# Patient Record
Sex: FEMALE | Race: WHITE | HISPANIC OR LATINO | Employment: OTHER | URBAN - METROPOLITAN AREA
[De-identification: names, ages, dates, MRNs, and addresses within clinical notes are randomized per-mention and may not be internally consistent; named-entity substitution may affect disease eponyms.]

---

## 2017-05-08 ENCOUNTER — OFFICE VISIT (OUTPATIENT)
Dept: INTERNAL MEDICINE | Facility: CLINIC | Age: 27
End: 2017-05-08

## 2017-05-08 ENCOUNTER — TELEPHONE (OUTPATIENT)
Dept: INTERNAL MEDICINE | Facility: CLINIC | Age: 27
End: 2017-05-08

## 2017-05-08 VITALS
BODY MASS INDEX: 37.45 KG/M2 | OXYGEN SATURATION: 97 % | HEART RATE: 103 BPM | TEMPERATURE: 97.7 F | DIASTOLIC BLOOD PRESSURE: 78 MMHG | HEIGHT: 65 IN | SYSTOLIC BLOOD PRESSURE: 128 MMHG | WEIGHT: 224.8 LBS

## 2017-05-08 DIAGNOSIS — R63.1 POLYDIPSIA: Primary | ICD-10-CM

## 2017-05-08 DIAGNOSIS — Z86.32 PERSONAL HISTORY OF GESTATIONAL DIABETES: ICD-10-CM

## 2017-05-08 DIAGNOSIS — R53.83 OTHER FATIGUE: ICD-10-CM

## 2017-05-08 DIAGNOSIS — E11.65 TYPE 2 DIABETES MELLITUS WITH HYPERGLYCEMIA, WITHOUT LONG-TERM CURRENT USE OF INSULIN (HCC): ICD-10-CM

## 2017-05-08 DIAGNOSIS — B35.6 TINEA CRURIS: ICD-10-CM

## 2017-05-08 DIAGNOSIS — R42 DIZZINESS: ICD-10-CM

## 2017-05-08 DIAGNOSIS — R74.01 ELEVATED TRANSAMINASE LEVEL: ICD-10-CM

## 2017-05-08 LAB
ALBUMIN SERPL-MCNC: 4.2 G/DL (ref 3.2–4.8)
ALBUMIN/GLOB SERPL: 1.4 G/DL (ref 1.5–2.5)
ALP SERPL-CCNC: 144 U/L (ref 25–100)
ALT SERPL W P-5'-P-CCNC: 78 U/L (ref 7–40)
ANION GAP SERPL CALCULATED.3IONS-SCNC: 8 MMOL/L (ref 3–11)
ARTICHOKE IGE QN: 155 MG/DL (ref 0–130)
AST SERPL-CCNC: 35 U/L (ref 0–33)
BASOPHILS # BLD AUTO: 0.03 10*3/MM3 (ref 0–0.2)
BASOPHILS NFR BLD AUTO: 0.5 % (ref 0–1)
BILIRUB BLD-MCNC: NEGATIVE MG/DL
BILIRUB SERPL-MCNC: 0.4 MG/DL (ref 0.3–1.2)
BUN BLD-MCNC: 10 MG/DL (ref 9–23)
BUN/CREAT SERPL: 12.5 (ref 7–25)
CALCIUM SPEC-SCNC: 9.8 MG/DL (ref 8.7–10.4)
CHLORIDE SERPL-SCNC: 105 MMOL/L (ref 99–109)
CHOLEST SERPL-MCNC: 201 MG/DL (ref 0–200)
CLARITY, POC: CLEAR
CO2 SERPL-SCNC: 28 MMOL/L (ref 20–31)
COLOR UR: YELLOW
CREAT BLD-MCNC: 0.8 MG/DL (ref 0.6–1.3)
DEPRECATED RDW RBC AUTO: 41.7 FL (ref 37–54)
EOSINOPHIL # BLD AUTO: 0.09 10*3/MM3 (ref 0.1–0.3)
EOSINOPHIL NFR BLD AUTO: 1.6 % (ref 0–3)
ERYTHROCYTE [DISTWIDTH] IN BLOOD BY AUTOMATED COUNT: 12.8 % (ref 11.3–14.5)
FOLATE SERPL-MCNC: 23.55 NG/ML (ref 3.2–20)
GFR SERPL CREATININE-BSD FRML MDRD: 85 ML/MIN/1.73
GLOBULIN UR ELPH-MCNC: 3.1 GM/DL
GLUCOSE BLD-MCNC: 411 MG/DL (ref 70–100)
GLUCOSE BLDC GLUCOMTR-MCNC: ABNORMAL MG/DL (ref 70–130)
GLUCOSE UR STRIP-MCNC: ABNORMAL MG/DL
HAV IGM SERPL QL IA: NORMAL
HBA1C MFR BLD: 10.8 %
HBV CORE IGM SERPL QL IA: NORMAL
HBV SURFACE AG SERPL QL IA: NORMAL
HCT VFR BLD AUTO: 43.1 % (ref 34.5–44)
HCV AB SER DONR QL: NORMAL
HDLC SERPL-MCNC: 48 MG/DL (ref 40–60)
HGB BLD-MCNC: 14.7 G/DL (ref 11.5–15.5)
IMM GRANULOCYTES # BLD: 0.02 10*3/MM3 (ref 0–0.03)
IMM GRANULOCYTES NFR BLD: 0.3 % (ref 0–0.6)
KETONES UR QL: NEGATIVE
LEUKOCYTE EST, POC: NEGATIVE
LYMPHOCYTES # BLD AUTO: 2.12 10*3/MM3 (ref 0.6–4.8)
LYMPHOCYTES NFR BLD AUTO: 36.9 % (ref 24–44)
MCH RBC QN AUTO: 30.4 PG (ref 27–31)
MCHC RBC AUTO-ENTMCNC: 34.1 G/DL (ref 32–36)
MCV RBC AUTO: 89 FL (ref 80–99)
MONOCYTES # BLD AUTO: 0.42 10*3/MM3 (ref 0–1)
MONOCYTES NFR BLD AUTO: 7.3 % (ref 0–12)
NEUTROPHILS # BLD AUTO: 3.06 10*3/MM3 (ref 1.5–8.3)
NEUTROPHILS NFR BLD AUTO: 53.4 % (ref 41–71)
NITRITE UR-MCNC: NEGATIVE MG/ML
PH UR: 6 [PH] (ref 5–8)
PLATELET # BLD AUTO: 161 10*3/MM3 (ref 150–450)
PMV BLD AUTO: 11.9 FL (ref 6–12)
POTASSIUM BLD-SCNC: 4.5 MMOL/L (ref 3.5–5.5)
PROT SERPL-MCNC: 7.3 G/DL (ref 5.7–8.2)
PROT UR STRIP-MCNC: NEGATIVE MG/DL
RBC # BLD AUTO: 4.84 10*6/MM3 (ref 3.89–5.14)
RBC # UR STRIP: NEGATIVE /UL
SODIUM BLD-SCNC: 141 MMOL/L (ref 132–146)
SP GR UR: 1.02 (ref 1–1.03)
T4 FREE SERPL-MCNC: 1.11 NG/DL (ref 0.89–1.76)
TRIGL SERPL-MCNC: 252 MG/DL (ref 0–150)
TSH SERPL DL<=0.05 MIU/L-ACNC: 1.3 MIU/ML (ref 0.35–5.35)
UROBILINOGEN UR QL: NORMAL
VIT B12 BLD-MCNC: 594 PG/ML (ref 211–911)
WBC NRBC COR # BLD: 5.74 10*3/MM3 (ref 3.5–10.8)

## 2017-05-08 PROCEDURE — 80074 ACUTE HEPATITIS PANEL: CPT | Performed by: FAMILY MEDICINE

## 2017-05-08 PROCEDURE — 84439 ASSAY OF FREE THYROXINE: CPT | Performed by: FAMILY MEDICINE

## 2017-05-08 PROCEDURE — 85025 COMPLETE CBC W/AUTO DIFF WBC: CPT | Performed by: FAMILY MEDICINE

## 2017-05-08 PROCEDURE — 81003 URINALYSIS AUTO W/O SCOPE: CPT | Performed by: FAMILY MEDICINE

## 2017-05-08 PROCEDURE — 82607 VITAMIN B-12: CPT | Performed by: FAMILY MEDICINE

## 2017-05-08 PROCEDURE — 84443 ASSAY THYROID STIM HORMONE: CPT | Performed by: FAMILY MEDICINE

## 2017-05-08 PROCEDURE — 82746 ASSAY OF FOLIC ACID SERUM: CPT | Performed by: FAMILY MEDICINE

## 2017-05-08 PROCEDURE — 83036 HEMOGLOBIN GLYCOSYLATED A1C: CPT | Performed by: FAMILY MEDICINE

## 2017-05-08 PROCEDURE — 99203 OFFICE O/P NEW LOW 30 MIN: CPT | Performed by: FAMILY MEDICINE

## 2017-05-08 PROCEDURE — 82962 GLUCOSE BLOOD TEST: CPT | Performed by: FAMILY MEDICINE

## 2017-05-08 PROCEDURE — 80061 LIPID PANEL: CPT | Performed by: FAMILY MEDICINE

## 2017-05-08 PROCEDURE — 80053 COMPREHEN METABOLIC PANEL: CPT | Performed by: FAMILY MEDICINE

## 2017-05-08 RX ORDER — BLOOD-GLUCOSE METER
KIT MISCELLANEOUS
Qty: 1 EACH | Refills: 0 | Status: SHIPPED | OUTPATIENT
Start: 2017-05-08 | End: 2018-06-08 | Stop reason: SDUPTHER

## 2017-05-10 ENCOUNTER — HOSPITAL ENCOUNTER (OUTPATIENT)
Dept: ULTRASOUND IMAGING | Facility: HOSPITAL | Age: 27
Discharge: HOME | End: 2017-05-10

## 2017-05-16 ENCOUNTER — HOSPITAL ENCOUNTER (OUTPATIENT)
Dept: ULTRASOUND IMAGING | Facility: HOSPITAL | Age: 27
Discharge: HOME | End: 2017-05-16
Admitting: FAMILY MEDICINE

## 2017-05-16 DIAGNOSIS — R74.01 ELEVATED TRANSAMINASE LEVEL: ICD-10-CM

## 2017-05-16 PROCEDURE — 76705 ECHO EXAM OF ABDOMEN: CPT

## 2017-05-17 ENCOUNTER — OFFICE VISIT (OUTPATIENT)
Dept: INTERNAL MEDICINE | Facility: CLINIC | Age: 27
End: 2017-05-17

## 2017-05-17 VITALS
WEIGHT: 222.2 LBS | OXYGEN SATURATION: 98 % | HEIGHT: 65 IN | DIASTOLIC BLOOD PRESSURE: 68 MMHG | SYSTOLIC BLOOD PRESSURE: 122 MMHG | BODY MASS INDEX: 37.02 KG/M2 | TEMPERATURE: 97.5 F | HEART RATE: 92 BPM

## 2017-05-17 DIAGNOSIS — IMO0001 UNCONTROLLED TYPE 2 DIABETES MELLITUS WITHOUT COMPLICATION, WITHOUT LONG-TERM CURRENT USE OF INSULIN: Primary | ICD-10-CM

## 2017-05-17 DIAGNOSIS — R19.7 DIARRHEA, UNSPECIFIED TYPE: ICD-10-CM

## 2017-05-17 PROCEDURE — 99213 OFFICE O/P EST LOW 20 MIN: CPT | Performed by: FAMILY MEDICINE

## 2017-05-17 RX ORDER — INSULIN GLARGINE 100 [IU]/ML
10 INJECTION, SOLUTION SUBCUTANEOUS NIGHTLY
Qty: 5 PEN | Refills: 1 | Status: SHIPPED | OUTPATIENT
Start: 2017-05-17 | End: 2017-10-09 | Stop reason: SDUPTHER

## 2017-05-17 RX ORDER — LANCETS 28 GAUGE
EACH MISCELLANEOUS
Qty: 100 EACH | Refills: 12 | Status: SHIPPED | OUTPATIENT
Start: 2017-05-17 | End: 2018-08-16 | Stop reason: SDUPTHER

## 2017-05-17 RX ORDER — PEN NEEDLE, DIABETIC 30 GX3/16"
NEEDLE, DISPOSABLE MISCELLANEOUS
Qty: 100 EACH | Refills: 3 | Status: SHIPPED | OUTPATIENT
Start: 2017-05-17 | End: 2017-07-17 | Stop reason: SDUPTHER

## 2017-05-24 ENCOUNTER — TELEPHONE (OUTPATIENT)
Dept: INTERNAL MEDICINE | Facility: CLINIC | Age: 27
End: 2017-05-24

## 2017-05-24 DIAGNOSIS — E11.65 TYPE 2 DIABETES MELLITUS WITH HYPERGLYCEMIA, WITHOUT LONG-TERM CURRENT USE OF INSULIN (HCC): ICD-10-CM

## 2017-06-07 ENCOUNTER — HOSPITAL ENCOUNTER (OUTPATIENT)
Dept: DIABETES SERVICES | Facility: HOSPITAL | Age: 27
Setting detail: RECURRING SERIES
Discharge: HOME | End: 2017-06-07

## 2017-06-08 ENCOUNTER — HOSPITAL ENCOUNTER (OUTPATIENT)
Dept: DIABETES SERVICES | Facility: HOSPITAL | Age: 27
Setting detail: RECURRING SERIES
Discharge: HOME | End: 2017-06-08

## 2017-06-08 PROCEDURE — G0109 DIAB MANAGE TRN IND/GROUP: HCPCS | Performed by: DIETITIAN, REGISTERED

## 2017-06-27 ENCOUNTER — OFFICE VISIT (OUTPATIENT)
Dept: INTERNAL MEDICINE | Facility: CLINIC | Age: 27
End: 2017-06-27

## 2017-06-27 VITALS
WEIGHT: 222.8 LBS | SYSTOLIC BLOOD PRESSURE: 120 MMHG | HEIGHT: 65 IN | TEMPERATURE: 98.3 F | OXYGEN SATURATION: 98 % | BODY MASS INDEX: 37.12 KG/M2 | HEART RATE: 99 BPM | DIASTOLIC BLOOD PRESSURE: 72 MMHG

## 2017-06-27 DIAGNOSIS — B35.2 TINEA MANUS: Primary | ICD-10-CM

## 2017-06-27 DIAGNOSIS — E11.65 TYPE 2 DIABETES MELLITUS WITH HYPERGLYCEMIA, WITH LONG-TERM CURRENT USE OF INSULIN (HCC): ICD-10-CM

## 2017-06-27 DIAGNOSIS — Z79.4 TYPE 2 DIABETES MELLITUS WITH HYPERGLYCEMIA, WITH LONG-TERM CURRENT USE OF INSULIN (HCC): ICD-10-CM

## 2017-06-27 PROBLEM — R42 DIZZINESS: Status: RESOLVED | Noted: 2017-05-08 | Resolved: 2017-06-27

## 2017-06-27 PROCEDURE — 99214 OFFICE O/P EST MOD 30 MIN: CPT | Performed by: FAMILY MEDICINE

## 2017-06-27 RX ORDER — LORATADINE 10 MG/1
10 TABLET ORAL DAILY
Qty: 30 TABLET | Refills: 3 | Status: SHIPPED | OUTPATIENT
Start: 2017-06-27 | End: 2018-04-11 | Stop reason: SDUPTHER

## 2017-06-27 RX ORDER — KETOCONAZOLE 20 MG/G
CREAM TOPICAL EVERY 12 HOURS SCHEDULED
Qty: 30 G | Refills: 0 | Status: SHIPPED | OUTPATIENT
Start: 2017-06-27 | End: 2017-07-17 | Stop reason: SDUPTHER

## 2017-06-27 NOTE — PROGRESS NOTES
"Subjective   Shelleybruce Graff is a 27 y.o. female.     Chief Complaint   Patient presents with   • Diabetes     1 month follow up.  Went to diabetes class   • Dizziness     dizziness is better       Visit Vitals   • /72   • Pulse 99   • Temp 98.3 °F (36.8 °C)   • Ht 64.8\" (164.6 cm)   • Wt 222 lb 12.8 oz (101 kg)   • LMP  (LMP Unknown)  Comment: Mirena   • SpO2 98%   • BMI 37.31 kg/m2       Diabetes   She presents for her follow-up diabetic visit. She has type 2 diabetes mellitus. Her disease course has been improving. There are no hypoglycemic associated symptoms. Pertinent negatives for hypoglycemia include no dizziness, headaches or nervousness/anxiousness. Pertinent negatives for diabetes include no blurred vision, no chest pain, no fatigue, no foot paresthesias, no foot ulcerations, no polydipsia, no polyphagia, no polyuria, no visual change, no weakness and no weight loss. There are no hypoglycemic complications. Symptoms are improving. Pertinent negatives for diabetic complications include no CVA, heart disease, nephropathy, peripheral neuropathy, PVD or retinopathy. Risk factors for coronary artery disease include diabetes mellitus. Current diabetic treatment includes diet, oral agent (monotherapy) and insulin injections. Her weight is stable. She is following a diabetic diet. Meal planning includes avoidance of concentrated sweets and carbohydrate counting. Her home blood glucose trend is decreasing rapidly. An ACE inhibitor/angiotensin II receptor blocker is not being taken. She does not see a podiatrist.Eye exam is not current.   Dizziness   The problem has been resolved. Pertinent negatives include no abdominal pain, anorexia, arthralgias, change in bowel habit, chest pain, chills, congestion, coughing, diaphoresis, fatigue, fever, headaches, joint swelling, myalgias, nausea, neck pain, numbness, rash, sore throat, swollen glands, urinary symptoms, vertigo, visual change, vomiting or " weakness. Nothing aggravates the symptoms. Treatments tried: control of diabetes. The treatment provided significant relief.        Pt ate watermelon last night and BS was 178.  Blood sugar in am 150 up to 180.  Pt has gone to 2 diabetic classes.   Pt's boss is giving her grief about going to classes.  Pt stopped breast feeding 2 weeks ago.     Pt has scaly rash on right hand    The following portions of the patient's history were reviewed and updated as appropriate: allergies, current medications, past family history, past medical history, past social history, past surgical history and problem list.    Past Medical History:   Diagnosis Date   • Gestational diabetes    • Pap smear for cervical cancer screening 2017       Past Surgical History:   Procedure Laterality Date   • CERVICAL CONE BIOPSY      age 14-benign   •  SECTION  , 08, 11/6/15    x3 5/15/13       No Known Allergies    Family History   Problem Relation Age of Onset   • Stroke Mother    • Heart disease Mother    • Diabetes Mother    • Obesity Mother    • Hypertension Mother        Social History     Social History   • Marital status:      Spouse name: N/A   • Number of children: N/A   • Years of education: N/A     Occupational History   • Not on file.     Social History Main Topics   • Smoking status: Former Smoker     Packs/day: 2.00     Years: 2.00     Quit date: 2012   • Smokeless tobacco: Never Used   • Alcohol use No   • Drug use: No   • Sexual activity: Yes     Partners: Male     Birth control/ protection: IUD      Comment:      Other Topics Concern   • Not on file     Social History Narrative       Review of Systems   Constitutional: Negative.  Negative for chills, diaphoresis, fatigue, fever and weight loss.   HENT: Positive for postnasal drip, rhinorrhea and sinus pressure. Negative for congestion, ear pain, nosebleeds, sneezing and sore throat.    Eyes: Negative.  Negative for blurred vision, redness and  itching.   Respiratory: Negative.  Negative for cough, shortness of breath and wheezing.    Cardiovascular: Negative.  Negative for chest pain and palpitations.   Gastrointestinal: Negative.  Negative for abdominal pain, anorexia, change in bowel habit, constipation, diarrhea, nausea and vomiting.   Endocrine: Negative.  Negative for cold intolerance, heat intolerance, polydipsia, polyphagia and polyuria.   Genitourinary: Negative.  Negative for dysuria, frequency, hematuria and urgency.   Musculoskeletal: Negative.  Negative for arthralgias, back pain, joint swelling, myalgias and neck pain.   Skin: Negative.  Negative for color change and rash.   Allergic/Immunologic: Positive for environmental allergies.   Neurological: Negative for dizziness, vertigo, syncope, weakness, light-headedness, numbness and headaches.   Hematological: Negative.  Negative for adenopathy. Does not bruise/bleed easily.   Psychiatric/Behavioral: Negative.  Negative for dysphoric mood. The patient is not nervous/anxious.        Objective   Physical Exam   Constitutional: She is oriented to person, place, and time. She appears well-developed.   HENT:   Head: Normocephalic.   Right Ear: External ear normal.   Left Ear: External ear normal.   Nose: Nose normal.   Eyes: Conjunctivae and EOM are normal. Pupils are equal, round, and reactive to light.   Neck: Normal range of motion. Neck supple.   Cardiovascular: Normal rate and regular rhythm.    No murmur heard.  Pulmonary/Chest: Effort normal and breath sounds normal.   Musculoskeletal: Normal range of motion.    Shelley had a diabetic foot exam performed (normal) today.   During the foot exam she had a monofilament test performed (normal).    Vascular Status -  Her exam exhibits right foot vasculature normal. Her exam exhibits no right foot edema. Her exam exhibits left foot vasculature normal. Her exam exhibits no left foot edema.   Skin Integrity  -  Her right foot skin is intact.     Shelley  's left foot skin is intact. .  Neurological: She is alert and oriented to person, place, and time.   Skin: Skin is warm and dry. Rash noted.        Psychiatric: She has a normal mood and affect. Her behavior is normal.   Nursing note and vitals reviewed.      Assessment/Plan   Shelley was seen today for diabetes and dizziness.    Diagnoses and all orders for this visit:    Tinea manus    Type 2 diabetes mellitus with hyperglycemia, with long-term current use of insulin  -     metFORMIN (GLUCOPHAGE) 1000 MG tablet; Take 1 tablet by mouth 2 (Two) Times a Day With Meals.  -     SITagliptin (JANUVIA) 50 MG tablet; Take 1 tablet by mouth Daily.    Other orders  -     loratadine (CLARITIN) 10 MG tablet; Take 1 tablet by mouth Daily.  -     ketoconazole (NIZORAL) 2 % cream; Apply  topically Every 12 (Twelve) Hours.        Cut back on insulin by 2 units if sugar is under 100 every 3 days.            Current Outpatient Prescriptions:   •  glucose blood test strip, Used to check blood sugar three times daily for diabetes E11.9., Disp: 100 each, Rfl: 12  •  glucose monitoring kit (FREESTYLE) monitoring kit, Use daily and prn, Disp: 1 each, Rfl: 0  •  Insulin Glargine (BASAGLAR KWIKPEN) 100 UNIT/ML injection pen, Inject 10 Units under the skin Every Night., Disp: 5 pen, Rfl: 1  •  Insulin Pen Needle (PEN NEEDLES) 31G X 8 MM misc, Use daily with insulin E11.65, Disp: 100 each, Rfl: 3  •  Lancets (FREESTYLE) lancets, Use to check blood sugar for Diabetes E11.9., Disp: 100 each, Rfl: 12  •  levonorgestrel (MIRENA) 20 MCG/24HR IUD, 1 each by Intrauterine route 1 (One) Time., Disp: , Rfl:   •  metFORMIN (GLUCOPHAGE) 1000 MG tablet, Take 1 tablet by mouth 2 (Two) Times a Day With Meals., Disp: 60 tablet, Rfl: 1  •  ketoconazole (NIZORAL) 2 % cream, Apply  topically Every 12 (Twelve) Hours., Disp: 30 g, Rfl: 0  •  loratadine (CLARITIN) 10 MG tablet, Take 1 tablet by mouth Daily., Disp: 30 tablet, Rfl: 3  •  SITagliptin (JANUVIA) 50 MG  tablet, Take 1 tablet by mouth Daily., Disp: 30 tablet, Rfl: 1    Return in about 4 weeks (around 7/25/2017), or if symptoms worsen or fail to improve, for Recheck.

## 2017-07-17 ENCOUNTER — TELEPHONE (OUTPATIENT)
Dept: INTERNAL MEDICINE | Facility: CLINIC | Age: 27
End: 2017-07-17

## 2017-07-17 DIAGNOSIS — Z79.4 TYPE 2 DIABETES MELLITUS WITH HYPERGLYCEMIA, WITH LONG-TERM CURRENT USE OF INSULIN (HCC): ICD-10-CM

## 2017-07-17 DIAGNOSIS — E11.65 TYPE 2 DIABETES MELLITUS WITH HYPERGLYCEMIA, WITH LONG-TERM CURRENT USE OF INSULIN (HCC): ICD-10-CM

## 2017-07-17 RX ORDER — KETOCONAZOLE 20 MG/G
CREAM TOPICAL EVERY 12 HOURS SCHEDULED
Qty: 30 G | Refills: 0 | Status: SHIPPED | OUTPATIENT
Start: 2017-07-17 | End: 2017-11-08

## 2017-07-17 RX ORDER — PEN NEEDLE, DIABETIC 30 GX3/16"
NEEDLE, DISPOSABLE MISCELLANEOUS
Qty: 100 EACH | Refills: 3 | Status: SHIPPED | OUTPATIENT
Start: 2017-07-17 | End: 2017-11-08

## 2017-07-17 NOTE — TELEPHONE ENCOUNTER
PT NEEDS DR LEVY TO CALL IN SCRIPT TP JANUVIA 50MG CLARITIN, NIZORAL AND ANTIBIOTIC THAT GOES WITH CREAM AND SHE NEEDS PEN NEEDS SENT SID MORGAN

## 2017-07-28 ENCOUNTER — OFFICE VISIT (OUTPATIENT)
Dept: INTERNAL MEDICINE | Facility: CLINIC | Age: 27
End: 2017-07-28

## 2017-07-28 VITALS
SYSTOLIC BLOOD PRESSURE: 120 MMHG | DIASTOLIC BLOOD PRESSURE: 70 MMHG | HEART RATE: 105 BPM | TEMPERATURE: 97.2 F | WEIGHT: 221.8 LBS | HEIGHT: 65 IN | BODY MASS INDEX: 36.96 KG/M2 | OXYGEN SATURATION: 98 %

## 2017-07-28 DIAGNOSIS — Z79.4 TYPE 2 DIABETES MELLITUS WITH HYPERGLYCEMIA, WITH LONG-TERM CURRENT USE OF INSULIN (HCC): ICD-10-CM

## 2017-07-28 DIAGNOSIS — Z79.899 ENCOUNTER FOR LONG-TERM CURRENT USE OF MEDICATION: ICD-10-CM

## 2017-07-28 DIAGNOSIS — E11.65 TYPE 2 DIABETES MELLITUS WITH HYPERGLYCEMIA, WITH LONG-TERM CURRENT USE OF INSULIN (HCC): ICD-10-CM

## 2017-07-28 LAB
AMPHET+METHAMPHET UR QL: NEGATIVE
AMPHETAMINES UR QL: NEGATIVE
BARBITURATES UR QL SCN: NEGATIVE
BENZODIAZ UR QL SCN: NEGATIVE
BUPRENORPHINE SERPL-MCNC: NEGATIVE NG/ML
CANNABINOIDS SERPL QL: NEGATIVE
COCAINE UR QL: NEGATIVE
METHADONE UR QL SCN: NEGATIVE
OPIATES UR QL: NEGATIVE
OXYCODONE UR QL SCN: NEGATIVE
PCP UR QL SCN: NEGATIVE
PROPOXYPH UR QL: NEGATIVE
TRICYCLICS UR QL SCN: NEGATIVE

## 2017-07-28 PROCEDURE — 80306 DRUG TEST PRSMV INSTRMNT: CPT | Performed by: FAMILY MEDICINE

## 2017-07-28 PROCEDURE — 99214 OFFICE O/P EST MOD 30 MIN: CPT | Performed by: FAMILY MEDICINE

## 2017-07-28 PROCEDURE — 82570 ASSAY OF URINE CREATININE: CPT | Performed by: FAMILY MEDICINE

## 2017-07-28 PROCEDURE — 82043 UR ALBUMIN QUANTITATIVE: CPT | Performed by: FAMILY MEDICINE

## 2017-07-28 RX ORDER — PHENTERMINE HYDROCHLORIDE 37.5 MG/1
37.5 TABLET ORAL
Qty: 30 TABLET | Refills: 0 | Status: SHIPPED | OUTPATIENT
Start: 2017-07-28 | End: 2017-08-30 | Stop reason: SDUPTHER

## 2017-07-28 RX ORDER — PHENTERMINE HYDROCHLORIDE 37.5 MG/1
37.5 TABLET ORAL
Qty: 30 TABLET | Refills: 0 | Status: SHIPPED | OUTPATIENT
Start: 2017-07-28 | End: 2017-07-28 | Stop reason: SDUPTHER

## 2017-07-28 NOTE — PROGRESS NOTES
"Subjective   Shelley Graff is a 27 y.o. female.     Chief Complaint   Patient presents with   • Diabetes     1 month follow up   • Dizziness       Visit Vitals   • /70   • Pulse 105   • Temp 97.2 °F (36.2 °C)   • Ht 64.8\" (164.6 cm)   • Wt 221 lb 12.8 oz (101 kg)   • LMP  (LMP Unknown)  Comment: Mirena   • SpO2 98%   • BMI 37.14 kg/m2       Diabetes   She presents for her follow-up diabetic visit. She has type 2 diabetes mellitus. Hypoglycemia symptoms include dizziness. Pertinent negatives for hypoglycemia include no headaches or nervousness/anxiousness. Pertinent negatives for diabetes include no blurred vision, no chest pain, no fatigue, no foot paresthesias, no foot ulcerations, no polydipsia, no polyphagia, no polyuria, no visual change, no weakness and no weight loss. There are no hypoglycemic complications. Pertinent negatives for diabetic complications include no autonomic neuropathy, CVA, heart disease, nephropathy, peripheral neuropathy, PVD or retinopathy. Risk factors for coronary artery disease include diabetes mellitus. Current diabetic treatment includes insulin injections and oral agent (dual therapy). She is compliant with treatment all of the time. Her weight is stable. She is following a generally healthy diet. Meal planning includes avoidance of concentrated sweets. She participates in exercise daily. Her home blood glucose trend is decreasing steadily. Her highest blood glucose is 140-180 mg/dl. An ACE inhibitor/angiotensin II receptor blocker is not being taken. Eye exam is not current.   Dizziness   This is a chronic problem. The current episode started more than 1 month ago. The problem occurs rarely. The problem has been resolved. Pertinent negatives include no abdominal pain, anorexia, arthralgias, change in bowel habit, chest pain, chills, congestion, coughing, diaphoresis, fatigue, fever, headaches, joint swelling, myalgias, nausea, neck pain, numbness, rash, sore throat, " swollen glands, urinary symptoms, vertigo, visual change, vomiting or weakness. Nothing aggravates the symptoms. She has tried nothing for the symptoms. The treatment provided significant relief.      Maximum sugar is 155, even postprandial.  Pt wants to try phentermine for weight loss.   Pt has friend's who are on this.  Pt has cut back on her carbs.    Pt stopped breast feeding 1 month ago    The following portions of the patient's history were reviewed and updated as appropriate: allergies, current medications, past family history, past medical history, past social history, past surgical history and problem list.      Past Medical History:   Diagnosis Date   • Gestational diabetes    • Pap smear for cervical cancer screening 2017       Past Surgical History:   Procedure Laterality Date   • CERVICAL CONE BIOPSY      age 14-benign   •  SECTION  , 08, 11/6/15    x3 5/15/13       No Known Allergies    Family History   Problem Relation Age of Onset   • Stroke Mother    • Heart disease Mother    • Diabetes Mother    • Obesity Mother    • Hypertension Mother        Social History     Social History   • Marital status:      Spouse name: N/A   • Number of children: N/A   • Years of education: N/A     Occupational History   • Not on file.     Social History Main Topics   • Smoking status: Former Smoker     Packs/day: 2.00     Years: 2.00     Quit date: 2012   • Smokeless tobacco: Never Used   • Alcohol use No   • Drug use: No   • Sexual activity: Yes     Partners: Male     Birth control/ protection: IUD      Comment:      Other Topics Concern   • Not on file     Social History Narrative       Review of Systems   Constitutional: Negative.  Negative for chills, diaphoresis, fatigue, fever and weight loss.   HENT: Negative.  Negative for congestion, ear pain, nosebleeds, postnasal drip, rhinorrhea, sinus pressure, sneezing and sore throat.    Eyes: Negative.  Negative for blurred vision,  redness and itching.   Respiratory: Negative.  Negative for cough, shortness of breath and wheezing.    Cardiovascular: Negative.  Negative for chest pain and palpitations.   Gastrointestinal: Negative.  Negative for abdominal pain, anorexia, change in bowel habit, constipation, diarrhea, nausea and vomiting.   Endocrine: Negative.  Negative for cold intolerance, heat intolerance, polydipsia, polyphagia and polyuria.   Genitourinary: Negative.  Negative for dysuria, frequency, hematuria and urgency.   Musculoskeletal: Negative.  Negative for arthralgias, back pain, joint swelling, myalgias and neck pain.   Skin: Negative.  Negative for color change and rash.        Rash resolved   Allergic/Immunologic: Negative.  Negative for environmental allergies.   Neurological: Positive for dizziness. Negative for vertigo, syncope, weakness, light-headedness, numbness and headaches.        Dizziness resolved   Hematological: Negative.  Negative for adenopathy. Does not bruise/bleed easily.   Psychiatric/Behavioral: Negative.  Negative for dysphoric mood. The patient is not nervous/anxious.        Objective   Physical Exam   Constitutional: She is oriented to person, place, and time. She appears well-developed.   BMI 37.1   Weight 221#   HENT:   Head: Normocephalic.   Right Ear: External ear normal.   Left Ear: External ear normal.   Nose: Nose normal.   Eyes: Conjunctivae and EOM are normal. Pupils are equal, round, and reactive to light.   Neck: Normal range of motion. Neck supple.   Cardiovascular: Normal rate and regular rhythm.    No murmur heard.  Pulmonary/Chest: Effort normal and breath sounds normal.   Abdominal: Soft. Bowel sounds are normal.   Musculoskeletal: Normal range of motion.   Neurological: She is alert and oriented to person, place, and time.   Skin: Skin is warm and dry.   Psychiatric: She has a normal mood and affect. Her behavior is normal.   Nursing note and vitals reviewed.      Assessment/Plan   Shelley  was seen today for diabetes and dizziness.    Diagnoses and all orders for this visit:    BMI 37.0-37.9, adult  -     Discontinue: phentermine (ADIPEX-P) 37.5 MG tablet; Take 1 tablet by mouth Every Morning Before Breakfast.  -     Discontinue: phentermine (ADIPEX-P) 37.5 MG tablet; Take 1 tablet by mouth Every Morning Before Breakfast.  -     phentermine (ADIPEX-P) 37.5 MG tablet; Take 1 tablet by mouth Every Morning Before Breakfast.    Type 2 diabetes mellitus with hyperglycemia, with long-term current use of insulin  -     metFORMIN (GLUCOPHAGE) 1000 MG tablet; Take 1 tablet by mouth 2 (Two) Times a Day With Meals.  -     Microalbumin / Creatinine Urine Ratio    Encounter for long-term current use of medication  -     Urine Drug Screen                   Current Outpatient Prescriptions:   •  glucose blood test strip, Used to check blood sugar three times daily for diabetes E11.9., Disp: 100 each, Rfl: 12  •  glucose monitoring kit (FREESTYLE) monitoring kit, Use daily and prn, Disp: 1 each, Rfl: 0  •  Insulin Glargine (BASAGLAR KWIKPEN) 100 UNIT/ML injection pen, Inject 10 Units under the skin Every Night., Disp: 5 pen, Rfl: 1  •  Insulin Pen Needle (PEN NEEDLES) 31G X 8 MM misc, Use daily with insulin E11.65, Disp: 100 each, Rfl: 3  •  ketoconazole (NIZORAL) 2 % cream, Apply  topically Every 12 (Twelve) Hours., Disp: 30 g, Rfl: 0  •  Lancets (FREESTYLE) lancets, Use to check blood sugar for Diabetes E11.9., Disp: 100 each, Rfl: 12  •  levonorgestrel (MIRENA) 20 MCG/24HR IUD, 1 each by Intrauterine route 1 (One) Time., Disp: , Rfl:   •  metFORMIN (GLUCOPHAGE) 1000 MG tablet, Take 1 tablet by mouth 2 (Two) Times a Day With Meals., Disp: 60 tablet, Rfl: 3  •  SITagliptin (JANUVIA) 50 MG tablet, Take 1 tablet by mouth Daily., Disp: 30 tablet, Rfl: 1  •  loratadine (CLARITIN) 10 MG tablet, Take 1 tablet by mouth Daily., Disp: 30 tablet, Rfl: 3  •  phentermine (ADIPEX-P) 37.5 MG tablet, Take 1 tablet by mouth Every  Morning Before Breakfast., Disp: 30 tablet, Rfl: 0    Return in about 4 weeks (around 8/25/2017), or if symptoms worsen or fail to improve, for Recheck.    Velasquez report reviewed and is consistent #[id number]    The patient has read and signed the [facility] Controlled Substance Contract.  I will continue to see patient for regular follow up appointments.  They are well controlled on their medication.  VELASQUEZ is updated every 3 months. The patient is aware of the potential for addiction and dependence.

## 2017-07-30 LAB
CREAT 24H UR-MCNC: 182.6 MG/DL
MICROALB/CRT. RATIO UR: 4.1 MG/G CREAT (ref 0–30)
MICROALBUMIN UR-MCNC: 7.4 UG/ML

## 2017-08-02 ENCOUNTER — TELEPHONE (OUTPATIENT)
Dept: INTERNAL MEDICINE | Facility: CLINIC | Age: 27
End: 2017-08-02

## 2017-08-02 NOTE — TELEPHONE ENCOUNTER
Patient called stating that you had left her a message on Monday but I don't see any encounters in here? Please advise. Thanks

## 2017-08-30 ENCOUNTER — OFFICE VISIT (OUTPATIENT)
Dept: INTERNAL MEDICINE | Facility: CLINIC | Age: 27
End: 2017-08-30

## 2017-08-30 VITALS
TEMPERATURE: 96.9 F | HEART RATE: 89 BPM | BODY MASS INDEX: 35.96 KG/M2 | HEIGHT: 65 IN | OXYGEN SATURATION: 98 % | DIASTOLIC BLOOD PRESSURE: 74 MMHG | WEIGHT: 215.8 LBS | SYSTOLIC BLOOD PRESSURE: 106 MMHG

## 2017-08-30 DIAGNOSIS — R74.01 ELEVATED TRANSAMINASE LEVEL: ICD-10-CM

## 2017-08-30 DIAGNOSIS — E78.2 MIXED HYPERLIPIDEMIA: ICD-10-CM

## 2017-08-30 DIAGNOSIS — B35.2 TINEA MANUS: ICD-10-CM

## 2017-08-30 DIAGNOSIS — IMO0001 UNCONTROLLED TYPE 2 DIABETES MELLITUS WITHOUT COMPLICATION, WITH LONG-TERM CURRENT USE OF INSULIN: ICD-10-CM

## 2017-08-30 LAB
ALBUMIN SERPL-MCNC: 4.5 G/DL (ref 3.2–4.8)
ALBUMIN/GLOB SERPL: 1.5 G/DL (ref 1.5–2.5)
ALP SERPL-CCNC: 98 U/L (ref 25–100)
ALT SERPL W P-5'-P-CCNC: 79 U/L (ref 7–40)
ANION GAP SERPL CALCULATED.3IONS-SCNC: 6 MMOL/L (ref 3–11)
ARTICHOKE IGE QN: 111 MG/DL (ref 0–130)
AST SERPL-CCNC: 29 U/L (ref 0–33)
BILIRUB SERPL-MCNC: 0.5 MG/DL (ref 0.3–1.2)
BUN BLD-MCNC: 14 MG/DL (ref 9–23)
BUN/CREAT SERPL: 23.3 (ref 7–25)
CALCIUM SPEC-SCNC: 9.7 MG/DL (ref 8.7–10.4)
CHLORIDE SERPL-SCNC: 105 MMOL/L (ref 99–109)
CHOLEST SERPL-MCNC: 168 MG/DL (ref 0–200)
CO2 SERPL-SCNC: 28 MMOL/L (ref 20–31)
CREAT BLD-MCNC: 0.6 MG/DL (ref 0.6–1.3)
GFR SERPL CREATININE-BSD FRML MDRD: 119 ML/MIN/1.73
GFR SERPL CREATININE-BSD FRML MDRD: 144 ML/MIN/1.73
GLOBULIN UR ELPH-MCNC: 3 GM/DL
GLUCOSE BLD-MCNC: 93 MG/DL (ref 70–100)
HBA1C MFR BLD: 6.3 % (ref 4.8–5.6)
HDLC SERPL-MCNC: 50 MG/DL (ref 40–60)
POTASSIUM BLD-SCNC: 4.1 MMOL/L (ref 3.5–5.5)
PROT SERPL-MCNC: 7.5 G/DL (ref 5.7–8.2)
SODIUM BLD-SCNC: 139 MMOL/L (ref 132–146)
TRIGL SERPL-MCNC: 141 MG/DL (ref 0–150)

## 2017-08-30 PROCEDURE — 80053 COMPREHEN METABOLIC PANEL: CPT | Performed by: FAMILY MEDICINE

## 2017-08-30 PROCEDURE — 99214 OFFICE O/P EST MOD 30 MIN: CPT | Performed by: FAMILY MEDICINE

## 2017-08-30 PROCEDURE — 83036 HEMOGLOBIN GLYCOSYLATED A1C: CPT | Performed by: FAMILY MEDICINE

## 2017-08-30 PROCEDURE — 80061 LIPID PANEL: CPT | Performed by: FAMILY MEDICINE

## 2017-08-30 RX ORDER — CLOTRIMAZOLE 1 %
CREAM (GRAM) TOPICAL 2 TIMES DAILY
Qty: 28 G | Refills: 1 | Status: SHIPPED | OUTPATIENT
Start: 2017-08-30 | End: 2017-11-08

## 2017-08-30 RX ORDER — PHENTERMINE HYDROCHLORIDE 37.5 MG/1
37.5 TABLET ORAL
Qty: 30 TABLET | Refills: 0 | Status: SHIPPED | OUTPATIENT
Start: 2017-08-30 | End: 2017-08-30 | Stop reason: SDUPTHER

## 2017-08-30 RX ORDER — PHENTERMINE HYDROCHLORIDE 37.5 MG/1
37.5 TABLET ORAL
Qty: 30 TABLET | Refills: 0 | Status: SHIPPED | OUTPATIENT
Start: 2017-08-30 | End: 2017-10-09 | Stop reason: SDUPTHER

## 2017-08-30 NOTE — PROGRESS NOTES
"Subjective   Shelley Graff is a 27 y.o. female.     Chief Complaint   Patient presents with   • Diabetes     checking at home and doing well.        Visit Vitals   • /74   • Pulse 89   • Temp 96.9 °F (36.1 °C)   • Ht 64.8\" (164.6 cm)   • Wt 215 lb 12.8 oz (97.9 kg)   • LMP 08/30/2017   • SpO2 98%   • BMI 36.13 kg/m2       Diabetes   She presents for her follow-up diabetic visit. She has type 2 diabetes mellitus. Her disease course has been improving. There are no hypoglycemic associated symptoms. Pertinent negatives for hypoglycemia include no dizziness, headaches or nervousness/anxiousness. Associated symptoms include weight loss. Pertinent negatives for diabetes include no blurred vision, no chest pain, no fatigue, no foot paresthesias, no foot ulcerations, no polydipsia, no polyphagia, no polyuria, no visual change and no weakness. There are no hypoglycemic complications. Symptoms are improving. There are no diabetic complications. Pertinent negatives for diabetic complications include no CVA, heart disease, nephropathy, peripheral neuropathy, PVD or retinopathy. Risk factors for coronary artery disease include diabetes mellitus and obesity. Current diabetic treatment includes insulin injections and oral agent (dual therapy). Her weight is decreasing steadily. She is following a generally healthy diet. Meal planning includes avoidance of concentrated sweets. She participates in exercise daily. Her home blood glucose trend is decreasing steadily. An ACE inhibitor/angiotensin II receptor blocker is not being taken. She does not see a podiatrist.Eye exam is not current.   Obesity   This is a chronic problem. The current episode started more than 1 year ago. The problem occurs constantly. The problem has been gradually improving. Associated symptoms include a rash. Pertinent negatives include no abdominal pain, anorexia, arthralgias, change in bowel habit, chest pain, chills, congestion, coughing, " diaphoresis, fatigue, fever, headaches, joint swelling, myalgias, nausea, neck pain, numbness, sore throat, swollen glands, urinary symptoms, visual change, vomiting or weakness. The symptoms are aggravated by eating. Treatments tried: diet change, phentermine. The treatment provided moderate relief.      Pt has lost 6# and is doing well and feeling since exercising.  Sugar yesterday 132.   Pt has cut her insulin to 13 units.   Pt has changed her diet.     The following portions of the patient's history were reviewed and updated as appropriate: allergies, current medications, past family history, past medical history, past social history, past surgical history and problem list.     Past Medical History:   Diagnosis Date   • Gestational diabetes    • Pap smear for cervical cancer screening 2017       Past Surgical History:   Procedure Laterality Date   • CERVICAL CONE BIOPSY      age 14-benign   •  SECTION  , 08, 11/6/15    x3 5/15/13       No Known Allergies    Family History   Problem Relation Age of Onset   • Stroke Mother    • Heart disease Mother    • Diabetes Mother    • Obesity Mother    • Hypertension Mother        Social History     Social History   • Marital status:      Spouse name: N/A   • Number of children: N/A   • Years of education: N/A     Occupational History   • Not on file.     Social History Main Topics   • Smoking status: Former Smoker     Packs/day: 2.00     Years: 2.00     Quit date: 2012   • Smokeless tobacco: Never Used   • Alcohol use No   • Drug use: No   • Sexual activity: Yes     Partners: Male     Birth control/ protection: IUD      Comment:      Other Topics Concern   • Not on file     Social History Narrative       Review of Systems   Constitutional: Positive for weight loss. Negative for chills, diaphoresis, fatigue and fever.   HENT: Negative.  Negative for congestion, ear pain, nosebleeds, postnasal drip, rhinorrhea, sinus pressure, sneezing and  sore throat.    Eyes: Negative.  Negative for blurred vision, redness and itching.   Respiratory: Negative.  Negative for cough, shortness of breath and wheezing.    Cardiovascular: Negative.  Negative for chest pain and palpitations.   Gastrointestinal: Positive for constipation. Negative for abdominal pain, anorexia, change in bowel habit, diarrhea, nausea and vomiting.   Endocrine: Negative.  Negative for cold intolerance, heat intolerance, polydipsia, polyphagia and polyuria.   Genitourinary: Negative.  Negative for dysuria, frequency, hematuria and urgency.   Musculoskeletal: Negative.  Negative for arthralgias, back pain, joint swelling, myalgias and neck pain.   Skin: Positive for rash. Negative for color change.   Allergic/Immunologic: Negative.  Negative for environmental allergies.   Neurological: Negative.  Negative for dizziness, syncope, weakness, light-headedness, numbness and headaches.   Hematological: Negative.  Negative for adenopathy. Does not bruise/bleed easily.   Psychiatric/Behavioral: Negative.  Negative for dysphoric mood. The patient is not nervous/anxious.        Objective   Physical Exam   Constitutional: She is oriented to person, place, and time. She appears well-developed.   HENT:   Head: Normocephalic.   Right Ear: External ear normal.   Left Ear: External ear normal.   Nose: Nose normal.   Mouth/Throat: Oropharynx is clear and moist.   Eyes: Conjunctivae and EOM are normal. Pupils are equal, round, and reactive to light.   Neck: Normal range of motion. Neck supple.   Cardiovascular: Normal rate and regular rhythm.    No murmur heard.  Pulmonary/Chest: Effort normal and breath sounds normal. No respiratory distress. She has no decreased breath sounds. She has no wheezes. She has no rhonchi. She has no rales. She exhibits no tenderness.   Abdominal: Soft. Bowel sounds are normal. There is no tenderness.   Musculoskeletal: Normal range of motion.   Neurological: She is alert and  oriented to person, place, and time.   Skin: Skin is warm and dry. Rash noted.        Psychiatric: She has a normal mood and affect. Her behavior is normal.   Nursing note and vitals reviewed.      Assessment/Plan   Shelley was seen today for diabetes.    Diagnoses and all orders for this visit:    BMI 36.0-36.9,adult  -     Discontinue: phentermine (ADIPEX-P) 37.5 MG tablet; Take 1 tablet by mouth Every Morning Before Breakfast.  -     phentermine (ADIPEX-P) 37.5 MG tablet; Take 1 tablet by mouth Every Morning Before Breakfast.    Tinea manus  -     clotrimazole (LOTRIMIN) 1 % cream; Apply  topically 2 (Two) Times a Day.    Uncontrolled type 2 diabetes mellitus without complication, with long-term current use of insulin  -     Hemoglobin A1c    Elevated transaminase level  -     Comprehensive Metabolic Panel    Mixed hyperlipidemia  -     Lipid Panel        Continue to decrease insulin as the blood sugar decreases           Current Outpatient Prescriptions:   •  glucose blood test strip, Used to check blood sugar three times daily for diabetes E11.9., Disp: 100 each, Rfl: 12  •  glucose monitoring kit (FREESTYLE) monitoring kit, Use daily and prn, Disp: 1 each, Rfl: 0  •  Insulin Pen Needle (PEN NEEDLES) 31G X 8 MM misc, Use daily with insulin E11.65, Disp: 100 each, Rfl: 3  •  Lancets (FREESTYLE) lancets, Use to check blood sugar for Diabetes E11.9., Disp: 100 each, Rfl: 12  •  levonorgestrel (MIRENA) 20 MCG/24HR IUD, 1 each by Intrauterine route 1 (One) Time., Disp: , Rfl:   •  loratadine (CLARITIN) 10 MG tablet, Take 1 tablet by mouth Daily., Disp: 30 tablet, Rfl: 3  •  metFORMIN (GLUCOPHAGE) 1000 MG tablet, Take 1 tablet by mouth 2 (Two) Times a Day With Meals., Disp: 60 tablet, Rfl: 3  •  phentermine (ADIPEX-P) 37.5 MG tablet, Take 1 tablet by mouth Every Morning Before Breakfast., Disp: 30 tablet, Rfl: 0  •  SITagliptin (JANUVIA) 50 MG tablet, Take 1 tablet by mouth Daily., Disp: 30 tablet, Rfl: 1  •   clotrimazole (LOTRIMIN) 1 % cream, Apply  topically 2 (Two) Times a Day., Disp: 28 g, Rfl: 1  •  Insulin Glargine (BASAGLAR KWIKPEN) 100 UNIT/ML injection pen, Inject 10 Units under the skin Every Night. (Patient taking differently: Inject 13 Units under the skin Every Night.), Disp: 5 pen, Rfl: 1  •  ketoconazole (NIZORAL) 2 % cream, Apply  topically Every 12 (Twelve) Hours., Disp: 30 g, Rfl: 0    Return in about 4 weeks (around 9/27/2017), or if symptoms worsen or fail to improve, for Recheck.

## 2017-08-30 NOTE — PATIENT INSTRUCTIONS
Recuento de calorías para bajar de peso  (Calorie Counting for Weight Loss)  Las calorías son energía que se obtiene de lo que se come y se sonja. El organismo usa esta energía para mantenerlo activo barbie el día. La cantidad de calorías que come tiene incidencia en el peso. Cuando come más calorías de las que el cuerpo necesita, edwina acumula las calorías extra faheem grasa. Cuando come menos calorías de las que el cuerpo necesita, edwina quema grasa para obtener la energía que requiere.  El recuento de calorías es el registro de la cantidad de calorías que come y sonja cada día. Si se asegura de comer menos calorías de las que el cuerpo necesita, debe bajar de peso. Para que el recuento de calorías funcione, tendrá que comer la cantidad de calorías adecuadas para usted en un día, para bajar david cantidad de peso saludable por semana. David cantidad de peso saludable para bajar por semana suele ser entre 1 y 2 libras (0,5 a 0,9 kg). Un nutricionista puede determinar la cantidad de calorías que necesita por día y sugerirle cómo alcanzar kohler objetivo calórico.   ¿CUÁL ES MI PLAN?  Mi objetivo es comer __________ calorías por día.   Si faheem esta cantidad de calorías por día, matias bajar unas __________ libras por semana.  ¿QUÉ MATIAS SABER ACERCA DEL RECUENTO DE CALORÍAS?  A fin de alcanzar kohler objetivo diario de calorías, tendrá que:  · Averiguar cuántas calorías hay en cada alimento que le gustaría comer. Intente hacerlo antes de comer.  · Decida la cantidad que puede comer del alimento.  · Anote lo que comió y cuántas calorías tenía. Esta tarea se conoce faheem llevar un registro de comidas.  ¿DÓNDE ENCUENTRO INFORMACIÓN SOBRE LAS CALORÍAS?  Es posible encontrar la cantidad de calorías que contiene un alimento en la etiqueta de información nutricional. Tenga en cuenta que toda la información que se incluye en david etiqueta se basa en david porción específica del alimento. Si un alimento no tiene david etiqueta de información  nutricional, intente buscar las calorías en Internet o pida ayuda al nutricionista.  ¿CÓMO DECIDO CUÁNTO COMER?  Para decidir qué cantidad del alimento puede comer, tendrá que tener en cuenta el número de calorías en david porción y el tamaño de david porción. Es posible encontrar estos datos en la etiqueta de información nutricional. Si un alimento no tiene david etiqueta de información nutricional, busque los datos en Internet o pida ayuda al nutricionista.  Recuerde que las calorías se calculan por porción. Si opta por comer más de david porción de un alimento, tendrá que multiplicar las calorías por porción por la cantidad de porciones que planea comer. Por ejemplo, la etiqueta de un envase de pan puede decir que el tamaño de david porción es 1 rodaja, y que david porción tiene 90 calorías. Si come 1 rodaja, habrá comido 90 calorías. Si come 2 rodajas, habrá comido 180 calorías.  ¿CÓMO LLEVO UN REGISTRO DE COMIDAS?  Después de cada comida, registre la siguiente información en el registro de comidas:  · Lo que comió.  · La cantidad que comió.  · La cantidad de calorías que tenía.  · Luego, sume las calorías.  Tenga a mano el registro de comidas, por ejemplo, en un anotador de bolsillo. Otra alternativa es usar david aplicación en el teléfono móvil o un sitio web. Algunos programas calcularán las calorías y le mostrarán la cantidad de calorías que le quedan, cada vez que agregue un alimento al registro.  ¿CUÁLES SON ALGUNOS CONSEJOS PARA EL RECUENTO DE CALORÍAS?  · Use las calorías de los alimentos y las bebidas que lo sacien y no lo dejen con apetito, por ejemplo, sonia secos y mantequillas de sonia secos, verduras, proteínas magras y alimentos con alto contenido de fibra (más de 5 g de fibra por porción).  · Coma alimentos nutritivos y evite las calorías vacías. Las calorías vacías son aquellas que se obtienen de los alimentos o las bebidas que no contienen muchos nutrientes, faheem los dulces y los refrescos. Es mejor comer  david comida nutritiva altamente calórica (faheem un aguacate) que david con pocos nutrientes (faheem david bolsa de patatas fritas).  · Sepa cuántas calorías tienen los alimentos que come con más frecuencia. De edwina modo, no tiene que buscar edwina dato cada vez que los come.  · Esté atento a los alimentos que pueden parecer hipocalóricos, beatriz que en realidad contienen muchas calorías, faheem los productos de panadería, los refrescos y los dulces sin grasa.  · Preste atención a las calorías en las bebidas, faheem los refrescos, las bebidas a base de café, las bebidas con alcohol y los jugos, que contienen muchas calorías, beatriz no le dan saciedad. Opte por las bebidas bajas en calorías, faheem el agua y las bebidas dietéticas.  · Concéntrese en tratar de contar las calorías de los alimentos que tienen la mayor cantidad de calorías. Registrar las calorías de david ensalada que solo contiene hortalizas es menos importante que calcular las de un batido de leche.  · Encuentre un método para controlar las calorías que funcione para usted. Sea creativo. La mayoría de las personas que alcanzan el éxito encuentran métodos para llevar un registro de cuánto comen en un día, incluso si no cuentan cada caloría.  ¿CUÁLES SON ALGUNOS CONSEJOS PARA CONTROLAR LAS PORCIONES?  · Sepa cuántas calorías hay en david porción. [place] lo ayudará a saber cuántas porciones de un alimento determinado puede comer.  · Use david taza medidora para medir los tamaños de las porciones, lo que es muy útil al principio. Con el tiempo, podrá hacer un cálculo estimativo de los tamaños de las porciones de algunos alimentos.  · Dedique tiempo a poner porciones de diferentes alimentos en alfonzo platos, tazones y tazas predilectos, a fin de saber cómo se ve david porción.  · Intente no comer directamente de david bolsa o david caja, ya que esto puede llevarlo a comer en exceso. Ponga la cantidad que le gustaría comer en david taza o un plato, a fin de asegurarse de que está comiendo la  "porción correcta.  · Use platos, vasos y tazones más pequeños para no comer en exceso. Esta es david forma rápida y sencilla de poner en práctica el control de las porciones. Si el plato es más pequeño, le caben menos alimentos.  · Intente no hacer muchas tareas mientras come, dexter alcides televisión o usar la computadora. Si es la hora de comer, siéntese a la serrano y disfrute de la comida. [place] lo ayudará a que empiece a reconocer cuándo está satisfecho. También le permitirá estar más consciente de lo que come y cuánto está comiendo.  ¿CÓMO PUEDO HACER EL RECUENTO DE CALORÍAS CUANDO DEXTER AFUERA?  · Pida porciones más pequeñas o porciones para niños.  · Considere la posibilidad de compartir un plato principal y las guarniciones, en lugar de pedir kohler propio plato principal.  · Si pide kohler propio plato principal, coma solo la mitad. Pida david caja al comienzo de la comida y ponga allí el taryn del plato principal, para no sentir la tentación de comerlo.  · Busque las calorías en el menú. Si se detallan las calorías, elija las opciones que contengan la garcia cantidad.  · Elija platos que incluyan verduras, frutas, cereales integrales, productos lácteos con bajo contenido de grasa y proteínas magras. Centrarse en elegir con inteligencia alimentos de cada dominic de los 5 grupos que puede ayudarlo a seguir por el buen andie en los restaurantes.  · Opte por los alimentos hervidos, asados, cocidos a la mer o al vapor.  · Elija el agua, la leche, el té helado sin azúcar u otras bebidas que no contengan azúcares agregados. Si desea david bebida alcohólica, escoja david opción con menos calorías. Por ejemplo, un tyler normal puede tener hasta 700 calorías, y un vaso de vino tiene unas 150.  · No coma alimentos que contengan mantequilla, estén empanados, fritos o que se sirvan con salsa a base de crema. Generalmente, los alimentos que se etiquetan dexter \"crujientes\" están fritos, a menos que se indique lo contrario.  · Ordene los " aderezos, las salsas y los jarabes aparte, ya que suelen tener muchas calorías; por lo tanto, no los consuma en grandes cantidades.  · Tenga cuidado con las ensaladas. Muchas personas piensan que las ensaladas son david opción saludable, beatriz en muchas cosas, esto no es así. Hay muchas ensaladas que contienen tocino, tamela frito, grandes cantidades de queso, patatas fritas y aderezo. Todos estos productos son altamente calóricos. Si desea david ensalada, elija david de hortalizas y pida lucy a la mer o un filete. Ordene el aderezo aparte o pida aceite de andrews y vinagre o cheryl para aderezar.  · Manuel un cálculo estimativo de la cantidad de porciones que le sirven. Por ejemplo, david porción de arroz cocido equivale a media taza o tiene el tamaño aproximado de un molde de janel, o de media pelota de tenis. Conocer el tamaño de las porciones lo ayudará a estar atento a la cantidad de comida que come en los restaurantes. La lista que sigue le muestra el tamaño de algunas porciones comunes a partir de objetos cotidianos.    1 onza (28 g) = 4 dados apilados.    3 onzas (85 g) = 1 nadine de cartas.    1 cucharadita = 1 dado.    1 cucharada = media pelota de tenis de serrano.    2 cucharadas = 1 pelota de tenis de serrano.    Media taza = 1 pelota de tenis o 1 molde de janel.    1 taza = 1 pelota de béisbol.     Esta información no tiene faheem fin reemplazar el consejo del médico. Asegúrese de hacerle al médico cualquier pregunta que tenga.     Document Released: 04/05/2010 Document Revised: 01/08/2016  [employer] Interactive Patient Education ©2017 [employer]

## 2017-10-09 ENCOUNTER — OFFICE VISIT (OUTPATIENT)
Dept: INTERNAL MEDICINE | Facility: CLINIC | Age: 27
End: 2017-10-09

## 2017-10-09 VITALS
WEIGHT: 210 LBS | OXYGEN SATURATION: 98 % | TEMPERATURE: 97.3 F | HEART RATE: 100 BPM | BODY MASS INDEX: 34.99 KG/M2 | HEIGHT: 65 IN | SYSTOLIC BLOOD PRESSURE: 132 MMHG | DIASTOLIC BLOOD PRESSURE: 74 MMHG

## 2017-10-09 DIAGNOSIS — Z23 NEED FOR INFLUENZA VACCINATION: ICD-10-CM

## 2017-10-09 DIAGNOSIS — Z79.4 TYPE 2 DIABETES MELLITUS WITHOUT COMPLICATION, WITH LONG-TERM CURRENT USE OF INSULIN (HCC): ICD-10-CM

## 2017-10-09 DIAGNOSIS — B35.2 TINEA MANUS: Primary | ICD-10-CM

## 2017-10-09 DIAGNOSIS — E11.9 TYPE 2 DIABETES MELLITUS WITHOUT COMPLICATION, WITH LONG-TERM CURRENT USE OF INSULIN (HCC): ICD-10-CM

## 2017-10-09 PROCEDURE — 90471 IMMUNIZATION ADMIN: CPT | Performed by: FAMILY MEDICINE

## 2017-10-09 PROCEDURE — 90686 IIV4 VACC NO PRSV 0.5 ML IM: CPT | Performed by: FAMILY MEDICINE

## 2017-10-09 PROCEDURE — 99214 OFFICE O/P EST MOD 30 MIN: CPT | Performed by: FAMILY MEDICINE

## 2017-10-09 RX ORDER — PHENTERMINE HYDROCHLORIDE 37.5 MG/1
37.5 TABLET ORAL
Qty: 30 TABLET | Refills: 0 | Status: SHIPPED | OUTPATIENT
Start: 2017-10-09 | End: 2017-11-08

## 2017-10-09 RX ORDER — INSULIN GLARGINE 100 [IU]/ML
8 INJECTION, SOLUTION SUBCUTANEOUS NIGHTLY
Qty: 2 PEN | Refills: 1 | Status: SHIPPED | OUTPATIENT
Start: 2017-10-09 | End: 2017-11-08

## 2017-10-09 NOTE — PROGRESS NOTES
"Subjective   Shelleybruce Graff is a 27 y.o. female.     Chief Complaint   Patient presents with   • Diabetes     3 month follow up   • Weight Loss   • dry skin on hand     dry, peeling area not helped with lotrimin cream       Visit Vitals   • /74   • Pulse 100   • Temp 97.3 °F (36.3 °C)   • Ht 64.8\" (164.6 cm)   • Wt 210 lb (95.3 kg)   • SpO2 98%   • Breastfeeding No   • BMI 35.16 kg/m2       Diabetes   She presents for her follow-up diabetic visit. She has type 2 diabetes mellitus. Her disease course has been improving. Hypoglycemia symptoms include sweats and tremors. Pertinent negatives for hypoglycemia include no dizziness, headaches or nervousness/anxiousness. Associated symptoms include weight loss. Pertinent negatives for diabetes include no blurred vision, no chest pain, no fatigue, no foot paresthesias, no foot ulcerations, no polydipsia, no polyphagia, no polyuria, no visual change and no weakness. There are no hypoglycemic complications. Symptoms are improving. There are no diabetic complications. Pertinent negatives for diabetic complications include no autonomic neuropathy, CVA, heart disease, nephropathy, peripheral neuropathy, PVD or retinopathy. Risk factors for coronary artery disease include dyslipidemia, obesity and diabetes mellitus. Current diabetic treatment includes insulin injections and oral agent (dual therapy). She is compliant with treatment all of the time. Her weight is decreasing steadily. She is following a generally healthy diet. She has not had a previous visit with a dietitian. She participates in exercise daily. Her home blood glucose trend is decreasing steadily. Her breakfast blood glucose range is generally 70-90 mg/dl. Her highest blood glucose is  mg/dl. An ACE inhibitor/angiotensin II receptor blocker is not being taken. She does not see a podiatrist.Eye exam is not current.   Weight Loss   This is a new (working on weight loss) problem. The current " episode started more than 1 month ago. The problem occurs constantly. The problem has been gradually improving. Pertinent negatives include no abdominal pain, anorexia, arthralgias, change in bowel habit, chest pain, chills, congestion, coughing, diaphoresis, fatigue, fever, headaches, joint swelling, myalgias, nausea, neck pain, numbness, rash, sore throat, swollen glands, urinary symptoms, vertigo, visual change, vomiting or weakness. Nothing aggravates the symptoms. Treatments tried: phentermine, diet and exercise. The treatment provided no relief.   Rash   This is a chronic problem. The current episode started more than 1 month ago. The problem is unchanged. The affected locations include the right hand. The rash is characterized by dryness, itchiness and peeling. She was exposed to nothing. Pertinent negatives include no anorexia, congestion, cough, diarrhea, eye pain, facial edema, fatigue, fever, joint pain, nail changes, rhinorrhea, shortness of breath, sore throat or vomiting. Past treatments include anti-itch cream (antifungal). The treatment provided no relief. There is no history of allergies, asthma, eczema or varicella.      Pt has been exercising and changing eating and sugar is rarely over 100.   Pt has cut her basaglar down to 11 and still has some low sugars.   Pt has lost 14#since May.   The following portions of the patient's history were reviewed and updated as appropriate: allergies, current medications, past family history, past medical history, past social history, past surgical history and problem list.    Past Medical History:   Diagnosis Date   • Gestational diabetes    • Pap smear for cervical cancer screening 2017       Past Surgical History:   Procedure Laterality Date   • CERVICAL CONE BIOPSY      age 14-benign   •  SECTION  , 08, 11/6/15    x3 5/15/13       No Known Allergies    Family History   Problem Relation Age of Onset   • Stroke Mother    • Heart disease Mother     • Diabetes Mother    • Obesity Mother    • Hypertension Mother        Social History     Social History   • Marital status:      Spouse name: N/A   • Number of children: N/A   • Years of education: N/A     Occupational History   • Not on file.     Social History Main Topics   • Smoking status: Former Smoker     Packs/day: 2.00     Years: 2.00     Quit date: 07/2012   • Smokeless tobacco: Never Used   • Alcohol use No   • Drug use: No   • Sexual activity: Yes     Partners: Male     Birth control/ protection: IUD      Comment:      Other Topics Concern   • Not on file     Social History Narrative       Review of Systems   Constitutional: Positive for weight loss. Negative for chills, diaphoresis, fatigue and fever.   HENT: Negative.  Negative for congestion, ear pain, nosebleeds, postnasal drip, rhinorrhea, sinus pressure, sneezing and sore throat.    Eyes: Negative.  Negative for blurred vision, pain, redness and itching.   Respiratory: Negative.  Negative for cough, shortness of breath and wheezing.    Cardiovascular: Negative.  Negative for chest pain and palpitations.   Gastrointestinal: Negative.  Negative for abdominal pain, anorexia, change in bowel habit, constipation, diarrhea, nausea and vomiting.   Endocrine: Negative.  Negative for cold intolerance, heat intolerance, polydipsia, polyphagia and polyuria.   Genitourinary: Negative.  Negative for dysuria, frequency, hematuria and urgency.   Musculoskeletal: Negative.  Negative for arthralgias, back pain, joint pain, joint swelling, myalgias and neck pain.   Skin: Negative.  Negative for color change, nail changes and rash.   Allergic/Immunologic: Negative.  Negative for environmental allergies.   Neurological: Positive for tremors. Negative for dizziness, vertigo, syncope, weakness, light-headedness, numbness and headaches.        Tremors when hypoglycemic   Hematological: Negative.  Negative for adenopathy. Does not bruise/bleed easily.    Psychiatric/Behavioral: Negative.  Negative for dysphoric mood. The patient is not nervous/anxious.        Objective   Physical Exam   Constitutional: She is oriented to person, place, and time. She appears well-developed.   HENT:   Head: Normocephalic.   Right Ear: External ear normal.   Left Ear: External ear normal.   Nose: Nose normal.   Mouth/Throat: Oropharynx is clear and moist.   Eyes: Conjunctivae and EOM are normal. Pupils are equal, round, and reactive to light.   Neck: Normal range of motion. Neck supple.   Cardiovascular: Normal rate and regular rhythm.    No murmur heard.  Pulmonary/Chest: Effort normal and breath sounds normal. No respiratory distress. She has no wheezes. She has no rales. She exhibits no tenderness.   Abdominal: Soft. Bowel sounds are normal. There is no tenderness.   Musculoskeletal: Normal range of motion.   Neurological: She is alert and oriented to person, place, and time.   Skin: Skin is warm and dry. Rash noted.        Psychiatric: She has a normal mood and affect. Her behavior is normal.   Nursing note and vitals reviewed.      Assessment/Plan   Shelley was seen today for diabetes, weight loss and dry skin on hand.    Diagnoses and all orders for this visit:    Tinea manus    BMI 35.0-35.9,adult  -     phentermine (ADIPEX-P) 37.5 MG tablet; Take 1 tablet by mouth Every Morning Before Breakfast.    Type 2 diabetes mellitus without complication, with long-term current use of insulin  -     Insulin Glargine (BASAGLAR KWIKPEN) 100 UNIT/ML injection pen; Inject 8 Units under the skin Every Night.    Need for influenza vaccination  -     Flu Vaccine Quad PF 3YR+ (FLUARIX/FLUZONE [id number])    Other orders  -     Crisaborole (EUCRISA) 2 % ointment; Apply 1 dose topically 2 (Two) Times a Day.      Continue to wean insulin           Current Outpatient Prescriptions:   •  glucose blood test strip, Used to check blood sugar three times daily for diabetes E11.9., Disp: 100 each, Rfl:  12  •  glucose monitoring kit (FREESTYLE) monitoring kit, Use daily and prn, Disp: 1 each, Rfl: 0  •  Insulin Glargine (BASAGLAR KWIKPEN) 100 UNIT/ML injection pen, Inject 8 Units under the skin Every Night., Disp: 2 pen, Rfl: 1  •  Insulin Pen Needle (PEN NEEDLES) 31G X 8 MM misc, Use daily with insulin E11.65, Disp: 100 each, Rfl: 3  •  ketoconazole (NIZORAL) 2 % cream, Apply  topically Every 12 (Twelve) Hours., Disp: 30 g, Rfl: 0  •  Lancets (FREESTYLE) lancets, Use to check blood sugar for Diabetes E11.9., Disp: 100 each, Rfl: 12  •  levonorgestrel (MIRENA) 20 MCG/24HR IUD, 1 each by Intrauterine route 1 (One) Time., Disp: , Rfl:   •  loratadine (CLARITIN) 10 MG tablet, Take 1 tablet by mouth Daily., Disp: 30 tablet, Rfl: 3  •  metFORMIN (GLUCOPHAGE) 1000 MG tablet, Take 1 tablet by mouth 2 (Two) Times a Day With Meals., Disp: 60 tablet, Rfl: 3  •  phentermine (ADIPEX-P) 37.5 MG tablet, Take 1 tablet by mouth Every Morning Before Breakfast., Disp: 30 tablet, Rfl: 0  •  SITagliptin (JANUVIA) 50 MG tablet, Take 1 tablet by mouth Daily., Disp: 30 tablet, Rfl: 1  •  clotrimazole (LOTRIMIN) 1 % cream, Apply  topically 2 (Two) Times a Day., Disp: 28 g, Rfl: 1  •  Crisaborole (EUCRISA) 2 % ointment, Apply 1 dose topically 2 (Two) Times a Day., Disp: 10 g, Rfl: 0    Return in about 4 weeks (around 11/6/2017), or if symptoms worsen or fail to improve, for Recheck.

## 2017-11-08 ENCOUNTER — OFFICE VISIT (OUTPATIENT)
Dept: INTERNAL MEDICINE | Facility: CLINIC | Age: 27
End: 2017-11-08

## 2017-11-08 VITALS
OXYGEN SATURATION: 98 % | SYSTOLIC BLOOD PRESSURE: 128 MMHG | TEMPERATURE: 96.9 F | BODY MASS INDEX: 34.99 KG/M2 | WEIGHT: 209 LBS | HEART RATE: 90 BPM | DIASTOLIC BLOOD PRESSURE: 84 MMHG

## 2017-11-08 DIAGNOSIS — L30.9 ECZEMA OF RIGHT HAND: ICD-10-CM

## 2017-11-08 DIAGNOSIS — E11.9 TYPE 2 DIABETES MELLITUS WITHOUT COMPLICATION, WITHOUT LONG-TERM CURRENT USE OF INSULIN (HCC): Primary | ICD-10-CM

## 2017-11-08 PROCEDURE — 99214 OFFICE O/P EST MOD 30 MIN: CPT | Performed by: FAMILY MEDICINE

## 2017-11-08 NOTE — PROGRESS NOTES
Subjective   Shelley Graff is a 27 y.o. female.     Chief Complaint   Patient presents with   • Diabetes     1 month follow up   • tinea manus       Visit Vitals   • /84   • Pulse 90   • Temp 96.9 °F (36.1 °C)   • Wt 209 lb (94.8 kg)   • SpO2 98%   • BMI 34.99 kg/m2       Diabetes   She presents for her follow-up diabetic visit. No MedicAlert identification noted. Her disease course has been improving. There are no hypoglycemic associated symptoms. Pertinent negatives for hypoglycemia include no dizziness, headaches or nervousness/anxiousness. Associated symptoms include weight loss. Pertinent negatives for diabetes include no blurred vision, no chest pain, no fatigue, no foot paresthesias, no foot ulcerations, no polydipsia, no polyphagia, no polyuria, no visual change and no weakness. There are no hypoglycemic complications. Symptoms are improving. There are no diabetic complications. Pertinent negatives for diabetic complications include no autonomic neuropathy, CVA, heart disease, impotence, nephropathy, peripheral neuropathy, PVD or retinopathy. Risk factors for coronary artery disease include diabetes mellitus, family history and obesity. Current diabetic treatment includes oral agent (dual therapy). She is compliant with treatment most of the time. Her weight is stable. She is following a diabetic diet. Meal planning includes avoidance of concentrated sweets. She has not had a previous visit with a dietitian. Her breakfast blood glucose range is generally  mg/dl. Her highest blood glucose is  mg/dl. An ACE inhibitor/angiotensin II receptor blocker is not being taken. She does not see a podiatrist.Eye exam is not current.   Rash   This is a chronic problem. The current episode started more than 1 year ago. The problem is unchanged. The affected locations include the right fingers. The rash is characterized by blistering, redness, itchiness and burning. It is unknown if there was an  exposure to a precipitant. Associated symptoms include rhinorrhea. Pertinent negatives include no anorexia, cough, diarrhea, eye pain, facial edema, fatigue, fever, joint pain, nail changes, shortness of breath, sore throat or vomiting. Past treatments include topical steroids (antifungal). The treatment provided no relief. There is no history of allergies, asthma, eczema or varicella.      Pt has stopped her insulin and sugars are stable.   The following portions of the patient's history were reviewed and updated as appropriate: allergies, current medications, past family history, past medical history, past social history, past surgical history and problem list.     Past Medical History:   Diagnosis Date   • Gestational diabetes    • Pap smear for cervical cancer screening 2017       Past Surgical History:   Procedure Laterality Date   • CERVICAL CONE BIOPSY      age 14-benign   •  SECTION  , 08, 11/6/15    x3 5/15/13       No Known Allergies    Family History   Problem Relation Age of Onset   • Stroke Mother    • Heart disease Mother    • Diabetes Mother    • Obesity Mother    • Hypertension Mother        Social History     Social History   • Marital status:      Spouse name: N/A   • Number of children: N/A   • Years of education: N/A     Occupational History   • Not on file.     Social History Main Topics   • Smoking status: Former Smoker     Packs/day: 2.00     Years: 2.00     Quit date: 2012   • Smokeless tobacco: Never Used   • Alcohol use No   • Drug use: No   • Sexual activity: Yes     Partners: Male     Birth control/ protection: IUD      Comment:      Other Topics Concern   • Not on file     Social History Narrative         Review of Systems   Constitutional: Positive for weight loss. Negative for chills, diaphoresis, fatigue and fever.   HENT: Positive for rhinorrhea. Negative for ear pain, nosebleeds, postnasal drip, sinus pressure, sneezing and sore throat.    Eyes:  Negative.  Negative for blurred vision, pain, redness and itching.   Respiratory: Negative.  Negative for cough, shortness of breath and wheezing.    Cardiovascular: Negative.  Negative for chest pain and palpitations.   Gastrointestinal: Negative.  Negative for abdominal pain, anorexia, constipation, diarrhea, nausea and vomiting.   Endocrine: Negative.  Negative for cold intolerance, heat intolerance, polydipsia, polyphagia and polyuria.   Genitourinary: Negative.  Negative for dysuria, frequency, hematuria, impotence and urgency.   Musculoskeletal: Negative.  Negative for arthralgias, back pain, joint pain and neck pain.   Skin: Positive for rash. Negative for color change and nail changes.   Allergic/Immunologic: Positive for environmental allergies.   Neurological: Negative.  Negative for dizziness, syncope, weakness, light-headedness and headaches.   Hematological: Negative.  Negative for adenopathy. Does not bruise/bleed easily.   Psychiatric/Behavioral: Negative.  Negative for dysphoric mood. The patient is not nervous/anxious.        Objective   Physical Exam   Constitutional: She is oriented to person, place, and time. She appears well-developed.   HENT:   Head: Normocephalic.   Right Ear: External ear normal.   Left Ear: External ear normal.   Nose: Nose normal.   Eyes: Conjunctivae and EOM are normal. Pupils are equal, round, and reactive to light.   Neck: Normal range of motion. Neck supple.   Cardiovascular: Normal rate and regular rhythm.    No murmur heard.  Pulmonary/Chest: Effort normal and breath sounds normal. No respiratory distress. She has no decreased breath sounds. She has no wheezes. She has no rhonchi. She has no rales. She exhibits no tenderness.   Abdominal: Soft. Bowel sounds are normal. There is no tenderness.   Musculoskeletal: Normal range of motion.   Neurological: She is alert and oriented to person, place, and time.   Skin: Skin is warm and dry. Rash noted.   Psychiatric: She has  a normal mood and affect. Her behavior is normal.   Nursing note and vitals reviewed.      Assessment/Plan   Shelley was seen today for diabetes and tinea manus.    Diagnoses and all orders for this visit:    Type 2 diabetes mellitus without complication, without long-term current use of insulin  -     metFORMIN (GLUCOPHAGE) 1000 MG tablet; Take 1 tablet by mouth 2 (Two) Times a Day With Meals.  -     SITagliptin (JANUVIA) 50 MG tablet; Take 1 tablet by mouth Daily.    Eczema of right hand  -     Ambulatory Referral to Dermatology    Other orders  -     mupirocin (BACTROBAN) 2 % ointment; Apply  topically 2 (Two) Times a Day.      D/c insulin  D/c phentermine  Continue oral meds  Tighten diet through the holidays             Current Outpatient Prescriptions:   •  glucose blood test strip, Used to check blood sugar three times daily for diabetes E11.9., Disp: 100 each, Rfl: 12  •  glucose monitoring kit (FREESTYLE) monitoring kit, Use daily and prn, Disp: 1 each, Rfl: 0  •  Lancets (FREESTYLE) lancets, Use to check blood sugar for Diabetes E11.9., Disp: 100 each, Rfl: 12  •  levonorgestrel (MIRENA) 20 MCG/24HR IUD, 1 each by Intrauterine route 1 (One) Time., Disp: , Rfl:   •  loratadine (CLARITIN) 10 MG tablet, Take 1 tablet by mouth Daily., Disp: 30 tablet, Rfl: 3  •  metFORMIN (GLUCOPHAGE) 1000 MG tablet, Take 1 tablet by mouth 2 (Two) Times a Day With Meals., Disp: 60 tablet, Rfl: 3  •  SITagliptin (JANUVIA) 50 MG tablet, Take 1 tablet by mouth Daily., Disp: 30 tablet, Rfl: 3  •  mupirocin (BACTROBAN) 2 % ointment, Apply  topically 2 (Two) Times a Day., Disp: 30 g, Rfl: 0    Return in about 4 weeks (around 12/6/2017), or if symptoms worsen or fail to improve, for Recheck.

## 2017-12-11 ENCOUNTER — TELEPHONE (OUTPATIENT)
Dept: INTERNAL MEDICINE | Facility: CLINIC | Age: 27
End: 2017-12-11

## 2017-12-11 NOTE — TELEPHONE ENCOUNTER
MsChester Wally Graff called needing a call back regarding her Dermatology referral that was entered on Nov. 8,2017, she has not been scheduled. She says her condition is getting worse.

## 2018-01-29 ENCOUNTER — OFFICE VISIT (OUTPATIENT)
Dept: INTERNAL MEDICINE | Facility: CLINIC | Age: 28
End: 2018-01-29

## 2018-01-29 VITALS
HEIGHT: 65 IN | DIASTOLIC BLOOD PRESSURE: 72 MMHG | BODY MASS INDEX: 36.59 KG/M2 | WEIGHT: 219.6 LBS | SYSTOLIC BLOOD PRESSURE: 118 MMHG | TEMPERATURE: 97.8 F

## 2018-01-29 DIAGNOSIS — R35.0 FREQUENCY OF URINATION: ICD-10-CM

## 2018-01-29 DIAGNOSIS — E11.9 TYPE 2 DIABETES MELLITUS WITHOUT COMPLICATION, UNSPECIFIED LONG TERM INSULIN USE STATUS: Primary | ICD-10-CM

## 2018-01-29 DIAGNOSIS — E11.9 TYPE 2 DIABETES MELLITUS WITHOUT COMPLICATION, WITHOUT LONG-TERM CURRENT USE OF INSULIN (HCC): ICD-10-CM

## 2018-01-29 LAB
ALBUMIN SERPL-MCNC: 4.3 G/DL (ref 3.2–4.8)
ALBUMIN/GLOB SERPL: 1.5 G/DL (ref 1.5–2.5)
ALP SERPL-CCNC: 98 U/L (ref 25–100)
ALT SERPL W P-5'-P-CCNC: 86 U/L (ref 7–40)
ANION GAP SERPL CALCULATED.3IONS-SCNC: 6 MMOL/L (ref 3–11)
ARTICHOKE IGE QN: 152 MG/DL (ref 0–130)
AST SERPL-CCNC: 30 U/L (ref 0–33)
BILIRUB BLD-MCNC: NEGATIVE MG/DL
BILIRUB SERPL-MCNC: 0.4 MG/DL (ref 0.3–1.2)
BUN BLD-MCNC: 8 MG/DL (ref 9–23)
BUN/CREAT SERPL: 13.3 (ref 7–25)
CALCIUM SPEC-SCNC: 9.2 MG/DL (ref 8.7–10.4)
CHLORIDE SERPL-SCNC: 103 MMOL/L (ref 99–109)
CHOLEST SERPL-MCNC: 204 MG/DL (ref 0–200)
CLARITY, POC: CLEAR
CO2 SERPL-SCNC: 27 MMOL/L (ref 20–31)
COLOR UR: ABNORMAL
CREAT BLD-MCNC: 0.6 MG/DL (ref 0.6–1.3)
GFR SERPL CREATININE-BSD FRML MDRD: 118 ML/MIN/1.73
GFR SERPL CREATININE-BSD FRML MDRD: 143 ML/MIN/1.73
GLOBULIN UR ELPH-MCNC: 2.9 GM/DL
GLUCOSE BLD-MCNC: 240 MG/DL (ref 70–100)
GLUCOSE UR STRIP-MCNC: ABNORMAL MG/DL
HBA1C MFR BLD: 7.9 %
HDLC SERPL-MCNC: 49 MG/DL (ref 40–60)
KETONES UR QL: NEGATIVE
LEUKOCYTE EST, POC: NEGATIVE
NITRITE UR-MCNC: NEGATIVE MG/ML
PH UR: 6 [PH] (ref 5–8)
POTASSIUM BLD-SCNC: 4.6 MMOL/L (ref 3.5–5.5)
PROT SERPL-MCNC: 7.2 G/DL (ref 5.7–8.2)
PROT UR STRIP-MCNC: NEGATIVE MG/DL
RBC # UR STRIP: NEGATIVE /UL
SODIUM BLD-SCNC: 136 MMOL/L (ref 132–146)
SP GR UR: 1.01 (ref 1–1.03)
TRIGL SERPL-MCNC: 251 MG/DL (ref 0–150)
UROBILINOGEN UR QL: NORMAL

## 2018-01-29 PROCEDURE — 83036 HEMOGLOBIN GLYCOSYLATED A1C: CPT | Performed by: FAMILY MEDICINE

## 2018-01-29 PROCEDURE — 99213 OFFICE O/P EST LOW 20 MIN: CPT | Performed by: FAMILY MEDICINE

## 2018-01-29 PROCEDURE — 80061 LIPID PANEL: CPT | Performed by: FAMILY MEDICINE

## 2018-01-29 PROCEDURE — 80053 COMPREHEN METABOLIC PANEL: CPT | Performed by: FAMILY MEDICINE

## 2018-01-29 PROCEDURE — 87086 URINE CULTURE/COLONY COUNT: CPT | Performed by: FAMILY MEDICINE

## 2018-01-29 NOTE — PROGRESS NOTES
"Jolene Graff is a 28 y.o. female.     Chief Complaint   Patient presents with   • Diabetes     3 month follow up       Visit Vitals   • /72 (BP Location: Right arm)   • Temp 97.8 °F (36.6 °C) (Temporal Artery )   • Ht 164.6 cm (64.8\")   • Wt 99.6 kg (219 lb 9.6 oz)   • BMI 36.77 kg/m2       Diabetes   Her disease course has been worsening. There are no hypoglycemic associated symptoms. Pertinent negatives for hypoglycemia include no dizziness, headaches or nervousness/anxiousness. Associated symptoms include polydipsia. Pertinent negatives for diabetes include no blurred vision, no chest pain, no fatigue, no foot paresthesias, no foot ulcerations, no polyphagia, no polyuria, no visual change, no weakness and no weight loss. There are no hypoglycemic complications. Symptoms are worsening. There are no diabetic complications. Risk factors for coronary artery disease include diabetes mellitus. Current diabetic treatment includes oral agent (dual therapy). She is compliant with treatment some of the time. She is following a generally unhealthy diet. When asked about meal planning, she reported none. She participates in exercise intermittently. Her home blood glucose trend is increasing steadily. Her breakfast blood glucose range is generally 180-200 mg/dl. Her highest blood glucose is >200 mg/dl. An ACE inhibitor/angiotensin II receptor blocker is not being taken. She does not see a podiatrist.Eye exam is not current.      Pt was laid off at work. Pt is eating more.  Pt had sugars up to 300 including this am.     The following portions of the patient's history were reviewed and updated as appropriate: allergies, current medications, past family history, past medical history, past social history, past surgical history and problem list.    Past Medical History:   Diagnosis Date   • Gestational diabetes    • Pap smear for cervical cancer screening 01/2017       Past Surgical History: "   Procedure Laterality Date   • CERVICAL CONE BIOPSY      age 14-benign   •  SECTION  , 08, 11/6/15    x3 5/15/13     No Known Allergies    Family History   Problem Relation Age of Onset   • Stroke Mother    • Heart disease Mother    • Diabetes Mother    • Obesity Mother    • Hypertension Mother      Social History     Social History   • Marital status:      Spouse name: N/A   • Number of children: N/A   • Years of education: N/A     Occupational History   • Not on file.     Social History Main Topics   • Smoking status: Former Smoker     Packs/day: 2.00     Years: 2.00     Quit date: 2012   • Smokeless tobacco: Never Used   • Alcohol use No   • Drug use: No   • Sexual activity: Yes     Partners: Male     Birth control/ protection: IUD      Comment:      Other Topics Concern   • Not on file     Social History Narrative       Review of Systems   Constitutional: Negative.  Negative for chills, diaphoresis, fatigue, fever and weight loss.   HENT: Negative.  Negative for ear pain, nosebleeds, postnasal drip, rhinorrhea, sinus pressure, sneezing and sore throat.    Eyes: Negative.  Negative for blurred vision, redness and itching.   Respiratory: Negative.  Negative for cough, shortness of breath and wheezing.    Cardiovascular: Negative.  Negative for chest pain and palpitations.   Gastrointestinal: Negative.  Negative for abdominal pain, constipation, diarrhea, nausea and vomiting.   Endocrine: Positive for polydipsia. Negative for cold intolerance, heat intolerance, polyphagia and polyuria.   Genitourinary: Positive for vaginal discharge. Negative for dysuria, frequency, hematuria and urgency.   Musculoskeletal: Negative.  Negative for arthralgias, back pain and neck pain.   Skin: Negative.  Negative for color change and rash.   Allergic/Immunologic: Negative.  Negative for environmental allergies.   Neurological: Negative.  Negative for dizziness, syncope, weakness, light-headedness and  headaches.   Hematological: Negative.  Negative for adenopathy. Does not bruise/bleed easily.   Psychiatric/Behavioral: Negative.  Negative for dysphoric mood. The patient is not nervous/anxious.        Objective   Physical Exam   Constitutional: She is oriented to person, place, and time. She appears well-developed.   HENT:   Head: Normocephalic.   Right Ear: External ear normal.   Left Ear: External ear normal.   Nose: Nose normal.   Eyes: Conjunctivae and EOM are normal. Pupils are equal, round, and reactive to light.   Neck: Normal range of motion. Neck supple.   Cardiovascular: Normal rate and regular rhythm.    No murmur heard.  Pulmonary/Chest: Effort normal and breath sounds normal. No respiratory distress. She has no decreased breath sounds. She has no wheezes. She has no rhonchi. She has no rales. She exhibits no tenderness.   Abdominal: Soft. Bowel sounds are normal. There is no tenderness.   Musculoskeletal: Normal range of motion.   Neurological: She is alert and oriented to person, place, and time.   Skin: Skin is warm and dry.   Psychiatric: She has a normal mood and affect. Her behavior is normal.   Nursing note and vitals reviewed.      Assessment/Plan   Shelley was seen today for diabetes.    Diagnoses and all orders for this visit:    Type 2 diabetes mellitus without complication, unspecified long term insulin use status  -     POC Glycosylated Hemoglobin (Hb A1C)  -     Comprehensive Metabolic Panel  -     Lipid Panel    Frequency of urination  -     Urine Culture - Urine, Urine, Clean Catch  -     POC Urinalysis Dipstick, Automated    Type 2 diabetes mellitus without complication, without long-term current use of insulin  -     metFORMIN (GLUCOPHAGE) 1000 MG tablet; Take 1 tablet by mouth 2 (Two) Times a Day With Meals.  -     SITagliptin (JANUVIA) 100 MG tablet; Take 1 tablet by mouth Daily.          Increase Januvia, increase walking         Current Outpatient Prescriptions:   •  glucose blood  test strip, Used to check blood sugar three times daily for diabetes E11.9., Disp: 100 each, Rfl: 12  •  glucose monitoring kit (FREESTYLE) monitoring kit, Use daily and prn, Disp: 1 each, Rfl: 0  •  Lancets (FREESTYLE) lancets, Use to check blood sugar for Diabetes E11.9., Disp: 100 each, Rfl: 12  •  levonorgestrel (MIRENA) 20 MCG/24HR IUD, 1 each by Intrauterine route 1 (One) Time., Disp: , Rfl:   •  loratadine (CLARITIN) 10 MG tablet, Take 1 tablet by mouth Daily., Disp: 30 tablet, Rfl: 3  •  metFORMIN (GLUCOPHAGE) 1000 MG tablet, Take 1 tablet by mouth 2 (Two) Times a Day With Meals., Disp: 60 tablet, Rfl: 3  •  mupirocin (BACTROBAN) 2 % ointment, Apply  topically 2 (Two) Times a Day., Disp: 30 g, Rfl: 0  •  SITagliptin (JANUVIA) 100 MG tablet, Take 1 tablet by mouth Daily., Disp: 30 tablet, Rfl: 3    Return in about 2 weeks (around 2/12/2018), or if symptoms worsen or fail to improve, for Recheck.    Recent Results (from the past 168 hour(s))   POC Glycosylated Hemoglobin (Hb A1C)    Collection Time: 01/29/18  1:57 PM   Result Value Ref Range    Hemoglobin A1C 7.9 %   Comprehensive Metabolic Panel    Collection Time: 01/29/18  3:06 PM   Result Value Ref Range    Glucose 240 (H) 70 - 100 mg/dL    BUN 8 (L) 9 - 23 mg/dL    Creatinine 0.60 0.60 - 1.30 mg/dL    Sodium 136 132 - 146 mmol/L    Potassium 4.6 3.5 - 5.5 mmol/L    Chloride 103 99 - 109 mmol/L    CO2 27.0 20.0 - 31.0 mmol/L    Calcium 9.2 8.7 - 10.4 mg/dL    Total Protein 7.2 5.7 - 8.2 g/dL    Albumin 4.30 3.20 - 4.80 g/dL    ALT (SGPT) 86 (H) 7 - 40 U/L    AST (SGOT) 30 0 - 33 U/L    Alkaline Phosphatase 98 25 - 100 U/L    Total Bilirubin 0.4 0.3 - 1.2 mg/dL    eGFR Non African Amer 118 >60 mL/min/1.73    eGFR  African Amer 143 >60 mL/min/1.73    Globulin 2.9 gm/dL    A/G Ratio 1.5 1.5 - 2.5 g/dL    BUN/Creatinine Ratio 13.3 7.0 - 25.0    Anion Gap 6.0 3.0 - 11.0 mmol/L   Lipid Panel    Collection Time: 01/29/18  3:06 PM   Result Value Ref Range    Total  Cholesterol 204 (H) 0 - 200 mg/dL    Triglycerides 251 (H) 0 - 150 mg/dL    HDL Cholesterol 49 40 - 60 mg/dL    LDL Cholesterol  152 (H) 0 - 130 mg/dL   POC Urinalysis Dipstick, Automated    Collection Time: 01/29/18  3:15 PM   Result Value Ref Range    Color Straw Yellow, Straw, Dark Yellow, Keila    Clarity, UA Clear Clear    Glucose, UA 3+ (A) Negative, 1000 mg/dL (3+) mg/dL    Bilirubin Negative Negative    Ketones, UA Negative Negative    Specific Gravity  1.015 1.005 - 1.030    Blood, UA Negative Negative    pH, Urine 6.0 5.0 - 8.0    Protein, POC Negative Negative mg/dL    Urobilinogen, UA Normal Normal    Leukocytes Negative Negative    Nitrite, UA Negative Negative

## 2018-01-31 LAB
BACTERIA SPEC AEROBE CULT: NORMAL
BACTERIA SPEC AEROBE CULT: NORMAL

## 2018-02-12 ENCOUNTER — OFFICE VISIT (OUTPATIENT)
Dept: INTERNAL MEDICINE | Facility: CLINIC | Age: 28
End: 2018-02-12

## 2018-02-12 VITALS
SYSTOLIC BLOOD PRESSURE: 118 MMHG | TEMPERATURE: 97.3 F | DIASTOLIC BLOOD PRESSURE: 62 MMHG | HEART RATE: 94 BPM | OXYGEN SATURATION: 99 %

## 2018-02-12 DIAGNOSIS — E11.9 TYPE 2 DIABETES MELLITUS WITHOUT COMPLICATION, WITHOUT LONG-TERM CURRENT USE OF INSULIN (HCC): Primary | ICD-10-CM

## 2018-02-12 PROCEDURE — 99212 OFFICE O/P EST SF 10 MIN: CPT | Performed by: FAMILY MEDICINE

## 2018-02-12 NOTE — PROGRESS NOTES
Subjective   Shelley Graff is a 28 y.o. female.     Chief Complaint   Patient presents with   • Med Management       Visit Vitals   • /62   • Pulse 94   • Temp 97.3 °F (36.3 °C)   • SpO2 99%       Diabetes   She presents for her follow-up diabetic visit. She has type 2 diabetes mellitus. Her disease course has been improving. There are no hypoglycemic associated symptoms. Pertinent negatives for hypoglycemia include no dizziness, headaches or nervousness/anxiousness. Pertinent negatives for diabetes include no blurred vision, no chest pain, no fatigue, no foot paresthesias, no foot ulcerations, no polydipsia, no polyphagia, no polyuria, no visual change, no weakness and no weight loss. There are no hypoglycemic complications. Pertinent negatives for diabetic complications include no autonomic neuropathy, CVA, heart disease, impotence, nephropathy, peripheral neuropathy, PVD or retinopathy. Risk factors for coronary artery disease include dyslipidemia and family history. Current diabetic treatment includes oral agent (dual therapy). She is compliant with treatment most of the time. She is following a generally healthy diet. Meal planning includes avoidance of concentrated sweets. She participates in exercise daily. Her breakfast blood glucose range is generally 110-130 mg/dl. Her dinner blood glucose range is generally 140-180 mg/dl. Her highest blood glucose is 180-200 mg/dl.        The following portions of the patient's history were reviewed and updated as appropriate: allergies, current medications, past family history, past medical history, past social history, past surgical history and problem list.     Past Medical History:   Diagnosis Date   • Gestational diabetes    • Pap smear for cervical cancer screening 2017     Past Surgical History:   Procedure Laterality Date   • CERVICAL CONE BIOPSY      age 14-benign   •  SECTION  , 08, 11/6/15    x3 5/15/13     No Known  Allergies    Family History   Problem Relation Age of Onset   • Stroke Mother    • Heart disease Mother    • Diabetes Mother    • Obesity Mother    • Hypertension Mother      Social History     Social History   • Marital status:      Spouse name: N/A   • Number of children: N/A   • Years of education: N/A     Occupational History   • Not on file.     Social History Main Topics   • Smoking status: Former Smoker     Packs/day: 2.00     Years: 2.00     Quit date: 07/2012   • Smokeless tobacco: Never Used   • Alcohol use No   • Drug use: No   • Sexual activity: Yes     Partners: Male     Birth control/ protection: IUD      Comment:      Other Topics Concern   • Not on file     Social History Narrative         Review of Systems   Constitutional: Negative.  Negative for chills, diaphoresis, fatigue, fever and weight loss.   HENT: Negative.  Negative for ear pain, nosebleeds, postnasal drip, rhinorrhea, sinus pressure, sneezing and sore throat.    Eyes: Negative.  Negative for blurred vision, redness and itching.   Respiratory: Negative.  Negative for cough, shortness of breath and wheezing.    Cardiovascular: Negative.  Negative for chest pain and palpitations.   Gastrointestinal: Negative.  Negative for abdominal pain, constipation, diarrhea, nausea and vomiting.   Endocrine: Negative.  Negative for cold intolerance, heat intolerance, polydipsia, polyphagia and polyuria.   Genitourinary: Negative.  Negative for dysuria, frequency, hematuria, impotence and urgency.   Musculoskeletal: Negative.  Negative for arthralgias, back pain and neck pain.   Skin: Negative.  Negative for color change and rash.   Allergic/Immunologic: Negative.  Negative for environmental allergies.   Neurological: Negative.  Negative for dizziness, syncope, weakness, light-headedness and headaches.   Hematological: Negative.  Negative for adenopathy. Does not bruise/bleed easily.   Psychiatric/Behavioral: Negative.  Negative for  dysphoric mood. The patient is not nervous/anxious.        Objective   Physical Exam   Constitutional: She is oriented to person, place, and time. She appears well-developed.   HENT:   Head: Normocephalic.   Right Ear: External ear normal.   Left Ear: External ear normal.   Nose: Nose normal.   Eyes: Conjunctivae and EOM are normal. Pupils are equal, round, and reactive to light.   Neck: Normal range of motion. Neck supple.   Cardiovascular: Normal rate and regular rhythm.    No murmur heard.  Pulmonary/Chest: Effort normal and breath sounds normal. No respiratory distress. She has no decreased breath sounds. She has no wheezes. She has no rhonchi. She has no rales. She exhibits no tenderness.   Abdominal: Soft. Bowel sounds are normal. There is no tenderness.   Musculoskeletal: Normal range of motion.   Neurological: She is alert and oriented to person, place, and time.   Skin: Skin is warm and dry.   Psychiatric: She has a normal mood and affect. Her behavior is normal.   Nursing note and vitals reviewed.      Assessment/Plan   Shelley was seen today for med management.    Diagnoses and all orders for this visit:    Type 2 diabetes mellitus without complication, without long-term current use of insulin      Improved control of DM with diet and exercise             Current Outpatient Prescriptions:   •  glucose blood test strip, Used to check blood sugar three times daily for diabetes E11.9., Disp: 100 each, Rfl: 12  •  glucose monitoring kit (FREESTYLE) monitoring kit, Use daily and prn, Disp: 1 each, Rfl: 0  •  Lancets (FREESTYLE) lancets, Use to check blood sugar for Diabetes E11.9., Disp: 100 each, Rfl: 12  •  levonorgestrel (MIRENA) 20 MCG/24HR IUD, 1 each by Intrauterine route 1 (One) Time., Disp: , Rfl:   •  loratadine (CLARITIN) 10 MG tablet, Take 1 tablet by mouth Daily., Disp: 30 tablet, Rfl: 3  •  metFORMIN (GLUCOPHAGE) 1000 MG tablet, Take 1 tablet by mouth 2 (Two) Times a Day With Meals., Disp: 60  tablet, Rfl: 3  •  mupirocin (BACTROBAN) 2 % ointment, Apply  topically 2 (Two) Times a Day., Disp: 30 g, Rfl: 0  •  SITagliptin (JANUVIA) 100 MG tablet, Take 1 tablet by mouth Daily., Disp: 30 tablet, Rfl: 3    Return in about 1 month (around 3/12/2018), or if symptoms worsen or fail to improve, for Recheck.    Discussed increasing time frame and pt wanted to keep it at a month

## 2018-04-11 ENCOUNTER — OFFICE VISIT (OUTPATIENT)
Dept: INTERNAL MEDICINE | Facility: CLINIC | Age: 28
End: 2018-04-11

## 2018-04-11 VITALS
OXYGEN SATURATION: 98 % | WEIGHT: 225.2 LBS | SYSTOLIC BLOOD PRESSURE: 118 MMHG | TEMPERATURE: 98 F | DIASTOLIC BLOOD PRESSURE: 70 MMHG | BODY MASS INDEX: 37.52 KG/M2 | HEART RATE: 109 BPM | HEIGHT: 65 IN

## 2018-04-11 DIAGNOSIS — E11.9 TYPE 2 DIABETES MELLITUS WITHOUT COMPLICATION, WITHOUT LONG-TERM CURRENT USE OF INSULIN (HCC): ICD-10-CM

## 2018-04-11 PROCEDURE — 99213 OFFICE O/P EST LOW 20 MIN: CPT | Performed by: FAMILY MEDICINE

## 2018-04-11 RX ORDER — LORATADINE 10 MG/1
10 TABLET ORAL DAILY
Qty: 30 TABLET | Refills: 3 | Status: SHIPPED | OUTPATIENT
Start: 2018-04-11 | End: 2018-10-15 | Stop reason: SDUPTHER

## 2018-04-11 NOTE — PROGRESS NOTES
"Jolene Graff is a 28 y.o. female.     Chief Complaint   Patient presents with   • Diabetes     follow up       Visit Vitals  /70   Pulse 109   Temp 98 °F (36.7 °C)   Ht 164.6 cm (64.8\")   Wt 102 kg (225 lb 3.2 oz)   SpO2 98%   BMI 37.70 kg/m²       Diabetes   She presents for her follow-up diabetic visit. She has type 2 diabetes mellitus. Her disease course has been worsening. There are no hypoglycemic associated symptoms. Pertinent negatives for hypoglycemia include no dizziness, headaches or nervousness/anxiousness. Pertinent negatives for diabetes include no blurred vision, no chest pain, no fatigue, no foot paresthesias, no foot ulcerations, no polydipsia, no polyphagia, no polyuria, no visual change, no weakness and no weight loss. There are no hypoglycemic complications. Symptoms are worsening. There are no diabetic complications. Risk factors for coronary artery disease include diabetes mellitus and family history. Current diabetic treatment includes oral agent (dual therapy). She is compliant with treatment most of the time. Her weight is stable. She is following a low fat/cholesterol and generally healthy diet. Meal planning includes avoidance of concentrated sweets. Her breakfast blood glucose range is generally 180-200 mg/dl. Her highest blood glucose is >200 mg/dl.      Fasting sugar 180-190.     The following portions of the patient's history were reviewed and updated as appropriate: allergies, current medications, past family history, past medical history, past social history, past surgical history and problem list.    Past Medical History:   Diagnosis Date   • Gestational diabetes    • Pap smear for cervical cancer screening 2017     Past Surgical History:   Procedure Laterality Date   • CERVICAL CONE BIOPSY      age 14-benign   •  SECTION  , 08, 11/6/15    x3 5/15/13     No Known Allergies  Family History   Problem Relation Age of Onset   • Stroke Mother  "   • Heart disease Mother    • Diabetes Mother    • Obesity Mother    • Hypertension Mother        Social History     Social History   • Marital status:      Spouse name: N/A   • Number of children: N/A   • Years of education: N/A     Occupational History   • Not on file.     Social History Main Topics   • Smoking status: Former Smoker     Packs/day: 2.00     Years: 2.00     Quit date: 07/2012   • Smokeless tobacco: Never Used   • Alcohol use No   • Drug use: No   • Sexual activity: Yes     Partners: Male     Birth control/ protection: IUD      Comment:      Other Topics Concern   • Not on file     Social History Narrative   • No narrative on file       Review of Systems   Constitutional: Negative.  Negative for chills, diaphoresis, fatigue, fever and weight loss.   HENT: Negative.  Negative for ear pain, nosebleeds, postnasal drip, rhinorrhea, sinus pressure, sneezing and sore throat.    Eyes: Negative.  Negative for blurred vision, redness and itching.   Respiratory: Negative.  Negative for cough, shortness of breath and wheezing.    Cardiovascular: Negative.  Negative for chest pain and palpitations.   Gastrointestinal: Negative.  Negative for abdominal pain, constipation, diarrhea, nausea and vomiting.   Endocrine: Negative.  Negative for cold intolerance, heat intolerance, polydipsia, polyphagia and polyuria.   Genitourinary: Negative.  Negative for dysuria, frequency, hematuria and urgency.   Musculoskeletal: Negative.  Negative for arthralgias, back pain and neck pain.   Skin: Negative.  Negative for color change and rash.   Allergic/Immunologic: Negative.  Negative for environmental allergies.   Neurological: Negative.  Negative for dizziness, syncope, weakness, light-headedness and headaches.   Hematological: Negative.  Negative for adenopathy. Does not bruise/bleed easily.   Psychiatric/Behavioral: Negative.  Negative for dysphoric mood. The patient is not nervous/anxious.        Objective    Physical Exam   Constitutional: She is oriented to person, place, and time. She appears well-developed.   HENT:   Head: Normocephalic.   Right Ear: External ear normal.   Left Ear: External ear normal.   Nose: Nose normal.   Eyes: Conjunctivae and EOM are normal. Pupils are equal, round, and reactive to light.   Neck: Trachea normal and normal range of motion. Neck supple. Carotid bruit is not present. No thyroid mass and no thyromegaly present.   Cardiovascular: Normal rate and regular rhythm.    No murmur heard.  Pulmonary/Chest: Effort normal and breath sounds normal. No respiratory distress. She has no decreased breath sounds. She has no wheezes. She has no rhonchi. She has no rales. She exhibits no tenderness.   Abdominal: Soft. Bowel sounds are normal. There is no tenderness.   Musculoskeletal: Normal range of motion.   Neurological: She is alert and oriented to person, place, and time.   Skin: Skin is warm and dry.   Psychiatric: She has a normal mood and affect. Her behavior is normal.   Nursing note and vitals reviewed.      Assessment/Plan   Shelley was seen today for diabetes.    Diagnoses and all orders for this visit:    Type 2 diabetes mellitus without complication, without long-term current use of insulin  -     metFORMIN (GLUCOPHAGE) 1000 MG tablet; Take 1 tablet by mouth 2 (Two) Times a Day With Meals.  -     SITagliptin (JANUVIA) 100 MG tablet; Take 1 tablet by mouth Daily.  -     dapagliflozin (FARXIGA) 5 MG tablet tablet; Take 1 tablet by mouth Daily.    Other orders  -     loratadine (CLARITIN) 10 MG tablet; Take 1 tablet by mouth Daily.      Continue metformin and januvia.  Add farxiga             Current Outpatient Prescriptions:   •  glucose blood test strip, Used to check blood sugar three times daily for diabetes E11.9., Disp: 100 each, Rfl: 12  •  glucose monitoring kit (FREESTYLE) monitoring kit, Use daily and prn, Disp: 1 each, Rfl: 0  •  Lancets (FREESTYLE) lancets, Use to check blood  sugar for Diabetes E11.9., Disp: 100 each, Rfl: 12  •  levonorgestrel (MIRENA) 20 MCG/24HR IUD, 1 each by Intrauterine route 1 (One) Time., Disp: , Rfl:   •  loratadine (CLARITIN) 10 MG tablet, Take 1 tablet by mouth Daily., Disp: 30 tablet, Rfl: 3  •  metFORMIN (GLUCOPHAGE) 1000 MG tablet, Take 1 tablet by mouth 2 (Two) Times a Day With Meals., Disp: 60 tablet, Rfl: 3  •  SITagliptin (JANUVIA) 100 MG tablet, Take 1 tablet by mouth Daily., Disp: 30 tablet, Rfl: 3  •  dapagliflozin (FARXIGA) 5 MG tablet tablet, Take 1 tablet by mouth Daily., Disp: 30 tablet, Rfl: 2    Return in about 4 weeks (around 5/9/2018), or if symptoms worsen or fail to improve, for Recheck.

## 2018-05-07 ENCOUNTER — TELEPHONE (OUTPATIENT)
Dept: INTERNAL MEDICINE | Facility: CLINIC | Age: 28
End: 2018-05-07

## 2018-05-08 ENCOUNTER — OFFICE VISIT (OUTPATIENT)
Dept: INTERNAL MEDICINE | Facility: CLINIC | Age: 28
End: 2018-05-08

## 2018-05-08 VITALS
WEIGHT: 224.6 LBS | SYSTOLIC BLOOD PRESSURE: 118 MMHG | OXYGEN SATURATION: 98 % | TEMPERATURE: 97.5 F | HEIGHT: 65 IN | HEART RATE: 100 BPM | DIASTOLIC BLOOD PRESSURE: 78 MMHG | BODY MASS INDEX: 37.42 KG/M2

## 2018-05-08 DIAGNOSIS — Z79.899 LONG-TERM USE OF HIGH-RISK MEDICATION: ICD-10-CM

## 2018-05-08 DIAGNOSIS — E11.9 TYPE 2 DIABETES MELLITUS WITHOUT COMPLICATION, WITHOUT LONG-TERM CURRENT USE OF INSULIN (HCC): Primary | ICD-10-CM

## 2018-05-08 DIAGNOSIS — E11.65 TYPE 2 DIABETES MELLITUS WITH HYPERGLYCEMIA, WITHOUT LONG-TERM CURRENT USE OF INSULIN (HCC): ICD-10-CM

## 2018-05-08 LAB
AMPHET+METHAMPHET UR QL: NEGATIVE
AMPHETAMINES UR QL: NEGATIVE
BARBITURATES UR QL SCN: NEGATIVE
BENZODIAZ UR QL SCN: NEGATIVE
BUPRENORPHINE SERPL-MCNC: NEGATIVE NG/ML
CANNABINOIDS SERPL QL: NEGATIVE
COCAINE UR QL: NEGATIVE
HBA1C MFR BLD: 8 %
METHADONE UR QL SCN: NEGATIVE
OPIATES UR QL: NEGATIVE
OXYCODONE UR QL SCN: NEGATIVE
PCP UR QL SCN: NEGATIVE
PROPOXYPH UR QL: NEGATIVE
TRICYCLICS UR QL SCN: NEGATIVE

## 2018-05-08 PROCEDURE — 83036 HEMOGLOBIN GLYCOSYLATED A1C: CPT | Performed by: FAMILY MEDICINE

## 2018-05-08 PROCEDURE — 80306 DRUG TEST PRSMV INSTRMNT: CPT | Performed by: FAMILY MEDICINE

## 2018-05-08 PROCEDURE — 99214 OFFICE O/P EST MOD 30 MIN: CPT | Performed by: FAMILY MEDICINE

## 2018-05-08 RX ORDER — PHENTERMINE HYDROCHLORIDE 37.5 MG/1
37.5 TABLET ORAL
Qty: 30 TABLET | Refills: 0 | Status: SHIPPED | OUTPATIENT
Start: 2018-05-08 | End: 2018-06-07 | Stop reason: SDUPTHER

## 2018-05-08 NOTE — PROGRESS NOTES
"Subjective   Shelley Graff is a 28 y.o. female.     Chief Complaint   Patient presents with   • Diabetes     4 week follow up       Visit Vitals  /78   Pulse 100   Temp 97.5 °F (36.4 °C)   Ht 164.6 cm (64.8\")   Wt 102 kg (224 lb 9.6 oz)   SpO2 98%   BMI 37.61 kg/m²         Diabetes   She presents for her follow-up diabetic visit. Her disease course has been stable. There are no hypoglycemic associated symptoms. Pertinent negatives for hypoglycemia include no dizziness, headaches or nervousness/anxiousness. Associated symptoms include polyuria. Pertinent negatives for diabetes include no blurred vision, no chest pain, no fatigue, no foot paresthesias, no foot ulcerations, no polydipsia, no polyphagia, no visual change, no weakness and no weight loss. There are no hypoglycemic complications. Pertinent negatives for diabetic complications include no CVA, heart disease, nephropathy, peripheral neuropathy, PVD or retinopathy. Risk factors for coronary artery disease include diabetes mellitus, family history and obesity. Current diabetic treatment includes oral agent (triple therapy). She is compliant with treatment most of the time. Her weight is stable. She is following a generally healthy diet. Meal planning includes avoidance of concentrated sweets. She participates in exercise daily. There is no change in her home blood glucose trend. An ACE inhibitor/angiotensin II receptor blocker is not being taken. She does not see a podiatrist.Eye exam is current.   Obesity   This is a chronic problem. The current episode started more than 1 year ago. The problem occurs constantly. The problem has been unchanged. Pertinent negatives include no abdominal pain, anorexia, arthralgias, change in bowel habit, chest pain, chills, congestion, coughing, diaphoresis, fatigue, fever, headaches, joint swelling, myalgias, nausea, neck pain, numbness, rash, sore throat, swollen glands, urinary symptoms, vertigo, visual " change, vomiting or weakness. The symptoms are aggravated by eating. Treatments tried: phentermine in past. The treatment provided moderate relief.      Pt would like to try appetite suppression tab.  Pt states that her sugar was staying below 200 with Farxiga, but her insurance does not cover it.   Pt has stopped her soda pops.      The following portions of the patient's history were reviewed and updated as appropriate: allergies, current medications, past family history, past medical history, past social history, past surgical history and problem list.    Past Medical History:   Diagnosis Date   • Gestational diabetes    • Pap smear for cervical cancer screening 2017      Past Surgical History:   Procedure Laterality Date   • CERVICAL CONE BIOPSY      age 14-benign   •  SECTION  , 08, 11/6/15    x3 5/15/13      Family History   Problem Relation Age of Onset   • Stroke Mother    • Heart disease Mother    • Diabetes Mother    • Obesity Mother    • Hypertension Mother       Social History     Social History   • Marital status:      Spouse name: N/A   • Number of children: N/A   • Years of education: N/A     Occupational History   • Not on file.     Social History Main Topics   • Smoking status: Former Smoker     Packs/day: 2.00     Years: 2.00     Quit date: 2012   • Smokeless tobacco: Never Used   • Alcohol use No   • Drug use: No   • Sexual activity: Yes     Partners: Male     Birth control/ protection: IUD      Comment:      Other Topics Concern   • Not on file     Social History Narrative   • No narrative on file        Review of Systems   Constitutional: Negative.  Negative for appetite change, chills, diaphoresis, fatigue, fever and weight loss.   HENT: Negative.  Negative for congestion, ear pain, nosebleeds, postnasal drip, rhinorrhea, sinus pressure, sneezing and sore throat.    Eyes: Negative.  Negative for blurred vision, redness and itching.   Respiratory: Negative.   Negative for cough, shortness of breath and wheezing.    Cardiovascular: Negative.  Negative for chest pain and palpitations.   Gastrointestinal: Negative.  Negative for abdominal pain, anorexia, change in bowel habit, constipation, diarrhea, nausea and vomiting.   Endocrine: Positive for polyuria. Negative for cold intolerance, heat intolerance, polydipsia and polyphagia.   Genitourinary: Positive for frequency. Negative for dysuria, hematuria and urgency.   Musculoskeletal: Negative.  Negative for arthralgias, back pain, joint swelling, myalgias and neck pain.   Skin: Negative.  Negative for color change and rash.   Allergic/Immunologic: Positive for environmental allergies.   Neurological: Negative.  Negative for dizziness, vertigo, syncope, weakness, light-headedness, numbness and headaches.   Hematological: Negative.  Negative for adenopathy. Does not bruise/bleed easily.   Psychiatric/Behavioral: Negative.  Negative for dysphoric mood. The patient is not nervous/anxious.        Objective   Physical Exam   Constitutional: She is oriented to person, place, and time. She appears well-developed.   HENT:   Head: Normocephalic.   Right Ear: External ear normal.   Left Ear: External ear normal.   Nose: Nose normal.   Eyes: Conjunctivae, EOM and lids are normal. Pupils are equal, round, and reactive to light.   Neck: Trachea normal and normal range of motion. Neck supple. Carotid bruit is not present. No thyroid mass and no thyromegaly present.   Cardiovascular: Normal rate and regular rhythm.    No murmur heard.  Pulmonary/Chest: Effort normal and breath sounds normal. No respiratory distress. She has no decreased breath sounds. She has no wheezes. She has no rhonchi. She has no rales. She exhibits no tenderness.   Abdominal: Soft. Bowel sounds are normal. There is no tenderness.   Musculoskeletal: Normal range of motion.   Neurological: She is alert and oriented to person, place, and time.   Skin: Skin is warm and  dry.   Psychiatric: She has a normal mood and affect. Her behavior is normal.   Nursing note and vitals reviewed.      Assessment/Plan   Shelley was seen today for diabetes.    Diagnoses and all orders for this visit:    Type 2 diabetes mellitus without complication, without long-term current use of insulin  -     POC Glycosylated Hemoglobin (Hb A1C)    BMI 37.0-37.9, adult    Long-term use of high-risk medication  -     Urine Drug Screen - Urine, Clean Catch    Other orders  -     phentermine (ADIPEX-P) 37.5 MG tablet; Take 1 tablet by mouth Every Morning Before Breakfast.        [facility]  New Jardiance order sent in earlier today.   Restart phentermine         Current Outpatient Prescriptions:   •  Empagliflozin 10 MG tablet, Take 1 tablet by mouth Daily., Disp: 30 tablet, Rfl: 1  •  glucose monitoring kit (FREESTYLE) monitoring kit, Use daily and prn, Disp: 1 each, Rfl: 0  •  Lancets (FREESTYLE) lancets, Use to check blood sugar for Diabetes E11.9., Disp: 100 each, Rfl: 12  •  levonorgestrel (MIRENA) 20 MCG/24HR IUD, 1 each by Intrauterine route 1 (One) Time., Disp: , Rfl:   •  loratadine (CLARITIN) 10 MG tablet, Take 1 tablet by mouth Daily., Disp: 30 tablet, Rfl: 3  •  metFORMIN (GLUCOPHAGE) 1000 MG tablet, Take 1 tablet by mouth 2 (Two) Times a Day With Meals., Disp: 60 tablet, Rfl: 3  •  SITagliptin (JANUVIA) 100 MG tablet, Take 1 tablet by mouth Daily., Disp: 30 tablet, Rfl: 3  •  glucose blood test strip, Used to check blood sugar 3-4 daily for diabetes E11.9., Disp: 125 each, Rfl: 12  •  phentermine (ADIPEX-P) 37.5 MG tablet, Take 1 tablet by mouth Every Morning Before Breakfast., Disp: 30 tablet, Rfl: 0    Return in about 4 weeks (around 6/5/2018), or if symptoms worsen or fail to improve, for Annual physical, Recheck.    Recent Results (from the past 168 hour(s))   Urine Drug Screen - Urine, Clean Catch    Collection Time: 05/08/18  8:42 AM   Result Value  Ref Range    THC, Screen, Urine Negative Negative    Phencyclidine (PCP), Urine Negative Negative    Cocaine Screen, Urine Negative Negative    Methamphetamine, Urine Negative Negative    Opiate Screen Negative Negative    Amphetamine Screen, Urine Negative Negative    Benzodiazepine Screen, Urine Negative Negative    Tricyclic Antidepressants Screen Negative Negative    Methadone Screen, Urine Negative Negative    Barbiturates Screen, Urine Negative Negative    Oxycodone Screen, Urine Negative Negative    Propoxyphene Screen Negative Negative    Buprenorphine, Screen, Urine Negative Negative   POC Glycosylated Hemoglobin (Hb A1C)    Collection Time: 05/08/18  9:02 AM   Result Value Ref Range    Hemoglobin A1C 8.0 %

## 2018-06-07 ENCOUNTER — OFFICE VISIT (OUTPATIENT)
Dept: INTERNAL MEDICINE | Facility: CLINIC | Age: 28
End: 2018-06-07

## 2018-06-07 VITALS
WEIGHT: 213 LBS | DIASTOLIC BLOOD PRESSURE: 82 MMHG | OXYGEN SATURATION: 98 % | TEMPERATURE: 97.6 F | BODY MASS INDEX: 35.49 KG/M2 | HEART RATE: 102 BPM | HEIGHT: 65 IN | SYSTOLIC BLOOD PRESSURE: 108 MMHG

## 2018-06-07 DIAGNOSIS — R74.01 ELEVATED TRANSAMINASE LEVEL: ICD-10-CM

## 2018-06-07 DIAGNOSIS — Z00.00 ANNUAL PHYSICAL EXAM: Primary | ICD-10-CM

## 2018-06-07 DIAGNOSIS — E11.9 TYPE 2 DIABETES MELLITUS WITHOUT COMPLICATION, WITHOUT LONG-TERM CURRENT USE OF INSULIN (HCC): ICD-10-CM

## 2018-06-07 LAB
ALBUMIN SERPL-MCNC: 4.83 G/DL (ref 3.2–4.8)
ALBUMIN/GLOB SERPL: 1.5 G/DL (ref 1.5–2.5)
ALP SERPL-CCNC: 86 U/L (ref 25–100)
ALT SERPL W P-5'-P-CCNC: 269 U/L (ref 7–40)
ANION GAP SERPL CALCULATED.3IONS-SCNC: 11 MMOL/L (ref 3–11)
ARTICHOKE IGE QN: 141 MG/DL (ref 0–130)
AST SERPL-CCNC: 74 U/L (ref 0–33)
BASOPHILS # BLD AUTO: 0.03 10*3/MM3 (ref 0–0.2)
BASOPHILS NFR BLD AUTO: 0.3 % (ref 0–1)
BILIRUB BLD-MCNC: NEGATIVE MG/DL
BILIRUB SERPL-MCNC: 0.7 MG/DL (ref 0.3–1.2)
BUN BLD-MCNC: 11 MG/DL (ref 9–23)
BUN/CREAT SERPL: 14.1 (ref 7–25)
CALCIUM SPEC-SCNC: 9.4 MG/DL (ref 8.7–10.4)
CHLORIDE SERPL-SCNC: 104 MMOL/L (ref 99–109)
CHOLEST SERPL-MCNC: 189 MG/DL (ref 0–200)
CLARITY, POC: ABNORMAL
CO2 SERPL-SCNC: 24 MMOL/L (ref 20–31)
COLOR UR: ABNORMAL
CREAT BLD-MCNC: 0.78 MG/DL (ref 0.6–1.3)
DEPRECATED RDW RBC AUTO: 45.4 FL (ref 37–54)
EOSINOPHIL # BLD AUTO: 0.14 10*3/MM3 (ref 0–0.3)
EOSINOPHIL NFR BLD AUTO: 1.2 % (ref 0–3)
ERYTHROCYTE [DISTWIDTH] IN BLOOD BY AUTOMATED COUNT: 13.7 % (ref 11.3–14.5)
GFR SERPL CREATININE-BSD FRML MDRD: 106 ML/MIN/1.73
GFR SERPL CREATININE-BSD FRML MDRD: 87 ML/MIN/1.73
GLOBULIN UR ELPH-MCNC: 3.2 GM/DL
GLUCOSE BLD-MCNC: 102 MG/DL (ref 70–100)
GLUCOSE UR STRIP-MCNC: ABNORMAL MG/DL
HCT VFR BLD AUTO: 48.7 % (ref 34.5–44)
HDLC SERPL-MCNC: 58 MG/DL (ref 40–60)
HGB BLD-MCNC: 16.6 G/DL (ref 11.5–15.5)
IMM GRANULOCYTES # BLD: 0.02 10*3/MM3 (ref 0–0.03)
IMM GRANULOCYTES NFR BLD: 0.2 % (ref 0–0.6)
KETONES UR QL: NEGATIVE
LEUKOCYTE EST, POC: NEGATIVE
LYMPHOCYTES # BLD AUTO: 3.71 10*3/MM3 (ref 0.6–4.8)
LYMPHOCYTES NFR BLD AUTO: 31.3 % (ref 24–44)
MCH RBC QN AUTO: 31.1 PG (ref 27–31)
MCHC RBC AUTO-ENTMCNC: 34.1 G/DL (ref 32–36)
MCV RBC AUTO: 91.2 FL (ref 80–99)
MONOCYTES # BLD AUTO: 0.64 10*3/MM3 (ref 0–1)
MONOCYTES NFR BLD AUTO: 5.4 % (ref 0–12)
NEUTROPHILS # BLD AUTO: 7.33 10*3/MM3 (ref 1.5–8.3)
NEUTROPHILS NFR BLD AUTO: 61.6 % (ref 41–71)
NITRITE UR-MCNC: NEGATIVE MG/ML
PH UR: 5.5 [PH] (ref 5–8)
PLATELET # BLD AUTO: 228 10*3/MM3 (ref 150–450)
PMV BLD AUTO: 11.5 FL (ref 6–12)
POTASSIUM BLD-SCNC: 4.1 MMOL/L (ref 3.5–5.5)
PROT SERPL-MCNC: 8 G/DL (ref 5.7–8.2)
PROT UR STRIP-MCNC: NEGATIVE MG/DL
RBC # BLD AUTO: 5.34 10*6/MM3 (ref 3.89–5.14)
RBC # UR STRIP: NEGATIVE /UL
SODIUM BLD-SCNC: 139 MMOL/L (ref 132–146)
SP GR UR: 1.03 (ref 1–1.03)
TRIGL SERPL-MCNC: 127 MG/DL (ref 0–150)
TSH SERPL DL<=0.05 MIU/L-ACNC: 1.18 MIU/ML (ref 0.35–5.35)
UROBILINOGEN UR QL: NORMAL
WBC NRBC COR # BLD: 11.87 10*3/MM3 (ref 3.5–10.8)

## 2018-06-07 PROCEDURE — 80053 COMPREHEN METABOLIC PANEL: CPT | Performed by: FAMILY MEDICINE

## 2018-06-07 PROCEDURE — 99395 PREV VISIT EST AGE 18-39: CPT | Performed by: FAMILY MEDICINE

## 2018-06-07 PROCEDURE — 84443 ASSAY THYROID STIM HORMONE: CPT | Performed by: FAMILY MEDICINE

## 2018-06-07 PROCEDURE — 85025 COMPLETE CBC W/AUTO DIFF WBC: CPT | Performed by: FAMILY MEDICINE

## 2018-06-07 PROCEDURE — 80074 ACUTE HEPATITIS PANEL: CPT | Performed by: FAMILY MEDICINE

## 2018-06-07 PROCEDURE — 80061 LIPID PANEL: CPT | Performed by: FAMILY MEDICINE

## 2018-06-07 PROCEDURE — 81003 URINALYSIS AUTO W/O SCOPE: CPT | Performed by: FAMILY MEDICINE

## 2018-06-07 PROCEDURE — 82570 ASSAY OF URINE CREATININE: CPT | Performed by: FAMILY MEDICINE

## 2018-06-07 PROCEDURE — 82043 UR ALBUMIN QUANTITATIVE: CPT | Performed by: FAMILY MEDICINE

## 2018-06-07 RX ORDER — PHENTERMINE HYDROCHLORIDE 37.5 MG/1
37.5 TABLET ORAL
Qty: 30 TABLET | Refills: 0 | Status: SHIPPED | OUTPATIENT
Start: 2018-06-07 | End: 2018-07-06 | Stop reason: SDUPTHER

## 2018-06-07 NOTE — PROGRESS NOTES
"Subjective   Shelley Graff is a 28 y.o. female.     Chief Complaint   Patient presents with   • Annual Exam     pap scheduled for this year       Visit Vitals  /82   Pulse 102   Temp 97.6 °F (36.4 °C) (Temporal Artery )   Ht 164.6 cm (64.8\")   Wt 96.6 kg (213 lb)   SpO2 98%   BMI 35.66 kg/m²         Diabetes   She presents for her follow-up diabetic visit. She has type 2 diabetes mellitus. Her disease course has been improving. There are no hypoglycemic associated symptoms. Pertinent negatives for hypoglycemia include no confusion, dizziness, headaches, nervousness/anxiousness, pallor, seizures, speech difficulty or tremors. Associated symptoms include weight loss. Pertinent negatives for diabetes include no blurred vision, no chest pain, no fatigue, no foot paresthesias, no foot ulcerations, no polydipsia, no polyphagia, no polyuria, no visual change and no weakness. There are no hypoglycemic complications. Symptoms are improving. Pertinent negatives for diabetic complications include no CVA, heart disease, nephropathy, peripheral neuropathy, PVD or retinopathy. Risk factors for coronary artery disease include family history and obesity. Current diabetic treatment includes oral agent (triple therapy). She is compliant with treatment most of the time. She is following a generally healthy diet. Meal planning includes avoidance of concentrated sweets. She participates in exercise daily. Her breakfast blood glucose range is generally  mg/dl. An ACE inhibitor/angiotensin II receptor blocker is not being taken. She does not see a podiatrist.Eye exam is current.      Pt has pap scheduled with GYN.   Pt has lost 13 # and glucose is under better control.     The following portions of the patient's history were reviewed and updated as appropriate: allergies, current medications, past family history, past medical history, past social history, past surgical history and problem list.    Past Medical " History:   Diagnosis Date   • Gestational diabetes    • Pap smear for cervical cancer screening 2017      Past Surgical History:   Procedure Laterality Date   • CERVICAL CONE BIOPSY      age 14-benign   •  SECTION  , 08, 11/6/15    x3 5/15/13      Family History   Problem Relation Age of Onset   • Stroke Mother    • Heart disease Mother    • Diabetes Mother    • Obesity Mother    • Hypertension Mother       Social History     Social History   • Marital status:      Spouse name: N/A   • Number of children: N/A   • Years of education: N/A     Occupational History   • Not on file.     Social History Main Topics   • Smoking status: Former Smoker     Packs/day: 2.00     Years: 2.00     Quit date: 2012   • Smokeless tobacco: Never Used   • Alcohol use No   • Drug use: No   • Sexual activity: Yes     Partners: Male     Birth control/ protection: IUD      Comment:      Other Topics Concern   • Not on file     Social History Narrative   • No narrative on file        Review of Systems   Constitutional: Positive for weight loss. Negative for activity change, appetite change, chills, diaphoresis, fatigue, fever and unexpected weight change.   HENT: Negative.  Negative for congestion, dental problem, drooling, ear discharge, ear pain, facial swelling, hearing loss, mouth sores, nosebleeds, postnasal drip, rhinorrhea, sinus pain, sinus pressure, sneezing, sore throat, tinnitus, trouble swallowing and voice change.    Eyes: Negative.  Negative for blurred vision, photophobia, pain, discharge, redness, itching and visual disturbance.   Respiratory: Negative.  Negative for apnea, cough, choking, chest tightness, shortness of breath, wheezing and stridor.    Cardiovascular: Negative.  Negative for chest pain, palpitations and leg swelling.   Gastrointestinal: Negative.  Negative for abdominal distention, abdominal pain, anal bleeding, blood in stool, constipation, diarrhea, nausea, rectal pain and  vomiting.   Endocrine: Negative.  Negative for cold intolerance, heat intolerance, polydipsia, polyphagia and polyuria.   Genitourinary: Negative.  Negative for decreased urine volume, difficulty urinating, dyspareunia, dysuria, enuresis, flank pain, frequency, genital sores, hematuria, menstrual problem, pelvic pain, urgency, vaginal bleeding, vaginal discharge and vaginal pain.   Musculoskeletal: Negative.  Negative for arthralgias, back pain, gait problem, joint swelling, myalgias, neck pain and neck stiffness.   Skin: Negative.  Negative for color change, pallor, rash and wound.   Allergic/Immunologic: Negative.  Negative for environmental allergies, food allergies and immunocompromised state.   Neurological: Negative.  Negative for dizziness, tremors, seizures, syncope, facial asymmetry, speech difficulty, weakness, light-headedness, numbness and headaches.   Hematological: Negative.  Negative for adenopathy. Does not bruise/bleed easily.   Psychiatric/Behavioral: Negative.  Negative for agitation, behavioral problems, confusion, decreased concentration, dysphoric mood, hallucinations, self-injury, sleep disturbance and suicidal ideas. The patient is not nervous/anxious and is not hyperactive.        Objective   Physical Exam   Constitutional: She is oriented to person, place, and time. She appears well-developed and well-nourished. No distress.   HENT:   Head: Normocephalic and atraumatic.   Right Ear: Tympanic membrane, external ear and ear canal normal.   Left Ear: Tympanic membrane, external ear and ear canal normal.   Nose: Nose normal.   Mouth/Throat: Uvula is midline, oropharynx is clear and moist and mucous membranes are normal. Mucous membranes are not pale, not dry and not cyanotic. No oropharyngeal exudate, posterior oropharyngeal edema or posterior oropharyngeal erythema.   Eyes: Conjunctivae, EOM and lids are normal. Pupils are equal, round, and reactive to light. Right eye exhibits no chemosis, no  discharge, no exudate and no hordeolum. No foreign body present in the right eye. Left eye exhibits no chemosis, no discharge, no exudate and no hordeolum. No foreign body present in the left eye. Right conjunctiva is not injected. Right conjunctiva has no hemorrhage. Left conjunctiva is not injected. Left conjunctiva has no hemorrhage. No scleral icterus. Right eye exhibits normal extraocular motion and no nystagmus. Left eye exhibits normal extraocular motion and no nystagmus.   Fundoscopic exam:       The right eye shows red reflex.        The left eye shows red reflex.   Neck: Trachea normal and normal range of motion. Neck supple. No tracheal deviation present. No thyroid mass and no thyromegaly present.   Cardiovascular: Normal rate, regular rhythm, normal heart sounds and intact distal pulses.  Exam reveals no gallop and no friction rub.    No murmur heard.  Pulses:       Dorsalis pedis pulses are 2+ on the right side, and 2+ on the left side.        Posterior tibial pulses are 2+ on the right side, and 2+ on the left side.   Pulmonary/Chest: Effort normal and breath sounds normal. No respiratory distress. She has no decreased breath sounds. She has no wheezes. She has no rhonchi. She has no rales. Chest wall is not dull to percussion. She exhibits no tenderness.   Abdominal: Soft. Bowel sounds are normal. She exhibits no distension and no mass. There is no hepatosplenomegaly. There is no tenderness. There is no rebound and no guarding.   Genitourinary:   Genitourinary Comments: Breast and GYN will be done by GYN   Musculoskeletal: Normal range of motion. She exhibits no edema, tenderness or deformity.   Lymphadenopathy:        Head (right side): No submandibular adenopathy present.        Head (left side): No submandibular adenopathy present.     She has no cervical adenopathy.        Right cervical: No superficial cervical, no deep cervical and no posterior cervical adenopathy present.       Left cervical:  No superficial cervical, no deep cervical and no posterior cervical adenopathy present.     She has no axillary adenopathy.        Right axillary: No pectoral and no lateral adenopathy present.        Left axillary: No pectoral and no lateral adenopathy present.       Right: No inguinal and no supraclavicular adenopathy present.        Left: No inguinal and no supraclavicular adenopathy present.   Neurological: She is alert and oriented to person, place, and time. She has normal strength and normal reflexes. No cranial nerve deficit or sensory deficit. Coordination normal.   Reflex Scores:       Bicep reflexes are 2+ on the right side and 2+ on the left side.       Brachioradialis reflexes are 2+ on the right side and 2+ on the left side.       Patellar reflexes are 2+ on the right side and 2+ on the left side.  Skin: Skin is warm and dry. No rash noted. She is not diaphoretic. No cyanosis. Nails show no clubbing.   Psychiatric: She has a normal mood and affect. Her speech is normal and behavior is normal. Judgment and thought content normal. Cognition and memory are normal.   Nursing note and vitals reviewed.      Assessment/Plan   Shelley was seen today for annual exam.    Diagnoses and all orders for this visit:    Annual physical exam  -     POCT urinalysis dipstick, automated  -     Comprehensive Metabolic Panel  -     TSH  -     Lipid Panel  -     CBC & Differential  -     CBC Auto Differential    Type 2 diabetes mellitus without complication, without long-term current use of insulin  -     metFORMIN (GLUCOPHAGE) 1000 MG tablet; Take 1 tablet by mouth 2 (Two) Times a Day With Meals.  -     SITagliptin (JANUVIA) 100 MG tablet; Take 1 tablet by mouth Daily.  -     Comprehensive Metabolic Panel  -     TSH  -     Lipid Panel  -     Microalbumin / Creatinine Urine Ratio - Urine, Clean Catch    Elevated transaminase level  -     Hepatitis Panel, Acute    Other orders  -     Empagliflozin 10 MG tablet; Take 1 tablet  by mouth Daily.  -     phentermine (ADIPEX-P) 37.5 MG tablet; Take 1 tablet by mouth Every Morning Before Breakfast.        ALT and AST have increased, will add acute hepatitis panel.  Please follow a low animal fat diet that is also low in sugar, low in junk food, low in sweet drinks and low in alcohol.  Please increase the amount of fiber in your diet as well as increasing your daily exercise, such as walking.           Current Outpatient Prescriptions:   •  Empagliflozin 10 MG tablet, Take 1 tablet by mouth Daily., Disp: 30 tablet, Rfl: 1  •  glucose blood test strip, Used to check blood sugar 3-4 daily for diabetes E11.9., Disp: 125 each, Rfl: 12  •  glucose monitoring kit (FREESTYLE) monitoring kit, Use daily and prn, Disp: 1 each, Rfl: 0  •  Lancets (FREESTYLE) lancets, Use to check blood sugar for Diabetes E11.9., Disp: 100 each, Rfl: 12  •  levonorgestrel (MIRENA) 20 MCG/24HR IUD, 1 each by Intrauterine route 1 (One) Time., Disp: , Rfl:   •  loratadine (CLARITIN) 10 MG tablet, Take 1 tablet by mouth Daily., Disp: 30 tablet, Rfl: 3  •  metFORMIN (GLUCOPHAGE) 1000 MG tablet, Take 1 tablet by mouth 2 (Two) Times a Day With Meals., Disp: 60 tablet, Rfl: 3  •  phentermine (ADIPEX-P) 37.5 MG tablet, Take 1 tablet by mouth Every Morning Before Breakfast., Disp: 30 tablet, Rfl: 0  •  SITagliptin (JANUVIA) 100 MG tablet, Take 1 tablet by mouth Daily., Disp: 30 tablet, Rfl: 3    Return in about 4 weeks (around 7/5/2018), or if symptoms worsen or fail to improve, for Recheck.    Recent Results (from the past 168 hour(s))   POCT urinalysis dipstick, automated    Collection Time: 06/07/18  1:05 PM   Result Value Ref Range    Color Dark Yellow Yellow, Straw, Dark Yellow, Keila    Clarity, UA Slightly Cloudy (A) Clear    Specific Gravity  1.030 1.005 - 1.030    pH, Urine 5.5 5.0 - 8.0    Leukocytes Negative Negative    Nitrite, UA Negative Negative    Protein, POC Negative Negative mg/dL    Glucose, UA 3+ (A) Negative,  1000 mg/dL (3+) mg/dL    Ketones, UA Negative Negative    Urobilinogen, UA Normal Normal    Bilirubin Negative Negative    Blood, UA Negative Negative   Comprehensive Metabolic Panel    Collection Time: 06/07/18  1:35 PM   Result Value Ref Range    Glucose 102 (H) 70 - 100 mg/dL    BUN 11 9 - 23 mg/dL    Creatinine 0.78 0.60 - 1.30 mg/dL    Sodium 139 132 - 146 mmol/L    Potassium 4.1 3.5 - 5.5 mmol/L    Chloride 104 99 - 109 mmol/L    CO2 24.0 20.0 - 31.0 mmol/L    Calcium 9.4 8.7 - 10.4 mg/dL    Total Protein 8.0 5.7 - 8.2 g/dL    Albumin 4.83 (H) 3.20 - 4.80 g/dL    ALT (SGPT) 269 (H) 7 - 40 U/L    AST (SGOT) 74 (H) 0 - 33 U/L    Alkaline Phosphatase 86 25 - 100 U/L    Total Bilirubin 0.7 0.3 - 1.2 mg/dL    eGFR Non African Amer 87 >60 mL/min/1.73    eGFR  African Amer 106 >60 mL/min/1.73    Globulin 3.2 gm/dL    A/G Ratio 1.5 1.5 - 2.5 g/dL    BUN/Creatinine Ratio 14.1 7.0 - 25.0    Anion Gap 11.0 3.0 - 11.0 mmol/L   TSH    Collection Time: 06/07/18  1:35 PM   Result Value Ref Range    TSH 1.182 0.350 - 5.350 mIU/mL   Lipid Panel    Collection Time: 06/07/18  1:35 PM   Result Value Ref Range    Total Cholesterol 189 0 - 200 mg/dL    Triglycerides 127 0 - 150 mg/dL    HDL Cholesterol 58 40 - 60 mg/dL    LDL Cholesterol  141 (H) 0 - 130 mg/dL   CBC Auto Differential    Collection Time: 06/07/18  1:35 PM   Result Value Ref Range    WBC 11.87 (H) 3.50 - 10.80 10*3/mm3    RBC 5.34 (H) 3.89 - 5.14 10*6/mm3    Hemoglobin 16.6 (H) 11.5 - 15.5 g/dL    Hematocrit 48.7 (H) 34.5 - 44.0 %    MCV 91.2 80.0 - 99.0 fL    MCH 31.1 (H) 27.0 - 31.0 pg    MCHC 34.1 32.0 - 36.0 g/dL    RDW 13.7 11.3 - 14.5 %    RDW-SD 45.4 37.0 - 54.0 fl    MPV 11.5 6.0 - 12.0 fL    Platelets 228 150 - 450 10*3/mm3    Neutrophil % 61.6 41.0 - 71.0 %    Lymphocyte % 31.3 24.0 - 44.0 %    Monocyte % 5.4 0.0 - 12.0 %    Eosinophil % 1.2 0.0 - 3.0 %    Basophil % 0.3 0.0 - 1.0 %    Immature Grans % 0.2 0.0 - 0.6 %    Neutrophils, Absolute 7.33 1.50 -  8.30 10*3/mm3    Lymphocytes, Absolute 3.71 0.60 - 4.80 10*3/mm3    Monocytes, Absolute 0.64 0.00 - 1.00 10*3/mm3    Eosinophils, Absolute 0.14 0.00 - 0.30 10*3/mm3    Basophils, Absolute 0.03 0.00 - 0.20 10*3/mm3    Immature Grans, Absolute 0.02 0.00 - 0.03 10*3/mm3   Hepatitis Panel, Acute    Collection Time: 06/07/18  1:35 PM   Result Value Ref Range    Hepatitis B Surface Ag Non-Reactive Non-Reactive    Hep A IgM Non-Reactive Non-Reactive    Hep B C IgM Non-Reactive Non-Reactive    Hepatitis C Ab Non-Reactive Non-Reactive

## 2018-06-08 ENCOUNTER — TELEPHONE (OUTPATIENT)
Dept: INTERNAL MEDICINE | Facility: CLINIC | Age: 28
End: 2018-06-08

## 2018-06-08 DIAGNOSIS — E11.65 TYPE 2 DIABETES MELLITUS WITH HYPERGLYCEMIA, WITHOUT LONG-TERM CURRENT USE OF INSULIN (HCC): ICD-10-CM

## 2018-06-08 DIAGNOSIS — R74.01 ELEVATED TRANSAMINASE LEVEL: Primary | ICD-10-CM

## 2018-06-08 LAB
HAV IGM SERPL QL IA: NORMAL
HBV CORE IGM SERPL QL IA: NORMAL
HBV SURFACE AG SERPL QL IA: NORMAL
HCV AB SER DONR QL: NORMAL

## 2018-06-08 RX ORDER — BLOOD-GLUCOSE METER
KIT MISCELLANEOUS
Qty: 1 EACH | Refills: 0 | Status: SHIPPED | OUTPATIENT
Start: 2018-06-08 | End: 2019-11-24

## 2018-06-08 NOTE — TELEPHONE ENCOUNTER
PT NEEDS TEST STRIP MACHINE SHE LOST WHOLE BAG WILL INSURANCE PAY IS SO WILL DR LYNNE SEND IN A RX

## 2018-06-09 LAB
CREAT 24H UR-MCNC: 176.9 MG/DL
MICROALBUMIN UR-MCNC: 27.3 UG/ML
MICROALBUMIN/CREAT UR: 15.4 MG/G CREAT (ref 0–30)

## 2018-06-11 ENCOUNTER — TELEPHONE (OUTPATIENT)
Dept: INTERNAL MEDICINE | Facility: CLINIC | Age: 28
End: 2018-06-11

## 2018-06-11 NOTE — TELEPHONE ENCOUNTER
----- Message from Amee DUMONT MD sent at 6/8/2018  8:38 PM EDT -----  Please call the patient regarding her abnormal result.  Glucose is better but liver enzymes have increased.  Hemoglobin has increased.  This patient getting enough fluids?  Is she getting dehydrated?  Hepatitis screen for hepatitis A, B, and C was negative.   Need to recheck liver enzymes about 2 weeks.  Orders will be in the computer.  Please hold phentermine until liver enzymes are stable.

## 2018-06-11 NOTE — TELEPHONE ENCOUNTER
PN of results.  She stated understanding and will have lab drawn in 2 weeks.  Will hold phentermine

## 2018-07-02 ENCOUNTER — LAB (OUTPATIENT)
Dept: INTERNAL MEDICINE | Facility: CLINIC | Age: 28
End: 2018-07-02

## 2018-07-02 DIAGNOSIS — R74.01 ELEVATED TRANSAMINASE LEVEL: ICD-10-CM

## 2018-07-02 LAB
ALBUMIN SERPL-MCNC: 4.2 G/DL (ref 3.2–4.8)
ALP SERPL-CCNC: 79 U/L (ref 25–100)
ALT SERPL W P-5'-P-CCNC: 189 U/L (ref 7–40)
AST SERPL-CCNC: 53 U/L (ref 0–33)
BILIRUB CONJ SERPL-MCNC: 0.1 MG/DL (ref 0–0.2)
BILIRUB INDIRECT SERPL-MCNC: 0.3 MG/DL (ref 0.1–1.1)
BILIRUB SERPL-MCNC: 0.4 MG/DL (ref 0.3–1.2)
PROT SERPL-MCNC: 7.1 G/DL (ref 5.7–8.2)

## 2018-07-02 PROCEDURE — 80076 HEPATIC FUNCTION PANEL: CPT | Performed by: FAMILY MEDICINE

## 2018-07-06 ENCOUNTER — OFFICE VISIT (OUTPATIENT)
Dept: INTERNAL MEDICINE | Facility: CLINIC | Age: 28
End: 2018-07-06

## 2018-07-06 VITALS
HEIGHT: 65 IN | HEART RATE: 115 BPM | DIASTOLIC BLOOD PRESSURE: 68 MMHG | BODY MASS INDEX: 35.96 KG/M2 | WEIGHT: 215.8 LBS | SYSTOLIC BLOOD PRESSURE: 112 MMHG | OXYGEN SATURATION: 98 % | TEMPERATURE: 97.5 F

## 2018-07-06 DIAGNOSIS — E11.9 TYPE 2 DIABETES MELLITUS WITHOUT COMPLICATION, WITHOUT LONG-TERM CURRENT USE OF INSULIN (HCC): Primary | ICD-10-CM

## 2018-07-06 DIAGNOSIS — IMO0002 TRANSAMINASE OR LDH ELEVATION: ICD-10-CM

## 2018-07-06 LAB
ALBUMIN SERPL-MCNC: 4.36 G/DL (ref 3.2–4.8)
ALP SERPL-CCNC: 74 U/L (ref 25–100)
ALT SERPL W P-5'-P-CCNC: 227 U/L (ref 7–40)
AST SERPL-CCNC: 70 U/L (ref 0–33)
BILIRUB CONJ SERPL-MCNC: 0.1 MG/DL (ref 0–0.2)
BILIRUB INDIRECT SERPL-MCNC: 0.4 MG/DL (ref 0.1–1.1)
BILIRUB SERPL-MCNC: 0.5 MG/DL (ref 0.3–1.2)
PROT SERPL-MCNC: 7.2 G/DL (ref 5.7–8.2)

## 2018-07-06 PROCEDURE — 80076 HEPATIC FUNCTION PANEL: CPT | Performed by: FAMILY MEDICINE

## 2018-07-06 PROCEDURE — 36415 COLL VENOUS BLD VENIPUNCTURE: CPT | Performed by: FAMILY MEDICINE

## 2018-07-06 RX ORDER — PHENTERMINE HYDROCHLORIDE 37.5 MG/1
37.5 TABLET ORAL
Qty: 30 TABLET | Refills: 0 | Status: SHIPPED | OUTPATIENT
Start: 2018-07-06 | End: 2018-08-16 | Stop reason: SDUPTHER

## 2018-07-06 NOTE — PROGRESS NOTES
"Jolene Graff is a 28 y.o. female.     Chief Complaint   Patient presents with   • Follow-up     Lab results from liver enzymes   • Med Refill       Visit Vitals  /68   Pulse 115   Temp 97.5 °F (36.4 °C) (Temporal Artery )   Ht 164.6 cm (64.8\")   Wt 97.9 kg (215 lb 12.8 oz)   SpO2 98%   BMI 36.13 kg/m²         History of Present Illness   Pt here for f/u of elevated liver enzymes. Sugar has been normal.     The following portions of the patient's history were reviewed and updated as appropriate: allergies, current medications, past family history, past medical history, past social history, past surgical history and problem list.    Past Medical History:   Diagnosis Date   • Gestational diabetes    • Pap smear for cervical cancer screening 2017      Past Surgical History:   Procedure Laterality Date   • CERVICAL CONE BIOPSY      age 14-benign   •  SECTION  , 08, 11/6/15    x3 5/15/13      Family History   Problem Relation Age of Onset   • Stroke Mother    • Heart disease Mother    • Diabetes Mother    • Obesity Mother    • Hypertension Mother       Social History     Social History   • Marital status:      Spouse name: N/A   • Number of children: N/A   • Years of education: N/A     Occupational History   • Not on file.     Social History Main Topics   • Smoking status: Former Smoker     Packs/day: 2.00     Years: 2.00     Quit date: 2012   • Smokeless tobacco: Never Used   • Alcohol use No   • Drug use: No   • Sexual activity: Yes     Partners: Male     Birth control/ protection: IUD      Comment:      Other Topics Concern   • Not on file     Social History Narrative   • No narrative on file        Review of Systems   Constitutional: Negative.  Negative for chills, diaphoresis, fatigue and fever.   HENT: Negative.  Negative for ear pain, nosebleeds, postnasal drip, rhinorrhea, sinus pressure, sneezing and sore throat.    Eyes: Positive for itching (right, " now better). Negative for redness.   Respiratory: Negative.  Negative for cough, shortness of breath and wheezing.    Cardiovascular: Negative.  Negative for chest pain and palpitations.   Gastrointestinal: Negative.  Negative for abdominal pain, constipation, diarrhea, nausea and vomiting.   Endocrine: Negative.  Negative for cold intolerance and heat intolerance.   Genitourinary: Negative.  Negative for dysuria, frequency, hematuria and urgency.   Musculoskeletal: Negative.  Negative for arthralgias, back pain and neck pain.   Skin: Negative.  Negative for color change and rash.   Allergic/Immunologic: Negative.  Negative for environmental allergies.   Neurological: Negative.  Negative for dizziness, syncope, light-headedness and headaches.   Hematological: Negative.  Negative for adenopathy. Does not bruise/bleed easily.   Psychiatric/Behavioral: Negative.  Negative for dysphoric mood. The patient is not nervous/anxious.        Objective   Physical Exam   Constitutional: She is oriented to person, place, and time. She appears well-developed.   HENT:   Head: Normocephalic.   Right Ear: External ear normal.   Left Ear: External ear normal.   Nose: Nose normal.   Eyes: Conjunctivae, EOM and lids are normal. Pupils are equal, round, and reactive to light.   Neck: Trachea normal and normal range of motion. Neck supple. Carotid bruit is not present. No thyroid mass and no thyromegaly present.   Cardiovascular: Normal rate and regular rhythm.    No murmur heard.  Pulmonary/Chest: Effort normal and breath sounds normal. No respiratory distress. She has no decreased breath sounds. She has no wheezes. She has no rhonchi. She has no rales. She exhibits no tenderness.   Abdominal: Soft. Bowel sounds are normal. There is no tenderness.   Musculoskeletal: Normal range of motion.   Neurological: She is alert and oriented to person, place, and time.   Skin: Skin is warm and dry.   Psychiatric: She has a normal mood and affect.  Her behavior is normal.   Nursing note and vitals reviewed.      Assessment/Plan   Shelley was seen today for follow-up and med refill.    Diagnoses and all orders for this visit:    Type 2 diabetes mellitus without complication, without long-term current use of insulin (CMS/HCC)    Transaminase or LDH elevation  -     Hepatic Function Panel; Future    Other orders  -     phentermine (ADIPEX-P) 37.5 MG tablet; Take 1 tablet by mouth Every Morning Before Breakfast.  -     Empagliflozin 10 MG tablet; Take 1 tablet by mouth Daily.                   Current Outpatient Prescriptions:   •  Empagliflozin 10 MG tablet, Take 1 tablet by mouth Daily., Disp: 30 tablet, Rfl: 1  •  glucose blood test strip, Used to check blood sugar 3-4 daily for diabetes E11.9., Disp: 125 each, Rfl: 12  •  glucose monitoring kit (FREESTYLE) monitoring kit, Use daily to test blood sugar daily for Diabetes E11.9, Disp: 1 each, Rfl: 0  •  Lancets (FREESTYLE) lancets, Use to check blood sugar for Diabetes E11.9., Disp: 100 each, Rfl: 12  •  levonorgestrel (MIRENA) 20 MCG/24HR IUD, 1 each by Intrauterine route 1 (One) Time., Disp: , Rfl:   •  loratadine (CLARITIN) 10 MG tablet, Take 1 tablet by mouth Daily., Disp: 30 tablet, Rfl: 3  •  metFORMIN (GLUCOPHAGE) 1000 MG tablet, Take 1 tablet by mouth 2 (Two) Times a Day With Meals., Disp: 60 tablet, Rfl: 3  •  SITagliptin (JANUVIA) 100 MG tablet, Take 1 tablet by mouth Daily., Disp: 30 tablet, Rfl: 3  •  phentermine (ADIPEX-P) 37.5 MG tablet, Take 1 tablet by mouth Every Morning Before Breakfast., Disp: 30 tablet, Rfl: 0    Return in about 4 weeks (around 8/3/2018), or if symptoms worsen or fail to improve, for Recheck.    Recent Results (from the past 336 hour(s))   Hepatic Function Panel    Collection Time: 07/02/18  8:03 AM   Result Value Ref Range    Total Protein 7.1 5.7 - 8.2 g/dL    Albumin 4.20 3.20 - 4.80 g/dL    ALT (SGPT) 189 (H) 7 - 40 U/L    AST (SGOT) 53 (H) 0 - 33 U/L    Alkaline  Phosphatase 79 25 - 100 U/L    Total Bilirubin 0.4 0.3 - 1.2 mg/dL    Bilirubin, Direct 0.1 0.0 - 0.2 mg/dL    Bilirubin, Indirect 0.3 0.1 - 1.1 mg/dL

## 2018-07-08 DIAGNOSIS — R74.01 ELEVATED TRANSAMINASE LEVEL: Primary | ICD-10-CM

## 2018-07-09 ENCOUNTER — TELEPHONE (OUTPATIENT)
Dept: INTERNAL MEDICINE | Facility: CLINIC | Age: 28
End: 2018-07-09

## 2018-07-09 NOTE — TELEPHONE ENCOUNTER
----- Message from Amee DUMONT MD sent at 7/8/2018  3:04 PM EDT -----  Please call the patient regarding her abnormal result. Liver enzymes increasing, hold phentermine.   A referral to GI will be made.

## 2018-07-11 ENCOUNTER — OFFICE VISIT (OUTPATIENT)
Dept: GASTROENTEROLOGY | Facility: CLINIC | Age: 28
End: 2018-07-11

## 2018-07-11 ENCOUNTER — LAB (OUTPATIENT)
Dept: LAB | Facility: HOSPITAL | Age: 28
End: 2018-07-11

## 2018-07-11 VITALS
WEIGHT: 215.2 LBS | OXYGEN SATURATION: 98 % | SYSTOLIC BLOOD PRESSURE: 122 MMHG | HEIGHT: 65 IN | DIASTOLIC BLOOD PRESSURE: 72 MMHG | TEMPERATURE: 97.5 F | BODY MASS INDEX: 35.85 KG/M2 | HEART RATE: 122 BPM

## 2018-07-11 DIAGNOSIS — R74.8 LIVER ENZYME ELEVATION: Primary | ICD-10-CM

## 2018-07-11 DIAGNOSIS — IMO0001 OBESITY, CLASS II, BMI 35-39.9, WITH COMORBIDITY: ICD-10-CM

## 2018-07-11 DIAGNOSIS — Z83.2 FAMILY HISTORY OF AUTOIMMUNE DISORDER: ICD-10-CM

## 2018-07-11 DIAGNOSIS — R74.8 LIVER ENZYME ELEVATION: ICD-10-CM

## 2018-07-11 LAB
FERRITIN SERPL-MCNC: 263 NG/ML (ref 10–291)
GGT SERPL-CCNC: 55 U/L (ref 0–37)
HBV SURFACE AB SER RIA-ACNC: NORMAL
IRON 24H UR-MRATE: 87 MCG/DL (ref 50–175)
IRON SATN MFR SERPL: 21 % (ref 15–50)
TIBC SERPL-MCNC: 405 MCG/DL (ref 250–450)

## 2018-07-11 PROCEDURE — 86708 HEPATITIS A ANTIBODY: CPT

## 2018-07-11 PROCEDURE — 83516 IMMUNOASSAY NONANTIBODY: CPT

## 2018-07-11 PROCEDURE — 86038 ANTINUCLEAR ANTIBODIES: CPT

## 2018-07-11 PROCEDURE — 82390 ASSAY OF CERULOPLASMIN: CPT

## 2018-07-11 PROCEDURE — 82977 ASSAY OF GGT: CPT

## 2018-07-11 PROCEDURE — 83550 IRON BINDING TEST: CPT

## 2018-07-11 PROCEDURE — 82728 ASSAY OF FERRITIN: CPT

## 2018-07-11 PROCEDURE — 82104 ALPHA-1-ANTITRYPSIN PHENO: CPT

## 2018-07-11 PROCEDURE — 86376 MICROSOMAL ANTIBODY EACH: CPT

## 2018-07-11 PROCEDURE — 99244 OFF/OP CNSLTJ NEW/EST MOD 40: CPT | Performed by: NURSE PRACTITIONER

## 2018-07-11 PROCEDURE — 82103 ALPHA-1-ANTITRYPSIN TOTAL: CPT

## 2018-07-11 PROCEDURE — 36415 COLL VENOUS BLD VENIPUNCTURE: CPT

## 2018-07-11 PROCEDURE — 83540 ASSAY OF IRON: CPT

## 2018-07-11 PROCEDURE — 86706 HEP B SURFACE ANTIBODY: CPT

## 2018-07-11 NOTE — PATIENT INSTRUCTIONS
There are different reasons that can cause LFT elevation, which include but not limited to:   · Viral hepatitis infection  · Other infection in the body  · Autoimmune conditions (hepatitis or primary biliary cholangitis)   · Alcohol  · Medications (OTC, prescribed, and recreational)  · Gallstones  · Fatty liver (usually involves weight gain, high blood pressure, high cholesterol/triglicerdies, and/or diabetes)    Additional work up is needed at this time. I also recommend checking for immunity against hepatitis A and hepatitis B.  I will recommend vaccination if needed.     If blood work results are inconclusive, we will consider doing a liver biopsy for more definitive answer. Any abnormal liver condition that is not treated or controlled can later lead to liver cirrhosis and sometimes liver cancer.       Liver Function Tests  Liver function tests are blood tests to see how well your liver is working. The proteins and enzymes measured in the test can alert your health care provider to inflammation, damage, or disease in your liver. It is common to have liver function tests:  · During annual physical exams.  · When you are taking certain medicines.  · If you have liver disease.  · If you drink a lot of alcohol.  · When you are not feeling well.  · When you have other conditions that may affect the liver.  Substances measured may include:   · Alanine transaminase (ALT). This is an enzyme in the liver.  · Aspartate transaminase (AST). This is an enzyme in the liver, heart, and muscles.  · Alkaline phosphatase (ALP). This is a protein in the liver, bile ducts, and bone. It is also in other body tissues.  · Total bilirubin. This is a yellow pigment in bile.  · Albumin. This is a protein in the liver.  · Prothrombin time and international normalized ratio (PT and INR). PT measures the time that it takes for your blood to clot. INR is a calculation of blood clotting time based upon your PT result. It is also calculated  based on normal ranges defined by the laboratory that processed your lab test.  · Total protein. This measures two proteins, albumin and globulin, found in the blood.  PREPARATION FOR TEST  How you prepare will depend on which tests are being done and the reason why these tests are being done. You may need to:  · Avoid eating for 4-6 hours before the test or as directed by your health care provider.  · Stop taking certain medicines prior to your blood test as directed by your health care provider.  RESULTS   It is your responsibility to obtain your test results. Ask the lab or department performing the test when and how you will get your results. Contact your health care provider to discuss any questions you have about your results.  RANGE OF NORMAL VALUES  Ranges for normal values may vary among different labs and hospitals. You should always check with your health care provider after having lab work or other tests done to discuss the meaning of your test results and whether your values are considered within normal limits.  The following are normal ranges for substances measured in liver function tests:  ALT  · Infant: may be twice as high as adult values.  · Child or adult: 4-36 international units/L at 37°C or 4-36 units/L (SI units).  · Elderly: may be slightly higher than adult values.  AST  · [place] 0-5 days old:  units/L.  · Child under 3 years old: 15-60 units/L.  · 3-6 years old: 15-50 units/L.  · 6-12 years old: 10-50 units/L.  · 12-18 years old: 10-40 units/L.  · Adult: 0-35 units/L or 0-0.58 microkatal/L (SI units).  · Elderly: slightly higher than adults.  ALP  · Child under 2 years old:  units/L.  · 2-8 years old:  units/L.  · 9-15 years old:  units/L.  · 16-21 years old:  units/L.  · Adult:  units/L or 0.5-2.0 microkatal/L (SI units).  · Elderly: slightly higher than adult.  Total bilirubin  · [place]: 1.0-12.0 mg/dL or 17.1-205 micromoles/L (SI units).  · Adult,  elderly, or child: 0.3-1.0 mg/dL or 5.1-17 micromoles/L.  Albumin  · Premature infant: 3.0-4.2 g/dL.  · [place]: 3.5-5.4 g/dL.  · Infant: 4.4-5.4 g/dL.  · Child: 4.0-5.9 g/dL.  · Adult or elderly: 3.5-5.0 g/dL or 35-50 g/L (SI units).  PT  · 11.0-12.5 seconds; 85%-100%.  INR  · 0.8-1.1.  Total protein  · Premature infant: 4.2-7.6 g/dL.  · [place]: 4.6-7.4 g/dL.  · Infant: 6.0-6.7 g/dL.  · Child: 6.2-8.0 g/dL.  · Adult or elderly: 6.4-8.3 g/dL or 64-83 g/L (SI units).  MEANING OF RESULTS OUTSIDE NORMAL VALUE RANGES   Sometimes test results can be abnormal due to other factors, such as medicines, exercise, or pregnancy. Follow up with your health care provider if you have any questions about test results outside the normal value ranges.  ALT  · Levels above the normal range, along with other test results, may indicate liver disease.  AST  · Levels above the normal range, along with other test results, may indicate liver disease. Sometimes levels also increase after burns, surgery, heart attack, muscle damage, or seizure.  ALP  · Levels above the normal range, along with other test results, may indicate biliary obstruction, diseases of the liver, bone disease, thyroid disease, tumors, fractures, leukemia or lymphoma, or several other conditions. People with blood type O or B may show higher levels after a fatty meal.  · Levels below the normal range, along with other test results, may indicate bone and teeth conditions, malnutrition, protein deficiency, or [place] disease.  Total bilirubin  · Levels above the normal range, along with other test results, may indicate problems with the liver, gallbladder, or bile ducts.  Albumin  · Levels above the normal range, along with other test results, may indicate dehydration. They may also be caused by a diet that is high in protein. Sometimes, the band placed around the upper arm during the process of drawing blood can cause the level of this protein in your blood to rise and  give you a result above the normal range.  · Levels below the normal range, along with other tests results, may indicate kidney disease, liver disease, or malabsorption of nutrients.  PT and INR  · Levels above the normal range mean your blood is clotting slower than normal. This may be due to blood disorders, liver disorders, or low levels of vitamin K.  Total protein  · Levels above the normal range, along with other test results, may be due to infection or other diseases.  · Levels below the normal range, along with other test results, may be due to an immune system disorder, bleeding, burns, kidney disorder, liver disease, trouble absorbing or getting enough nutrients, or other conditions that affect the intestines.

## 2018-07-11 NOTE — PROGRESS NOTES
GASTROENTEROLOGY OUTPATIENT NEW PATIENT NOTE  Patient: YUSUF RAMOS : 1990  Date of Service: 2018  Dear Dr.Wanda DARYL Larson MD   Thank you for your referral of this patient for evaluation of transinits  CC: Establish Care (Elevated  transaminse level)  Assessment/Plan                                             ASSESSMENT & PLANS     Liver enzyme elevation r/t fatty liver versus autoimmune condition.  However, there is an inverse correlation of liver enzymes and hemoglobin A1c.  Perhaps patient has more than one condition, besides diabetes, that is contributing to her liver enzymes elevation.     Family history of autoimmune disorder  -     Hepatitis A Antibody, Total; Future  -     Hepatitis B Surface Antibody; Future  -     Gamma GT; Future  -     Ferritin; Future  -     Nuclear Antigen Antibody, IFA; Future  -     Alpha - 1 - Antitrypsin Phenotype; Future  -     Mitochondrial Antibodies, M2; Future  -     Anti-Smooth Muscle Antibody Titer; Future  -     Soluble Liver Ag (IgG Ab); Future  -     US Liver; Future  -     Iron Profile; Future  -     Anti-microsomal Antibody; Future  -     Ceruloplasmin; Future    Obesity, Class II, BMI 35-39.9, with comorbidity    Follow Up: RTC 1 month      DISCUSSION: I explained to patient that there are different reasons that can cause LFT elevation, which include but not limited to: Viral hepatitis infection, autoimmune hepatitis, alcohol, medications (OTC, prescribed, & recreational),  primary biliary cholangitis, gallstones,  &/or fatty liver.  Additional work up is needed at this time. I also recommend checking for immunity against hepatitis A & hepatitis B. If blood work results are inconclusive, we will consider doing a liver biopsy for more definitive answer. I also explained that any underlying cause of LFT elevation that is not treated or controlled can later lead to liver cirrhosis and sometimes liver cancer.   The above plan was delineated  "in details with patient and all questions and concerns were answered.  Patient is also given contact information.  Patient is to return as scheduled or sooner if new problems arise  Subjective                                                     SUBJECTIVE   History of Present Illness  Ms. Shelley Graff is a 28 y.o. female presents to the clinic today for an evaluation of Establish Care (Elevated  transaminse level)  Liver enzymes elevation started in May 2017.    DM2 since 2017. Hemoglobin A1c at 10.8.  Hemoglobin A1c has been downward trending to 8.0 in May 2018  · fasiga samples only (May or 2018 for x 1 wk, but insurance no approved  · jardiance then Januvia    Recent new meds:  Pt was on phentermine (Aug 2017 to ) for wt loss.  Patient has intentional weight loss (documented) of 10 pounds within the last 3 months    Maternal first cousin w/ Crohn  No known HLP. Seldom NSAIDS. No recreational drug of any type. Denies ETOH. \"Absolutely not alcohol\"  No excessive consumption of acetaminophen.  Pt denies taking other OTC products or herbal products other than what is listed.    ROS:Review of Systems   Gastrointestinal: Positive for abdominal pain.     Subjective   PAST MED HX: Pt  has a past medical history of Gestational diabetes and Pap smear for cervical cancer screening (2017).  PAST SURG HX: Pt  has a past surgical history that includes  section (, 08, 11/6/15) and Cervical cone biopsy.  FAM HX: family history includes Diabetes in her mother; Heart disease in her mother; Hypertension in her mother; Obesity in her mother; Stroke in her mother.  SOC HX: Pt  reports that she quit smoking about 6 years ago. She has a 4.00 pack-year smoking history. She has never used smokeless tobacco. She reports that she does not drink alcohol or use drugs. patient has 2 small kids were here with her today.  Patient works at [employer]) [email or web address]   Objective                                  "                          OBJECTIVE   Allergy: Pt has No Known Allergies.  MEDS: •  Empagliflozin 10 MG tablet, Take 1 tablet by mouth Daily., Disp: 30 tablet, Rfl: 1  •  glucose blood test strip, Used to check blood sugar 3-4 daily for diabetes E11.9., Disp: 125 each, Rfl: 12  •  glucose monitoring kit (FREESTYLE) monitoring kit, Use daily to test blood sugar daily for Diabetes E11.9, Disp: 1 each, Rfl: 0  •  Lancets (FREESTYLE) lancets, Use to check blood sugar for Diabetes E11.9., Disp: 100 each, Rfl: 12  •  levonorgestrel (MIRENA) 20 MCG/24HR IUD, 1 each by Intrauterine route 1 (One) Time., Disp: , Rfl:   •  loratadine (CLARITIN) 10 MG tablet, Take 1 tablet by mouth Daily., Disp: 30 tablet, Rfl: 3  •  metFORMIN (GLUCOPHAGE) 1000 MG tablet, Take 1 tablet by mouth 2 (Two) Times a Day With Meals., Disp: 60 tablet, Rfl: 3  •  phentermine (ADIPEX-P) 37.5 MG tablet, Take 1 tablet by mouth Every Morning Before Breakfast., Disp: 30 tablet, Rfl: 0  •  SITagliptin (JANUVIA) 100 MG tablet, Take 1 tablet by mouth Daily., Disp: 30 tablet, Rfl: 3  Lab Results   Component Value Date    WBC 11.87 (H) 06/07/2018    HGB 16.6 (H) 06/07/2018    HGB 14.7 05/08/2017    HGB 14.4 11/25/2014    HCT 48.7 (H) 06/07/2018    HCT 43.1 05/08/2017    HCT 41.8 11/25/2014    MCV 91.2 06/07/2018    MCHC 34.1 06/07/2018     06/07/2018     05/08/2017     11/25/2014     Lab Results   Component Value Date     06/07/2018    K 4.1 06/07/2018     06/07/2018    CO2 24.0 06/07/2018    BUN 11 06/07/2018    CREATININE 0.78 06/07/2018    EGFRIFNONA 87 06/07/2018    GLUCOSE 102 (H) 06/07/2018    CALCIUM 9.4 06/07/2018    ANIONGAP 11.0 06/07/2018       Liver Profile     Lab Results   Component Value Date    AST 70 (H) 07/06/2018    AST 53 (H) 07/02/2018    AST 74 (H) 06/07/2018    AST 30 01/29/2018    AST 29 08/30/2017    AST 35 (H) 05/08/2017    AST 16 11/25/2014     Lab Results   Component Value Date     (H) 07/06/2018      (H) 07/02/2018     (H) 06/07/2018    ALT 86 (H) 01/29/2018    ALT 79 (H) 08/30/2017    ALT 78 (H) 05/08/2017    ALT 38 11/25/2014     Lab Results   Component Value Date    ALKPHOS 74 07/06/2018    ALKPHOS 79 07/02/2018    ALKPHOS 86 06/07/2018    ALKPHOS 98 01/29/2018    ALKPHOS 98 08/30/2017     Lab Results   Component Value Date    BILITOT 0.5 07/06/2018    BILITOT 0.4 07/02/2018    BILITOT 0.7 06/07/2018    BILITOT 0.4 01/29/2018    BILITOT 0.5 08/30/2017    BILIDIR 0.1 07/06/2018    BILIDIR 0.1 07/02/2018    INDBILI 0.4 07/06/2018    INDBILI 0.3 07/02/2018    ALBUMIN 4.36 07/06/2018    ALBUMIN 4.20 07/02/2018    ALBUMIN 4.83 (H) 06/07/2018    ALBUMIN 4.30 01/29/2018    ALBUMIN 4.50 08/30/2017    PROTEINTOT 7.2 07/06/2018     Albumin Globulin Ratio (Normal 1.5 - 2.5 g/dL)  Lab Results   Component Value Date    LABIL2 1.5 06/07/2018    LABIL2 1.5 01/29/2018    LABIL2 1.5 08/30/2017      Drug Screen  Lab Results   Component Value Date    THCURSCR Negative 05/08/2018    THCURSCR Negative 07/28/2017    PCPUR Negative 05/08/2018    PCPUR Negative 07/28/2017    COCAINEUR Negative 05/08/2018    COCAINEUR Negative 07/28/2017    METAMPSCNUR Negative 05/08/2018    METAMPSCNUR Negative 07/28/2017    LABOPIASCN Negative 05/08/2018    LABOPIASCN Negative 07/28/2017    AMPHETSCREEN Negative 05/08/2018    AMPHETSCREEN Negative 07/28/2017    LABBENZSCN Negative 05/08/2018    LABBENZSCN Negative 07/28/2017    TRICYCLICSCN Negative 05/08/2018    TRICYCLICSCN Negative 07/28/2017    LABMETHSCN Negative 05/08/2018    LABMETHSCN Negative 07/28/2017    BARBITSCNUR Negative 05/08/2018    BARBITSCNUR Negative 07/28/2017    OXYCODONESCN Negative 05/08/2018    OXYCODONESCN Negative 07/28/2017    PROPOXSCN Negative 05/08/2018    PROPOXSCN Negative 07/28/2017    BUPRENORSCNU Negative 05/08/2018    BUPRENORSCNU Negative 07/28/2017     Co-morbidities   Lab Results   Component Value Date    HGBA1C 8.0 05/08/2018    HGBA1C 7.9  01/29/2018    HGBA1C 6.30 08/30/2017    HGBA1C 10.8 05/08/2017     HLP  Lab Results   Component Value Date    TRIG 127 06/07/2018    TRIG 251 (H) 01/29/2018    TRIG 141 08/30/2017    TRIG 252 (H) 05/08/2017    HDL 58 06/07/2018    HDL 49 01/29/2018    HDL 50 08/30/2017    HDL 48 05/08/2017     (H) 06/07/2018     (H) 01/29/2018     08/30/2017     (H) 05/08/2017     Infections  Viral Hepatitis  Lab Results   Component Value Date    HEPAIGM Non-Reactive 06/07/2018    HEPAIGM Non-Reactive 05/08/2017    HEPBSAG Non-Reactive 06/07/2018    HEPBSAG Non-Reactive 05/08/2017    HEPBIGMCORE Non-Reactive 06/07/2018    HEPBIGMCORE Non-Reactive 05/08/2017    HEPCVIRUSABY Non-Reactive 06/07/2018    HEPCVIRUSABY Non-Reactive 05/08/2017     Wt Readings from Last 5 Encounters:   07/11/18 97.6 kg (215 lb 3.2 oz)   07/06/18 97.9 kg (215 lb 12.8 oz)   06/07/18 96.6 kg (213 lb)   05/08/18 102 kg (224 lb 9.6 oz)   04/11/18 102 kg (225 lb 3.2 oz)   body mass index is 36.07 kg/m².,Temp: 97.5 °F (36.4 °C),BP: 122/72,Heart Rate: (!) 122   Physical Exam  General Well developed; well nourished; no acute distress.   ENT Oral mucosa pink and moist without thrush or lesions.    Neck Neck supple; trachea midline. No thyromegaly   Resp CTA; no rhonchi, rales, or wheezes.  Respiration effort normal  CV RRR; normal S1, S2; no M/R/G. No lower extremity edema  GI Abd soft, NT, ND, normal active bowel sounds.  Obese. No abd hernia  Skin No rash; no lesions; no bruises.  Skin turgor normal  Musc No clubbing; no cyanosis.  Gait steady  Psych Oriented to time, place, and person.  Appropriate affect  Thank you kindly for allowing me to be part of this patient’s care.    Pt care team: Rea BIRMINGHAM & Talia Campbell MA  07/11/189:09 AM  [facility]--Gastroenterology  [phone]    CC: Dr.Wanda DARYL Larson MD  [address] [address] / [facility] [id number]  FAX:[phone]

## 2018-07-12 LAB
ACTIN IGG SERPL-ACNC: 6 UNITS (ref 0–19)
ALP LIVER CFR SERPL: <1 UNITS (ref 0–20)
ANA SER QL IA: NEGATIVE
CERULOPLASMIN SERPL-MCNC: 30.8 MG/DL (ref 19–39)
DEPRECATED MITOCHONDRIA M2 IGG SER-ACNC: <20 UNITS (ref 0–20)
HAV AB SER QL IA: NEGATIVE

## 2018-07-18 LAB
A1AT SERPL-MCNC: 139 MG/DL (ref 90–200)
PHENOTYPE: NORMAL
SOLUBLE LIVER IGG SER IA-ACNC: 1.6 UNITS (ref 0–20)

## 2018-07-23 ENCOUNTER — HOSPITAL ENCOUNTER (OUTPATIENT)
Dept: ULTRASOUND IMAGING | Facility: HOSPITAL | Age: 28
Discharge: HOME | End: 2018-07-23
Admitting: NURSE PRACTITIONER

## 2018-07-23 DIAGNOSIS — R74.8 LIVER ENZYME ELEVATION: ICD-10-CM

## 2018-07-23 PROCEDURE — 76705 ECHO EXAM OF ABDOMEN: CPT

## 2018-08-14 ENCOUNTER — OFFICE VISIT (OUTPATIENT)
Dept: GASTROENTEROLOGY | Facility: CLINIC | Age: 28
End: 2018-08-14

## 2018-08-14 VITALS
BODY MASS INDEX: 35.81 KG/M2 | OXYGEN SATURATION: 98 % | TEMPERATURE: 97.4 F | HEART RATE: 112 BPM | WEIGHT: 214.9 LBS | SYSTOLIC BLOOD PRESSURE: 116 MMHG | HEIGHT: 65 IN | DIASTOLIC BLOOD PRESSURE: 80 MMHG

## 2018-08-14 DIAGNOSIS — E66.9 DIABETES MELLITUS TYPE 2 IN OBESE (HCC): ICD-10-CM

## 2018-08-14 DIAGNOSIS — K75.81 NASH (NONALCOHOLIC STEATOHEPATITIS): Primary | ICD-10-CM

## 2018-08-14 DIAGNOSIS — R74.8 LIVER ENZYME ELEVATION: ICD-10-CM

## 2018-08-14 DIAGNOSIS — E11.69 DIABETES MELLITUS TYPE 2 IN OBESE (HCC): ICD-10-CM

## 2018-08-14 DIAGNOSIS — E66.9 OBESITY, CLASS II, BMI 35-39.9: ICD-10-CM

## 2018-08-14 DIAGNOSIS — Z23 NEED FOR PROPHYLACTIC VACCINATION AGAINST HEPATITIS A AND HEPATITIS B: ICD-10-CM

## 2018-08-14 PROCEDURE — 90746 HEPB VACCINE 3 DOSE ADULT IM: CPT | Performed by: NURSE PRACTITIONER

## 2018-08-14 PROCEDURE — 90632 HEPA VACCINE ADULT IM: CPT | Performed by: NURSE PRACTITIONER

## 2018-08-14 PROCEDURE — 99213 OFFICE O/P EST LOW 20 MIN: CPT | Performed by: NURSE PRACTITIONER

## 2018-08-14 PROCEDURE — 90472 IMMUNIZATION ADMIN EACH ADD: CPT | Performed by: NURSE PRACTITIONER

## 2018-08-14 PROCEDURE — 90471 IMMUNIZATION ADMIN: CPT | Performed by: NURSE PRACTITIONER

## 2018-08-14 NOTE — PATIENT INSTRUCTIONS
Fatty Liver  Fatty liver, also called hepatic steatosis or steatohepatitis, is a condition in which too much fat has built up in your liver cells. The liver removes harmful substances from your bloodstream. It produces fluids your body needs. It also helps your body use and store energy from the food you eat.  In many cases, fatty liver does not cause symptoms or problems. It is often diagnosed when tests are being done for other reasons. However, over time, fatty liver can cause inflammation that may lead to more serious liver problems, such as scarring of the liver (cirrhosis).  What are the causes?  Causes of fatty liver may include:  · Drinking too much alcohol.  · Poor nutrition.  · Obesity.  · Cushing syndrome.  · Diabetes.  · Hyperlipidemia.  · Pregnancy.  · Certain drugs.  · Poisons.  · Some viral infections.    What increases the risk?  You may be more likely to develop fatty liver if you:  · Abuse alcohol.  · Are pregnant.  · Are overweight.  · Have diabetes.  · Have hepatitis.  · Have a high triglyceride level.    What are the signs or symptoms?  Fatty liver often does not cause any symptoms. In cases where symptoms develop, they can include:  · Fatigue.  · Weakness.  · Weight loss.  · Confusion.  · Abdominal pain.  · Yellowing of your skin and the white parts of your eyes (jaundice).  · Nausea and vomiting.    How is this diagnosed?  Fatty liver may be diagnosed by:  · Physical exam and medical history.  · Blood tests.  · Imaging tests, such as an ultrasound, CT scan, or MRI.  · Liver biopsy. A small sample of liver tissue is removed using a needle. The sample is then looked at under a microscope.    How is this treated?  Fatty liver is often caused by other health conditions. Treatment for fatty liver may involve medicines and lifestyle changes to manage conditions such as:  · Alcoholism.  · High cholesterol.  · Diabetes.  · Being overweight or obese.    Follow these instructions at home:  · Eat a  healthy diet as directed by your health care provider.  · Exercise regularly. This can help you lose weight and control your cholesterol and diabetes. Talk to your health care provider about an exercise plan and which activities are best for you.  · Do not drink alcohol.  · Take medicines only as directed by your health care provider.  Contact a health care provider if:  You have difficulty controlling your:  · Blood sugar.  · Cholesterol.  · Alcohol consumption.    Get help right away if:  · You have abdominal pain.  · You have jaundice.  · You have nausea and vomiting.  This information is not intended to replace advice given to you by your health care provider. Make sure you discuss any questions you have with your health care provider.  Document Released: 02/02/2007 Document Revised: 05/25/2017 Document Reviewed: 04/29/2015  [employer] Interactive Patient Education © 2018 [employer]  Nonalcoholic Fatty Liver Disease Diet  Nonalcoholic fatty liver disease is a condition that causes fat to accumulate in and around the liver. The disease makes it harder for the liver to work the way that it should. Following a healthy diet can help to keep nonalcoholic fatty liver disease under control. It can also help to prevent or improve conditions that are associated with the disease, such as heart disease, diabetes, high blood pressure, and abnormal cholesterol levels. Along with regular exercise, this diet:  · Promotes weight loss.  · Helps to control blood sugar levels.  · Helps to improve the way that the body uses insulin.    What do I need to know about this diet?  · Use the glycemic index (GI) to plan your meals. The index tells you how quickly a food will raise your blood sugar. Choose low-GI foods. These foods take a longer time to raise blood sugar.  · Keep track of how many calories you take in. Eating the right amount of calories will help you to achieve a healthy weight.  · You may want to follow a Mediterranean  diet. This diet includes a lot of vegetables, lean meats or fish, whole grains, fruits, and healthy oils and fats.  What foods can I eat?  Grains  Whole grains, such as whole-wheat or whole-grain breads, crackers, tortillas, cereals, and pasta. Stone-ground whole wheat. Pumpernickel bread. Unsweetened oatmeal. Bulgur. Barley. Quinoa. Brown or wild rice. Corn or whole-wheat flour tortillas.  Vegetables  Lettuce. Spinach. Peas. Beets. Cauliflower. Cabbage. Broccoli. Carrots. Tomatoes. Squash. Eggplant. Herbs. Peppers. Onions. Cucumbers. [place] sprouts. Yams and sweet potatoes. Beans. Lentils.  Fruits  Bananas. Apples. Oranges. Grapes. Papaya. Patricio. Pomegranate. Kiwi. Grapefruit. Cherries.  Meats and Other Protein Sources  Seafood and shellfish. Lean meats. Poultry. Tofu.  Dairy  Low-fat or fat-free dairy products, such as yogurt, cottage cheese, and cheese.  Beverages  Water. Sugar-free drinks. Tea. Coffee. Low-fat or skim milk. Milk alternatives, such as soy or almond milk. Real fruit juice.  Condiments  Mustard. Relish. Low-fat, low-sugar ketchup and barbecue sauce. Low-fat or fat-free mayonnaise.  Sweets and Desserts  Sugar-free sweets.  Fats and Oils  Avocado. Canola or olive oil. Nuts and nut butters. Seeds.  The items listed above may not be a complete list of recommended foods or beverages. Contact your dietitian for more options.  What foods are not recommended?  Palm oil and coconut oil. Processed foods. Fried foods. Sweetened drinks, such as sweet tea, milkshakes, snow cones, iced sweet drinks, and sodas. Alcohol. Sweets. Foods that contain a lot of salt or sodium.  The items listed above may not be a complete list of foods and beverages to avoid. Contact your dietitian for more information.  This information is not intended to replace advice given to you by your health care provider. Make sure you discuss any questions you have with your health care provider.  Document Released: 05/03/2016 Document  Revised: 05/25/2017 Document Reviewed: 01/12/2016  [employer] Interactive Patient Education © 2018 [employer]

## 2018-08-14 NOTE — PROGRESS NOTES
GASTROENTEROLOGY OUTPATIENT ESTABLISHED PATIENT NOTE  Patient: YUSUF RAMOS : 1990  Date of Service: 2018  CC: Follow-up    Assessment/Plan                                             ASSESSMENT & PLANS     JOHNSON (nonalcoholic steatohepatitis)  Liver enzyme elevation r/t to JOHNSON  Diabetes mellitus type 2 in obese (CMS/HCC)  Obesity, Class II, BMI 35-39.9  • Weight loss is only safe way to mgt fatty liver disease.   • Ambulatory Referral to Nutrition Services  • I recommend moderate, regular exercise as tolerated.  • Follow up with PCP for mgt of DM. I encouraged pt to stay healthy in general.  Avoid or modest alcohol consumption.  Avoidsmoking, recreation drugs, or excessive acetaminophen.  I explained to pt that fatty liver, if not controlled, can develop into liver cirrhosis  • OK to continue phentermine since her ALT remains elevated whether or not pt is on phentermine.      Need for prophylactic vaccination against hepatitis A and hepatitis B  -     Hepatitis A Vaccine Adult IM.  First dose today.    -     Hepatitis B Vaccine Adult IM  First dose today    Follow Up:  · RTC in 1 month for 2nd Hep B vaccine.    · RTC in 3 months for repeat CMP. If at that time, pt's weight is reduced, and DM2 is improved, but there is no improvement in LFT abnormality, then a liver biopsy is to recommended to further evaluate other causes of LFT elevation.      DISCUSSION: The above plan was delineated in details with patient and all questions and concerns were answered.  Patient is also given contact information.  Patient is to return as scheduled or sooner if new problems arise.   Subjective                                                     SUBJECTIVE   History of Present Illness  Ms. Yusuf Ramos is a 28 y.o. female is here for Follow-up on recent evaluation for LFT elevation.  Work up showed fatty liver.  Autoimmune lab work is WNL.   Need Hep A and B vaccine.  Pt has been trying to  lose wt.  Has resumed taking phentermine in May 2018.  Previously, pt was on phentermine fr Aug 2017 to Nov 17 and had an intentional weight loss (documented) of 10 pounds within the 3 months  Pt denies any abdominal pain, including RUQ abdominal pain.  Pt denies jaundice, pruritus, stool or urine color changes, palmar erythema, or any skin changes. No stigmata of liver disease.  No change in bowel habits. Pt denies dark black stools or bright red blood in the stools, in the toilet bowl, or on the toilet tissue.  No diarrhea or constipation.  Stool character and consistency have been normal.    ROS:Review of Systems   Constitutional: Negative.         Pt currently or recently takes NSAIDS ( (i.e Ibuprofen, Aleve, Advil, Exedrin, BC Powder, diclofenac, meloxicam, & Naproxen, etc)?  No    Pt currently or recently takes abx?  No   HENT: Negative.    Eyes: Negative.    Respiratory: Negative.    Cardiovascular: Negative.    Gastrointestinal: Negative.    Endocrine: Negative.    Genitourinary: Negative.    Musculoskeletal: Negative.    Skin: Negative.    Allergic/Immunologic: Negative.    Neurological: Negative.    Hematological: Negative.    Psychiatric/Behavioral: Negative.      Objective                                                           OBJECTIVE   Allergy: Pt has No Known Allergies.  MEDS: •  Empagliflozin 10 MG tablet, Take 1 tablet by mouth Daily., Disp: 30 tablet, Rfl: 1  •  glucose blood test strip, Used to check blood sugar 3-4 daily for diabetes E11.9., Disp: 125 each, Rfl: 12  •  glucose monitoring kit (FREESTYLE) monitoring kit, Use daily to test blood sugar daily for Diabetes E11.9, Disp: 1 each, Rfl: 0  •  Lancets (FREESTYLE) lancets, Use to check blood sugar for Diabetes E11.9., Disp: 100 each, Rfl: 12  •  levonorgestrel (MIRENA) 20 MCG/24HR IUD, 1 each by Intrauterine route 1 (One) Time., Disp: , Rfl:   •  loratadine (CLARITIN) 10 MG tablet, Take 1 tablet by mouth Daily., Disp: 30 tablet, Rfl: 3  •   metFORMIN (GLUCOPHAGE) 1000 MG tablet, Take 1 tablet by mouth 2 (Two) Times a Day With Meals., Disp: 60 tablet, Rfl: 3  •  phentermine (ADIPEX-P) 37.5 MG tablet, Take 1 tablet by mouth Every Morning Before Breakfast., Disp: 30 tablet, Rfl: 0  •  SITagliptin (JANUVIA) 100 MG tablet, Take 1 tablet by mouth Daily., Disp: 30 tablet, Rfl: 3  Lab Results   Component Value Date    WBC 11.87 (H) 06/07/2018    WBC 5.74 05/08/2017    WBC 7.96 11/25/2014    EOSABS 0.14 06/07/2018    EOSABS 0.09 (L) 05/08/2017    EOSABS 0.10 11/25/2014    HGB 16.6 (H) 06/07/2018    HGB 14.7 05/08/2017    HGB 14.4 11/25/2014    HCT 48.7 (H) 06/07/2018    HCT 43.1 05/08/2017    HCT 41.8 11/25/2014    MCV 91.2 06/07/2018    MCV 89.0 05/08/2017    MCV 85.5 11/25/2014    MCHC 34.1 06/07/2018    MCHC 34.1 05/08/2017    MCHC 34.4 11/25/2014     06/07/2018     05/08/2017     11/25/2014     Lab Results   Component Value Date     06/07/2018    K 4.1 06/07/2018     06/07/2018    CO2 24.0 06/07/2018    BUN 11 06/07/2018    CREATININE 0.78 06/07/2018    GLUCOSE 102 (H) 06/07/2018    CALCIUM 9.4 06/07/2018    ANIONGAP 11.0 06/07/2018        Liver Profile     Lab Results   Component Value Date    AST 70 (H) 07/06/2018    AST 53 (H) 07/02/2018    AST 74 (H) 06/07/2018    AST 30 01/29/2018    AST 29 08/30/2017    AST 35 (H) 05/08/2017    AST 16 11/25/2014     Lab Results   Component Value Date     (H) 07/06/2018     (H) 07/02/2018     (H) 06/07/2018    ALT 86 (H) 01/29/2018    ALT 79 (H) 08/30/2017    ALT 78 (H) 05/08/2017    ALT 38 11/25/2014     Lab Results   Component Value Date    ALKPHOS 74 07/06/2018    ALKPHOS 79 07/02/2018    ALKPHOS 86 06/07/2018    ALKPHOS 98 01/29/2018    ALKPHOS 98 08/30/2017    GGT 55 (H) 07/11/2018     Lab Results   Component Value Date    BILITOT 0.5 07/06/2018    BILITOT 0.4 07/02/2018    BILITOT 0.7 06/07/2018    BILITOT 0.4 01/29/2018    BILITOT 0.5 08/30/2017    BILIDIR  0.1 07/06/2018    BILIDIR 0.1 07/02/2018    INDBILI 0.4 07/06/2018    INDBILI 0.3 07/02/2018    ALBUMIN 4.36 07/06/2018    ALBUMIN 4.20 07/02/2018    ALBUMIN 4.83 (H) 06/07/2018    ALBUMIN 4.30 01/29/2018    ALBUMIN 4.50 08/30/2017    PROTEINTOT 7.2 07/06/2018     06/07/2018     05/08/2017     11/25/2014     Immunity against Hep A and B  Lab Results   Component Value Date    HAV Negative 07/11/2018    HEPBSAB Non-Reactive 07/11/2018     Drug Screen  Lab Results   Component Value Date    THCURSCR Negative 05/08/2018    THCURSCR Negative 07/28/2017    PCPUR Negative 05/08/2018    PCPUR Negative 07/28/2017    COCAINEUR Negative 05/08/2018    COCAINEUR Negative 07/28/2017    METAMPSCNUR Negative 05/08/2018    METAMPSCNUR Negative 07/28/2017    LABOPIASCN Negative 05/08/2018    LABOPIASCN Negative 07/28/2017    AMPHETSCREEN Negative 05/08/2018    AMPHETSCREEN Negative 07/28/2017    LABBENZSCN Negative 05/08/2018    LABBENZSCN Negative 07/28/2017    TRICYCLICSCN Negative 05/08/2018    TRICYCLICSCN Negative 07/28/2017    LABMETHSCN Negative 05/08/2018    LABMETHSCN Negative 07/28/2017    BARBITSCNUR Negative 05/08/2018    BARBITSCNUR Negative 07/28/2017    OXYCODONESCN Negative 05/08/2018    OXYCODONESCN Negative 07/28/2017    PROPOXSCN Negative 05/08/2018    PROPOXSCN Negative 07/28/2017    BUPRENORSCNU Negative 05/08/2018    BUPRENORSCNU Negative 07/28/2017     TIBC (Normal 250 - 450 ug/dL)  Lab Results   Component Value Date    TIBC 405 07/11/2018     Serum Iron (Normal 38 - 169 ug/dL)  Lab Results   Component Value Date    IRON 87 07/11/2018     Transferrin or Iron Saturation % (Normal 20 - 50 %)  Lab Results   Component Value Date    LABIRON 21 07/11/2018     Ferritin (Normal 20.00 - 291.00 ng/mL)  Lab Results   Component Value Date    FERRITIN 263.00 07/11/2018     Co-morbidities   Lab Results   Component Value Date    HGBA1C 8.0 05/08/2018    HGBA1C 7.9 01/29/2018    HGBA1C 6.30 (H) 08/30/2017     HGBA1C 10.8 05/08/2017     HLP  Lab Results   Component Value Date    TRIG 127 06/07/2018    TRIG 251 (H) 01/29/2018    TRIG 141 08/30/2017    TRIG 252 (H) 05/08/2017    HDL 58 06/07/2018    HDL 49 01/29/2018    HDL 50 08/30/2017    HDL 48 05/08/2017     (H) 06/07/2018     (H) 01/29/2018     08/30/2017     (H) 05/08/2017     Thyroid/Calcium Panel (Normal TSH: 0.350 - 5.350 mIU/mL)  Lab Results   Component Value Date    TSH 1.182 06/07/2018    TSH 1.299 05/08/2017    TSH 1.029 11/25/2014    FREET4 1.11 05/08/2017    CALCIUM 9.4 06/07/2018    CALCIUM 9.2 01/29/2018    CALCIUM 9.7 08/30/2017     Autoimmune Markers  Lab Results   Component Value Date    ETHEL Negative 07/11/2018     Anti-Mitochondrial Antibody (AMA)   Lab Results   Component Value Date    MITOAB <20.0 07/11/2018     Anti-Smooth Muscle Antibody   Lab Results   Component Value Date    SMOOTHMUSCAB 6 07/11/2018     Liver-Kidney Antibody (Anti-microsomal Antibody)  Lab Results   Component Value Date    LABLIVE <1.0 07/11/2018     Alpha-1 Antitrypsin Phenotype and Alpha Tumor Marker  Lab Results   Component Value Date    AFPTM 139 07/11/2018    PHENOTYPE MM 07/11/2018     Ceruloplasmin (Normal 16.0 - 31.0 mg/dL)  Lab Results   Component Value Date    CERULOPLSM 30.8 07/11/2018     Soluble Liver Ag (IgG Ab)   Lab Results   Component Value Date    LABANTI 1.6 07/11/2018     Infections  Viral Hepatitis  Lab Results   Component Value Date    HEPAIGM Non-Reactive 06/07/2018    HEPAIGM Non-Reactive 05/08/2017    HEPBSAG Non-Reactive 06/07/2018    HEPBSAG Non-Reactive 05/08/2017    HEPBIGMCORE Non-Reactive 06/07/2018    HEPBIGMCORE Non-Reactive 05/08/2017    HEPCVIRUSABY Non-Reactive 06/07/2018    HEPCVIRUSABY Non-Reactive 05/08/2017     EXAMINATION: US LIVER- 07/23/2018     FINDINGS: Examination is suboptimal due to breathing motion artifact.  The pancreas and its visualized portions are homogeneous and  unremarkable. There is  increased echogenicity identified within the  liver suggesting fatty infiltration. No focal mass or intrahepatic  biliary ductal dilatation. No stones seen in the gallbladder. No wall  thickening or pericholecystic fluid. The common bile duct measures 3 mm.  The right kidney normal in size, configuration, and texture measuring in  length from pole to pole 11.5 cm. No solid cortical mass or renal  cortical cysts seen within the right kidney. No hydronephrosis or  nephrolithiasis.     IMPRESSION:  Fatty infiltration identified of the liver otherwise  unremarkable right upper quadrant ultrasound.    Wt Readings from Last 5 Encounters:   08/14/18 97.5 kg (214 lb 14.4 oz)   07/11/18 97.6 kg (215 lb 3.2 oz)   07/06/18 97.9 kg (215 lb 12.8 oz)   06/07/18 96.6 kg (213 lb)   05/08/18 102 kg (224 lb 9.6 oz)   body mass index is 36.03 kg/m².,Temp: 97.4 °F (36.3 °C),BP: 116/80,Heart Rate: 112   Physical Exam  General Well developed; well nourished; no acute distress.   ENT Oral mucosa pink and moist without thrush or lesions.    GI Abd soft, NT, ND, normal active bowel sounds.  No HSM.  No abd hernia    Pt care team: Rea BIRMINGH[facility] & Yao Blank, [facility]--Gastroenterology  [phone]    CC: Amee Plunkett MD   [address] [place] [address] / [place] [address] FAX:[phone]

## 2018-08-16 ENCOUNTER — OFFICE VISIT (OUTPATIENT)
Dept: INTERNAL MEDICINE | Facility: CLINIC | Age: 28
End: 2018-08-16

## 2018-08-16 VITALS
HEIGHT: 65 IN | WEIGHT: 214.4 LBS | DIASTOLIC BLOOD PRESSURE: 72 MMHG | OXYGEN SATURATION: 98 % | HEART RATE: 96 BPM | TEMPERATURE: 97.8 F | BODY MASS INDEX: 35.72 KG/M2 | SYSTOLIC BLOOD PRESSURE: 110 MMHG

## 2018-08-16 DIAGNOSIS — E11.65 TYPE 2 DIABETES MELLITUS WITH HYPERGLYCEMIA, WITHOUT LONG-TERM CURRENT USE OF INSULIN (HCC): ICD-10-CM

## 2018-08-16 DIAGNOSIS — E11.9 TYPE 2 DIABETES MELLITUS WITHOUT COMPLICATION, WITHOUT LONG-TERM CURRENT USE OF INSULIN (HCC): ICD-10-CM

## 2018-08-16 LAB — HBA1C MFR BLD: 6.5 %

## 2018-08-16 PROCEDURE — 99213 OFFICE O/P EST LOW 20 MIN: CPT | Performed by: FAMILY MEDICINE

## 2018-08-16 PROCEDURE — 83036 HEMOGLOBIN GLYCOSYLATED A1C: CPT | Performed by: FAMILY MEDICINE

## 2018-08-16 RX ORDER — PHENTERMINE HYDROCHLORIDE 37.5 MG/1
37.5 TABLET ORAL
Qty: 30 TABLET | Refills: 0 | Status: SHIPPED | OUTPATIENT
Start: 2018-08-16 | End: 2018-10-15

## 2018-08-16 RX ORDER — LANCETS 28 GAUGE
EACH MISCELLANEOUS
Qty: 100 EACH | Refills: 12 | Status: SHIPPED | OUTPATIENT
Start: 2018-08-16 | End: 2019-04-15 | Stop reason: SDUPTHER

## 2018-08-16 NOTE — PROGRESS NOTES
"Subjective   Shelley Graff is a 28 y.o. female.     Chief Complaint   Patient presents with   • Follow-up   • Diabetes       Visit Vitals  /72   Pulse 96   Temp 97.8 °F (36.6 °C) (Temporal Artery )   Ht 164.5 cm (64.76\")   Wt 97.3 kg (214 lb 6.4 oz)   SpO2 98%   BMI 35.94 kg/m²         Diabetes   She presents for her follow-up diabetic visit. She has type 2 diabetes mellitus. Her disease course has been improving. There are no hypoglycemic associated symptoms. Pertinent negatives for hypoglycemia include no dizziness, headaches or nervousness/anxiousness. Pertinent negatives for diabetes include no blurred vision, no chest pain, no fatigue, no foot paresthesias, no foot ulcerations, no polydipsia, no polyphagia, no polyuria, no visual change, no weakness and no weight loss. There are no hypoglycemic complications. Symptoms are stable. Pertinent negatives for diabetic complications include no CVA, heart disease, nephropathy, peripheral neuropathy, PVD or retinopathy. Risk factors for coronary artery disease include diabetes mellitus, obesity and family history. Current diabetic treatment includes oral agent (triple therapy). She is compliant with treatment all of the time. Her weight is stable. She is following a generally healthy diet. Meal planning includes avoidance of concentrated sweets. She has not had a previous visit with a dietitian (has appointment Monday). Her breakfast blood glucose range is generally 110-130 mg/dl. An ACE inhibitor/angiotensin II receptor blocker is not being taken. She does not see a podiatrist.Eye exam is current.      Pt did see GI and dx'd with fatty liver.  They did make a Nutrition appt and ok'ed restart phentermine.     The following portions of the patient's history were reviewed and updated as appropriate: allergies, current medications, past family history, past medical history, past social history, past surgical history and problem list.    Past Medical " History:   Diagnosis Date   • Gestational diabetes    • Pap smear for cervical cancer screening 2017      Past Surgical History:   Procedure Laterality Date   • CERVICAL CONE BIOPSY      age 14-benign   •  SECTION  , 08, 11/6/15    x3 5/15/13      Family History   Problem Relation Age of Onset   • Stroke Mother    • Heart disease Mother    • Diabetes Mother    • Obesity Mother    • Hypertension Mother       Social History     Social History   • Marital status:      Spouse name: N/A   • Number of children: N/A   • Years of education: N/A     Occupational History   • Not on file.     Social History Main Topics   • Smoking status: Former Smoker     Packs/day: 2.00     Years: 2.00     Quit date: 2012   • Smokeless tobacco: Never Used   • Alcohol use No   • Drug use: No   • Sexual activity: Yes     Partners: Male     Birth control/ protection: IUD      Comment:      Other Topics Concern   • Not on file     Social History Narrative   • No narrative on file        Review of Systems   Constitutional: Negative.  Negative for chills, diaphoresis, fatigue, fever and weight loss.   HENT: Negative.  Negative for ear pain, nosebleeds, postnasal drip, rhinorrhea, sinus pressure, sneezing and sore throat.    Eyes: Negative.  Negative for blurred vision, redness and itching.   Respiratory: Negative.  Negative for cough, shortness of breath and wheezing.    Cardiovascular: Negative.  Negative for chest pain and palpitations.   Gastrointestinal: Negative.  Negative for abdominal pain, constipation, diarrhea, nausea and vomiting.   Endocrine: Negative.  Negative for cold intolerance, heat intolerance, polydipsia, polyphagia and polyuria.   Genitourinary: Negative.  Negative for dysuria, frequency, hematuria and urgency.   Musculoskeletal: Negative.  Negative for arthralgias, back pain and neck pain.   Skin: Negative.  Negative for color change and rash.   Allergic/Immunologic: Negative.  Negative for  environmental allergies.   Neurological: Negative.  Negative for dizziness, syncope, weakness, light-headedness and headaches.   Hematological: Negative.  Negative for adenopathy. Does not bruise/bleed easily.   Psychiatric/Behavioral: Negative.  Negative for dysphoric mood. The patient is not nervous/anxious.        Objective   Physical Exam   Constitutional: She is oriented to person, place, and time. She appears well-developed.   HENT:   Head: Normocephalic.   Right Ear: External ear normal.   Left Ear: External ear normal.   Nose: Nose normal.   Eyes: Pupils are equal, round, and reactive to light. Conjunctivae, EOM and lids are normal.   Neck: Trachea normal and normal range of motion. Neck supple. Carotid bruit is not present. No thyroid mass and no thyromegaly present.   Cardiovascular: Normal rate and regular rhythm.    No murmur heard.  Pulmonary/Chest: Effort normal and breath sounds normal. No respiratory distress. She has no decreased breath sounds. She has no wheezes. She has no rhonchi. She has no rales. She exhibits no tenderness.   Abdominal: Soft. Bowel sounds are normal. There is no tenderness.   Musculoskeletal: Normal range of motion.   Neurological: She is alert and oriented to person, place, and time.   Skin: Skin is warm and dry.   Psychiatric: She has a normal mood and affect. Her behavior is normal.   Nursing note and vitals reviewed.      Assessment/Plan   Shelley was seen today for follow-up and diabetes.    Diagnoses and all orders for this visit:    Type 2 diabetes mellitus with hyperglycemia, without long-term current use of insulin (CMS/[facility])  -     POC Glycosylated Hemoglobin (Hb A1C)  -     glucose blood test strip; Used to check blood sugar 3-4 daily for diabetes E11.9.    Type 2 diabetes mellitus without complication, without long-term current use of insulin (CMS/[facility])  -     SITagliptin (JANUVIA) 100 MG tablet; Take 1 tablet by mouth Daily.  -     metFORMIN (GLUCOPHAGE) 1000 MG  tablet; Take 1 tablet by mouth 2 (Two) Times a Day With Meals.    Other orders  -     Lancets (FREESTYLE) lancets; Use to check blood sugar for Diabetes E11.9.  -     phentermine (ADIPEX-P) 37.5 MG tablet; Take 1 tablet by mouth Every Morning Before Breakfast.  -     Empagliflozin 10 MG tablet; Take 1 tablet by mouth Daily.                   Current Outpatient Prescriptions:   •  Empagliflozin 10 MG tablet, Take 1 tablet by mouth Daily., Disp: 30 tablet, Rfl: 3  •  glucose blood test strip, Used to check blood sugar 3-4 daily for diabetes E11.9., Disp: 125 each, Rfl: 12  •  glucose monitoring kit (FREESTYLE) monitoring kit, Use daily to test blood sugar daily for Diabetes E11.9, Disp: 1 each, Rfl: 0  •  Lancets (FREESTYLE) lancets, Use to check blood sugar for Diabetes E11.9., Disp: 100 each, Rfl: 12  •  levonorgestrel (MIRENA) 20 MCG/24HR IUD, 1 each by Intrauterine route 1 (One) Time., Disp: , Rfl:   •  loratadine (CLARITIN) 10 MG tablet, Take 1 tablet by mouth Daily., Disp: 30 tablet, Rfl: 3  •  metFORMIN (GLUCOPHAGE) 1000 MG tablet, Take 1 tablet by mouth 2 (Two) Times a Day With Meals., Disp: 60 tablet, Rfl: 3  •  phentermine (ADIPEX-P) 37.5 MG tablet, Take 1 tablet by mouth Every Morning Before Breakfast., Disp: 30 tablet, Rfl: 0  •  SITagliptin (JANUVIA) 100 MG tablet, Take 1 tablet by mouth Daily., Disp: 30 tablet, Rfl: 3    Return in about 4 weeks (around 9/13/2018), or if symptoms worsen or fail to improve, for Recheck.    Recent Results (from the past 168 hour(s))   POC Glycosylated Hemoglobin (Hb A1C)    Collection Time: 08/16/18  2:45 PM   Result Value Ref Range    Hemoglobin A1C 6.5 %

## 2018-09-13 ENCOUNTER — TELEPHONE (OUTPATIENT)
Dept: GASTROENTEROLOGY | Facility: CLINIC | Age: 28
End: 2018-09-13

## 2018-09-17 ENCOUNTER — OFFICE VISIT (OUTPATIENT)
Dept: INTERNAL MEDICINE | Facility: CLINIC | Age: 28
End: 2018-09-17

## 2018-09-17 VITALS
HEIGHT: 65 IN | DIASTOLIC BLOOD PRESSURE: 70 MMHG | TEMPERATURE: 98.1 F | SYSTOLIC BLOOD PRESSURE: 110 MMHG | HEART RATE: 97 BPM | OXYGEN SATURATION: 98 % | WEIGHT: 208.6 LBS | BODY MASS INDEX: 34.75 KG/M2

## 2018-09-17 DIAGNOSIS — E11.9 TYPE 2 DIABETES MELLITUS WITHOUT COMPLICATION, WITHOUT LONG-TERM CURRENT USE OF INSULIN (HCC): Primary | ICD-10-CM

## 2018-09-17 DIAGNOSIS — Z23 NEED FOR HEPATITIS B VACCINATION: ICD-10-CM

## 2018-09-17 DIAGNOSIS — E66.09 CLASS 2 OBESITY DUE TO EXCESS CALORIES WITH BODY MASS INDEX (BMI) OF 35.0 TO 35.9 IN ADULT, UNSPECIFIED WHETHER SERIOUS COMORBIDITY PRESENT: ICD-10-CM

## 2018-09-17 DIAGNOSIS — B35.6 TINEA CRURIS: ICD-10-CM

## 2018-09-17 PROCEDURE — 90746 HEPB VACCINE 3 DOSE ADULT IM: CPT | Performed by: FAMILY MEDICINE

## 2018-09-17 PROCEDURE — 90471 IMMUNIZATION ADMIN: CPT | Performed by: FAMILY MEDICINE

## 2018-09-17 PROCEDURE — 99214 OFFICE O/P EST MOD 30 MIN: CPT | Performed by: FAMILY MEDICINE

## 2018-09-17 RX ORDER — KETOCONAZOLE 20 MG/G
CREAM TOPICAL EVERY 12 HOURS SCHEDULED
Qty: 30 G | Refills: 0 | Status: SHIPPED | OUTPATIENT
Start: 2018-09-17 | End: 2020-01-30

## 2018-09-17 NOTE — PROGRESS NOTES
"Subjective   Shelley Graff is a 28 y.o. female.     Chief Complaint   Patient presents with   • Diabetes     follow up       Visit Vitals  /70   Pulse 97   Temp 98.1 °F (36.7 °C)   Ht 164.5 cm (64.75\")   Wt 94.6 kg (208 lb 9.6 oz)   LMP  (Approximate)   SpO2 98%   BMI 34.98 kg/m²         Diabetes   She presents for her follow-up diabetic visit. She has type 2 diabetes mellitus. Her disease course has been improving. Hypoglycemia symptoms include hunger and sweats. Pertinent negatives for hypoglycemia include no dizziness, headaches or nervousness/anxiousness. Pertinent negatives for diabetes include no blurred vision, no chest pain, no fatigue, no foot paresthesias, no foot ulcerations, no polydipsia, no polyphagia, no polyuria, no visual change, no weakness and no weight loss. Hypoglycemia complications include required assistance (). Symptoms are improving. There are no diabetic complications. Risk factors for coronary artery disease include diabetes mellitus. Current diabetic treatment includes oral agent (triple therapy). She is compliant with treatment all of the time. Her weight is decreasing steadily. She is following a generally healthy diet. Meal planning includes avoidance of concentrated sweets. She participates in exercise intermittently. Her breakfast blood glucose range is generally  mg/dl. An ACE inhibitor/angiotensin II receptor blocker is being taken. She does not see a podiatrist.Eye exam is current.   Obesity   This is a chronic problem. The current episode started more than 1 year ago. The problem occurs constantly. The problem has been gradually improving. Associated symptoms include arthralgias (knees). Pertinent negatives include no abdominal pain, anorexia, change in bowel habit, chest pain, chills, congestion, coughing, diaphoresis, fatigue, fever, headaches, joint swelling, myalgias, nausea, neck pain, numbness, rash, sore throat, swollen glands, urinary " symptoms, vertigo, visual change, vomiting or weakness. The symptoms are aggravated by eating. Treatments tried: phentermine. The treatment provided significant relief.      Pt skipped a meal and her blood sugar dropped to 70. Pt had to have her  drive her home from [place].   Sugar  depending on the diet.    Pt has lost 6# since last visit. Pt is avoiding carbs.     The following portions of the patient's history were reviewed and updated as appropriate: allergies, current medications, past family history, past medical history, past social history, past surgical history and problem list.    Past Medical History:   Diagnosis Date   • Gestational diabetes    • Pap smear for cervical cancer screening 2017      Past Surgical History:   Procedure Laterality Date   • CERVICAL CONE BIOPSY      age 14-benign   •  SECTION  , 08, 11/6/15    x3 5/15/13      Family History   Problem Relation Age of Onset   • Stroke Mother    • Heart disease Mother    • Diabetes Mother    • Obesity Mother    • Hypertension Mother       Social History     Social History   • Marital status:      Spouse name: N/A   • Number of children: N/A   • Years of education: N/A     Occupational History   • Not on file.     Social History Main Topics   • Smoking status: Former Smoker     Packs/day: 2.00     Years: 2.00     Quit date: 2012   • Smokeless tobacco: Never Used   • Alcohol use No   • Drug use: No   • Sexual activity: Yes     Partners: Male     Birth control/ protection: IUD      Comment:      Other Topics Concern   • Not on file     Social History Narrative   • No narrative on file        Review of Systems   Constitutional: Negative.  Negative for chills, diaphoresis, fatigue, fever and weight loss.   HENT: Negative.  Negative for congestion, ear pain, nosebleeds, postnasal drip, rhinorrhea, sinus pressure, sneezing and sore throat.    Eyes: Negative.  Negative for blurred vision, redness and itching.    Respiratory: Negative.  Negative for cough, shortness of breath and wheezing.    Cardiovascular: Negative.  Negative for chest pain and palpitations.   Gastrointestinal: Negative.  Negative for abdominal pain, anorexia, change in bowel habit, constipation, diarrhea, nausea and vomiting.   Endocrine: Negative.  Negative for cold intolerance, heat intolerance, polydipsia, polyphagia and polyuria.   Genitourinary: Negative.  Negative for dysuria, frequency, hematuria and urgency.   Musculoskeletal: Positive for arthralgias (knees). Negative for back pain, joint swelling, myalgias and neck pain.   Skin: Negative.  Negative for color change and rash.   Allergic/Immunologic: Negative.  Negative for environmental allergies.   Neurological: Negative.  Negative for dizziness, vertigo, syncope, weakness, light-headedness, numbness and headaches.   Hematological: Negative.  Negative for adenopathy. Does not bruise/bleed easily.   Psychiatric/Behavioral: Negative.  Negative for dysphoric mood. The patient is not nervous/anxious.        Objective   Physical Exam   Constitutional: She is oriented to person, place, and time. She appears well-developed.   HENT:   Head: Normocephalic.   Right Ear: External ear normal.   Left Ear: External ear normal.   Nose: Nose normal.   Eyes: Pupils are equal, round, and reactive to light. Conjunctivae, EOM and lids are normal.   Neck: Trachea normal and normal range of motion. Neck supple. Carotid bruit is not present. No thyroid mass and no thyromegaly present.   Cardiovascular: Normal rate and regular rhythm.    No murmur heard.  Pulmonary/Chest: Effort normal and breath sounds normal. No respiratory distress. She has no decreased breath sounds. She has no wheezes. She has no rhonchi. She has no rales. She exhibits no tenderness.   Abdominal: Soft. Bowel sounds are normal. There is no tenderness.   Musculoskeletal: Normal range of motion.    Shelley had a diabetic foot exam performed (nl)  today.   During the foot exam she had a monofilament test performed (nl).  Vascular Status -  Her right foot exhibits normal foot vasculature  and no edema. Her left foot exhibits normal foot vasculature  and no edema.  Skin Integrity  -  Her right foot skin is intact.Her left foot skin is intact..  Neurological: She is alert and oriented to person, place, and time.   Skin: Skin is warm and dry.   Psychiatric: She has a normal mood and affect. Her behavior is normal.   Nursing note and vitals reviewed.      Assessment/Plan   Shelley was seen today for diabetes.    Diagnoses and all orders for this visit:    Type 2 diabetes mellitus without complication, without long-term current use of insulin (CMS/HCC)    Need for hepatitis B vaccination  -     Hepatitis B Vaccine Adult IM    Class 2 obesity due to excess calories with body mass index (BMI) of 35.0 to 35.9 in adult, unspecified whether serious comorbidity present    Tinea cruris  -     ketoconazole (NIZORAL) 2 % cream; Apply  topically to the appropriate area as directed Every 12 (Twelve) Hours.      Pap with GYN  Flu shot when available  Continue the adipex and DM meds         Current Outpatient Prescriptions:   •  Empagliflozin 10 MG tablet, Take 1 tablet by mouth Daily., Disp: 30 tablet, Rfl: 3  •  glucose blood test strip, Used to check blood sugar 3-4 daily for diabetes E11.9., Disp: 125 each, Rfl: 12  •  glucose monitoring kit (FREESTYLE) monitoring kit, Use daily to test blood sugar daily for Diabetes E11.9, Disp: 1 each, Rfl: 0  •  levonorgestrel (MIRENA) 20 MCG/24HR IUD, 1 each by Intrauterine route 1 (One) Time., Disp: , Rfl:   •  loratadine (CLARITIN) 10 MG tablet, Take 1 tablet by mouth Daily., Disp: 30 tablet, Rfl: 3  •  metFORMIN (GLUCOPHAGE) 1000 MG tablet, Take 1 tablet by mouth 2 (Two) Times a Day With Meals., Disp: 60 tablet, Rfl: 3  •  phentermine (ADIPEX-P) 37.5 MG tablet, Take 1 tablet by mouth Every Morning Before Breakfast., Disp: 30 tablet,  Rfl: 0  •  SITagliptin (JANUVIA) 100 MG tablet, Take 1 tablet by mouth Daily., Disp: 30 tablet, Rfl: 3  •  ketoconazole (NIZORAL) 2 % cream, Apply  topically to the appropriate area as directed Every 12 (Twelve) Hours., Disp: 30 g, Rfl: 0  •  Lancets (FREESTYLE) lancets, Use to check blood sugar for Diabetes E11.9., Disp: 100 each, Rfl: 12    Return in about 4 weeks (around 10/15/2018), or if symptoms worsen or fail to improve, for Recheck.    Anil report reviewed and is consistent #[id number]

## 2018-10-15 ENCOUNTER — OFFICE VISIT (OUTPATIENT)
Dept: INTERNAL MEDICINE | Facility: CLINIC | Age: 28
End: 2018-10-15

## 2018-10-15 VITALS
HEIGHT: 65 IN | WEIGHT: 210.2 LBS | TEMPERATURE: 97.4 F | OXYGEN SATURATION: 98 % | DIASTOLIC BLOOD PRESSURE: 74 MMHG | BODY MASS INDEX: 35.02 KG/M2 | HEART RATE: 94 BPM | SYSTOLIC BLOOD PRESSURE: 108 MMHG

## 2018-10-15 DIAGNOSIS — E11.9 TYPE 2 DIABETES MELLITUS WITHOUT COMPLICATION, WITHOUT LONG-TERM CURRENT USE OF INSULIN (HCC): ICD-10-CM

## 2018-10-15 PROCEDURE — 99213 OFFICE O/P EST LOW 20 MIN: CPT | Performed by: FAMILY MEDICINE

## 2018-10-15 RX ORDER — LORATADINE 10 MG/1
10 TABLET ORAL DAILY
Qty: 30 TABLET | Refills: 3 | Status: SHIPPED | OUTPATIENT
Start: 2018-10-15 | End: 2019-02-11 | Stop reason: SDUPTHER

## 2018-10-15 RX ORDER — PHENTERMINE HYDROCHLORIDE 37.5 MG/1
37.5 TABLET ORAL
Qty: 30 TABLET | Refills: 0 | Status: CANCELLED | OUTPATIENT
Start: 2018-10-15

## 2018-10-15 NOTE — PROGRESS NOTES
"Subjective   Shelley Graff is a 28 y.o. female.     Chief Complaint   Patient presents with   • weight management   • Med Refill       Visit Vitals  /74   Pulse 94   Temp 97.4 °F (36.3 °C)   Ht 164.5 cm (64.75\")   Wt 95.3 kg (210 lb 3.2 oz)   SpO2 98%   BMI 35.25 kg/m²         Diabetes   She presents for her follow-up diabetic visit. She has type 2 diabetes mellitus. Her disease course has been improving. There are no hypoglycemic associated symptoms. Pertinent negatives for hypoglycemia include no dizziness, headaches or nervousness/anxiousness. Associated symptoms include weight loss. Pertinent negatives for diabetes include no blurred vision, no chest pain, no fatigue, no foot paresthesias, no foot ulcerations, no polydipsia, no polyphagia, no polyuria, no visual change and no weakness. There are no hypoglycemic complications. Symptoms are improving. Pertinent negatives for diabetic complications include no CVA, heart disease, nephropathy, peripheral neuropathy, PVD or retinopathy. Risk factors for coronary artery disease include obesity and diabetes mellitus. Current diabetic treatment includes oral agent (dual therapy). She is compliant with treatment most of the time. Her weight is decreasing steadily. She is following a generally healthy diet. Meal planning includes avoidance of concentrated sweets. She participates in exercise daily. Her home blood glucose trend is decreasing steadily. Her breakfast blood glucose range is generally 70-90 mg/dl. An ACE inhibitor/angiotensin II receptor blocker is not being taken. She does not see a podiatrist.Eye exam is current.   Obesity   This is a chronic problem. The current episode started more than 1 year ago. The problem occurs constantly. The problem has been gradually improving. Associated symptoms include arthralgias (knees). Pertinent negatives include no abdominal pain, anorexia, change in bowel habit, chest pain, chills, congestion, coughing, " diaphoresis, fatigue, fever, headaches, joint swelling, myalgias, nausea, neck pain, numbness, rash, sore throat, swollen glands, urinary symptoms, vertigo, visual change, vomiting or weakness. The symptoms are aggravated by eating. She has tried walking (adipex) for the symptoms. The treatment provided moderate relief.      Pt has decreased her metformin to daily instead of bid because of decrease in blood sugar.     The following portions of the patient's history were reviewed and updated as appropriate: allergies, current medications, past family history, past medical history, past social history, past surgical history and problem list.    Past Medical History:   Diagnosis Date   • Gestational diabetes    • Pap smear for cervical cancer screening 2017      Past Surgical History:   Procedure Laterality Date   • CERVICAL CONE BIOPSY      age 14-benign   •  SECTION  , 08, 11/6/15    x3 5/15/13      Family History   Problem Relation Age of Onset   • Stroke Mother    • Heart disease Mother    • Diabetes Mother    • Obesity Mother    • Hypertension Mother       Social History     Social History   • Marital status:      Spouse name: N/A   • Number of children: N/A   • Years of education: N/A     Occupational History   • Not on file.     Social History Main Topics   • Smoking status: Former Smoker     Packs/day: 2.00     Years: 2.00     Quit date: 2012   • Smokeless tobacco: Never Used   • Alcohol use No   • Drug use: No   • Sexual activity: Yes     Partners: Male     Birth control/ protection: IUD      Comment:      Other Topics Concern   • Not on file     Social History Narrative   • No narrative on file        Review of Systems   Constitutional: Positive for weight loss. Negative for chills, diaphoresis, fatigue and fever.   HENT: Negative.  Negative for congestion, ear pain, nosebleeds, postnasal drip, rhinorrhea, sinus pressure, sneezing and sore throat.    Eyes: Negative.  Negative  for blurred vision, redness and itching.   Respiratory: Negative.  Negative for cough, shortness of breath and wheezing.    Cardiovascular: Negative.  Negative for chest pain and palpitations.   Gastrointestinal: Negative.  Negative for abdominal pain, anorexia, change in bowel habit, constipation, diarrhea, nausea and vomiting.   Endocrine: Negative.  Negative for cold intolerance, heat intolerance, polydipsia, polyphagia and polyuria.   Genitourinary: Negative.  Negative for dysuria, frequency, hematuria and urgency.   Musculoskeletal: Positive for arthralgias (knees). Negative for back pain, joint swelling, myalgias and neck pain.   Skin: Negative.  Negative for color change and rash.   Allergic/Immunologic: Negative.  Negative for environmental allergies.   Neurological: Negative.  Negative for dizziness, vertigo, syncope, weakness, light-headedness, numbness and headaches.   Hematological: Negative.  Negative for adenopathy. Does not bruise/bleed easily.   Psychiatric/Behavioral: Negative.  Negative for dysphoric mood. The patient is not nervous/anxious.        Objective   Physical Exam   Constitutional: She is oriented to person, place, and time. She appears well-developed.   HENT:   Head: Normocephalic.   Right Ear: External ear normal.   Left Ear: External ear normal.   Nose: Nose normal.   Eyes: Pupils are equal, round, and reactive to light. Conjunctivae, EOM and lids are normal.   Neck: Trachea normal and normal range of motion. Neck supple. Carotid bruit is not present. No thyroid mass and no thyromegaly present.   Cardiovascular: Normal rate and regular rhythm.    No murmur heard.  Pulmonary/Chest: Effort normal and breath sounds normal. No respiratory distress. She has no decreased breath sounds. She has no wheezes. She has no rhonchi. She has no rales. She exhibits no tenderness.   Abdominal: Soft. Bowel sounds are normal. There is no tenderness.   Musculoskeletal: Normal range of motion.    Neurological: She is alert and oriented to person, place, and time.   Skin: Skin is warm and dry.   Psychiatric: She has a normal mood and affect. Her behavior is normal.   Nursing note and vitals reviewed.      Assessment/Plan   Shelley was seen today for weight management and med refill.    Diagnoses and all orders for this visit:    BMI 35.0-35.9,adult  -     naltrexone-bupropion ER (CONTRAVE) 8-90 MG tablet; Wk 1: 1 tab daily, Wk 2: 1 tab twice a day, Wk 3: 2 tabs in AM, 1 tab in PM, Wk 4: 2 tabs twice a day, Maintenance dose: 2 tabs twice daily.    Type 2 diabetes mellitus without complication, without long-term current use of insulin (CMS/[facility])  -     SITagliptin (JANUVIA) 100 MG tablet; Take 1 tablet by mouth Daily.    Other orders  -     loratadine (CLARITIN) 10 MG tablet; Take 1 tablet by mouth Daily.  -     Cancel: phentermine (ADIPEX-P) 37.5 MG tablet; Take 1 tablet by mouth Every Morning Before Breakfast.        Pt advised that we will not write for controlled substances in the future. Will change weight loss med to contrave.              Current Outpatient Prescriptions:   •  Empagliflozin 10 MG tablet, Take 1 tablet by mouth Daily., Disp: 30 tablet, Rfl: 3  •  glucose blood test strip, Used to check blood sugar 3-4 daily for diabetes E11.9., Disp: 125 each, Rfl: 12  •  glucose monitoring kit (FREESTYLE) monitoring kit, Use daily to test blood sugar daily for Diabetes E11.9, Disp: 1 each, Rfl: 0  •  ketoconazole (NIZORAL) 2 % cream, Apply  topically to the appropriate area as directed Every 12 (Twelve) Hours., Disp: 30 g, Rfl: 0  •  Lancets (FREESTYLE) lancets, Use to check blood sugar for Diabetes E11.9., Disp: 100 each, Rfl: 12  •  levonorgestrel (MIRENA) 20 MCG/24HR IUD, 1 each by Intrauterine route 1 (One) Time., Disp: , Rfl:   •  loratadine (CLARITIN) 10 MG tablet, Take 1 tablet by mouth Daily., Disp: 30 tablet, Rfl: 3  •  metFORMIN (GLUCOPHAGE) 1000 MG tablet, Take 1 tablet by mouth 2 (Two) Times  a Day With Meals., Disp: 60 tablet, Rfl: 3  •  SITagliptin (JANUVIA) 100 MG tablet, Take 1 tablet by mouth Daily., Disp: 30 tablet, Rfl: 3  •  naltrexone-bupropion ER (CONTRAVE) 8-90 MG tablet, Wk 1: 1 tab daily, Wk 2: 1 tab twice a day, Wk 3: 2 tabs in AM, 1 tab in PM, Wk 4: 2 tabs twice a day, Maintenance dose: 2 tabs twice daily., Disp: 60 tablet, Rfl: 0    Return in about 4 weeks (around 11/12/2018), or if symptoms worsen or fail to improve, for Recheck.    Hemoglobin A1C   Date Value Ref Range Status   08/16/2018 6.5 % Final   05/08/2018 8.0 % Final   01/29/2018 7.9 % Final   08/30/2017 6.30 (H) 4.80 - 5.60 % Final

## 2018-10-16 ENCOUNTER — TELEPHONE (OUTPATIENT)
Dept: INTERNAL MEDICINE | Facility: CLINIC | Age: 28
End: 2018-10-16

## 2018-10-16 NOTE — TELEPHONE ENCOUNTER
Patient needs to talk to you about some medication that we called in yesterday that is not covered. No idea what the name of it is.

## 2018-11-15 ENCOUNTER — OFFICE VISIT (OUTPATIENT)
Dept: INTERNAL MEDICINE | Facility: CLINIC | Age: 28
End: 2018-11-15

## 2018-11-15 VITALS
DIASTOLIC BLOOD PRESSURE: 70 MMHG | BODY MASS INDEX: 35.89 KG/M2 | HEART RATE: 96 BPM | TEMPERATURE: 99.5 F | WEIGHT: 215.4 LBS | HEIGHT: 65 IN | OXYGEN SATURATION: 99 % | SYSTOLIC BLOOD PRESSURE: 110 MMHG

## 2018-11-15 DIAGNOSIS — E11.9 TYPE 2 DIABETES MELLITUS WITHOUT COMPLICATION, WITHOUT LONG-TERM CURRENT USE OF INSULIN (HCC): Primary | ICD-10-CM

## 2018-11-15 DIAGNOSIS — Z23 NEED FOR PROPHYLACTIC VACCINATION AGAINST STREPTOCOCCUS PNEUMONIAE (PNEUMOCOCCUS): ICD-10-CM

## 2018-11-15 DIAGNOSIS — R53.83 OTHER FATIGUE: ICD-10-CM

## 2018-11-15 DIAGNOSIS — R63.5 WEIGHT GAIN: ICD-10-CM

## 2018-11-15 DIAGNOSIS — Z23 NEED FOR IMMUNIZATION AGAINST INFLUENZA: ICD-10-CM

## 2018-11-15 DIAGNOSIS — E11.65 TYPE 2 DIABETES MELLITUS WITH HYPERGLYCEMIA, WITHOUT LONG-TERM CURRENT USE OF INSULIN (HCC): ICD-10-CM

## 2018-11-15 LAB
ALBUMIN SERPL-MCNC: 4.36 G/DL (ref 3.2–4.8)
ALBUMIN/GLOB SERPL: 1.7 G/DL (ref 1.5–2.5)
ALP SERPL-CCNC: 86 U/L (ref 25–100)
ALT SERPL W P-5'-P-CCNC: 93 U/L (ref 7–40)
ANION GAP SERPL CALCULATED.3IONS-SCNC: 8 MMOL/L (ref 3–11)
ARTICHOKE IGE QN: 152 MG/DL (ref 0–130)
AST SERPL-CCNC: 31 U/L (ref 0–33)
BASOPHILS # BLD AUTO: 0.04 10*3/MM3 (ref 0–0.2)
BASOPHILS NFR BLD AUTO: 0.4 % (ref 0–1)
BILIRUB SERPL-MCNC: 0.4 MG/DL (ref 0.3–1.2)
BUN BLD-MCNC: 11 MG/DL (ref 9–23)
BUN/CREAT SERPL: 15.7 (ref 7–25)
CALCIUM SPEC-SCNC: 9.4 MG/DL (ref 8.7–10.4)
CHLORIDE SERPL-SCNC: 103 MMOL/L (ref 99–109)
CHOLEST SERPL-MCNC: 197 MG/DL (ref 0–200)
CO2 SERPL-SCNC: 23 MMOL/L (ref 20–31)
CREAT BLD-MCNC: 0.7 MG/DL (ref 0.6–1.3)
DEPRECATED RDW RBC AUTO: 39.6 FL (ref 37–54)
EOSINOPHIL # BLD AUTO: 0.19 10*3/MM3 (ref 0–0.3)
EOSINOPHIL NFR BLD AUTO: 2 % (ref 0–3)
ERYTHROCYTE [DISTWIDTH] IN BLOOD BY AUTOMATED COUNT: 12.4 % (ref 11.3–14.5)
FOLATE SERPL-MCNC: 8.04 NG/ML (ref 3.2–20)
GFR SERPL CREATININE-BSD FRML MDRD: 120 ML/MIN/1.73
GFR SERPL CREATININE-BSD FRML MDRD: 99 ML/MIN/1.73
GLOBULIN UR ELPH-MCNC: 2.6 GM/DL
GLUCOSE BLD-MCNC: 235 MG/DL (ref 70–100)
HBA1C MFR BLD: 6.7 % (ref 4.8–5.6)
HCT VFR BLD AUTO: 45.6 % (ref 34.5–44)
HDLC SERPL-MCNC: 48 MG/DL (ref 40–60)
HGB BLD-MCNC: 16 G/DL (ref 11.5–15.5)
IMM GRANULOCYTES # BLD: 0.02 10*3/MM3 (ref 0–0.03)
IMM GRANULOCYTES NFR BLD: 0.2 % (ref 0–0.6)
LYMPHOCYTES # BLD AUTO: 3.4 10*3/MM3 (ref 0.6–4.8)
LYMPHOCYTES NFR BLD AUTO: 35.1 % (ref 24–44)
MCH RBC QN AUTO: 30.7 PG (ref 27–31)
MCHC RBC AUTO-ENTMCNC: 35.1 G/DL (ref 32–36)
MCV RBC AUTO: 87.5 FL (ref 80–99)
MONOCYTES # BLD AUTO: 0.45 10*3/MM3 (ref 0–1)
MONOCYTES NFR BLD AUTO: 4.6 % (ref 0–12)
NEUTROPHILS # BLD AUTO: 5.59 10*3/MM3 (ref 1.5–8.3)
NEUTROPHILS NFR BLD AUTO: 57.7 % (ref 41–71)
PLATELET # BLD AUTO: 182 10*3/MM3 (ref 150–450)
PMV BLD AUTO: 11.2 FL (ref 6–12)
POTASSIUM BLD-SCNC: 4.2 MMOL/L (ref 3.5–5.5)
PROT SERPL-MCNC: 7 G/DL (ref 5.7–8.2)
RBC # BLD AUTO: 5.21 10*6/MM3 (ref 3.89–5.14)
SODIUM BLD-SCNC: 134 MMOL/L (ref 132–146)
T4 FREE SERPL-MCNC: 1.06 NG/DL (ref 0.89–1.76)
TRIGL SERPL-MCNC: 235 MG/DL (ref 0–150)
TSH SERPL DL<=0.05 MIU/L-ACNC: 1.16 MIU/ML (ref 0.35–5.35)
WBC NRBC COR # BLD: 9.69 10*3/MM3 (ref 3.5–10.8)

## 2018-11-15 PROCEDURE — 99213 OFFICE O/P EST LOW 20 MIN: CPT | Performed by: FAMILY MEDICINE

## 2018-11-15 PROCEDURE — 90472 IMMUNIZATION ADMIN EACH ADD: CPT | Performed by: FAMILY MEDICINE

## 2018-11-15 PROCEDURE — 80061 LIPID PANEL: CPT | Performed by: FAMILY MEDICINE

## 2018-11-15 PROCEDURE — 80053 COMPREHEN METABOLIC PANEL: CPT | Performed by: FAMILY MEDICINE

## 2018-11-15 PROCEDURE — 84439 ASSAY OF FREE THYROXINE: CPT | Performed by: FAMILY MEDICINE

## 2018-11-15 PROCEDURE — 82746 ASSAY OF FOLIC ACID SERUM: CPT | Performed by: FAMILY MEDICINE

## 2018-11-15 PROCEDURE — 90732 PPSV23 VACC 2 YRS+ SUBQ/IM: CPT | Performed by: FAMILY MEDICINE

## 2018-11-15 PROCEDURE — 85025 COMPLETE CBC W/AUTO DIFF WBC: CPT | Performed by: FAMILY MEDICINE

## 2018-11-15 PROCEDURE — 90471 IMMUNIZATION ADMIN: CPT | Performed by: FAMILY MEDICINE

## 2018-11-15 PROCEDURE — 82607 VITAMIN B-12: CPT | Performed by: FAMILY MEDICINE

## 2018-11-15 PROCEDURE — 90674 CCIIV4 VAC NO PRSV 0.5 ML IM: CPT | Performed by: FAMILY MEDICINE

## 2018-11-15 PROCEDURE — 84443 ASSAY THYROID STIM HORMONE: CPT | Performed by: FAMILY MEDICINE

## 2018-11-15 PROCEDURE — 83036 HEMOGLOBIN GLYCOSYLATED A1C: CPT | Performed by: FAMILY MEDICINE

## 2018-11-15 NOTE — PROGRESS NOTES
"Subjective   Shelley KATE Graff is a 28 y.o. female.     Chief Complaint   Patient presents with   • Diabetes     4 week f/u. Could not afford Concave. Wants to know if she could get naltrexone and the bupropion.       Visit Vitals  /70 (BP Location: Left arm, Patient Position: Sitting)   Pulse 96   Temp 99.5 °F (37.5 °C)   Ht 164.5 cm (64.76\")   Wt 97.7 kg (215 lb 6.4 oz)   SpO2 99%   BMI 36.11 kg/m²         Diabetes   She presents for her follow-up diabetic visit. She has type 2 diabetes mellitus. Her disease course has been stable. There are no hypoglycemic associated symptoms. Pertinent negatives for hypoglycemia include no dizziness, headaches or nervousness/anxiousness. Associated symptoms include fatigue (staying tired). Pertinent negatives for diabetes include no blurred vision, no chest pain, no foot paresthesias, no foot ulcerations, no polydipsia, no polyphagia, no polyuria, no visual change, no weakness and no weight loss. There are no hypoglycemic complications. Symptoms are stable. Pertinent negatives for diabetic complications include no CVA, heart disease, nephropathy, peripheral neuropathy, PVD or retinopathy. Risk factors for coronary artery disease include diabetes mellitus and obesity. Current diabetic treatment includes oral agent (triple therapy). She is compliant with treatment most of the time. Her weight is increasing steadily. She is following a generally healthy diet. Meal planning includes avoidance of concentrated sweets. She participates in exercise intermittently. Her breakfast blood glucose range is generally 110-130 mg/dl. She does not see a podiatrist.Eye exam is current (February).   Obesity   This is a chronic problem. The current episode started more than 1 year ago. The problem occurs constantly. The problem has been waxing and waning. Associated symptoms include fatigue (staying tired). Pertinent negatives include no abdominal pain, anorexia, arthralgias, change in " bowel habit, chest pain, chills, congestion, coughing, diaphoresis, fever, headaches, joint swelling, myalgias, nausea, neck pain, numbness, rash, sore throat, swollen glands, urinary symptoms, vertigo, visual change, vomiting or weakness. Nothing aggravates the symptoms. Treatments tried: phentermine and diet. The treatment provided mild relief.        The following portions of the patient's history were reviewed and updated as appropriate: allergies, current medications, past family history, past medical history, past social history, past surgical history and problem list.    Past Medical History:   Diagnosis Date   • Gestational diabetes    • Pap smear for cervical cancer screening 2017      Past Surgical History:   Procedure Laterality Date   • CERVICAL CONE BIOPSY      age 14-benign   •  SECTION  , 08, 11/6/15    x3 5/15/13      Family History   Problem Relation Age of Onset   • Stroke Mother    • Heart disease Mother    • Diabetes Mother    • Obesity Mother    • Hypertension Mother       Social History     Socioeconomic History   • Marital status:      Spouse name: Not on file   • Number of children: Not on file   • Years of education: Not on file   • Highest education level: Not on file   Social Needs   • Financial resource strain: Not on file   • Food insecurity - worry: Not on file   • Food insecurity - inability: Not on file   • Transportation needs - medical: Not on file   • Transportation needs - non-medical: Not on file   Occupational History   • Not on file   Tobacco Use   • Smoking status: Former Smoker     Packs/day: 2.00     Years: 2.00     Pack years: 4.00     Last attempt to quit: 2012     Years since quittin.3   • Smokeless tobacco: Never Used   Substance and Sexual Activity   • Alcohol use: No   • Drug use: No   • Sexual activity: Yes     Partners: Male     Birth control/protection: IUD     Comment:    Other Topics Concern   • Not on file   Social History  Narrative   • Not on file        Review of Systems   Constitutional: Positive for fatigue (staying tired). Negative for chills, diaphoresis, fever and weight loss.   HENT: Negative.  Negative for congestion, ear pain, nosebleeds, postnasal drip, rhinorrhea, sinus pressure, sneezing and sore throat.    Eyes: Negative.  Negative for blurred vision, redness and itching.   Respiratory: Negative.  Negative for cough, shortness of breath and wheezing.    Cardiovascular: Negative.  Negative for chest pain and palpitations.   Gastrointestinal: Negative.  Negative for abdominal pain, anorexia, change in bowel habit, constipation, diarrhea, nausea and vomiting.   Endocrine: Negative.  Negative for cold intolerance, heat intolerance, polydipsia, polyphagia and polyuria.   Genitourinary: Negative.  Negative for dysuria, frequency, hematuria and urgency.   Musculoskeletal: Negative.  Negative for arthralgias, back pain, joint swelling, myalgias and neck pain.   Skin: Negative.  Negative for color change and rash.   Allergic/Immunologic: Negative.  Negative for environmental allergies.   Neurological: Negative.  Negative for dizziness, vertigo, syncope, weakness, light-headedness, numbness and headaches.   Hematological: Negative.  Negative for adenopathy. Does not bruise/bleed easily.   Psychiatric/Behavioral: Negative.  Negative for dysphoric mood. The patient is not nervous/anxious.        Objective   Physical Exam   Constitutional: She is oriented to person, place, and time. She appears well-developed.   HENT:   Head: Normocephalic.   Right Ear: External ear normal.   Left Ear: External ear normal.   Nose: Nose normal.   Eyes: Conjunctivae, EOM and lids are normal. Pupils are equal, round, and reactive to light.   Neck: Trachea normal and normal range of motion. Neck supple. Carotid bruit is not present. No thyroid mass and no thyromegaly present.   Cardiovascular: Normal rate and regular rhythm.   No murmur  heard.  Pulmonary/Chest: Effort normal and breath sounds normal. No respiratory distress. She has no decreased breath sounds. She has no wheezes. She has no rhonchi. She has no rales. She exhibits no tenderness.   Abdominal: Soft. Bowel sounds are normal. There is no tenderness.   Musculoskeletal: Normal range of motion.   Neurological: She is alert and oriented to person, place, and time.   Skin: Skin is warm and dry.   Psychiatric: She has a normal mood and affect. Her behavior is normal.   Nursing note and vitals reviewed.      Assessment/Plan   Shelley was seen today for diabetes.    Diagnoses and all orders for this visit:    Type 2 diabetes mellitus without complication, without long-term current use of insulin (CMS/[facility])  -     Lipid Panel  -     TSH  -     Hemoglobin A1c  -     Empagliflozin 10 MG tablet; Take 1 tablet by mouth Daily.  -     metFORMIN (GLUCOPHAGE) 1000 MG tablet; Take 1 tablet by mouth 2 (Two) Times a Day With Meals.  -     SITagliptin (JANUVIA) 100 MG tablet; Take 1 tablet by mouth Daily.  -     Ambulatory Referral to Bariatric Surgery  -     Pneumococcal Polysaccharide Vaccine 23-Valent (PPSV23) Greater Than or Equal To 3yo Subcutaneous / IM    Weight gain  -     Ambulatory Referral to Bariatric Surgery    Need for immunization against influenza  -     Flucelvax Quad=>4Years ([id number])    Need for prophylactic vaccination against Streptococcus pneumoniae (pneumococcus)  -     Pneumococcal Polysaccharide Vaccine 23-Valent (PPSV23) Greater Than or Equal To 3yo Subcutaneous / IM    Other fatigue  -     Comprehensive Metabolic Panel  -     TSH  -     T4, Free  -     CBC & Differential  -     Vitamin B12  -     Folate  -     CBC Auto Differential    Type 2 diabetes mellitus with hyperglycemia, without long-term current use of insulin (CMS/[facility])  -     glucose blood test strip; Used to check blood sugar 3-4 daily for diabetes E11.9.    BMI 36.0-36.9,adult  -     Ambulatory Referral to Bariatric  Surgery                   Current Outpatient Medications:   •  Empagliflozin 10 MG tablet, Take 1 tablet by mouth Daily., Disp: 30 tablet, Rfl: 3  •  glucose blood test strip, Used to check blood sugar 3-4 daily for diabetes E11.9., Disp: 125 each, Rfl: 12  •  glucose monitoring kit (FREESTYLE) monitoring kit, Use daily to test blood sugar daily for Diabetes E11.9, Disp: 1 each, Rfl: 0  •  ketoconazole (NIZORAL) 2 % cream, Apply  topically to the appropriate area as directed Every 12 (Twelve) Hours., Disp: 30 g, Rfl: 0  •  Lancets (FREESTYLE) lancets, Use to check blood sugar for Diabetes E11.9., Disp: 100 each, Rfl: 12  •  levonorgestrel (MIRENA) 20 MCG/24HR IUD, 1 each by Intrauterine route 1 (One) Time., Disp: , Rfl:   •  loratadine (CLARITIN) 10 MG tablet, Take 1 tablet by mouth Daily., Disp: 30 tablet, Rfl: 3  •  metFORMIN (GLUCOPHAGE) 1000 MG tablet, Take 1 tablet by mouth 2 (Two) Times a Day With Meals., Disp: 60 tablet, Rfl: 3  •  SITagliptin (JANUVIA) 100 MG tablet, Take 1 tablet by mouth Daily., Disp: 30 tablet, Rfl: 3    Return in about 4 weeks (around 12/13/2018), or if symptoms worsen or fail to improve, for Recheck.

## 2018-11-16 ENCOUNTER — TELEPHONE (OUTPATIENT)
Dept: INTERNAL MEDICINE | Facility: CLINIC | Age: 28
End: 2018-11-16

## 2018-11-16 LAB — VIT B12 BLD-MCNC: 519 PG/ML (ref 211–911)

## 2018-11-16 NOTE — TELEPHONE ENCOUNTER
PT CALLED ARM IS HURTING SO BAD FROM SHOTS SHE CAN NOT LIFT HER ARM WAS TOLD TO ICE AND MOVE ARM AROUND PLEASE GIVE HER A CALL

## 2018-11-16 NOTE — TELEPHONE ENCOUNTER
Spoke with pt, it was her left deltoid that was hurting which was where the pneumonia shot was given. She said it's slightly bruised and slightly sore and red just around the injection site. She has been using ice and has been taking ibuprofen. Advised to keep moving and lightly massage but if gets worse to go to an [facility]. Pt verbalized understanding.

## 2018-12-14 ENCOUNTER — OFFICE VISIT (OUTPATIENT)
Dept: INTERNAL MEDICINE | Facility: CLINIC | Age: 28
End: 2018-12-14

## 2018-12-14 VITALS
HEIGHT: 65 IN | SYSTOLIC BLOOD PRESSURE: 110 MMHG | WEIGHT: 229 LBS | BODY MASS INDEX: 38.15 KG/M2 | OXYGEN SATURATION: 99 % | HEART RATE: 95 BPM | DIASTOLIC BLOOD PRESSURE: 68 MMHG | TEMPERATURE: 97.1 F

## 2018-12-14 DIAGNOSIS — F41.9 ANXIETY: ICD-10-CM

## 2018-12-14 DIAGNOSIS — G47.9 SLEEP DISTURBANCE: ICD-10-CM

## 2018-12-14 DIAGNOSIS — E11.65 TYPE 2 DIABETES MELLITUS WITH HYPERGLYCEMIA, WITHOUT LONG-TERM CURRENT USE OF INSULIN (HCC): Primary | ICD-10-CM

## 2018-12-14 LAB — GLUCOSE BLDC GLUCOMTR-MCNC: 185 MG/DL (ref 70–130)

## 2018-12-14 PROCEDURE — 99214 OFFICE O/P EST MOD 30 MIN: CPT | Performed by: FAMILY MEDICINE

## 2018-12-14 PROCEDURE — 82962 GLUCOSE BLOOD TEST: CPT | Performed by: FAMILY MEDICINE

## 2018-12-14 RX ORDER — BUSPIRONE HYDROCHLORIDE 5 MG/1
5 TABLET ORAL 3 TIMES DAILY
Qty: 90 TABLET | Refills: 1 | Status: SHIPPED | OUTPATIENT
Start: 2018-12-14 | End: 2019-01-02 | Stop reason: DRUGHIGH

## 2018-12-14 RX ORDER — BUSPIRONE HYDROCHLORIDE 5 MG/1
5 TABLET ORAL 3 TIMES DAILY
Qty: 60 TABLET | Refills: 1 | Status: SHIPPED | OUTPATIENT
Start: 2018-12-14 | End: 2018-12-14 | Stop reason: SDUPTHER

## 2018-12-14 NOTE — PROGRESS NOTES
"Subjective   Shelleybruce Graff is a 28 y.o. female.     Chief Complaint   Patient presents with   • Diabetes     f/u. She states that her sugars have been running up into the 500s       Visit Vitals  /68 (BP Location: Left arm, Patient Position: Sitting)   Pulse 95   Temp 97.1 °F (36.2 °C)   Ht 164.5 cm (64.76\")   Wt 104 kg (229 lb)   SpO2 99%   BMI 38.39 kg/m²         Diabetes   She presents for her follow-up diabetic visit. She has type 2 diabetes mellitus. Her disease course has been worsening. Hypoglycemia symptoms include dizziness and nervousness/anxiousness. Pertinent negatives for hypoglycemia include no confusion or headaches. Associated symptoms include blurred vision, fatigue, polydipsia and polyphagia. Pertinent negatives for diabetes include no chest pain, no foot paresthesias, no foot ulcerations, no polyuria, no visual change, no weakness and no weight loss. There are no hypoglycemic complications. Symptoms are worsening. Pertinent negatives for diabetic complications include no CVA, heart disease, peripheral neuropathy, PVD or retinopathy. Risk factors for coronary artery disease include diabetes mellitus and obesity. Current diabetic treatment includes oral agent (triple therapy). She is compliant with treatment most of the time. Her weight is increasing steadily. She is following a generally unhealthy diet. When asked about meal planning, she reported none. She participates in exercise daily. Her home blood glucose trend is increasing rapidly. Her breakfast blood glucose range is generally 180-200 mg/dl. She does not see a podiatrist.Eye exam is not current.   Anxiety   Presents for initial visit. Onset was 1 to 4 weeks ago. Symptoms include dizziness, excessive worry, irritability, nervous/anxious behavior and shortness of breath. Patient reports no chest pain, compulsions, confusion, decreased concentration, depressed mood, dry mouth, feeling of choking, hyperventilation, insomnia, " malaise, muscle tension, nausea, obsessions, palpitations, panic, restlessness or suicidal ideas. Symptoms occur most days. The severity of symptoms is moderate. The symptoms are aggravated by family issues. The quality of sleep is good. Nighttime awakenings: none.     There are no known risk factors. There is no history of anemia, anxiety/panic attacks, arrhythmia, asthma, bipolar disorder, CAD, CHF, chronic lung disease, depression, fibromyalgia, hyperthyroidism or suicide attempts. Past treatments include nothing.      Pt forgot her med one day and her sugar was 433. Pt states that she took her meds the next day and sugar was 385.  Pt did not check her sugar this morning.     Pt is stress eating.   Pt has gained 14#.        told pt that she snores and has interrupted breathing.    Pt is stressing over getting 's visa.   The following portions of the patient's history were reviewed and updated as appropriate: allergies, current medications, past family history, past medical history, past social history, past surgical history and problem list.    Past Medical History:   Diagnosis Date   • Gestational diabetes    • Pap smear for cervical cancer screening 2017      Past Surgical History:   Procedure Laterality Date   • CERVICAL CONE BIOPSY      age 14-benign   •  SECTION  , 08, 11/6/15    x3 5/15/13      Family History   Problem Relation Age of Onset   • Stroke Mother    • Heart disease Mother    • Diabetes Mother    • Obesity Mother    • Hypertension Mother       Social History     Socioeconomic History   • Marital status:      Spouse name: Not on file   • Number of children: Not on file   • Years of education: Not on file   • Highest education level: Not on file   Social Needs   • Financial resource strain: Not on file   • Food insecurity - worry: Not on file   • Food insecurity - inability: Not on file   • Transportation needs - medical: Not on file   • Transportation needs -  non-medical: Not on file   Occupational History   • Not on file   Tobacco Use   • Smoking status: Former Smoker     Packs/day: 2.00     Years: 2.00     Pack years: 4.00     Last attempt to quit: 2012     Years since quittin.4   • Smokeless tobacco: Never Used   Substance and Sexual Activity   • Alcohol use: No   • Drug use: No   • Sexual activity: Yes     Partners: Male     Birth control/protection: IUD     Comment:    Other Topics Concern   • Not on file   Social History Narrative   • Not on file        Review of Systems   Constitutional: Positive for diaphoresis, fatigue and irritability. Negative for chills, fever and weight loss.   HENT: Negative.  Negative for ear pain, nosebleeds, postnasal drip, rhinorrhea, sinus pressure, sneezing and sore throat.    Eyes: Positive for blurred vision and visual disturbance. Negative for redness and itching.   Respiratory: Positive for shortness of breath. Negative for cough and wheezing.    Cardiovascular: Negative.  Negative for chest pain and palpitations.   Gastrointestinal: Negative.  Negative for abdominal pain, constipation, diarrhea, nausea and vomiting.   Endocrine: Positive for polydipsia and polyphagia. Negative for cold intolerance, heat intolerance and polyuria.   Genitourinary: Negative.  Negative for dysuria, frequency, hematuria and urgency.   Musculoskeletal: Positive for arthralgias. Negative for back pain and neck pain.   Skin: Negative.  Negative for color change and rash.   Allergic/Immunologic: Negative.  Negative for environmental allergies.   Neurological: Positive for dizziness. Negative for syncope, weakness, light-headedness and headaches.   Hematological: Negative.  Negative for adenopathy. Does not bruise/bleed easily.   Psychiatric/Behavioral: Positive for sleep disturbance. Negative for confusion, decreased concentration, dysphoric mood and suicidal ideas. The patient is nervous/anxious. The patient does not have insomnia.         Objective   Physical Exam   Constitutional: She is oriented to person, place, and time. She appears well-developed.   HENT:   Head: Normocephalic.   Right Ear: External ear normal.   Left Ear: External ear normal.   Nose: Nose normal.   Eyes: Conjunctivae, EOM and lids are normal. Pupils are equal, round, and reactive to light.   Neck: Trachea normal and normal range of motion. Neck supple. Carotid bruit is not present. No thyroid mass and no thyromegaly present.   Cardiovascular: Normal rate and regular rhythm.   No murmur heard.  Pulmonary/Chest: Effort normal and breath sounds normal. No respiratory distress. She has no decreased breath sounds. She has no wheezes. She has no rhonchi. She has no rales. She exhibits no tenderness.   Abdominal: Soft. Bowel sounds are normal. There is no tenderness.   Musculoskeletal: Normal range of motion.   Neurological: She is alert and oriented to person, place, and time.   Skin: Skin is warm and dry.   Psychiatric: She has a normal mood and affect. Her behavior is normal.   Nursing note and vitals reviewed.      Assessment/Plan   Shelley was seen today for diabetes.    Diagnoses and all orders for this visit:    Type 2 diabetes mellitus with hyperglycemia, without long-term current use of insulin (CMS/formerly [facility])  -     POCT Glucose    Sleep disturbance  -     Ambulatory Referral to Sleep Medicine    Anxiety  -     Discontinue: busPIRone (BUSPAR) 5 MG tablet; Take 1 tablet by mouth 3 (Three) Times a Day.  -     busPIRone (BUSPAR) 5 MG tablet; Take 1 tablet by mouth 3 (Three) Times a Day.        Tighten diet, daily exercise.   Pt is buying low carb food.   Start buspar, check for sleep apnea.         Current Outpatient Medications:   •  Empagliflozin 10 MG tablet, Take 1 tablet by mouth Daily., Disp: 30 tablet, Rfl: 3  •  glucose blood test strip, Used to check blood sugar 3-4 daily for diabetes E11.9., Disp: 125 each, Rfl: 12  •  glucose monitoring kit (FREESTYLE) monitoring kit,  Use daily to test blood sugar daily for Diabetes E11.9, Disp: 1 each, Rfl: 0  •  ketoconazole (NIZORAL) 2 % cream, Apply  topically to the appropriate area as directed Every 12 (Twelve) Hours., Disp: 30 g, Rfl: 0  •  Lancets (FREESTYLE) lancets, Use to check blood sugar for Diabetes E11.9., Disp: 100 each, Rfl: 12  •  levonorgestrel (MIRENA) 20 MCG/24HR IUD, 1 each by Intrauterine route 1 (One) Time., Disp: , Rfl:   •  loratadine (CLARITIN) 10 MG tablet, Take 1 tablet by mouth Daily., Disp: 30 tablet, Rfl: 3  •  metFORMIN (GLUCOPHAGE) 1000 MG tablet, Take 1 tablet by mouth 2 (Two) Times a Day With Meals., Disp: 60 tablet, Rfl: 3  •  SITagliptin (JANUVIA) 100 MG tablet, Take 1 tablet by mouth Daily., Disp: 30 tablet, Rfl: 3  •  busPIRone (BUSPAR) 5 MG tablet, Take 1 tablet by mouth 3 (Three) Times a Day., Disp: 90 tablet, Rfl: 1    Return in about 2 weeks (around 12/28/2018), or if symptoms worsen or fail to improve, for Recheck.    Hemoglobin A1C   Date Value Ref Range Status   11/15/2018 6.70 (H) 4.80 - 5.60 % Final   08/16/2018 6.5 % Final   05/08/2018 8.0 % Final   01/29/2018 7.9 % Final   08/30/2017 6.30 (H) 4.80 - 5.60 % Final     Recent Results (from the past 168 hour(s))   POCT Glucose    Collection Time: 12/14/18  4:25 PM   Result Value Ref Range    Glucose 185 (A) 70 - 130 mg/dL

## 2018-12-14 NOTE — PATIENT INSTRUCTIONS
Living With Anxiety  After being diagnosed with an anxiety disorder, you may be relieved to know why you have felt or behaved a certain way. It is natural to also feel overwhelmed about the treatment ahead and what it will mean for your life. With care and support, you can manage this condition and recover from it.  How to cope with anxiety  Dealing with stress  Stress is your body’s reaction to life changes and events, both good and bad. Stress can last just a few hours or it can be ongoing. Stress can play a major role in anxiety, so it is important to learn both how to cope with stress and how to think about it differently.  Talk with your health care provider or a counselor to learn more about stress reduction. He or she may suggest some stress reduction techniques, such as:  · Music therapy. This can include creating or listening to music that you enjoy and that inspires you.  · Mindfulness-based meditation. This involves being aware of your normal breaths, rather than trying to control your breathing. It can be done while sitting or walking.  · Centering prayer. This is a kind of meditation that involves focusing on a word, phrase, or sacred image that is meaningful to you and that brings you peace.  · Deep breathing. To do this, expand your stomach and inhale slowly through your nose. Hold your breath for 3-5 seconds. Then exhale slowly, allowing your stomach muscles to relax.  · Self-talk. This is a skill where you identify thought patterns that lead to anxiety reactions and correct those thoughts.  · Muscle relaxation. This involves tensing muscles then relaxing them.    Choose a stress reduction technique that fits your lifestyle and personality. Stress reduction techniques take time and practice. Set aside 5-15 minutes a day to do them. Therapists can offer training in these techniques. The training may be covered by some insurance plans. Other things you can do to manage stress include:  · Keeping a  stress diary. This can help you learn what triggers your stress and ways to control your response.  · Thinking about how you respond to certain situations. You may not be able to control everything, but you can control your reaction.  · Making time for activities that help you relax, and not feeling guilty about spending your time in this way.    Therapy combined with coping and stress-reduction skills provides the best chance for successful treatment.  Medicines  Medicines can help ease symptoms. Medicines for anxiety include:  · Anti-anxiety drugs.  · Antidepressants.  · Beta-blockers.    Medicines may be used as the main treatment for anxiety disorder, along with therapy, or if other treatments are not working. Medicines should be prescribed by a health care provider.  Relationships  Relationships can play a big part in helping you recover. Try to spend more time connecting with trusted friends and family members. Consider going to couples counseling, taking family education classes, or going to family therapy. Therapy can help you and others better understand the condition.  How to recognize changes in your condition  Everyone has a different response to treatment for anxiety. Recovery from anxiety happens when symptoms decrease and stop interfering with your daily activities at home or work. This may mean that you will start to:  · Have better concentration and focus.  · Sleep better.  · Be less irritable.  · Have more energy.  · Have improved memory.    It is important to recognize when your condition is getting worse. Contact your health care provider if your symptoms interfere with home or work and you do not feel like your condition is improving.  Where to find help and support:  You can get help and support from these sources:  · Self-help groups.  · Online and community organizations.  · A trusted spiritual leader.  · Couples counseling.  · Family education classes.  · Family therapy.    Follow these  instructions at home:  · Eat a healthy diet that includes plenty of vegetables, fruits, whole grains, low-fat dairy products, and lean protein. Do not eat a lot of foods that are high in solid fats, added sugars, or salt.  · Exercise. Most adults should do the following:  ? Exercise for at least 150 minutes each week. The exercise should increase your heart rate and make you sweat (moderate-intensity exercise).  ? Strengthening exercises at least twice a week.  · Cut down on caffeine, tobacco, alcohol, and other potentially harmful substances.  · Get the right amount and quality of sleep. Most adults need 7-9 hours of sleep each night.  · Make choices that simplify your life.  · Take over-the-counter and prescription medicines only as told by your health care provider.  · Avoid caffeine, alcohol, and certain over-the-counter cold medicines. These may make you feel worse. Ask your pharmacist which medicines to avoid.  · Keep all follow-up visits as told by your health care provider. This is important.  Questions to ask your health care provider  · Would I benefit from therapy?  · How often should I follow up with a health care provider?  · How long do I need to take medicine?  · Are there any long-term side effects of my medicine?  · Are there any alternatives to taking medicine?  Contact a health care provider if:  · You have a hard time staying focused or finishing daily tasks.  · You spend many hours a day feeling worried about everyday life.  · You become exhausted by worry.  · You start to have headaches, feel tense, or have nausea.  · You urinate more than normal.  · You have diarrhea.  Get help right away if:  · You have a racing heart and shortness of breath.  · You have thoughts of hurting yourself or others.  If you ever feel like you may hurt yourself or others, or have thoughts about taking your own life, get help right away. You can go to your nearest emergency department or call:  · Your local emergency  services (911 in the [place]  · A suicide crisis helpline, such as the National Suicide Prevention Lifeline at 1-662.684.6079. This is open 24-hours a day.    Summary  · Taking steps to deal with stress can help calm you.  · Medicines cannot cure anxiety disorders, but they can help ease symptoms.  · Family, friends, and partners can play a big part in helping you recover from an anxiety disorder.  This information is not intended to replace advice given to you by your health care provider. Make sure you discuss any questions you have with your health care provider.  Document Released: 12/12/2017 Document Revised: 12/12/2017 Document Reviewed: 12/12/2017  [employer] Interactive Patient Education © 2018 [employer]  Sleep Apnea  Sleep apnea is a condition in which breathing pauses or becomes shallow during sleep. Episodes of sleep apnea usually last 10 seconds or longer, and they may occur as many as 20 times an hour. Sleep apnea disrupts your sleep and keeps your body from getting the rest that it needs. This condition can increase your risk of certain health problems, including:  · Heart attack.  · Stroke.  · Obesity.  · Diabetes.  · Heart failure.  · Irregular heartbeat.    There are three kinds of sleep apnea:  · Obstructive sleep apnea. This kind is caused by a blocked or collapsed airway.  · Central sleep apnea. This kind happens when the part of the brain that controls breathing does not send the correct signals to the muscles that control breathing.  · Mixed sleep apnea. This is a combination of obstructive and central sleep apnea.    What are the causes?  The most common cause of this condition is a collapsed or blocked airway. An airway can collapse or become blocked if:  · Your throat muscles are abnormally relaxed.  · Your tongue and tonsils are larger than normal.  · You are overweight.  · Your airway is smaller than normal.    What increases the risk?  This condition is more likely to develop in people  who:  · Are overweight.  · Smoke.  · Have a smaller than normal airway.  · Are elderly.  · Are male.  · Drink alcohol.  · Take sedatives or tranquilizers.  · Have a family history of sleep apnea.    What are the signs or symptoms?  Symptoms of this condition include:  · Trouble staying asleep.  · Daytime sleepiness and tiredness.  · Irritability.  · Loud snoring.  · Morning headaches.  · Trouble concentrating.  · Forgetfulness.  · Decreased interest in sex.  · Unexplained sleepiness.  · Mood swings.  · Personality changes.  · Feelings of depression.  · Waking up often during the night to urinate.  · Dry mouth.  · Sore throat.    How is this diagnosed?  This condition may be diagnosed with:  · A medical history.  · A physical exam.  · A series of tests that are done while you are sleeping (sleep study). These tests are usually done in a sleep lab, but they may also be done at home.    How is this treated?  Treatment for this condition aims to restore normal breathing and to ease symptoms during sleep. It may involve managing health issues that can affect breathing, such as high blood pressure or obesity. Treatment may include:  · Sleeping on your side.  · Using a decongestant if you have nasal congestion.  · Avoiding the use of depressants, including alcohol, sedatives, and narcotics.  · Losing weight if you are overweight.  · Making changes to your diet.  · Quitting smoking.  · Using a device to open your airway while you sleep, such as:  ? An oral appliance. This is a custom-made mouthpiece that shifts your lower jaw forward.  ? A continuous positive airway pressure (CPAP) device. This device delivers oxygen to your airway through a mask.  ? A nasal expiratory positive airway pressure (EPAP) device. This device has valves that you put into each nostril.  ? A bi-level positive airway pressure (BPAP) device. This device delivers oxygen to your airway through a mask.  · Surgery if other treatments do not work. During  surgery, excess tissue is removed to create a wider airway.    It is important to get treatment for sleep apnea. Without treatment, this condition can lead to:  · High blood pressure.  · Coronary artery disease.  · (Men) An inability to achieve or maintain an erection (impotence).  · Reduced thinking abilities.    Follow these instructions at home:  · Make any lifestyle changes that your health care provider recommends.  · Eat a healthy, well-balanced diet.  · Take over-the-counter and prescription medicines only as told by your health care provider.  · Avoid using depressants, including alcohol, sedatives, and narcotics.  · Take steps to lose weight if you are overweight.  · If you were given a device to open your airway while you sleep, use it only as told by your health care provider.  · Do not use any tobacco products, such as cigarettes, chewing tobacco, and e-cigarettes. If you need help quitting, ask your health care provider.  · Keep all follow-up visits as told by your health care provider. This is important.  Contact a health care provider if:  · The device that you received to open your airway during sleep is uncomfortable or does not seem to be working.  · Your symptoms do not improve.  · Your symptoms get worse.  Get help right away if:  · You develop chest pain.  · You develop shortness of breath.  · You develop discomfort in your back, arms, or stomach.  · You have trouble speaking.  · You have weakness on one side of your body.  · You have drooping in your face.  These symptoms may represent a serious problem that is an emergency. Do not wait to see if the symptoms will go away. Get medical help right away. Call your local emergency services (911 in the [place] Do not drive yourself to the hospital.  This information is not intended to replace advice given to you by your health care provider. Make sure you discuss any questions you have with your health care provider.  Document Released: 12/08/2003  Document Revised: 08/13/2017 Document Reviewed: 09/26/2016  [employer] Interactive Patient Education © 2018 [employer]

## 2019-01-02 ENCOUNTER — OFFICE VISIT (OUTPATIENT)
Dept: INTERNAL MEDICINE | Facility: CLINIC | Age: 29
End: 2019-01-02

## 2019-01-02 VITALS
DIASTOLIC BLOOD PRESSURE: 62 MMHG | BODY MASS INDEX: 35.35 KG/M2 | OXYGEN SATURATION: 99 % | HEIGHT: 65 IN | WEIGHT: 212.2 LBS | TEMPERATURE: 97.7 F | RESPIRATION RATE: 16 BRPM | SYSTOLIC BLOOD PRESSURE: 104 MMHG | HEART RATE: 102 BPM

## 2019-01-02 DIAGNOSIS — E11.65 TYPE 2 DIABETES MELLITUS WITH HYPERGLYCEMIA, WITHOUT LONG-TERM CURRENT USE OF INSULIN (HCC): ICD-10-CM

## 2019-01-02 DIAGNOSIS — B37.31 YEAST VAGINITIS: ICD-10-CM

## 2019-01-02 DIAGNOSIS — F41.9 ANXIETY: Primary | ICD-10-CM

## 2019-01-02 PROCEDURE — 99214 OFFICE O/P EST MOD 30 MIN: CPT | Performed by: FAMILY MEDICINE

## 2019-01-02 RX ORDER — BUSPIRONE HYDROCHLORIDE 15 MG/1
15 TABLET ORAL 2 TIMES DAILY
Qty: 60 TABLET | Refills: 1 | Status: SHIPPED | OUTPATIENT
Start: 2019-01-02 | End: 2019-01-22 | Stop reason: SINTOL

## 2019-01-02 RX ORDER — FLUCONAZOLE 150 MG/1
150 TABLET ORAL ONCE
Qty: 1 TABLET | Refills: 1 | Status: SHIPPED | OUTPATIENT
Start: 2019-01-02 | End: 2019-01-02

## 2019-01-02 NOTE — PROGRESS NOTES
"Subjective   Shelley Graff is a 28 y.o. female.     Chief Complaint   Patient presents with   • 2 week follow up Anxiety and Sleep Apnea     Started on Buspar 2 weeks ago. Does not feel any difference since starting this.        Visit Vitals  /62   Pulse 102   Temp 97.7 °F (36.5 °C) (Temporal)   Resp 16   Ht 164.5 cm (64.76\")   Wt 96.3 kg (212 lb 3.2 oz)   SpO2 99%   BMI 35.57 kg/m²         Anxiety   Presents for follow-up visit. Symptoms include decreased concentration, excessive worry, nervous/anxious behavior and restlessness. Patient reports no chest pain, compulsions, confusion, depressed mood, dizziness, dry mouth, feeling of choking, hyperventilation, insomnia, irritability, malaise, muscle tension, nausea, obsessions, palpitations, panic, shortness of breath or suicidal ideas. Symptoms occur most days. The severity of symptoms is mild. The quality of sleep is poor. Nighttime awakenings: several.     Compliance with medications is %.      Pt had sugars of over 400 for 4 days the week before [place].   Pt states that diet was not different.  Then sugar dropped.  Pt has stopped carbs the past 1.5 weeks.   Pt restarted her bid metformin.   Bedtime sugar is about 130. AM sugar 160-180.     Pt is losing weight.     Pt states that her  has told her that she has interrupted snoring.   Pt has been called by Sleep clinic    The following portions of the patient's history were reviewed and updated as appropriate: allergies, current medications, past family history, past medical history, past social history, past surgical history and problem list.    Past Medical History:   Diagnosis Date   • Gestational diabetes    • Pap smear for cervical cancer screening 2017      Past Surgical History:   Procedure Laterality Date   • CERVICAL CONE BIOPSY      age 14-benign   •  SECTION  , 08, 11/6/15    x3 5/15/13      Family History   Problem Relation Age of Onset   • Stroke Mother  "   • Heart disease Mother    • Diabetes Mother    • Obesity Mother    • Hypertension Mother       Social History     Socioeconomic History   • Marital status:      Spouse name: Not on file   • Number of children: Not on file   • Years of education: Not on file   • Highest education level: Not on file   Social Needs   • Financial resource strain: Not on file   • Food insecurity - worry: Not on file   • Food insecurity - inability: Not on file   • Transportation needs - medical: Not on file   • Transportation needs - non-medical: Not on file   Occupational History   • Not on file   Tobacco Use   • Smoking status: Former Smoker     Packs/day: 2.00     Years: 2.00     Pack years: 4.00     Last attempt to quit: 2012     Years since quittin.5   • Smokeless tobacco: Never Used   Substance and Sexual Activity   • Alcohol use: No   • Drug use: No   • Sexual activity: Yes     Partners: Male     Birth control/protection: IUD     Comment:    Other Topics Concern   • Not on file   Social History Narrative   • Not on file        Review of Systems   Constitutional: Positive for diaphoresis and fatigue. Negative for chills, fever and irritability.   HENT: Negative.  Negative for ear pain, nosebleeds, postnasal drip, rhinorrhea, sinus pressure, sneezing and sore throat.    Eyes: Negative.  Negative for redness and itching.   Respiratory: Positive for apnea (possibly). Negative for cough, shortness of breath and wheezing.    Cardiovascular: Negative.  Negative for chest pain and palpitations.   Gastrointestinal: Negative.  Negative for abdominal pain, constipation, diarrhea, nausea and vomiting.   Endocrine: Negative.  Negative for cold intolerance and heat intolerance.   Genitourinary: Positive for vaginal discharge (yeast infection). Negative for frequency, hematuria and urgency. Dysuria: from yeast per pt.   Musculoskeletal: Negative.  Negative for arthralgias, back pain and neck pain.   Skin: Negative.   Negative for color change and rash.   Allergic/Immunologic: Negative.  Negative for environmental allergies.   Neurological: Negative.  Negative for dizziness, syncope, light-headedness and headaches.   Hematological: Negative.  Negative for adenopathy. Does not bruise/bleed easily.   Psychiatric/Behavioral: Positive for agitation and decreased concentration. Negative for confusion, dysphoric mood and suicidal ideas. The patient is nervous/anxious. The patient does not have insomnia.        Objective   Physical Exam   Constitutional: She is oriented to person, place, and time. She appears well-developed.   HENT:   Head: Normocephalic.   Right Ear: External ear normal.   Left Ear: External ear normal.   Nose: Nose normal.   Eyes: Conjunctivae, EOM and lids are normal. Pupils are equal, round, and reactive to light.   Neck: Trachea normal and normal range of motion. Neck supple. Carotid bruit is not present. No thyroid mass and no thyromegaly present.   Cardiovascular: Normal rate and regular rhythm.   No murmur heard.  Pulmonary/Chest: Effort normal and breath sounds normal. No respiratory distress. She has no decreased breath sounds. She has no wheezes. She has no rhonchi. She has no rales. She exhibits no tenderness.   Abdominal: Soft. Bowel sounds are normal. There is no tenderness.   Musculoskeletal: Normal range of motion.   Neurological: She is alert and oriented to person, place, and time.   Skin: Skin is warm and dry.   Psychiatric: She has a normal mood and affect. Her behavior is normal.   Nursing note and vitals reviewed.      Assessment/Plan   Shelley was seen today for 2 week follow up anxiety and sleep apnea.    Diagnoses and all orders for this visit:    Anxiety  -     busPIRone (BUSPAR) 15 MG tablet; Take 1 tablet by mouth 2 (Two) Times a Day.    Type 2 diabetes mellitus with hyperglycemia, without long-term current use of insulin (CMS/[facility])    Yeast vaginitis  -     fluconazole (DIFLUCAN) 150 MG  tablet; Take 1 tablet by mouth 1 (One) Time for 1 dose.      Add bedtime snack.   Continue bid metformin.  Increase buspar to 15 mg bid           Current Outpatient Medications:   •  busPIRone (BUSPAR) 15 MG tablet, Take 1 tablet by mouth 2 (Two) Times a Day., Disp: 60 tablet, Rfl: 1  •  Empagliflozin 10 MG tablet, Take 1 tablet by mouth Daily., Disp: 30 tablet, Rfl: 3  •  fluconazole (DIFLUCAN) 150 MG tablet, Take 1 tablet by mouth 1 (One) Time for 1 dose., Disp: 1 tablet, Rfl: 1  •  glucose blood test strip, Used to check blood sugar 3-4 daily for diabetes E11.9., Disp: 125 each, Rfl: 12  •  glucose monitoring kit (FREESTYLE) monitoring kit, Use daily to test blood sugar daily for Diabetes E11.9, Disp: 1 each, Rfl: 0  •  ketoconazole (NIZORAL) 2 % cream, Apply  topically to the appropriate area as directed Every 12 (Twelve) Hours., Disp: 30 g, Rfl: 0  •  Lancets (FREESTYLE) lancets, Use to check blood sugar for Diabetes E11.9., Disp: 100 each, Rfl: 12  •  levonorgestrel (MIRENA) 20 MCG/24HR IUD, 1 each by Intrauterine route 1 (One) Time., Disp: , Rfl:   •  loratadine (CLARITIN) 10 MG tablet, Take 1 tablet by mouth Daily., Disp: 30 tablet, Rfl: 3  •  metFORMIN (GLUCOPHAGE) 1000 MG tablet, Take 1 tablet by mouth 2 (Two) Times a Day With Meals., Disp: 60 tablet, Rfl: 3  •  ondansetron ODT (ZOFRAN-ODT) 8 MG disintegrating tablet, Take 1 tablet by mouth Every 8 (Eight) Hours As Needed for Nausea., Disp: 15 tablet, Rfl: 0  •  SITagliptin (JANUVIA) 100 MG tablet, Take 1 tablet by mouth Daily., Disp: 30 tablet, Rfl: 3    Return in about 4 weeks (around 1/30/2019), or if symptoms worsen or fail to improve, for Recheck.

## 2019-01-16 ENCOUNTER — TELEPHONE (OUTPATIENT)
Dept: INTERNAL MEDICINE | Facility: CLINIC | Age: 29
End: 2019-01-16

## 2019-01-22 ENCOUNTER — OFFICE VISIT (OUTPATIENT)
Dept: INTERNAL MEDICINE | Facility: CLINIC | Age: 29
End: 2019-01-22

## 2019-01-22 VITALS
HEIGHT: 65 IN | TEMPERATURE: 97.8 F | SYSTOLIC BLOOD PRESSURE: 102 MMHG | HEART RATE: 110 BPM | OXYGEN SATURATION: 99 % | WEIGHT: 216.2 LBS | DIASTOLIC BLOOD PRESSURE: 60 MMHG | BODY MASS INDEX: 36.02 KG/M2

## 2019-01-22 DIAGNOSIS — F41.9 ANXIETY: Primary | ICD-10-CM

## 2019-01-22 PROCEDURE — 99213 OFFICE O/P EST LOW 20 MIN: CPT | Performed by: FAMILY MEDICINE

## 2019-01-22 RX ORDER — SERTRALINE HYDROCHLORIDE 25 MG/1
25 TABLET, FILM COATED ORAL DAILY
Qty: 30 TABLET | Refills: 1 | Status: SHIPPED | OUTPATIENT
Start: 2019-01-22 | End: 2019-02-11 | Stop reason: SDUPTHER

## 2019-01-22 NOTE — PROGRESS NOTES
"Subjective   Shelley Graff is a 28 y.o. female.     Chief Complaint   Patient presents with   • Anxiety     f/u. Pt states she is too drowsy and only has taken for 3 days. Stopped taking medication, she also had vomiting while on the meds.       Visit Vitals  /60   Pulse 110   Temp 97.8 °F (36.6 °C)   Ht 164.5 cm (64.76\")   Wt 98.1 kg (216 lb 3.2 oz)   SpO2 99%   BMI 36.24 kg/m²         Anxiety   Presents for follow-up visit. Symptoms include insomnia, nervous/anxious behavior and restlessness. Patient reports no chest pain, compulsions, confusion, decreased concentration, depressed mood, dizziness, excessive worry, feeling of choking, hyperventilation, irritability, malaise, muscle tension, nausea, obsessions, palpitations, panic, shortness of breath or suicidal ideas. Symptoms occur most days. The severity of symptoms is mild. The quality of sleep is fair. Nighttime awakenings: several.     Compliance with medications is %.      Pt states that she has pizza for 2 days and otherwise the sugar has been good.   Pt stopped the buspirone because of fatigue.    The following portions of the patient's history were reviewed and updated as appropriate: allergies, current medications, past family history, past medical history, past social history, past surgical history and problem list.    Past Medical History:   Diagnosis Date   • Gestational diabetes    • Pap smear for cervical cancer screening 2017      Past Surgical History:   Procedure Laterality Date   • CERVICAL CONE BIOPSY      age 14-benign   •  SECTION  , 08, 11/6/15    x3 5/15/13      Family History   Problem Relation Age of Onset   • Stroke Mother    • Heart disease Mother    • Diabetes Mother    • Obesity Mother    • Hypertension Mother       Social History     Socioeconomic History   • Marital status:      Spouse name: Not on file   • Number of children: Not on file   • Years of education: Not on file   • Highest " education level: Not on file   Social Needs   • Financial resource strain: Not on file   • Food insecurity - worry: Not on file   • Food insecurity - inability: Not on file   • Transportation needs - medical: Not on file   • Transportation needs - non-medical: Not on file   Occupational History   • Not on file   Tobacco Use   • Smoking status: Former Smoker     Packs/day: 2.00     Years: 2.00     Pack years: 4.00     Last attempt to quit: 2012     Years since quittin.5   • Smokeless tobacco: Never Used   Substance and Sexual Activity   • Alcohol use: No   • Drug use: No   • Sexual activity: Yes     Partners: Male     Birth control/protection: IUD     Comment:    Other Topics Concern   • Not on file   Social History Narrative   • Not on file      No Known Allergies    Review of Systems   Constitutional: Negative.  Negative for chills, diaphoresis, fatigue, fever and irritability.   HENT: Negative.  Negative for ear pain, nosebleeds, postnasal drip, rhinorrhea, sinus pressure, sneezing and sore throat.    Eyes: Negative.  Negative for redness and itching.   Respiratory: Negative.  Negative for cough, shortness of breath and wheezing.    Cardiovascular: Negative.  Negative for chest pain and palpitations.   Gastrointestinal: Negative.  Negative for abdominal pain, constipation, diarrhea, nausea and vomiting.   Endocrine: Negative.  Negative for cold intolerance and heat intolerance.   Genitourinary: Negative.  Negative for dysuria, frequency, hematuria and urgency.   Musculoskeletal: Negative.  Negative for arthralgias, back pain and neck pain.   Skin: Negative.  Negative for color change and rash.   Allergic/Immunologic: Positive for environmental allergies.   Neurological: Negative.  Negative for dizziness, syncope, weakness, light-headedness and headaches.   Hematological: Negative.  Negative for adenopathy. Does not bruise/bleed easily.   Psychiatric/Behavioral: Negative for confusion, decreased  concentration, dysphoric mood and suicidal ideas. The patient is nervous/anxious and has insomnia.        Objective   Physical Exam   Constitutional: She is oriented to person, place, and time. She appears well-developed.   HENT:   Head: Normocephalic.   Right Ear: External ear normal.   Left Ear: External ear normal.   Nose: Nose normal.   Eyes: Conjunctivae, EOM and lids are normal. Pupils are equal, round, and reactive to light.   Neck: Trachea normal and normal range of motion. Neck supple. Carotid bruit is not present. No thyroid mass and no thyromegaly present.   Cardiovascular: Normal rate and regular rhythm.   No murmur heard.  Pulmonary/Chest: Effort normal and breath sounds normal. No respiratory distress. She has no decreased breath sounds. She has no wheezes. She has no rhonchi. She has no rales. She exhibits no tenderness.   Abdominal: Soft. Bowel sounds are normal. There is no tenderness.   Musculoskeletal: Normal range of motion.   Neurological: She is alert and oriented to person, place, and time.   Skin: Skin is warm and dry.   Psychiatric: She has a normal mood and affect. Her behavior is normal.   Nursing note and vitals reviewed.      Assessment/Plan   Shelley was seen today for anxiety.    Diagnoses and all orders for this visit:    Anxiety  -     sertraline (ZOLOFT) 25 MG tablet; Take 1 tablet by mouth Daily.                   Current Outpatient Medications:   •  Empagliflozin 10 MG tablet, Take 1 tablet by mouth Daily., Disp: 30 tablet, Rfl: 3  •  glucose blood test strip, Used to check blood sugar 3-4 daily for diabetes E11.9., Disp: 125 each, Rfl: 12  •  glucose monitoring kit (FREESTYLE) monitoring kit, Use daily to test blood sugar daily for Diabetes E11.9, Disp: 1 each, Rfl: 0  •  ketoconazole (NIZORAL) 2 % cream, Apply  topically to the appropriate area as directed Every 12 (Twelve) Hours., Disp: 30 g, Rfl: 0  •  Lancets (FREESTYLE) lancets, Use to check blood sugar for Diabetes E11.9.,  Disp: 100 each, Rfl: 12  •  levonorgestrel (MIRENA) 20 MCG/24HR IUD, 1 each by Intrauterine route 1 (One) Time., Disp: , Rfl:   •  loratadine (CLARITIN) 10 MG tablet, Take 1 tablet by mouth Daily., Disp: 30 tablet, Rfl: 3  •  metFORMIN (GLUCOPHAGE) 1000 MG tablet, Take 1 tablet by mouth 2 (Two) Times a Day With Meals., Disp: 60 tablet, Rfl: 3  •  ondansetron ODT (ZOFRAN-ODT) 8 MG disintegrating tablet, Take 1 tablet by mouth Every 8 (Eight) Hours As Needed for Nausea., Disp: 15 tablet, Rfl: 0  •  SITagliptin (JANUVIA) 100 MG tablet, Take 1 tablet by mouth Daily., Disp: 30 tablet, Rfl: 3  •  sertraline (ZOLOFT) 25 MG tablet, Take 1 tablet by mouth Daily., Disp: 30 tablet, Rfl: 1    Return in about 3 weeks (around 2/12/2019), or if symptoms worsen or fail to improve, for Recheck.    Lab Results   Component Value Date    HGBA1C 6.70 (H) 11/15/2018

## 2019-02-11 ENCOUNTER — OFFICE VISIT (OUTPATIENT)
Dept: INTERNAL MEDICINE | Facility: CLINIC | Age: 29
End: 2019-02-11

## 2019-02-11 VITALS
TEMPERATURE: 98.2 F | DIASTOLIC BLOOD PRESSURE: 64 MMHG | OXYGEN SATURATION: 99 % | HEIGHT: 65 IN | WEIGHT: 218.4 LBS | BODY MASS INDEX: 36.39 KG/M2 | HEART RATE: 80 BPM | SYSTOLIC BLOOD PRESSURE: 108 MMHG

## 2019-02-11 DIAGNOSIS — J30.9 ALLERGIC RHINITIS, UNSPECIFIED SEASONALITY, UNSPECIFIED TRIGGER: ICD-10-CM

## 2019-02-11 DIAGNOSIS — F41.9 ANXIETY: ICD-10-CM

## 2019-02-11 DIAGNOSIS — E11.9 TYPE 2 DIABETES MELLITUS WITHOUT COMPLICATION, WITHOUT LONG-TERM CURRENT USE OF INSULIN (HCC): Primary | ICD-10-CM

## 2019-02-11 LAB — HBA1C MFR BLD: 7.6 %

## 2019-02-11 PROCEDURE — 83036 HEMOGLOBIN GLYCOSYLATED A1C: CPT | Performed by: FAMILY MEDICINE

## 2019-02-11 PROCEDURE — 99214 OFFICE O/P EST MOD 30 MIN: CPT | Performed by: FAMILY MEDICINE

## 2019-02-11 RX ORDER — SERTRALINE HYDROCHLORIDE 25 MG/1
25 TABLET, FILM COATED ORAL DAILY
Qty: 30 TABLET | Refills: 3 | Status: SHIPPED | OUTPATIENT
Start: 2019-02-11 | End: 2019-04-15 | Stop reason: SDUPTHER

## 2019-02-11 RX ORDER — LORATADINE 10 MG/1
10 TABLET ORAL DAILY
Qty: 30 TABLET | Refills: 3 | Status: SHIPPED | OUTPATIENT
Start: 2019-02-11 | End: 2019-04-15 | Stop reason: SDUPTHER

## 2019-02-11 NOTE — PROGRESS NOTES
"Subjective   Shelleybruce Graff is a 29 y.o. female.     Chief Complaint   Patient presents with   • Diabetes     f/u   • Anxiety   • Depression       Visit Vitals  /64   Pulse 80   Temp 98.2 °F (36.8 °C)   Ht 164.5 cm (64.76\")   Wt 99.1 kg (218 lb 6.4 oz)   SpO2 99%   BMI 36.61 kg/m²         Diabetes   She presents for her follow-up diabetic visit. She has type 2 diabetes mellitus. Her disease course has been stable. Hypoglycemia symptoms include nervousness/anxiousness. Pertinent negatives for hypoglycemia include no confusion, dizziness or headaches. Associated symptoms include polydipsia. Pertinent negatives for diabetes include no blurred vision, no chest pain, no fatigue, no foot paresthesias, no foot ulcerations, no polyphagia, no polyuria, no visual change, no weakness and no weight loss. There are no hypoglycemic complications. Symptoms are worsening (out of medication). Pertinent negatives for diabetic complications include no CVA, heart disease, nephropathy, peripheral neuropathy, PVD or retinopathy. Risk factors for coronary artery disease include diabetes mellitus and family history. Current diabetic treatment includes oral agent (triple therapy). She is compliant with treatment most of the time. Her weight is increasing steadily. She is following a generally healthy diet. Meal planning includes avoidance of concentrated sweets. She participates in exercise three times a week. Her home blood glucose trend is fluctuating dramatically (out of meds). Her breakfast blood glucose range is generally >200 mg/dl. An ACE inhibitor/angiotensin II receptor blocker is not being taken. She does not see a podiatrist.Eye exam is not current.   Anxiety   Presents for follow-up visit. Symptoms include nervous/anxious behavior. Patient reports no chest pain, compulsions, confusion, decreased concentration, depressed mood, dizziness, dry mouth, excessive worry, feeling of choking, hyperventilation, insomnia, " irritability, malaise, muscle tension, nausea, obsessions, palpitations, panic, restlessness, shortness of breath or suicidal ideas. Symptoms occur rarely. The severity of symptoms is mild. The patient sleeps 7 hours per night. The quality of sleep is good. Nighttime awakenings: occasional.     Her past medical history is significant for depression. Compliance with medications is %.   Depression   Visit Type: follow-up  Patient presents with the following symptoms: nervousness/anxiety.  Patient is not experiencing: anhedonia, chest pain, choking sensation, compulsions, confusion, decreased concentration, depressed mood, dizziness, dry mouth, excessive worry, fatigue, feelings of hopelessness, feelings of worthlessness, hypersomnia, hyperventilation, insomnia, irritability, malaise, memory impairment, muscle tension, nausea, obsessions, palpitations, panic, psychomotor agitation, psychomotor retardation, restlessness, shortness of breath, suicidal ideas, suicidal planning, thoughts of death, weight gain and weight loss.  Frequency of symptoms: rarely   Severity: mild   Sleep per night: 7 hours  Sleep quality: good  Nighttime awakenings: occasional  Compliance with medications:  %           Pt has not had her medication in a few days because of finances.   Depression has resolved.    Pt needs refill of allergy medication.     The following portions of the patient's history were reviewed and updated as appropriate: allergies, current medications, past family history, past medical history, past social history, past surgical history and problem list.    Past Medical History:   Diagnosis Date   • Gestational diabetes    • Pap smear for cervical cancer screening 2017      Past Surgical History:   Procedure Laterality Date   • CERVICAL CONE BIOPSY      age 14-benign   •  SECTION  , 08, 11/6/15    x3 5/15/13      Family History   Problem Relation Age of Onset   • Stroke Mother    • Heart disease  Mother    • Diabetes Mother    • Obesity Mother    • Hypertension Mother       Social History     Socioeconomic History   • Marital status:      Spouse name: Not on file   • Number of children: Not on file   • Years of education: Not on file   • Highest education level: Not on file   Social Needs   • Financial resource strain: Not on file   • Food insecurity - worry: Not on file   • Food insecurity - inability: Not on file   • Transportation needs - medical: Not on file   • Transportation needs - non-medical: Not on file   Occupational History   • Not on file   Tobacco Use   • Smoking status: Former Smoker     Packs/day: 2.00     Years: 2.00     Pack years: 4.00     Last attempt to quit: 2012     Years since quittin.6   • Smokeless tobacco: Never Used   Substance and Sexual Activity   • Alcohol use: No   • Drug use: No   • Sexual activity: Yes     Partners: Male     Birth control/protection: IUD     Comment:    Other Topics Concern   • Not on file   Social History Narrative   • Not on file      No Known Allergies    Review of Systems   Constitutional: Negative.  Negative for chills, diaphoresis, fatigue, fever, irritability, weight gain and weight loss.   HENT: Positive for postnasal drip, rhinorrhea and sinus pressure. Negative for ear pain, nosebleeds, sneezing and sore throat.    Eyes: Negative.  Negative for blurred vision, redness and itching.   Respiratory: Positive for wheezing. Negative for cough, choking and shortness of breath.    Cardiovascular: Negative.  Negative for chest pain and palpitations.   Gastrointestinal: Negative.  Negative for abdominal pain, constipation, diarrhea, nausea and vomiting.   Endocrine: Positive for polydipsia. Negative for cold intolerance, heat intolerance, polyphagia and polyuria.   Genitourinary: Negative.  Negative for dysuria, frequency, hematuria and urgency.   Musculoskeletal: Positive for arthralgias (knees and ankles). Negative for back pain and  neck pain.   Skin: Negative.  Negative for color change and rash.   Allergic/Immunologic: Positive for environmental allergies.   Neurological: Negative.  Negative for dizziness, syncope, weakness, light-headedness and headaches.   Hematological: Negative.  Negative for adenopathy. Does not bruise/bleed easily.   Psychiatric/Behavioral: Negative for confusion, decreased concentration, dysphoric mood and suicidal ideas. The patient is nervous/anxious. The patient does not have insomnia.        Objective   Physical Exam   Constitutional: She is oriented to person, place, and time. She appears well-developed.   HENT:   Head: Normocephalic.   Right Ear: External ear normal.   Left Ear: External ear normal.   Nose: Rhinorrhea present.   Eyes: Conjunctivae, EOM and lids are normal. Pupils are equal, round, and reactive to light.   Neck: Trachea normal and normal range of motion. Neck supple. Carotid bruit is not present. No thyroid mass and no thyromegaly present.   Cardiovascular: Normal rate and regular rhythm.   No murmur heard.  Pulmonary/Chest: Effort normal and breath sounds normal. No respiratory distress. She has no decreased breath sounds. She has no wheezes. She has no rhonchi. She has no rales. She exhibits no tenderness.   Abdominal: Soft. Bowel sounds are normal. There is no tenderness.   Musculoskeletal: Normal range of motion.   Neurological: She is alert and oriented to person, place, and time.   Skin: Skin is warm and dry.   Psychiatric: She has a normal mood and affect. Her behavior is normal.   Nursing note and vitals reviewed.      Assessment/Plan   Shelley was seen today for diabetes, anxiety and depression.    Diagnoses and all orders for this visit:    Type 2 diabetes mellitus without complication, without long-term current use of insulin (CMS/[facility])  -     POC Glycosylated Hemoglobin (Hb A1C)  -     Empagliflozin 10 MG tablet; Take 1 tablet by mouth Daily.  -     metFORMIN (GLUCOPHAGE) 1000 MG tablet;  Take 1 tablet by mouth 2 (Two) Times a Day With Meals.  -     SITagliptin (JANUVIA) 100 MG tablet; Take 1 tablet by mouth Daily.    Anxiety  -     sertraline (ZOLOFT) 25 MG tablet; Take 1 tablet by mouth Daily.    Allergic rhinitis, unspecified seasonality, unspecified trigger  -     loratadine (CLARITIN) 10 MG tablet; Take 1 tablet by mouth Daily.      Sample of januvia and jardiance-restart the DM meds.  Please follow a low animal fat diet that is also low in sugar, low in junk food, low in sweet drinks and low in alcohol.  Please increase the amount of fiber in your diet as well as increasing your daily exercise, such as walking.               Current Outpatient Medications:   •  Empagliflozin 10 MG tablet, Take 1 tablet by mouth Daily., Disp: 30 tablet, Rfl: 3  •  glucose blood test strip, Used to check blood sugar 3-4 daily for diabetes E11.9., Disp: 125 each, Rfl: 12  •  glucose monitoring kit (FREESTYLE) monitoring kit, Use daily to test blood sugar daily for Diabetes E11.9, Disp: 1 each, Rfl: 0  •  ketoconazole (NIZORAL) 2 % cream, Apply  topically to the appropriate area as directed Every 12 (Twelve) Hours., Disp: 30 g, Rfl: 0  •  Lancets (FREESTYLE) lancets, Use to check blood sugar for Diabetes E11.9., Disp: 100 each, Rfl: 12  •  levonorgestrel (MIRENA) 20 MCG/24HR IUD, 1 each by Intrauterine route 1 (One) Time., Disp: , Rfl:   •  loratadine (CLARITIN) 10 MG tablet, Take 1 tablet by mouth Daily., Disp: 30 tablet, Rfl: 3  •  metFORMIN (GLUCOPHAGE) 1000 MG tablet, Take 1 tablet by mouth 2 (Two) Times a Day With Meals., Disp: 60 tablet, Rfl: 3  •  ondansetron ODT (ZOFRAN-ODT) 8 MG disintegrating tablet, Take 1 tablet by mouth Every 8 (Eight) Hours As Needed for Nausea., Disp: 15 tablet, Rfl: 0  •  sertraline (ZOLOFT) 25 MG tablet, Take 1 tablet by mouth Daily., Disp: 30 tablet, Rfl: 3  •  SITagliptin (JANUVIA) 100 MG tablet, Take 1 tablet by mouth Daily., Disp: 30 tablet, Rfl: 3    Return in about 2 months  (around 4/11/2019), or if symptoms worsen or fail to improve, for Recheck.    Hemoglobin A1C   Date Value Ref Range Status   02/11/2019 7.6 % Final   11/15/2018 6.70 (H) 4.80 - 5.60 % Final   08/16/2018 6.5 % Final   05/08/2018 8.0 % Final   08/30/2017 6.30 (H) 4.80 - 5.60 % Final

## 2019-02-19 ENCOUNTER — TELEPHONE (OUTPATIENT)
Dept: INTERNAL MEDICINE | Facility: CLINIC | Age: 29
End: 2019-02-19

## 2019-02-19 NOTE — TELEPHONE ENCOUNTER
LVM that she could come by and have it copied onto letter head since Dr. Larson has already signed.

## 2019-02-19 NOTE — TELEPHONE ENCOUNTER
Pt brought in form note for signature, thought it had been copied in. For relative() to stay in country.  Would need to see again

## 2019-02-19 NOTE — TELEPHONE ENCOUNTER
BUTCH RAMOS WOULD LIKE A CALL BACK REGARDING THE LETTER THAT DR. LYNNE WROTE FOR HER, SHE STATED THAT IT NEEDED TO BE ON LETTERHEAD.

## 2019-02-25 ENCOUNTER — PATIENT MESSAGE (OUTPATIENT)
Dept: INTERNAL MEDICINE | Facility: CLINIC | Age: 29
End: 2019-02-25

## 2019-02-25 RX ORDER — FLUCONAZOLE 150 MG/1
150 TABLET ORAL ONCE
Qty: 1 TABLET | Refills: 0 | Status: SHIPPED | OUTPATIENT
Start: 2019-02-25 | End: 2019-02-25

## 2019-02-25 NOTE — TELEPHONE ENCOUNTER
Tammi Ng 2/25/2019 2:18 PM EST        ----- Message -----  From: Shelley Graff  Sent: 2/25/2019 1:12 PM  To: [facility]  Subject: Prescription Question     ----- Message from Mychart, Generic sent at 2/25/2019 1:12 PM EST -----    Would it be possible to get a prescription called in for yeast infection?

## 2019-03-05 ENCOUNTER — TELEPHONE (OUTPATIENT)
Dept: INTERNAL MEDICINE | Facility: CLINIC | Age: 29
End: 2019-03-05

## 2019-04-15 ENCOUNTER — TELEPHONE (OUTPATIENT)
Dept: INTERNAL MEDICINE | Facility: CLINIC | Age: 29
End: 2019-04-15

## 2019-04-15 ENCOUNTER — OFFICE VISIT (OUTPATIENT)
Dept: INTERNAL MEDICINE | Facility: CLINIC | Age: 29
End: 2019-04-15

## 2019-04-15 VITALS
HEART RATE: 98 BPM | OXYGEN SATURATION: 98 % | WEIGHT: 217.8 LBS | DIASTOLIC BLOOD PRESSURE: 76 MMHG | SYSTOLIC BLOOD PRESSURE: 118 MMHG | TEMPERATURE: 97.7 F | HEIGHT: 65 IN | BODY MASS INDEX: 36.29 KG/M2

## 2019-04-15 DIAGNOSIS — J30.9 ALLERGIC RHINITIS, UNSPECIFIED SEASONALITY, UNSPECIFIED TRIGGER: ICD-10-CM

## 2019-04-15 DIAGNOSIS — R53.83 OTHER FATIGUE: ICD-10-CM

## 2019-04-15 DIAGNOSIS — F41.9 ANXIETY: ICD-10-CM

## 2019-04-15 DIAGNOSIS — E11.65 TYPE 2 DIABETES MELLITUS WITH HYPERGLYCEMIA, WITHOUT LONG-TERM CURRENT USE OF INSULIN (HCC): Primary | ICD-10-CM

## 2019-04-15 DIAGNOSIS — L30.9 ECZEMA OF RIGHT HAND: ICD-10-CM

## 2019-04-15 DIAGNOSIS — E11.9 TYPE 2 DIABETES MELLITUS WITHOUT COMPLICATION, WITHOUT LONG-TERM CURRENT USE OF INSULIN (HCC): ICD-10-CM

## 2019-04-15 LAB
ALBUMIN SERPL-MCNC: 4.4 G/DL (ref 3.5–5.2)
ALBUMIN/GLOB SERPL: 1.2 G/DL
ALP SERPL-CCNC: 86 U/L (ref 39–117)
ALT SERPL W P-5'-P-CCNC: 150 U/L (ref 1–33)
ANION GAP SERPL CALCULATED.3IONS-SCNC: 15 MMOL/L
AST SERPL-CCNC: 58 U/L (ref 1–32)
BILIRUB SERPL-MCNC: 0.2 MG/DL (ref 0.2–1.2)
BUN BLD-MCNC: 7 MG/DL (ref 6–20)
BUN/CREAT SERPL: 10.3 (ref 7–25)
CALCIUM SPEC-SCNC: 9.3 MG/DL (ref 8.6–10.5)
CHLORIDE SERPL-SCNC: 103 MMOL/L (ref 98–107)
CHOLEST SERPL-MCNC: 232 MG/DL (ref 0–200)
CO2 SERPL-SCNC: 21 MMOL/L (ref 22–29)
CREAT BLD-MCNC: 0.68 MG/DL (ref 0.57–1)
FOLATE SERPL-MCNC: 13 NG/ML (ref 4.78–24.2)
GFR SERPL CREATININE-BSD FRML MDRD: 102 ML/MIN/1.73
GFR SERPL CREATININE-BSD FRML MDRD: 123 ML/MIN/1.73
GLOBULIN UR ELPH-MCNC: 3.6 GM/DL
GLUCOSE BLD-MCNC: 176 MG/DL (ref 65–99)
HDLC SERPL-MCNC: 56 MG/DL (ref 40–60)
LDLC SERPL CALC-MCNC: 133 MG/DL (ref 0–100)
LDLC/HDLC SERPL: 2.37 {RATIO}
POTASSIUM BLD-SCNC: 4.3 MMOL/L (ref 3.5–5.2)
PROT SERPL-MCNC: 8 G/DL (ref 6–8.5)
SODIUM BLD-SCNC: 139 MMOL/L (ref 136–145)
T4 FREE SERPL-MCNC: 1.03 NG/DL (ref 0.93–1.7)
TRIGL SERPL-MCNC: 217 MG/DL (ref 0–150)
TSH SERPL DL<=0.05 MIU/L-ACNC: 1.73 MIU/ML (ref 0.27–4.2)
VIT B12 BLD-MCNC: 475 PG/ML (ref 211–946)
VLDLC SERPL-MCNC: 43.4 MG/DL (ref 5–40)

## 2019-04-15 PROCEDURE — 84439 ASSAY OF FREE THYROXINE: CPT | Performed by: FAMILY MEDICINE

## 2019-04-15 PROCEDURE — 84443 ASSAY THYROID STIM HORMONE: CPT | Performed by: FAMILY MEDICINE

## 2019-04-15 PROCEDURE — 82746 ASSAY OF FOLIC ACID SERUM: CPT | Performed by: FAMILY MEDICINE

## 2019-04-15 PROCEDURE — 82607 VITAMIN B-12: CPT | Performed by: FAMILY MEDICINE

## 2019-04-15 PROCEDURE — 80053 COMPREHEN METABOLIC PANEL: CPT | Performed by: FAMILY MEDICINE

## 2019-04-15 PROCEDURE — 80061 LIPID PANEL: CPT | Performed by: FAMILY MEDICINE

## 2019-04-15 PROCEDURE — 99214 OFFICE O/P EST MOD 30 MIN: CPT | Performed by: FAMILY MEDICINE

## 2019-04-15 RX ORDER — SERTRALINE HYDROCHLORIDE 25 MG/1
25 TABLET, FILM COATED ORAL DAILY
Qty: 30 TABLET | Refills: 3 | Status: SHIPPED | OUTPATIENT
Start: 2019-04-15 | End: 2019-07-15 | Stop reason: SDUPTHER

## 2019-04-15 RX ORDER — FLUCONAZOLE 150 MG/1
150 TABLET ORAL ONCE
Qty: 1 TABLET | Refills: 0 | Status: SHIPPED | OUTPATIENT
Start: 2019-04-15 | End: 2019-04-15

## 2019-04-15 RX ORDER — LANCETS 28 GAUGE
EACH MISCELLANEOUS
Qty: 100 EACH | Refills: 12 | Status: SHIPPED | OUTPATIENT
Start: 2019-04-15 | End: 2019-11-24

## 2019-04-15 RX ORDER — LORATADINE 10 MG/1
10 TABLET ORAL DAILY
Qty: 30 TABLET | Refills: 3 | Status: SHIPPED | OUTPATIENT
Start: 2019-04-15 | End: 2019-07-15 | Stop reason: SDUPTHER

## 2019-04-15 NOTE — TELEPHONE ENCOUNTER
[facility] and needs a frequency clarification on lancets.  PH is [phone]

## 2019-04-15 NOTE — PROGRESS NOTES
"Subjective   Shelleybruce Graff is a 29 y.o. female.     Chief Complaint   Patient presents with   • Diabetes     f/u   • Depression       Visit Vitals  /76   Pulse 98   Temp 97.7 °F (36.5 °C)   Ht 164.5 cm (64.76\")   Wt 98.8 kg (217 lb 12.8 oz)   SpO2 98%   BMI 36.51 kg/m²         Diabetes   She presents for her follow-up diabetic visit. She has type 2 diabetes mellitus. Her disease course has been fluctuating. There are no hypoglycemic associated symptoms. Pertinent negatives for hypoglycemia include no confusion, dizziness, headaches or nervousness/anxiousness. Associated symptoms include fatigue and polydipsia. Pertinent negatives for diabetes include no blurred vision, no chest pain, no foot paresthesias, no foot ulcerations, no polyphagia, no polyuria, no visual change, no weakness and no weight loss. There are no hypoglycemic complications. Symptoms are worsening. Pertinent negatives for diabetic complications include no CVA, heart disease, nephropathy, peripheral neuropathy, PVD or retinopathy. Risk factors for coronary artery disease include diabetes mellitus, family history and obesity. Current diabetic treatment includes oral agent (dual therapy) and oral agent (triple therapy). She is compliant with treatment some of the time. Her weight is stable. When asked about meal planning, she reported none. She participates in exercise intermittently. Her home blood glucose trend is fluctuating dramatically. An ACE inhibitor/angiotensin II receptor blocker is not being taken. She does not see a podiatrist.Eye exam is not current.   Depression   Visit Type: follow-up  Patient presents with the following symptoms: dry mouth, fatigue, hypersomnia and shortness of breath.  Patient is not experiencing: anhedonia, chest pain, choking sensation, compulsions, confusion, decreased concentration, depressed mood, dizziness, excessive worry, feelings of hopelessness, feelings of worthlessness, hyperventilation, " insomnia, irritability, malaise, memory impairment, muscle tension, nausea, nervousness/anxiety, obsessions, palpitations, panic, psychomotor agitation, psychomotor retardation, restlessness, suicidal ideas, suicidal planning, thoughts of death, weight gain and weight loss.  Frequency of symptoms: rarely   Severity: mild   Sleep quality: good  Nighttime awakenings: none  Compliance with medications:  %           Pt has hand eczema that has responded to betamethasone.     Pt did keto diet for 2 weeks and pts sugar was in the 90 range and down to 62.   Now sugar is 140-260. Pt is staying tired. Pt is eating bagels.    The following portions of the patient's history were reviewed and updated as appropriate: allergies, current medications, past family history, past medical history, past social history, past surgical history and problem list.    Past Medical History:   Diagnosis Date   • Gestational diabetes    • Pap smear for cervical cancer screening 2017      Past Surgical History:   Procedure Laterality Date   • CERVICAL CONE BIOPSY      age 14-benign   •  SECTION  , 08, 11/6/15    x3 5/15/13      Family History   Problem Relation Age of Onset   • Stroke Mother    • Heart disease Mother    • Diabetes Mother    • Obesity Mother    • Hypertension Mother       Social History     Socioeconomic History   • Marital status:      Spouse name: Not on file   • Number of children: Not on file   • Years of education: Not on file   • Highest education level: Not on file   Tobacco Use   • Smoking status: Former Smoker     Packs/day: 2.00     Years: 2.00     Pack years: 4.00     Last attempt to quit: 2012     Years since quittin.7   • Smokeless tobacco: Never Used   Substance and Sexual Activity   • Alcohol use: No   • Drug use: No   • Sexual activity: Yes     Partners: Male     Birth control/protection: IUD     Comment:       No Known Allergies    Review of Systems   Constitutional:  Positive for fatigue. Negative for chills, diaphoresis, fever, irritability, weight gain and weight loss.   HENT: Negative for ear pain, nosebleeds, postnasal drip, rhinorrhea, sinus pressure, sneezing and sore throat.    Eyes: Negative.  Negative for blurred vision, redness and itching.   Respiratory: Positive for shortness of breath. Negative for cough, choking and wheezing.    Cardiovascular: Negative.  Negative for chest pain and palpitations.   Gastrointestinal: Negative.  Negative for abdominal pain, constipation, diarrhea, nausea and vomiting.   Endocrine: Positive for polydipsia. Negative for cold intolerance, heat intolerance, polyphagia and polyuria.   Genitourinary: Negative.  Negative for dysuria, frequency, hematuria and urgency.   Musculoskeletal: Negative.  Negative for arthralgias, back pain and neck pain.   Skin: Positive for rash. Negative for color change.   Allergic/Immunologic: Negative.  Negative for environmental allergies.   Neurological: Negative.  Negative for dizziness, syncope, weakness, light-headedness and headaches.   Hematological: Negative.  Negative for adenopathy. Does not bruise/bleed easily.   Psychiatric/Behavioral: Negative.  Negative for confusion, decreased concentration, dysphoric mood and suicidal ideas. The patient is not nervous/anxious and does not have insomnia.         Depression is stable on medications       Objective   Physical Exam   Constitutional: She is oriented to person, place, and time. She appears well-developed.   HENT:   Head: Normocephalic.   Right Ear: External ear normal.   Left Ear: External ear normal.   Nose: Nose normal.   Eyes: Conjunctivae, EOM and lids are normal. Pupils are equal, round, and reactive to light.   Neck: Trachea normal and normal range of motion. Neck supple. Carotid bruit is not present. No thyroid mass and no thyromegaly present.   Cardiovascular: Normal rate and regular rhythm.   No murmur heard.  Pulmonary/Chest: Effort normal  and breath sounds normal. No respiratory distress. She has no decreased breath sounds. She has no wheezes. She has no rhonchi. She has no rales. She exhibits no tenderness.   Abdominal: Soft. Bowel sounds are normal. There is no tenderness.   Musculoskeletal: Normal range of motion.   Neurological: She is alert and oriented to person, place, and time.   Skin: Skin is warm and dry.   Psychiatric: She has a normal mood and affect. Her behavior is normal.   Nursing note and vitals reviewed.      Assessment/Plan   Shelley was seen today for diabetes and depression.    Diagnoses and all orders for this visit:    Type 2 diabetes mellitus with hyperglycemia, without long-term current use of insulin (CMS/[facility])  -     Lipid Panel  -     Comprehensive Metabolic Panel  -     Lancets (FREESTYLE) lancets; Use to check blood sugar for Diabetes E11.9.    Eczema of right hand  -     betamethasone valerate (VALISONE) 0.1 % ointment; Apply  topically to the appropriate area as directed Daily.    Type 2 diabetes mellitus without complication, without long-term current use of insulin (CMS/[facility])  -     Empagliflozin 10 MG tablet; Take 1 tablet by mouth Daily.  -     metFORMIN (GLUCOPHAGE) 1000 MG tablet; Take 1 tablet by mouth 2 (Two) Times a Day With Meals.  -     SITagliptin (JANUVIA) 100 MG tablet; Take 1 tablet by mouth Daily.    Allergic rhinitis, unspecified seasonality, unspecified trigger  -     loratadine (CLARITIN) 10 MG tablet; Take 1 tablet by mouth Daily.    Anxiety  -     sertraline (ZOLOFT) 25 MG tablet; Take 1 tablet by mouth Daily.    Other fatigue  -     TSH  -     T4, Free  -     Vitamin B12  -     Folate    Other orders  -     fluconazole (DIFLUCAN) 150 MG tablet; Take 1 tablet by mouth 1 (One) Time for 1 dose.      Pt plans to restart keto diet.   Avoid bread and junk food           Current Outpatient Medications:   •  Empagliflozin 10 MG tablet, Take 1 tablet by mouth Daily., Disp: 30 tablet, Rfl: 3  •  glucose blood  test strip, Used to check blood sugar 3-4 daily for diabetes E11.9., Disp: 125 each, Rfl: 12  •  glucose monitoring kit (FREESTYLE) monitoring kit, Use daily to test blood sugar daily for Diabetes E11.9, Disp: 1 each, Rfl: 0  •  ketoconazole (NIZORAL) 2 % cream, Apply  topically to the appropriate area as directed Every 12 (Twelve) Hours., Disp: 30 g, Rfl: 0  •  Lancets (FREESTYLE) lancets, Use to check blood sugar for Diabetes E11.9., Disp: 100 each, Rfl: 12  •  levonorgestrel (MIRENA) 20 MCG/24HR IUD, 1 each by Intrauterine route 1 (One) Time., Disp: , Rfl:   •  loratadine (CLARITIN) 10 MG tablet, Take 1 tablet by mouth Daily., Disp: 30 tablet, Rfl: 3  •  metFORMIN (GLUCOPHAGE) 1000 MG tablet, Take 1 tablet by mouth 2 (Two) Times a Day With Meals., Disp: 60 tablet, Rfl: 3  •  ondansetron ODT (ZOFRAN-ODT) 8 MG disintegrating tablet, Take 1 tablet by mouth Every 8 (Eight) Hours As Needed for Nausea., Disp: 15 tablet, Rfl: 0  •  sertraline (ZOLOFT) 25 MG tablet, Take 1 tablet by mouth Daily., Disp: 30 tablet, Rfl: 3  •  SITagliptin (JANUVIA) 100 MG tablet, Take 1 tablet by mouth Daily., Disp: 30 tablet, Rfl: 3  •  betamethasone valerate (VALISONE) 0.1 % ointment, Apply  topically to the appropriate area as directed Daily., Disp: 30 g, Rfl: 3  •  fluconazole (DIFLUCAN) 150 MG tablet, Take 1 tablet by mouth 1 (One) Time for 1 dose., Disp: 1 tablet, Rfl: 0    Return in about 3 months (around 7/15/2019), or if symptoms worsen or fail to improve, for Recheck.

## 2019-04-17 ENCOUNTER — TELEPHONE (OUTPATIENT)
Dept: INTERNAL MEDICINE | Facility: CLINIC | Age: 29
End: 2019-04-17

## 2019-04-17 DIAGNOSIS — E11.65 TYPE 2 DIABETES MELLITUS WITH HYPERGLYCEMIA, WITHOUT LONG-TERM CURRENT USE OF INSULIN (HCC): Primary | ICD-10-CM

## 2019-04-17 DIAGNOSIS — E11.65 TYPE 2 DIABETES MELLITUS WITH HYPERGLYCEMIA, WITH LONG-TERM CURRENT USE OF INSULIN (HCC): Primary | ICD-10-CM

## 2019-04-17 DIAGNOSIS — Z79.4 TYPE 2 DIABETES MELLITUS WITH HYPERGLYCEMIA, WITH LONG-TERM CURRENT USE OF INSULIN (HCC): Primary | ICD-10-CM

## 2019-04-17 RX ORDER — INSULIN GLARGINE 100 [IU]/ML
10 INJECTION, SOLUTION SUBCUTANEOUS NIGHTLY
Qty: 3 ML | Refills: 2 | Status: SHIPPED | OUTPATIENT
Start: 2019-04-17 | End: 2019-07-15 | Stop reason: SDUPTHER

## 2019-04-17 RX ORDER — PEN NEEDLE, DIABETIC 30 GX3/16"
NEEDLE, DISPOSABLE MISCELLANEOUS
Qty: 50 EACH | Refills: 2 | Status: SHIPPED | OUTPATIENT
Start: 2019-04-17 | End: 2019-11-24

## 2019-04-17 NOTE — TELEPHONE ENCOUNTER
----- Message from Amee DUMONT MD sent at 4/16/2019  1:08 PM EDT -----  Please call the patient regarding her abnormal result.  Cholesterol has increased.  Triglycerides are staying high.  Glucose is elevated for fasting.  ALT and AST have increased.  Need to consider adding insulin.

## 2019-04-22 ENCOUNTER — TELEPHONE (OUTPATIENT)
Dept: INTERNAL MEDICINE | Facility: CLINIC | Age: 29
End: 2019-04-22

## 2019-04-22 NOTE — TELEPHONE ENCOUNTER
[facility] pharmacy called and is wanting to know the frequency on the test strips.  PH is [phone]

## 2019-06-14 ENCOUNTER — OFFICE VISIT (OUTPATIENT)
Dept: BARIATRICS/WEIGHT MGMT | Facility: CLINIC | Age: 29
End: 2019-06-14

## 2019-06-14 ENCOUNTER — DOCUMENTATION (OUTPATIENT)
Dept: BARIATRICS/WEIGHT MGMT | Facility: CLINIC | Age: 29
End: 2019-06-14

## 2019-06-14 ENCOUNTER — TELEPHONE (OUTPATIENT)
Dept: INTERNAL MEDICINE | Facility: CLINIC | Age: 29
End: 2019-06-14

## 2019-06-14 VITALS
HEIGHT: 65 IN | DIASTOLIC BLOOD PRESSURE: 72 MMHG | SYSTOLIC BLOOD PRESSURE: 120 MMHG | RESPIRATION RATE: 18 BRPM | WEIGHT: 210.5 LBS | HEART RATE: 108 BPM | TEMPERATURE: 97.8 F | OXYGEN SATURATION: 99 % | BODY MASS INDEX: 35.07 KG/M2

## 2019-06-14 DIAGNOSIS — Z79.4 TYPE 2 DIABETES MELLITUS WITH HYPERGLYCEMIA, WITH LONG-TERM CURRENT USE OF INSULIN (HCC): ICD-10-CM

## 2019-06-14 DIAGNOSIS — R10.13 DYSPEPSIA: Primary | ICD-10-CM

## 2019-06-14 DIAGNOSIS — E11.65 TYPE 2 DIABETES MELLITUS WITH HYPERGLYCEMIA, WITH LONG-TERM CURRENT USE OF INSULIN (HCC): ICD-10-CM

## 2019-06-14 DIAGNOSIS — R53.83 FATIGUE, UNSPECIFIED TYPE: ICD-10-CM

## 2019-06-14 DIAGNOSIS — E66.9 OBESITY, CLASS II, BMI 35-39.9: ICD-10-CM

## 2019-06-14 PROBLEM — E66.812 OBESITY, CLASS II, BMI 35-39.9: Status: ACTIVE | Noted: 2019-06-14

## 2019-06-14 PROCEDURE — 99204 OFFICE O/P NEW MOD 45 MIN: CPT | Performed by: PHYSICIAN ASSISTANT

## 2019-06-14 RX ORDER — PHENTERMINE HYDROCHLORIDE 37.5 MG/1
37.5 TABLET ORAL
COMMUNITY
End: 2019-07-15

## 2019-06-14 NOTE — PROGRESS NOTES
[facility] BARIATRIC SURGERY  [address]  [facility] [address]  [phone]      Patient  Name:  Shelley Graff  :  1990      Date of Visit: 2019      Chief Complaint:  weight gain; unable to maintain weight loss    History of Present Illness:  Shelley Graff is a 29 y.o. female who presents today for evaluation, education and consultation regarding weight loss surgery. The patient is interested in sleeve gastrectomy with Dr. Whatley.     Shelley has been overweight for at least 10 years, has been 35 pounds or more overweight for at least 10 years, and started dieting at age 27.  Previous diet attempts include: KETO and Phenteramine.  The most weight Shelley lost was 15 pounds on diet pills but was only able to maintain that weight loss for 3 months.  Her maximum lifetime weight is 220 pounds.    As above, patient has been overweight for many years, with numerous failed dietary/weight loss attempts.  She now has obesity related comorbidities and as such has decided to pursue weight loss surgery.  All past medical, surgical, social and family history have been obtained and discussed today, as pertinent to bariatric surgery.     Past Medical History:   Diagnosis Date   • Anxiety    • Chronic joint pain     denies NSAIDS/steroids   • DM2 (diabetes mellitus, type 2) (CMS/HCC)     w/ hx gestational diabetes , dx May 2017, started on insulin when initially dx, lost weight w/ Adipex, stopped insulin 6 months after dx, but now w/ weight regain, back on insulin.  Managed by PCP   • Dyspepsia    • Dyspnea on exertion    • Fatigue    • Fatty liver     w/ abnormal LFTs, US 2018   • Gestational diabetes    • Obesity (BMI 30-39.9)    • Pap smear for cervical cancer screening 2017     Past Surgical History:   Procedure Laterality Date   • CERVICAL CONE BIOPSY      benign   •  SECTION  08, 5/15/13, 11/6/15    x3        No Known  Allergies    Current Outpatient Medications:   •  betamethasone valerate (VALISONE) 0.1 % ointment, Apply  topically to the appropriate area as directed Daily., Disp: 30 g, Rfl: 3  •  Empagliflozin 10 MG tablet, Take 1 tablet by mouth Daily., Disp: 30 tablet, Rfl: 3  •  glucose blood test strip, Used to check blood sugar 3-4 daily for diabetes E11.9., Disp: 125 each, Rfl: 12  •  glucose monitoring kit (FREESTYLE) monitoring kit, Use daily to test blood sugar daily for Diabetes E11.9, Disp: 1 each, Rfl: 0  •  Insulin Glargine (BASAGLAR KWIKPEN) 100 UNIT/ML injection pen, Inject 10 Units under the skin into the appropriate area as directed Every Night., Disp: 3 mL, Rfl: 2  •  Insulin Pen Needle (PEN NEEDLES) 31G X 8 MM misc, Use daily for insulin, Disp: 50 each, Rfl: 2  •  ketoconazole (NIZORAL) 2 % cream, Apply  topically to the appropriate area as directed Every 12 (Twelve) Hours., Disp: 30 g, Rfl: 0  •  Lancets (FREESTYLE) lancets, Use to check blood sugar for Diabetes E11.9., Disp: 100 each, Rfl: 12  •  levonorgestrel (MIRENA) 20 MCG/24HR IUD, 1 each by Intrauterine route 1 (One) Time., Disp: , Rfl:   •  metFORMIN (GLUCOPHAGE) 1000 MG tablet, Take 1 tablet by mouth 2 (Two) Times a Day With Meals., Disp: 60 tablet, Rfl: 3  •  phentermine (ADIPEX-P) 37.5 MG tablet, Take 37.5 mg by mouth Every Morning Before Breakfast., Disp: , Rfl:   •  sertraline (ZOLOFT) 25 MG tablet, Take 1 tablet by mouth Daily., Disp: 30 tablet, Rfl: 3  •  SITagliptin (JANUVIA) 100 MG tablet, Take 1 tablet by mouth Daily., Disp: 30 tablet, Rfl: 3  •  loratadine (CLARITIN) 10 MG tablet, Take 1 tablet by mouth Daily., Disp: 30 tablet, Rfl: 3    Social History     Socioeconomic History   • Marital status:      Spouse name: Not on file   • Number of children: Not on file   • Years of education: Not on file   • Highest education level: Not on file   Tobacco Use   • Smoking status: Former Smoker     Packs/day: 2.00     Years: 2.00     Pack  years: 4.00     Last attempt to quit: 2012     Years since quittin.9   • Smokeless tobacco: Never Used   Substance and Sexual Activity   • Alcohol use: No   • Drug use: No   • Sexual activity: Yes     Partners: Male     Birth control/protection: IUD     Comment:    Social History Narrative    Living in [place], [place] w/  and children.   @ [employer] in [facility].       Family History   Problem Relation Age of Onset   • Stroke Mother    • Heart disease Mother    • Diabetes Mother    • Obesity Mother    • Hypertension Mother    • Diabetes Father    • Hypertension Father        Review of Systems:  Constitutional:  reports fatigue, weight gain and denies fevers, chills.  HEENT:  denies headache, ear pain or loss of hearing, blurred or double vision, nasal discharge or sore throat.  Cardiovascular:  denies HTN, hx heart disease, chest pain, palpitations, hx DVT.  Respiratory:  reports dyspnea on exertion and denies cough , wheezing, sleep apnea, asthma, hx PE.  Gastrointestinal: denies dysphagia, heartburn, nausea, vomiting, abdominal pain, IBS, gallbladder issues, liver disease.  Genitourinary:  denies history of  frequent UTI, incontinence, hematuria, dysuria, renal insufficiency.    Musculoskeletal: denies fibromyalgia and arthritis.  Neurological:  denies numbness /tingling, dizziness, confusion, seizure.  Psychiatric:  reports anxiety, depression and denies bipolar disorder.  Endocrine:  reports diabetes and denies thyroid disease.  Hematologic: denies anemia, bleeding disorder, hx blood transfusion.  Skin:  denies rashes, hx MRSA.    Physical Exam:  Vital Signs:  Weight: 95.5 kg (210 lb 8 oz)   Body mass index is 35.03 kg/m².  Temp: 97.8 °F (36.6 °C)   Heart Rate: 108   BP: 120/72     Physical Exam   Constitutional: She is oriented to person, place, and time. She appears well-developed and well-nourished.   HENT:   Head: Normocephalic and atraumatic.   Eyes:  Conjunctivae are normal. No scleral icterus.   Neck: Neck supple. No thyromegaly present.   Cardiovascular: Normal rate and regular rhythm.   No murmur heard.  Pulmonary/Chest: Effort normal and breath sounds normal. No respiratory distress. She has no wheezes. She has no rales.   Abdominal: Soft. Bowel sounds are normal. She exhibits no distension and no mass. There is no tenderness. No hernia.   scars: low transverse   Musculoskeletal: Normal range of motion. She exhibits no edema.   Neurological: She is alert and oriented to person, place, and time. Gait normal.   Skin: Skin is warm and dry. No rash noted.   Psychiatric: She has a normal mood and affect. Judgment normal.   Vitals reviewed.      Patient Active Problem List   Diagnosis   • Polydipsia   • Personal history of gestational diabetes   • Type 2 diabetes mellitus with hyperglycemia, with long-term current use of insulin (CMS/[facility])   • Anxiety   • Fatigue   • Dyspepsia   • Dyspnea on exertion   • Fatty liver   • Chronic joint pain   • Obesity, Class II, BMI 35-39.9       Assessment:    Shelley Graff is a 29 y.o. female with medically complicated obesity pursuing sleeve gastrectomy.    Weight loss surgery is deemed medically necessary given the following obesity related comorbidities including diabetes and fatty liver with current Weight: 95.5 kg (210 lb 8 oz) and Body mass index is 35.03 kg/m².    Plan:  The consultation plan and program requirements were reviewed with the patient.  A psychological evaluation will be arranged.  A nutritional evaluation will be performed.  The patient was advised to start a high protein and low carbohydrate diet.  Necessary lifestyle modifications were discussed.  Instructions on how to access BARI was given to the patient.  BARI is an internet based educational video that explains the surgical procedure chosen and answers basic questions regarding that procedure.     Note:  patient was advised to check blood  sugars more frequently while on preoperative diet.      Preoperative testing will include: CBC, CMP, Lipids, TSH, HgA1C, H.Pylori UBT, CXR and EKG     Additional preop clearances required prior to surgery: Cardiac.      Statement regarding tram-operative insulin management required prior to surgery.    The patient has been educated on expected postoperative lifestyle changes, including commitment to high protein diet, vitamin regimen, and exercise program.  They are aware that support groups are encouraged for optimal weight loss results. Patient understands that bariatric surgery is not cosmetic surgery but rather a tool to help make a lifelong commitment to lifestyle changes including diet, exercise, behavior modifications, and healthy habits. The surgical procedure was discussed with the patient and all questions were answered. The importance of avoiding ASA/ NSAIDS/ steroids/ tobacco/ hormones/ immunomodulators perioperatively was discussed.       Addendum:  H.Pylori UBT (+) 6/14/19.  PrevPak RX 6/18/19.  Will schedule EGD to further evaluate.      GHULAM Rodríguez

## 2019-06-14 NOTE — TELEPHONE ENCOUNTER
PT LEFT PAPERWORK TO BE FILLED OUT BY DR LYNNE. -DIET PROGRESS SHEET (IN DR LYNNE'S FOLDER)  PT STATES THAT SHE NEEDS THIS PACKET BACK DATED FROM WHEN SHE WAS ON HER DIET PLAN.    ALSO NEEDING REFERRALS TO PSYCHOLOGIST, PULMONOLOGIST AND CARDIOLOGIST FOR HER BARIATRIC SURGERY.    PLEASE ADVISE.    PT CONTACT # [phone]

## 2019-06-17 LAB — UREA BREATH TEST QL: POSITIVE

## 2019-06-18 RX ORDER — OMEPRAZOLE 20 MG/1
20 CAPSULE, DELAYED RELEASE ORAL 2 TIMES DAILY
Qty: 20 CAPSULE | Refills: 0 | Status: SHIPPED | OUTPATIENT
Start: 2019-06-18 | End: 2019-06-28

## 2019-06-18 RX ORDER — AMOXICILLIN 500 MG/1
1000 CAPSULE ORAL 2 TIMES DAILY
Qty: 40 CAPSULE | Refills: 0 | Status: SHIPPED | OUTPATIENT
Start: 2019-06-18 | End: 2019-06-28

## 2019-06-18 RX ORDER — CLARITHROMYCIN 500 MG/1
500 TABLET, COATED ORAL 2 TIMES DAILY
Qty: 20 TABLET | Refills: 0 | Status: SHIPPED | OUTPATIENT
Start: 2019-06-18 | End: 2019-06-28

## 2019-06-19 NOTE — PROGRESS NOTES
Weight Loss Surgery  Presurgical Nutrition Assessment     Shelley Graff  2019  [id number]  [id number]  1990  female    Surgery desired: Sleeve Gastrectomy    Weight: 95.5 kg (210 lb 8 oz)   Body mass index is 35.03 kg/m².  Past Medical History:   Diagnosis Date   • Anxiety    • Chronic joint pain     denies NSAIDS/steroids   • DM2 (diabetes mellitus, type 2) (CMS/HCC)     w/ hx gestational diabetes , dx May 2017, started on insulin when initially dx, lost weight w/ Adipex, stopped insulin 6 months after dx, but now w/ weight regain, back on insulin.  Managed by PCP   • Dyspepsia    • Dyspnea on exertion    • Fatigue    • Fatty liver     w/ abnormal LFTs, US 2018   • Gestational diabetes    • H. pylori infection     UBT (+) 19 - PrevPak RX   • Obesity (BMI 30-39.9)    • Pap smear for cervical cancer screening 2017     Past Surgical History:   Procedure Laterality Date   • CERVICAL CONE BIOPSY      benign   •  SECTION  08, 5/15/13, 11/6/15    x3      No Known Allergies    Current Outpatient Medications:   •  amoxicillin (AMOXIL) 500 MG capsule, Take 2 capsules by mouth 2 (Two) Times a Day for 10 days., Disp: 40 capsule, Rfl: 0  •  betamethasone valerate (VALISONE) 0.1 % ointment, Apply  topically to the appropriate area as directed Daily., Disp: 30 g, Rfl: 3  •  clarithromycin (BIAXIN) 500 MG tablet, Take 1 tablet by mouth 2 (Two) Times a Day for 10 days., Disp: 20 tablet, Rfl: 0  •  Empagliflozin 10 MG tablet, Take 1 tablet by mouth Daily., Disp: 30 tablet, Rfl: 3  •  glucose blood test strip, Used to check blood sugar 3-4 daily for diabetes E11.9., Disp: 125 each, Rfl: 12  •  glucose monitoring kit (FREESTYLE) monitoring kit, Use daily to test blood sugar daily for Diabetes E11.9, Disp: 1 each, Rfl: 0  •  Insulin Glargine (BASAGLAR KWIKPEN) 100 UNIT/ML injection pen, Inject 10 Units under the skin into the appropriate area as directed Every Night., Disp: 3 mL,  Rfl: 2  •  Insulin Pen Needle (PEN NEEDLES) 31G X 8 MM misc, Use daily for insulin, Disp: 50 each, Rfl: 2  •  ketoconazole (NIZORAL) 2 % cream, Apply  topically to the appropriate area as directed Every 12 (Twelve) Hours., Disp: 30 g, Rfl: 0  •  Lancets (FREESTYLE) lancets, Use to check blood sugar for Diabetes E11.9., Disp: 100 each, Rfl: 12  •  levonorgestrel (MIRENA) 20 MCG/24HR IUD, 1 each by Intrauterine route 1 (One) Time., Disp: , Rfl:   •  loratadine (CLARITIN) 10 MG tablet, Take 1 tablet by mouth Daily., Disp: 30 tablet, Rfl: 3  •  metFORMIN (GLUCOPHAGE) 1000 MG tablet, Take 1 tablet by mouth 2 (Two) Times a Day With Meals., Disp: 60 tablet, Rfl: 3  •  omeprazole (priLOSEC) 20 MG capsule, Take 1 capsule by mouth 2 (Two) Times a Day for 10 days., Disp: 20 capsule, Rfl: 0  •  phentermine (ADIPEX-P) 37.5 MG tablet, Take 37.5 mg by mouth Every Morning Before Breakfast., Disp: , Rfl:   •  sertraline (ZOLOFT) 25 MG tablet, Take 1 tablet by mouth Daily., Disp: 30 tablet, Rfl: 3  •  SITagliptin (JANUVIA) 100 MG tablet, Take 1 tablet by mouth Daily., Disp: 30 tablet, Rfl: 3      Nutrition Assessment    Estimated energy needs: 1850    Estimated calories for weight loss: 1500    IBW (Pounds):  145      Excess body weight (Pounds): 65       Nutrition Recall  24 Hour recall: (B) (L) (D) -  Reviewed and discussed with patient       Exercise  never      Education    Provided manual:    Sleeve Gastrectomy    Provided follow-up options for support, including contact information for dietitians here, if desired.  Web-based support information and apps for smart phones and computers given.  Noted that monthly support group is offered at this clinic, and that support is associated with weight loss.    Recommend that team proceed with surgery and follow per protocol.      Nutrition Goals   Dietary Guidelines per manual  Protein goal:  grams per day     Exercise Goals  Add 15-30 minutes of activity per day as  tolerated        Victoria Tristan RD  06/14/2019  11:28 AM

## 2019-06-27 ENCOUNTER — LAB REQUISITION (OUTPATIENT)
Dept: LAB | Facility: HOSPITAL | Age: 29
End: 2019-06-27

## 2019-06-27 DIAGNOSIS — K30 FUNCTIONAL DYSPEPSIA: ICD-10-CM

## 2019-06-27 PROCEDURE — 88305 TISSUE EXAM BY PATHOLOGIST: CPT | Performed by: SURGERY

## 2019-06-27 PROCEDURE — 88312 SPECIAL STAINS GROUP 1: CPT | Performed by: SURGERY

## 2019-06-27 PROCEDURE — 88342 IMHCHEM/IMCYTCHM 1ST ANTB: CPT | Performed by: SURGERY

## 2019-07-01 LAB
CYTO UR: NORMAL
LAB AP CASE REPORT: NORMAL
LAB AP CLINICAL INFORMATION: NORMAL
PATH REPORT.FINAL DX SPEC: NORMAL
PATH REPORT.GROSS SPEC: NORMAL

## 2019-07-02 RX ORDER — OMEPRAZOLE 20 MG/1
CAPSULE, DELAYED RELEASE ORAL
Qty: 20 CAPSULE | Refills: 0 | OUTPATIENT
Start: 2019-07-02

## 2019-07-15 ENCOUNTER — OFFICE VISIT (OUTPATIENT)
Dept: INTERNAL MEDICINE | Facility: CLINIC | Age: 29
End: 2019-07-15

## 2019-07-15 VITALS
HEIGHT: 65 IN | TEMPERATURE: 98.7 F | OXYGEN SATURATION: 98 % | DIASTOLIC BLOOD PRESSURE: 70 MMHG | WEIGHT: 219.8 LBS | BODY MASS INDEX: 36.62 KG/M2 | SYSTOLIC BLOOD PRESSURE: 116 MMHG | HEART RATE: 96 BPM

## 2019-07-15 DIAGNOSIS — E11.65 TYPE 2 DIABETES MELLITUS WITH HYPERGLYCEMIA, WITH LONG-TERM CURRENT USE OF INSULIN (HCC): Primary | ICD-10-CM

## 2019-07-15 DIAGNOSIS — E66.9 OBESITY, CLASS II, BMI 35-39.9: ICD-10-CM

## 2019-07-15 DIAGNOSIS — Z79.4 TYPE 2 DIABETES MELLITUS WITH HYPERGLYCEMIA, WITH LONG-TERM CURRENT USE OF INSULIN (HCC): Primary | ICD-10-CM

## 2019-07-15 DIAGNOSIS — F41.9 ANXIETY: ICD-10-CM

## 2019-07-15 DIAGNOSIS — J30.9 ALLERGIC RHINITIS, UNSPECIFIED SEASONALITY, UNSPECIFIED TRIGGER: ICD-10-CM

## 2019-07-15 LAB
ALBUMIN UR-MCNC: 4.1 MG/L
CREAT UR-MCNC: 88.2 MG/DL
HBA1C MFR BLD: 7.7 %
MICROALBUMIN/CREAT UR: 46.5 MG/G

## 2019-07-15 PROCEDURE — 82043 UR ALBUMIN QUANTITATIVE: CPT | Performed by: FAMILY MEDICINE

## 2019-07-15 PROCEDURE — 93000 ELECTROCARDIOGRAM COMPLETE: CPT | Performed by: FAMILY MEDICINE

## 2019-07-15 PROCEDURE — 83036 HEMOGLOBIN GLYCOSYLATED A1C: CPT | Performed by: FAMILY MEDICINE

## 2019-07-15 PROCEDURE — 82570 ASSAY OF URINE CREATININE: CPT | Performed by: FAMILY MEDICINE

## 2019-07-15 PROCEDURE — 99213 OFFICE O/P EST LOW 20 MIN: CPT | Performed by: FAMILY MEDICINE

## 2019-07-15 RX ORDER — SERTRALINE HYDROCHLORIDE 25 MG/1
25 TABLET, FILM COATED ORAL DAILY
Qty: 30 TABLET | Refills: 3 | Status: SHIPPED | OUTPATIENT
Start: 2019-07-15 | End: 2019-10-15 | Stop reason: SDUPTHER

## 2019-07-15 RX ORDER — INSULIN GLARGINE 100 [IU]/ML
10 INJECTION, SOLUTION SUBCUTANEOUS NIGHTLY
Qty: 3 ML | Refills: 2 | Status: SHIPPED | OUTPATIENT
Start: 2019-07-15 | End: 2019-09-12 | Stop reason: SDUPTHER

## 2019-07-15 RX ORDER — LORATADINE 10 MG/1
10 TABLET ORAL DAILY
Qty: 30 TABLET | Refills: 3 | Status: SHIPPED | OUTPATIENT
Start: 2019-07-15 | End: 2019-10-15

## 2019-07-15 NOTE — PROGRESS NOTES
"Subjective   Shelley Graff is a 29 y.o. female.     Chief Complaint   Patient presents with   • Diabetes     f/u   • Anxiety       Visit Vitals  /70 (BP Location: Right arm, Patient Position: Sitting, Cuff Size: Large Adult)   Pulse 96   Temp 98.7 °F (37.1 °C)   Ht 165.1 cm (65\")   Wt 99.7 kg (219 lb 12.8 oz)   SpO2 98%   BMI 36.58 kg/m²         Diabetes   She presents for her follow-up diabetic visit. Her disease course has been stable. Pertinent negatives for hypoglycemia include no confusion, dizziness, headaches or nervousness/anxiousness. Pertinent negatives for diabetes include no blurred vision, no chest pain, no fatigue, no foot paresthesias, no foot ulcerations, no polydipsia, no polyphagia, no polyuria, no visual change, no weakness and no weight loss. There are no hypoglycemic complications. Symptoms are worsening. Pertinent negatives for diabetic complications include no CVA, heart disease, impotence, nephropathy, peripheral neuropathy, PVD or retinopathy. Risk factors for coronary artery disease include family history. Current diabetic treatment includes insulin injections and oral agent (triple therapy). She is compliant with treatment some of the time. Her weight is increasing steadily. She is following a generally healthy diet. Meal planning includes avoidance of concentrated sweets. She participates in exercise daily. There is no change in her home blood glucose trend. Her breakfast blood glucose range is generally  mg/dl. She does not see a podiatrist.Eye exam is not current.   Anxiety   Presents for follow-up visit. Patient reports no chest pain, compulsions, confusion, decreased concentration, depressed mood, dizziness, dry mouth, excessive worry, feeling of choking, hyperventilation, impotence, insomnia, irritability, malaise, muscle tension, nausea, nervous/anxious behavior, obsessions, palpitations, panic, restlessness, shortness of breath or suicidal ideas. Symptoms " occur rarely. The severity of symptoms is mild. The quality of sleep is good. Nighttime awakenings: none.     Compliance with medications is %.      Pt had EGD and had H pylori that was treated.  Pt has seen the Nutritionist and was advised for diet change. Discussed less carbs and more protein in preparation for gastric sleeve.     Pt has paperwork and the dates from when she was on phentermine needs to be added.  Pt needs EKG.   Pt had pap in .   The following portions of the patient's history were reviewed and updated as appropriate: allergies, current medications, past family history, past medical history, past social history, past surgical history and problem list.    Past Medical History:   Diagnosis Date   • Anxiety    • Chronic joint pain     denies NSAIDS/steroids   • DM2 (diabetes mellitus, type 2) (CMS/HCC)     w/ hx gestational diabetes , dx May 2017, started on insulin when initially dx, lost weight w/ Adipex, stopped insulin 6 months after dx, but now w/ weight regain, back on insulin.  Managed by PCP   • Dyspepsia    • Dyspnea on exertion    • Fatigue    • Fatty liver     w/ abnormal LFTs, US 2018   • Gestational diabetes    • H. pylori infection     UBT (+) 19 - PrevPak RX   • Obesity (BMI 30-39.9)    • Pap smear for cervical cancer screening 2017      Past Surgical History:   Procedure Laterality Date   • CERVICAL CONE BIOPSY      benign   •  SECTION  08, 5/15/13, 11/6/15    x3    • UPPER GASTROINTESTINAL ENDOSCOPY  2019    Dr VIVIENNE Whatley      Family History   Problem Relation Age of Onset   • Stroke Mother    • Heart disease Mother    • Diabetes Mother    • Obesity Mother    • Hypertension Mother    • Diabetes Father    • Hypertension Father       Social History     Socioeconomic History   • Marital status:      Spouse name: Not on file   • Number of children: Not on file   • Years of education: Not on file   • Highest education level: Not on  file   Tobacco Use   • Smoking status: Former Smoker     Packs/day: 2.00     Years: 2.00     Pack years: 4.00     Last attempt to quit: 2012     Years since quittin.0   • Smokeless tobacco: Never Used   Substance and Sexual Activity   • Alcohol use: No   • Drug use: No   • Sexual activity: Yes     Partners: Male     Birth control/protection: IUD     Comment:    Social History Narrative    Living in [place], [place] w/  and children.   @ [employer] in [facility] [employer].        No Known Allergies    Review of Systems   Constitutional: Negative.  Negative for chills, diaphoresis, fatigue, fever, irritability and weight loss.   HENT: Negative.  Negative for ear pain, nosebleeds, postnasal drip, rhinorrhea, sinus pressure, sneezing and sore throat.    Eyes: Negative.  Negative for blurred vision, redness and itching.   Respiratory: Negative.  Negative for cough, shortness of breath and wheezing.    Cardiovascular: Negative.  Negative for chest pain and palpitations.   Gastrointestinal: Negative.  Negative for abdominal pain, constipation, diarrhea, nausea and vomiting.   Endocrine: Negative.  Negative for cold intolerance, heat intolerance, polydipsia, polyphagia and polyuria.   Genitourinary: Negative.  Negative for dysuria, frequency, hematuria, impotence and urgency.   Musculoskeletal: Positive for arthralgias (knees, hips and ankles). Negative for back pain and neck pain.   Skin: Negative.  Negative for color change and rash.   Allergic/Immunologic: Negative.  Negative for environmental allergies.   Neurological: Negative.  Negative for dizziness, syncope, weakness, light-headedness and headaches.   Hematological: Negative.  Negative for adenopathy. Does not bruise/bleed easily.   Psychiatric/Behavioral: Negative.  Negative for confusion, decreased concentration, dysphoric mood and suicidal ideas. The patient is not nervous/anxious and does not have insomnia.        Objective    Physical Exam   Constitutional: She is oriented to person, place, and time. She appears well-developed.   HENT:   Head: Normocephalic.   Right Ear: External ear normal.   Left Ear: External ear normal.   Nose: Nose normal.   Eyes: Conjunctivae, EOM and lids are normal. Pupils are equal, round, and reactive to light.   Neck: Trachea normal and normal range of motion. Neck supple. Carotid bruit is not present. No thyroid mass and no thyromegaly present.   Cardiovascular: Normal rate and regular rhythm.   No murmur heard.  Pulmonary/Chest: Effort normal and breath sounds normal. No respiratory distress. She has no decreased breath sounds. She has no wheezes. She has no rhonchi. She has no rales. She exhibits no tenderness.   Abdominal: Soft. Bowel sounds are normal. There is no tenderness.   Musculoskeletal: Normal range of motion.   Neurological: She is alert and oriented to person, place, and time.   Skin: Skin is warm and dry.   Psychiatric: She has a normal mood and affect. Her behavior is normal.   Nursing note and vitals reviewed.      Assessment/Plan   Shelley was seen today for diabetes and anxiety.    Diagnoses and all orders for this visit:    Type 2 diabetes mellitus with hyperglycemia, with long-term current use of insulin (CMS/[facility])  -     POC Glycosylated Hemoglobin (Hb A1C)  -     Microalbumin / Creatinine Urine Ratio - Urine, Clean Catch  -     Empagliflozin (JARDIANCE) 25 MG tablet; Take 25 mg by mouth Daily.  -     metFORMIN (GLUCOPHAGE) 1000 MG tablet; Take 1 tablet by mouth 2 (Two) Times a Day With Meals.  -     Insulin Glargine (BASAGLAR KWIKPEN) 100 UNIT/ML injection pen; Inject 10 Units under the skin into the appropriate area as directed Every Night.  -     SITagliptin (JANUVIA) 100 MG tablet; Take 1 tablet by mouth Daily.    Obesity, Class II, BMI 35-39.9  -     ECG 12 Lead    Allergic rhinitis, unspecified seasonality, unspecified trigger  -     loratadine (CLARITIN) 10 MG tablet; Take 1  tablet by mouth Daily.    Anxiety  -     sertraline (ZOLOFT) 25 MG tablet; Take 1 tablet by mouth Daily.        Increase Jardiance to 25 mg per day           Current Outpatient Medications:   •  betamethasone valerate (VALISONE) 0.1 % ointment, Apply  topically to the appropriate area as directed Daily., Disp: 30 g, Rfl: 3  •  glucose blood test strip, Used to check blood sugar 3-4 daily for diabetes E11.9., Disp: 125 each, Rfl: 12  •  glucose monitoring kit (FREESTYLE) monitoring kit, Use daily to test blood sugar daily for Diabetes E11.9, Disp: 1 each, Rfl: 0  •  Insulin Glargine (BASAGLAR KWIKPEN) 100 UNIT/ML injection pen, Inject 10 Units under the skin into the appropriate area as directed Every Night., Disp: 3 mL, Rfl: 2  •  Insulin Pen Needle (PEN NEEDLES) 31G X 8 MM misc, Use daily for insulin, Disp: 50 each, Rfl: 2  •  ketoconazole (NIZORAL) 2 % cream, Apply  topically to the appropriate area as directed Every 12 (Twelve) Hours., Disp: 30 g, Rfl: 0  •  Lancets (FREESTYLE) lancets, Use to check blood sugar for Diabetes E11.9., Disp: 100 each, Rfl: 12  •  levonorgestrel (MIRENA) 20 MCG/24HR IUD, 1 each by Intrauterine route 1 (One) Time., Disp: , Rfl:   •  loratadine (CLARITIN) 10 MG tablet, Take 1 tablet by mouth Daily., Disp: 30 tablet, Rfl: 3  •  metFORMIN (GLUCOPHAGE) 1000 MG tablet, Take 1 tablet by mouth 2 (Two) Times a Day With Meals., Disp: 60 tablet, Rfl: 3  •  sertraline (ZOLOFT) 25 MG tablet, Take 1 tablet by mouth Daily., Disp: 30 tablet, Rfl: 3  •  SITagliptin (JANUVIA) 100 MG tablet, Take 1 tablet by mouth Daily., Disp: 30 tablet, Rfl: 3  •  Empagliflozin (JARDIANCE) 25 MG tablet, Take 25 mg by mouth Daily., Disp: 30 tablet, Rfl: 3    Return in about 3 months (around 10/15/2019), or if symptoms worsen or fail to improve, for Recheck.    Recent Results (from the past 168 hour(s))   POC Glycosylated Hemoglobin (Hb A1C)    Collection Time: 07/15/19 10:21 AM   Result Value Ref Range    Hemoglobin  A1C 7.7 %       Hemoglobin A1C   Date Value Ref Range Status   07/15/2019 7.7 % Final   02/11/2019 7.6 % Final   11/15/2018 6.70 (H) 4.80 - 5.60 % Final   08/16/2018 6.5 % Final   08/30/2017 6.30 (H) 4.80 - 5.60 % Final        ECG 12 Lead  Date/Time: 7/15/2019 10:26 AM  Performed by: Amee Larson MD  Authorized by: Amee Larson MD   Comparison: not compared with previous ECG   Previous ECG: no previous ECG available  Rhythm: sinus rhythm  Ectopy comments: occasional supraventricular premature complexes  Rate: normal  BPM: 82  Conduction: conduction normal  ST Segments: ST segments normal  T Waves: T waves normal  QRS axis: normal (P, R, T: 48, 56, 35)  Other: no other findings    Clinical impression: non-specific ECG  Comments: LA int 133 ms  QRS dur 92 ms  QT/QTc 361/399 ms

## 2019-07-18 ENCOUNTER — TELEPHONE (OUTPATIENT)
Dept: INTERNAL MEDICINE | Facility: CLINIC | Age: 29
End: 2019-07-18

## 2019-07-18 DIAGNOSIS — Z01.818 PREOPERATIVE CLEARANCE: Primary | ICD-10-CM

## 2019-07-18 NOTE — TELEPHONE ENCOUNTER
She needs both a pulmonologist and cardiologist to have clearance. Will you put in referrals please, thanks.

## 2019-07-23 ENCOUNTER — TELEPHONE (OUTPATIENT)
Dept: INTERNAL MEDICINE | Facility: CLINIC | Age: 29
End: 2019-07-23

## 2019-07-30 DIAGNOSIS — R10.13 DYSPEPSIA: Primary | ICD-10-CM

## 2019-08-15 ENCOUNTER — CONSULT (OUTPATIENT)
Dept: CARDIOLOGY | Facility: CLINIC | Age: 29
End: 2019-08-15

## 2019-08-15 VITALS
HEIGHT: 65 IN | HEART RATE: 123 BPM | OXYGEN SATURATION: 98 % | SYSTOLIC BLOOD PRESSURE: 110 MMHG | WEIGHT: 214 LBS | DIASTOLIC BLOOD PRESSURE: 60 MMHG | BODY MASS INDEX: 35.65 KG/M2

## 2019-08-15 DIAGNOSIS — Z01.810 PREOP CARDIOVASCULAR EXAM: Primary | ICD-10-CM

## 2019-08-15 DIAGNOSIS — E78.5 DYSLIPIDEMIA: ICD-10-CM

## 2019-08-15 DIAGNOSIS — E11.65 TYPE 2 DIABETES MELLITUS WITH HYPERGLYCEMIA, WITH LONG-TERM CURRENT USE OF INSULIN (HCC): ICD-10-CM

## 2019-08-15 DIAGNOSIS — Z79.4 TYPE 2 DIABETES MELLITUS WITH HYPERGLYCEMIA, WITH LONG-TERM CURRENT USE OF INSULIN (HCC): ICD-10-CM

## 2019-08-15 PROCEDURE — 99243 OFF/OP CNSLTJ NEW/EST LOW 30: CPT | Performed by: INTERNAL MEDICINE

## 2019-08-15 PROCEDURE — 93000 ELECTROCARDIOGRAM COMPLETE: CPT | Performed by: INTERNAL MEDICINE

## 2019-08-15 NOTE — PROGRESS NOTES
[facility] at [facility]  Consultation H&P  Shelley LOVE Lo Dajuan  1990  [address] [address]     VISIT DATE:  08/15/19    PCP: Amee Larson MD  [address] [facility] [address]  [facility] [address]    IDENTIFICATION: A 29 y.o. female     CC:  Chief Complaint   Patient presents with   • surgery clearance        PROBLEM LIST:  1. HLD  1. 4/15/2019   HDL 56   2. T2DM  1. Hx gestational diabetes   2. Dx , started on insulin, weight loss with Adipex stopped insulin, weight gain and back on insulin  3. 7/15/2019 A1c 7.7  3. Obesity  4. Remote brief tobacco abuse  1. Smoked for 2 years  5. Fatty liver  6. Chronic joint pain  7. Fatigue  8. Dyspepsia  9. Anxiety  10. Surgical history:  1. Cervical cone biopsy   2.  x3 , ,     Allergies  No Known Allergies    Current Medications    Current Outpatient Medications:   •  Empagliflozin (JARDIANCE) 25 MG tablet, Take 25 mg by mouth Daily., Disp: 30 tablet, Rfl: 3  •  glucose blood test strip, Used to check blood sugar 3-4 daily for diabetes E11.9., Disp: 125 each, Rfl: 12  •  glucose monitoring kit (FREESTYLE) monitoring kit, Use daily to test blood sugar daily for Diabetes E11.9, Disp: 1 each, Rfl: 0  •  Insulin Glargine (BASAGLAR KWIKPEN) 100 UNIT/ML injection pen, Inject 10 Units under the skin into the appropriate area as directed Every Night., Disp: 3 mL, Rfl: 2  •  Insulin Pen Needle (PEN NEEDLES) 31G X 8 MM misc, Use daily for insulin, Disp: 50 each, Rfl: 2  •  ketoconazole (NIZORAL) 2 % cream, Apply  topically to the appropriate area as directed Every 12 (Twelve) Hours. (Patient taking differently: Apply  topically to the appropriate area as directed As Needed.), Disp: 30 g, Rfl: 0  •  Lancets (FREESTYLE) lancets, Use to check blood sugar for Diabetes E11.9., Disp: 100 each, Rfl: 12  •  levonorgestrel (MIRENA) 20 MCG/24HR IUD, 1 each by Intrauterine route 1  (One) Time., Disp: , Rfl:   •  loratadine (CLARITIN) 10 MG tablet, Take 1 tablet by mouth Daily. (Patient taking differently: Take 10 mg by mouth As Needed.), Disp: 30 tablet, Rfl: 3  •  metFORMIN (GLUCOPHAGE) 1000 MG tablet, Take 1 tablet by mouth 2 (Two) Times a Day With Meals., Disp: 60 tablet, Rfl: 3  •  sertraline (ZOLOFT) 25 MG tablet, Take 1 tablet by mouth Daily., Disp: 30 tablet, Rfl: 3  •  SITagliptin (JANUVIA) 100 MG tablet, Take 1 tablet by mouth Daily., Disp: 30 tablet, Rfl: 3  •  VALACYCLOVIR HCL PO, Take 1 tablet by mouth Daily., Disp: , Rfl:      History of Present Illness   HPI  Shelley Graff is a 29 y.o. year old female with the above mentioned PMH who presents for consult from PCP Dr. Larson for evaluation of cardiac clearance for sleeve gastrectomy.    Pt denies any chest pain, dyspnea at rest, dyspnea on exertion, orthopnea, PND, palpitations, lower extremity edema, or claudication. Is currently following the keto diet and has lost 6 lbs. She does not get regular formal exercise, occasionally walks or goes roller skating. She smoked for ~2 years, and quit in 2012. Was dx w DM in 2017, has been on and off insulin, had lost weight w adipex previously but gained it back. She reports she saw a cardiologist in the past to get clearance to take adipex. She did not have any testing. HR is up, she reports she had just climbed 2 flights of stairs quickly.     Pt denies history of CHF, DVT, PE, MI, CVA, TIA, or rheumatic fever.     Her mother passed in her 50s from MI. She was a heavy smoker and diabetic.     ROS  Review of Systems   Constitution: Positive for weight loss.   Psychiatric/Behavioral: The patient is nervous/anxious.    All other systems reviewed and are negative.      SOCIAL HX  Social History     Socioeconomic History   • Marital status:      Spouse name: Not on file   • Number of children: Not on file   • Years of education: Not on file   • Highest education level: Not  "on file   Tobacco Use   • Smoking status: Former Smoker     Packs/day: 2.00     Years: 2.00     Pack years: 4.00     Last attempt to quit: 2012     Years since quittin.1   • Smokeless tobacco: Never Used   Substance and Sexual Activity   • Alcohol use: No   • Drug use: No   • Sexual activity: Yes     Partners: Male     Birth control/protection: IUD     Comment:    Social History Narrative    Living in [place], [place] w/  and children.   @ [employer] in [employer].         FAMILY HX  Family History   Problem Relation Age of Onset   • Stroke Mother    • Heart disease Mother    • Diabetes Mother    • Obesity Mother    • Hypertension Mother    • Diabetes Father    • Hypertension Father        Vitals:    08/15/19 1447   BP: 110/60   BP Location: Right arm   Patient Position: Sitting   Pulse: (!) 123   SpO2: 98%   Weight: 97.1 kg (214 lb)   Height: 165.1 cm (65\")       PHYSICAL EXAMINATION:  Physical Exam   Constitutional: She is oriented to person, place, and time. She appears well-developed and well-nourished. No distress.   obese   HENT:   Head: Normocephalic and atraumatic.   Right Ear: External ear normal.   Left Ear: External ear normal.   Nose: Nose normal.   Eyes: Conjunctivae and EOM are normal.   Neck: Neck supple. No hepatojugular reflux and no JVD present. Carotid bruit is not present. No thyromegaly present.   Cardiovascular: Regular rhythm, S1 normal, S2 normal, normal heart sounds, intact distal pulses and normal pulses. Tachycardia present. Exam reveals no gallop, no distant heart sounds and no midsystolic click.   No murmur heard.  Pulses:       Radial pulses are 2+ on the right side, and 2+ on the left side.        Dorsalis pedis pulses are 2+ on the right side, and 2+ on the left side.        Posterior tibial pulses are 2+ on the right side, and 2+ on the left side.   Pulmonary/Chest: Effort normal and breath sounds normal. No respiratory distress. She has " no decreased breath sounds. She has no wheezes. She has no rhonchi. She has no rales.   Abdominal: Soft. Bowel sounds are normal. There is no hepatosplenomegaly. There is no tenderness.   Musculoskeletal: Normal range of motion. She exhibits no edema.   Neurological: She is alert and oriented to person, place, and time.   No focal deficits.   Skin: Skin is warm and dry. No erythema.   tattoos   Psychiatric: She has a normal mood and affect. Thought content normal.   Nursing note and vitals reviewed.      Diagnostic Data:    ECG 12 Lead  Date/Time: 8/15/2019 3:19 PM  Performed by: Terell Curry MD  Authorized by: Terell Curry MD   Comparison: compared with previous ECG from 7/16/2019  Similar to previous ECG  Rhythm: sinus tachycardia  BPM: 116    Clinical impression: abnormal EKG  Comments: Normal except for rate          Lab Results   Component Value Date    TRIG 217 (H) 04/15/2019    HDL 56 04/15/2019     Lab Results   Component Value Date    GLUCOSE 176 (H) 04/15/2019    BUN 7 04/15/2019    CREATININE 0.68 04/15/2019     04/15/2019    K 4.3 04/15/2019     04/15/2019    CO2 21.0 (L) 04/15/2019     Lab Results   Component Value Date    HGBA1C 7.7 07/15/2019     Lab Results   Component Value Date    WBC 9.69 11/15/2018    HGB 16.0 (H) 11/15/2018    HCT 45.6 (H) 11/15/2018     11/15/2018       ASSESSMENT:   Diagnosis Plan   1. Preop cardiovascular exam     2. Dyslipidemia     3. Type 2 diabetes mellitus with hyperglycemia, with long-term current use of insulin (CMS/[facility])       PLAN:  1. Pt is acceptable cardiac risk for sleeve gastrectomy   2.  not at goal <70. Pt would qualify for statin therapy as a diabetic. Counseled on plaque rupture benefits of statin therapy. Would expect some improvement in both lipids and insulin resistance w weight loss. Will defer to PCP to reevaluate with weight loss.  Continue Jardiance, patient counseled on cardiovascular benefit.  3. Patient counseled  that cardiac risk with diabetes increases with hemaglobin a1c >7. Counseled to avoid starch rich foods such as bread, pasta, potatoes, and sweets, and to avoid drinking sugary beverages.   4. Sinus tachycardia, pt tolerates exercise. Would expect some lowering of HR following weight loss w increased exercise capacity.       Scribed for Terell Curry MD by Tory Ruiz PA-C. 8/15/2019  3:12 PM   Terell Curry MD, [facility]  I, Terell Curry MD, personally performed the services described in this documentation as scribed by the above named individual in my presence, and it is both accurate and complete.  8/15/2019  3:31 PM

## 2019-08-20 ENCOUNTER — OFFICE VISIT (OUTPATIENT)
Dept: PSYCHIATRY | Facility: CLINIC | Age: 29
End: 2019-08-20

## 2019-08-20 ENCOUNTER — OFFICE VISIT (OUTPATIENT)
Dept: PULMONOLOGY | Facility: CLINIC | Age: 29
End: 2019-08-20

## 2019-08-20 VITALS
BODY MASS INDEX: 35.82 KG/M2 | DIASTOLIC BLOOD PRESSURE: 82 MMHG | HEIGHT: 65 IN | TEMPERATURE: 97.9 F | SYSTOLIC BLOOD PRESSURE: 134 MMHG | WEIGHT: 215 LBS | OXYGEN SATURATION: 97 % | HEART RATE: 90 BPM

## 2019-08-20 DIAGNOSIS — F41.8 SITUATIONAL ANXIETY: ICD-10-CM

## 2019-08-20 DIAGNOSIS — Z01.818 PRE-OPERATIVE CLEARANCE: Primary | ICD-10-CM

## 2019-08-20 DIAGNOSIS — E11.65 TYPE 2 DIABETES MELLITUS WITH HYPERGLYCEMIA, WITH LONG-TERM CURRENT USE OF INSULIN (HCC): ICD-10-CM

## 2019-08-20 DIAGNOSIS — E66.9 OBESITY, CLASS II, BMI 35-39.9: Primary | ICD-10-CM

## 2019-08-20 DIAGNOSIS — Z79.4 TYPE 2 DIABETES MELLITUS WITH HYPERGLYCEMIA, WITH LONG-TERM CURRENT USE OF INSULIN (HCC): ICD-10-CM

## 2019-08-20 DIAGNOSIS — E66.9 OBESITY, CLASS II, BMI 35-39.9: ICD-10-CM

## 2019-08-20 DIAGNOSIS — R06.09 DYSPNEA ON EXERTION: ICD-10-CM

## 2019-08-20 PROCEDURE — 99213 OFFICE O/P EST LOW 20 MIN: CPT | Performed by: NURSE PRACTITIONER

## 2019-08-20 PROCEDURE — 90791 PSYCH DIAGNOSTIC EVALUATION: CPT | Performed by: PSYCHOLOGIST

## 2019-08-20 PROCEDURE — 94726 PLETHYSMOGRAPHY LUNG VOLUMES: CPT | Performed by: NURSE PRACTITIONER

## 2019-08-20 PROCEDURE — 94375 RESPIRATORY FLOW VOLUME LOOP: CPT | Performed by: NURSE PRACTITIONER

## 2019-08-20 PROCEDURE — 94729 DIFFUSING CAPACITY: CPT | Performed by: NURSE PRACTITIONER

## 2019-08-20 NOTE — PROGRESS NOTES
[facility]    CHIEF COMPLAINT    Preoperative evaluation     Refered by:  Amee Larson MD        HISTORY OF PRESENT ILLNESS    Shelley Graff is a 29 y.o.female here today for preoperative evaluation for weight loss surgery.  She is following bariatric surgery for sleeve gastrectomy with Dr. Whatley.    She denies any history of pulmonary disease, chronic bronchitis, sleep apnea.  She has no witnessed snoring or sleep apnea risk factors.  She did tolerate her EGD without complication or desaturation.    She denies any cough or sputum production.  No chest tightness or wheezing.  Mild dyspnea with activity secondary to her weight and deconditioning.    She has a remote history of tobacco abuse only a couple years when she was in her 20s.      Patient Active Problem List   Diagnosis   • Polydipsia   • Personal history of gestational diabetes   • Type 2 diabetes mellitus with hyperglycemia, with long-term current use of insulin (CMS/[facility])   • Anxiety   • Fatigue   • Dyspepsia   • Dyspnea on exertion   • Fatty liver   • Chronic joint pain   • Obesity, Class II, BMI 35-39.9   • H. pylori infection       No Known Allergies    Current Outpatient Medications:   •  Empagliflozin (JARDIANCE) 25 MG tablet, Take 25 mg by mouth Daily., Disp: 30 tablet, Rfl: 3  •  glucose blood test strip, Used to check blood sugar 3-4 daily for diabetes E11.9., Disp: 125 each, Rfl: 12  •  glucose monitoring kit (FREESTYLE) monitoring kit, Use daily to test blood sugar daily for Diabetes E11.9, Disp: 1 each, Rfl: 0  •  Insulin Glargine (BASAGLAR KWIKPEN) 100 UNIT/ML injection pen, Inject 10 Units under the skin into the appropriate area as directed Every Night., Disp: 3 mL, Rfl: 2  •  Insulin Pen Needle (PEN NEEDLES) 31G X 8 MM misc, Use daily for insulin, Disp: 50 each, Rfl: 2  •  ketoconazole (NIZORAL) 2 % cream, Apply  topically to the appropriate area as directed Every 12 (Twelve) Hours. (Patient taking  differently: Apply  topically to the appropriate area as directed As Needed.), Disp: 30 g, Rfl: 0  •  Lancets (FREESTYLE) lancets, Use to check blood sugar for Diabetes E11.9., Disp: 100 each, Rfl: 12  •  levonorgestrel (MIRENA) 20 MCG/24HR IUD, 1 each by Intrauterine route 1 (One) Time., Disp: , Rfl:   •  loratadine (CLARITIN) 10 MG tablet, Take 1 tablet by mouth Daily. (Patient taking differently: Take 10 mg by mouth As Needed.), Disp: 30 tablet, Rfl: 3  •  metFORMIN (GLUCOPHAGE) 1000 MG tablet, Take 1 tablet by mouth 2 (Two) Times a Day With Meals., Disp: 60 tablet, Rfl: 3  •  sertraline (ZOLOFT) 25 MG tablet, Take 1 tablet by mouth Daily., Disp: 30 tablet, Rfl: 3  •  SITagliptin (JANUVIA) 100 MG tablet, Take 1 tablet by mouth Daily., Disp: 30 tablet, Rfl: 3  •  VALACYCLOVIR HCL PO, Take 1 tablet by mouth Daily., Disp: , Rfl:     MEDICATION LIST AND ALLERGIES REVIEWED.    Social History     Tobacco Use   • Smoking status: Former Smoker     Packs/day: 2.00     Years: 2.00     Pack years: 4.00     Last attempt to quit: 2012     Years since quittin.1   • Smokeless tobacco: Never Used   Substance Use Topics   • Alcohol use: No   • Drug use: No       FAMILY AND SOCIAL HISTORY REVIEWED AND UPDATED IN EPIC    Review of Systems   Constitutional: Negative for chills, fatigue, fever and unexpected weight change.   HENT: Negative for congestion, nosebleeds, postnasal drip, rhinorrhea, sinus pressure and trouble swallowing.    Respiratory: Negative for cough, chest tightness, shortness of breath and wheezing.    Cardiovascular: Negative for chest pain and leg swelling.   Gastrointestinal: Negative for abdominal pain, constipation, diarrhea, nausea and vomiting.   Genitourinary: Negative for dysuria, frequency, hematuria and urgency.   Musculoskeletal: Negative for myalgias.   Neurological: Negative for dizziness, weakness, numbness and headaches.   All other systems reviewed and are negative.  .    /82   Pulse  "90   Temp 97.9 °F (36.6 °C)   Ht 165.1 cm (65\")   Wt 97.5 kg (215 lb)   LMP  (LMP Unknown)   SpO2 97% Comment: resting, room air  Breastfeeding? No   BMI 35.78 kg/m²   Immunization History   Administered Date(s) Administered   • Flu Vaccine Quad PF >36MO 10/09/2017   • Hepatitis A 08/14/2018   • Hepatitis B Vaccine Adult IM 08/14/2018, 09/17/2018   • Influenza, Unspecified 12/01/2016   • Pneumococcal Polysaccharide (PPSV23) 11/15/2018   • flucelvax quad pfs =>4 YRS 11/15/2018       Physical Exam   Constitutional: She is oriented to person, place, and time. She appears well-developed and well-nourished.   HENT:   Head: Normocephalic and atraumatic.   Eyes: EOM are normal. Pupils are equal, round, and reactive to light.   Neck: Normal range of motion. Neck supple.   Cardiovascular: Normal rate and regular rhythm.   No murmur heard.  Pulmonary/Chest: Effort normal and breath sounds normal. No respiratory distress. She has no wheezes. She has no rales.   Abdominal: Soft. Bowel sounds are normal. She exhibits no distension.   Musculoskeletal: Normal range of motion. She exhibits no edema.   Neurological: She is alert and oriented to person, place, and time.   Skin: Skin is warm and dry. No erythema.   Psychiatric: She has a normal mood and affect. Her behavior is normal.   Vitals reviewed.      RESULTS    Lab Results   Component Value Date    WBC 9.69 11/15/2018    HGB 16.0 (H) 11/15/2018    HCT 45.6 (H) 11/15/2018    MCV 87.5 11/15/2018     11/15/2018     Lab Results   Component Value Date    GLUCOSE 176 (H) 04/15/2019    CALCIUM 9.3 04/15/2019     04/15/2019    K 4.3 04/15/2019    CO2 21.0 (L) 04/15/2019     04/15/2019    BUN 7 04/15/2019    CREATININE 0.68 04/15/2019    EGFRIFAFRI 123 04/15/2019    EGFRIFNONA 102 04/15/2019    BCR 10.3 04/15/2019    ANIONGAP 15.0 04/15/2019       PFTs done in the office today, and read by me:  Normal PFTs no obstruction or restriction.  Normal " diffusion    PA/LAT CXR done in the office today, and reviewed by me:  Awaiting final MD review      PROBLEM LIST    Problem List Items Addressed This Visit        Respiratory    Dyspnea on exertion       Endocrine    Type 2 diabetes mellitus with hyperglycemia, with long-term current use of insulin (CMS/[facility])       Other    Obesity, Class II, BMI 35-39.9      Other Visit Diagnoses     Pre-operative clearance    -  Primary    Relevant Orders    Pulmonary Function Test (Completed)    XR Chest PA & Lateral            DISCUSSION      ARISCAT Preoperative Pulmonary Risk Index.    Age [x]   <= 50 years old (0 points)    []   51-80 years old (3 points)    []   >80 years old (16 points)       Preoperative Oxygen Saturation [x]   >= 96% (0 points)    []   91-95% (8 points)    []   <= 90% (24 points)       Other Clinical Risk Factors []   Respiratory Infection in the last month (17 points)    []   Pre-operative anemia: Hb < 10 g/dL (11 points)    []   Emergency Surgery (8 points)       Surgical Incision [x]   Upper abdominal (15 points)    []   Intrathoracic (24 points)       Duration of Surgery [x]   < 2 hours (0 points)    []   2-3 hours (16 points)    []   >3 hours (23 points)       Total Criteria Point Count: 15     ARISCAT risk index interpretation:      0-25 points: Low risk  1.6 % pulmonary complication rate.   26-44 points: Intermediate risk 13.3% pulmonary complication rate.    points: High risk 42.1 % pulmonary complication rate.     Postoperative Pulmonary Complications include but are not limited to: Respiratory Failure, Pulmonary Infection, Pleural Effusion, Atelectasis, Pneumothorax Bronchospasm, Aspiration Pneumonia.    We did go over her testing today in the office.  She would be at minimal risk for perioperative complications.  We did go over the importance of proper pulmonary toileting after surgery.    Gretchen Gardner, APRN  08/20/20198:35 AM  Electronically signed     Please note that portions of  this note were completed with a voice recognition program. Efforts were made to edit the dictations, but occasionally words are mistranscribed.      CC: Amee Larson MD

## 2019-08-22 ENCOUNTER — TELEPHONE (OUTPATIENT)
Dept: INTERNAL MEDICINE | Facility: CLINIC | Age: 29
End: 2019-08-22

## 2019-08-22 NOTE — TELEPHONE ENCOUNTER
Regarding: FW: Prescription Question  Contact: [phone]  Please send prior auth  ----- Message -----  From: Juany Simons MA  Sent: 8/22/2019   9:21 AM  To: Amee DUMONT MD  Subject: FW: Prescription Question                            ----- Message -----  From: Shelley Graff  Sent: 8/20/2019   7:21 PM  To: Yayo Allen  Clinical Pool  Subject: Prescription Question                            ----- Message from Mychart, Generic sent at 8/20/2019  7:21 PM EDT -----    Hello.     I went to fill my prescription of jardiance and the pharmacist is saying that it is requiring a prior authorization.

## 2019-08-24 NOTE — PROGRESS NOTES
PROGRESS NOTE    Data:  Shelley Lo is a 29 y.o. female who met with the undersigned for a scheduled individual outpatient therapy session from 3:00 - 3:40pm.      Clinical Maneuvering/Intervention:      The pt talked about struggling with obesity for several years. Despite trying different weight loss plans and diets, the pt reported being unsuccessful in losing weight. A psychological evaluation was conducted in order to assess past and current level of functioning. Areas assessed included, but were not limited to: perception of social support, perception of ability to face and deal with challenges in life (positive functioning), anxiety symptoms, depressive symptoms, perspective on beliefs/belief system, coping skills for stress, intelligence level, addiction issues, etc. Therapeutic rapport was established. Interventions conducted today were geared towards assessing the pt's readiness for weight loss surgery and identifying and psychological contraindications for undergoing such a major life change. Social support was deemed strong (specific to weight loss surgery/weight loss in this manner and in a general sense): several friends and family members.Current psychological struggles were deemed low, but included anxiety situational to being overweight. She worries often and to a high degree about diabetes symptoms worsening should she not lose more weight and soon. Coping skills for distress and related to undergoing a major life change such as weight loss surgery/weight loss were deemed strong as she tends to believe in herself that she will be successful with weight loss surgery and see herself as someone who is able to face and deal with challenges in life. She also often uses humor to cope with stress. She describes her life as not very stressful and she is excited to get weight loss surgery. The pt endorsed having characteristics of readiness to improve quality of life through the major life  changes inherent in the journey of weight loss surgery by recently starting to cut out junk food and incorporate healthier food options into her diet. The pt expressed gratitude for today's session.    Mental Status Exam  Hygiene:  good  Dress: normal  Attitude:  cooperative and proactive  Motor Activity: normal  Speech: normal  Mood:  upbeat, nervous  Affect:  congruent  Thought Processes: normal  Thought Content:  normal  Suicidal Thoughts:  not endorsed  Homicidal Thoughts:  not endorsed  Crisis Safety Plan: not needed   Hallucinations:  none      Patient's Support Network Includes:  family, friends      Progress toward goal: there is evidence to suggest that she is taking measures to improve the quality of her life including seeking weight loss surgery.      Functional Status: moderate to high      Prognosis: good    Assessment      The pt presented to be struggling with anxiety situational to being overweight. She otherwise presents as a fairly highly functioning person with several strong coping skills for distress/stress in life.     From a psychological standpoint, the pt presents as a good candidate for bariatric surgery.  She is motivated for the surgery, has showed readiness for the lifestyle change in terms of adjusting her eating habits, and seems to have appropriate expectations of how to prepare and how to live after surgery in order to lose weight successfully.      Plan      In order to diminish symptoms of anxiety and improve her quality of life, the pt is to follow up with her bariatric surgeon in order to receive weight loss surgery as soon as feasible/appropriate and based on success with compliance to adhering to the proper diet.    Lynn Quintero, PhD, LP

## 2019-08-26 ENCOUNTER — TELEPHONE (OUTPATIENT)
Dept: INTERNAL MEDICINE | Facility: CLINIC | Age: 29
End: 2019-08-26

## 2019-08-26 DIAGNOSIS — Z79.4 TYPE 2 DIABETES MELLITUS WITH HYPERGLYCEMIA, WITH LONG-TERM CURRENT USE OF INSULIN (HCC): Primary | ICD-10-CM

## 2019-08-26 DIAGNOSIS — E11.65 TYPE 2 DIABETES MELLITUS WITH HYPERGLYCEMIA, WITH LONG-TERM CURRENT USE OF INSULIN (HCC): Primary | ICD-10-CM

## 2019-08-26 NOTE — TELEPHONE ENCOUNTER
Patient is needing a pariopertive insulin plan for bariatric surgery.  She doesn't know what this is, but was wanting to know if she could get this.  Her PH is [phone]

## 2019-09-12 ENCOUNTER — OFFICE VISIT (OUTPATIENT)
Dept: ENDOCRINOLOGY | Facility: CLINIC | Age: 29
End: 2019-09-12

## 2019-09-12 VITALS
OXYGEN SATURATION: 98 % | HEART RATE: 119 BPM | DIASTOLIC BLOOD PRESSURE: 80 MMHG | SYSTOLIC BLOOD PRESSURE: 124 MMHG | WEIGHT: 218 LBS | BODY MASS INDEX: 36.28 KG/M2

## 2019-09-12 DIAGNOSIS — E11.65 UNCONTROLLED TYPE 2 DIABETES MELLITUS WITH HYPERGLYCEMIA (HCC): Primary | ICD-10-CM

## 2019-09-12 LAB
GLUCOSE BLDC GLUCOMTR-MCNC: 234 MG/DL (ref 70–130)
HBA1C MFR BLD: 8.7 %

## 2019-09-12 PROCEDURE — 90686 IIV4 VACC NO PRSV 0.5 ML IM: CPT | Performed by: PHYSICIAN ASSISTANT

## 2019-09-12 PROCEDURE — 83036 HEMOGLOBIN GLYCOSYLATED A1C: CPT | Performed by: PHYSICIAN ASSISTANT

## 2019-09-12 PROCEDURE — 99215 OFFICE O/P EST HI 40 MIN: CPT | Performed by: PHYSICIAN ASSISTANT

## 2019-09-12 PROCEDURE — 90471 IMMUNIZATION ADMIN: CPT | Performed by: PHYSICIAN ASSISTANT

## 2019-09-12 PROCEDURE — 82947 ASSAY GLUCOSE BLOOD QUANT: CPT | Performed by: PHYSICIAN ASSISTANT

## 2019-09-12 RX ORDER — INSULIN GLARGINE 100 [IU]/ML
15 INJECTION, SOLUTION SUBCUTANEOUS NIGHTLY
Qty: 2 PEN | Refills: 6 | Status: ON HOLD | OUTPATIENT
Start: 2019-09-12 | End: 2019-11-27 | Stop reason: SDUPTHER

## 2019-09-12 NOTE — PROGRESS NOTES
Chief Complaint  Establish care for Diabetes Mellitus.    HPI   Shelley Graff is a 29 y.o. female who is here today for evaluation of Diabetes Mellitus type 2. The initial diagnosis of diabetes was made 5/2017.  Mom has DM2 and obesity.     Bariatric surgery planned soon.   Has lost wt on keto diet.     A1C- 8.7 (9/12/19), 7.7 (7/2019)    Diabetic complications: none  Eye exam current (within one year): due  Foot care and dental care: discussed    Current diabetic medications include:  Metformin 1000mg BID  Januvia 100mg QD  Jardiance 10mg QD - has not started higher dose yet, waiting on PA approval   Basaglar 13U QHS    Statin: no    Past medications: no    Diabetic Monitoring  - checks glucose 1-2x/day  Glucose is averaging- -160, during the day mostly 100-180, -400 yesterday as she forgot to take her basaglar the night before  Hypoglycemia- no   Home blood sugar records: glucometer downloaded, data reviewed and scanned to chart    Nutrition:     Current diet: following keto diet, which has significantly improved diet  Current exercise: none    The following portions of the patient's history were reviewed and updated by me as appropriate: allergies, current medications, past family history, past social history, past surgical history and problem list.    Past Medical History:   Diagnosis Date   • Anxiety    • Chronic joint pain     denies NSAIDS/steroids   • DM2 (diabetes mellitus, type 2) (CMS/[facility])     w/ hx gestational diabetes 2015, dx May 2017, started on insulin when initially dx, lost weight w/ Adipex, stopped insulin 6 months after dx, but now w/ weight regain, back on insulin.  Managed by PCP   • Dyspepsia    • Dyspnea on exertion    • Fatigue    • Fatty liver     w/ abnormal LFTs, US 7/2018   • Gestational diabetes    • H. pylori infection     UBT (+) 6/14/19 - PrevPak RX   • Obesity (BMI 30-39.9)    • Pap smear for cervical cancer screening 01/2017       Medications    Current  Outpatient Medications:   •  Empagliflozin (JARDIANCE) 25 MG tablet, Take 25 mg by mouth Daily., Disp: 30 tablet, Rfl: 3  •  glucose blood test strip, Used to check blood sugar 3-4 daily for diabetes E11.9., Disp: 125 each, Rfl: 12  •  glucose monitoring kit (FREESTYLE) monitoring kit, Use daily to test blood sugar daily for Diabetes E11.9, Disp: 1 each, Rfl: 0  •  Insulin Glargine (BASAGLAR KWIKPEN) 100 UNIT/ML injection pen, Inject 15 Units under the skin into the appropriate area as directed Every Night., Disp: 2 pen, Rfl: 6  •  Insulin Pen Needle (PEN NEEDLES) 31G X 8 MM misc, Use daily for insulin, Disp: 50 each, Rfl: 2  •  ketoconazole (NIZORAL) 2 % cream, Apply  topically to the appropriate area as directed Every 12 (Twelve) Hours. (Patient taking differently: Apply  topically to the appropriate area as directed As Needed.), Disp: 30 g, Rfl: 0  •  Lancets (FREESTYLE) lancets, Use to check blood sugar for Diabetes E11.9., Disp: 100 each, Rfl: 12  •  levonorgestrel (MIRENA) 20 MCG/24HR IUD, 1 each by Intrauterine route 1 (One) Time., Disp: , Rfl:   •  loratadine (CLARITIN) 10 MG tablet, Take 1 tablet by mouth Daily. (Patient taking differently: Take 10 mg by mouth As Needed.), Disp: 30 tablet, Rfl: 3  •  metFORMIN (GLUCOPHAGE) 1000 MG tablet, Take 1 tablet by mouth 2 (Two) Times a Day With Meals., Disp: 60 tablet, Rfl: 3  •  sertraline (ZOLOFT) 25 MG tablet, Take 1 tablet by mouth Daily., Disp: 30 tablet, Rfl: 3  •  SITagliptin (JANUVIA) 100 MG tablet, Take 1 tablet by mouth Daily., Disp: 30 tablet, Rfl: 3  •  VALACYCLOVIR HCL PO, Take 1 tablet by mouth Daily., Disp: , Rfl:     Review of Systems  Review of Systems   Constitutional: Positive for fatigue. Negative for chills, fever and unexpected weight change.   HENT: Negative for ear pain, hearing loss, nosebleeds, rhinorrhea and sore throat.    Eyes: Positive for visual disturbance. Negative for pain, discharge, redness and itching.   Respiratory: Positive for  shortness of breath. Negative for cough and wheezing.    Cardiovascular: Negative for chest pain, palpitations and leg swelling.   Gastrointestinal: Negative for abdominal pain, blood in stool, constipation and diarrhea.   Endocrine: Positive for polydipsia. Negative for cold intolerance and heat intolerance.   Genitourinary: Negative for dysuria, frequency, hematuria, pelvic pain, vaginal bleeding and vaginal discharge.   Musculoskeletal: Negative for arthralgias, gait problem, joint swelling and myalgias.   Skin: Negative for rash.   Allergic/Immunologic: Negative.    Neurological: Negative for dizziness, syncope, weakness, numbness and headaches.   Hematological: Negative for adenopathy. Does not bruise/bleed easily.   Psychiatric/Behavioral: Negative for sleep disturbance and suicidal ideas. The patient is not nervous/anxious.         Anxiety        Physical Exam    /80   Pulse 119   Wt 98.9 kg (218 lb)   LMP  (LMP Unknown)   SpO2 98%   BMI 36.28 kg/m² Body mass index is 36.28 kg/m².  Physical Exam   Constitutional: She is oriented to person, place, and time. She appears well-developed. No distress.   HENT:   Head: Normocephalic.   Right Ear: External ear normal.   Left Ear: External ear normal.   Mouth/Throat: No oropharyngeal exudate.   Eyes: Conjunctivae and lids are normal. Right eye exhibits no discharge. Left eye exhibits no discharge. Right pupil is reactive. Left pupil is reactive.   Neck: No JVD present. No tracheal deviation present. No thyroid mass and no thyromegaly present.   Cardiovascular: Normal rate, regular rhythm, normal heart sounds and intact distal pulses.   No murmur heard.  Pulmonary/Chest: Effort normal and breath sounds normal. No respiratory distress. She has no wheezes.   Abdominal: Soft. Bowel sounds are normal. There is no tenderness.   Musculoskeletal: She exhibits no edema or tenderness.   Lymphadenopathy:     She has no cervical adenopathy.   Neurological: She is alert  and oriented to person, place, and time.   Skin: Skin is warm, dry and intact. No rash noted. She is not diaphoretic. No erythema.   Psychiatric: She has a normal mood and affect. Her speech is normal and behavior is normal. Thought content normal.       Labs and Imaging   Lab Results   Component Value Date    HGBA1C 8.7 09/12/2019    HGBA1C 7.7 07/15/2019    HGBA1C 7.6 02/11/2019     Office Visit on 09/12/2019   Component Date Value Ref Range Status   • Glucose 09/12/2019 234* 70 - 130 mg/dL Final   • Hemoglobin A1C 09/12/2019 8.7  % Final       Assessment / Plan   Shelley was seen today for establish care.    Diagnoses and all orders for this visit:    Uncontrolled type 2 diabetes mellitus with hyperglycemia (CMS/[facility])  -     POC Glucose Fingerstick  -     POC Glycosylated Hemoglobin (Hb A1C)  -     Empagliflozin (JARDIANCE) 25 MG tablet; Take 25 mg by mouth Daily.  -     Insulin Glargine (BASAGLAR KWIKPEN) 100 UNIT/ML injection pen; Inject 15 Units under the skin into the appropriate area as directed Every Night.    Other orders  -     Flucelvax Quad=>4Years ([id number])        Diabetes Mellitus 2 is under inadequate control.  -A1c 8.7, up from 7.7 7/2019  -bs improved the last ~2 weeks, unless she forgets to take basaglar  -increase basalgar to 15u qhs and encouraged not missing doses  -increase jardiance to 25mg daily. Samples x 4 weeks provided. Resent rx so PA can be faxed to us.  -continue metformin, januvia  -continue following keto diet  -bariatric surgery coming up- she can take 10 units of basaglar the night before. After surgery dc all diabetes medication except metformin. She will monitor bs and will resume dm meds if needed      1.  Diet: 3-4 carb servings per meal for females, 4-5 carb servings per meal for males  Spread carb intake throughout the day  Increase lean protein and vegetable intake  Avoid sugary drinks and processed carbs including crackers, cookies, cakes  2.  Exercise: Recommend at least  30 minutes of exercise daily, at least 5 days per week. Increase exercise gradually.   3.  Blood Glucose Goal: Blood glucose goal <150 fasting, <180 2 hr postprandial  4.  Microalbumin - repeat next visit  5.  Education performed during this visit: long term diabetic complications discussed. , annual eye examinations at Ophthalmology discussed, dental hygiene discussed  and foot care reviewed., home glucose monitoring emphasized, all medications, side effects and compliance discussed carefully and Hypoglycemia management and prevention reviewed. Reviewed ‘ABCs’ of diabetes management (respective goals in parentheses):  A1C (<7), blood pressure (<130/80), and cholesterol (LDL <100, if CVD <70).    There are no Patient Instructions on file for this visit.    Follow up: Return in about 3 months (around 12/12/2019).    Discussed the nature of the disease including, risks, complications, implications, management, safe and proper use of medications. Encouraged therapeutic lifestyle changes including low calorie diet with plenty of fruits and vegetables, daily exercise, medication compliance, and keeping scheduled follow up appointments with me and any other providers. Encouraged patient to have appointment for complete physical, fasting labs, appropriate screenings, and immunizations on an annual basis.    25 min  of 45 min face-to-face visit time spent for coordination of care and counselling regarding identified problems as outlined in the objective, assessment and discussion portions of the documentation.    Rosana Hanks PA-C

## 2019-10-07 RX ORDER — LANCETS 28 GAUGE
EACH MISCELLANEOUS
Qty: 100 EACH | Refills: 11 | Status: SHIPPED | OUTPATIENT
Start: 2019-10-07 | End: 2019-11-24

## 2019-10-11 ENCOUNTER — PRIOR AUTHORIZATION (OUTPATIENT)
Dept: INTERNAL MEDICINE | Facility: CLINIC | Age: 29
End: 2019-10-11

## 2019-10-15 ENCOUNTER — OFFICE VISIT (OUTPATIENT)
Dept: INTERNAL MEDICINE | Facility: CLINIC | Age: 29
End: 2019-10-15

## 2019-10-15 VITALS
OXYGEN SATURATION: 98 % | TEMPERATURE: 98 F | HEART RATE: 109 BPM | BODY MASS INDEX: 36.69 KG/M2 | DIASTOLIC BLOOD PRESSURE: 76 MMHG | SYSTOLIC BLOOD PRESSURE: 112 MMHG | WEIGHT: 220.2 LBS | HEIGHT: 65 IN

## 2019-10-15 DIAGNOSIS — F41.9 ANXIETY: ICD-10-CM

## 2019-10-15 DIAGNOSIS — E11.65 TYPE 2 DIABETES MELLITUS WITH HYPERGLYCEMIA, WITH LONG-TERM CURRENT USE OF INSULIN (HCC): ICD-10-CM

## 2019-10-15 DIAGNOSIS — E66.9 OBESITY, CLASS II, BMI 35-39.9: ICD-10-CM

## 2019-10-15 DIAGNOSIS — Z79.4 TYPE 2 DIABETES MELLITUS WITH HYPERGLYCEMIA, WITH LONG-TERM CURRENT USE OF INSULIN (HCC): ICD-10-CM

## 2019-10-15 DIAGNOSIS — E78.2 MIXED HYPERLIPIDEMIA: Primary | ICD-10-CM

## 2019-10-15 LAB
ALBUMIN SERPL-MCNC: 4.6 G/DL (ref 3.5–5.2)
ALBUMIN/GLOB SERPL: 1.4 G/DL
ALP SERPL-CCNC: 77 U/L (ref 39–117)
ALT SERPL W P-5'-P-CCNC: 247 U/L (ref 1–33)
ANION GAP SERPL CALCULATED.3IONS-SCNC: 12.7 MMOL/L (ref 5–15)
AST SERPL-CCNC: 95 U/L (ref 1–32)
BILIRUB SERPL-MCNC: 0.3 MG/DL (ref 0.2–1.2)
BUN BLD-MCNC: 8 MG/DL (ref 6–20)
BUN/CREAT SERPL: 11.4 (ref 7–25)
CALCIUM SPEC-SCNC: 9.1 MG/DL (ref 8.6–10.5)
CHLORIDE SERPL-SCNC: 96 MMOL/L (ref 98–107)
CHOLEST SERPL-MCNC: 208 MG/DL (ref 0–200)
CO2 SERPL-SCNC: 23.3 MMOL/L (ref 22–29)
CREAT BLD-MCNC: 0.7 MG/DL (ref 0.57–1)
GFR SERPL CREATININE-BSD FRML MDRD: 119 ML/MIN/1.73
GFR SERPL CREATININE-BSD FRML MDRD: 98 ML/MIN/1.73
GLOBULIN UR ELPH-MCNC: 3.4 GM/DL
GLUCOSE BLD-MCNC: 169 MG/DL (ref 65–99)
HDLC SERPL-MCNC: 56 MG/DL (ref 40–60)
LDLC SERPL CALC-MCNC: 128 MG/DL (ref 0–100)
LDLC/HDLC SERPL: 2.28 {RATIO}
POTASSIUM BLD-SCNC: 4.4 MMOL/L (ref 3.5–5.2)
PROT SERPL-MCNC: 8 G/DL (ref 6–8.5)
SODIUM BLD-SCNC: 132 MMOL/L (ref 136–145)
TRIGL SERPL-MCNC: 122 MG/DL (ref 0–150)
VLDLC SERPL-MCNC: 24.4 MG/DL (ref 5–40)

## 2019-10-15 PROCEDURE — 80061 LIPID PANEL: CPT | Performed by: FAMILY MEDICINE

## 2019-10-15 PROCEDURE — 99214 OFFICE O/P EST MOD 30 MIN: CPT | Performed by: FAMILY MEDICINE

## 2019-10-15 PROCEDURE — 80053 COMPREHEN METABOLIC PANEL: CPT | Performed by: FAMILY MEDICINE

## 2019-10-15 RX ORDER — SERTRALINE HYDROCHLORIDE 25 MG/1
25 TABLET, FILM COATED ORAL DAILY
Qty: 30 TABLET | Refills: 3 | Status: SHIPPED | OUTPATIENT
Start: 2019-10-15 | End: 2020-01-14 | Stop reason: SDUPTHER

## 2019-10-15 NOTE — PROGRESS NOTES
"Subjective   Shelleybruce Graff is a 29 y.o. female.     Chief Complaint   Patient presents with   • Diabetes     f/u. Has been approved for the gastric sleeve.       Visit Vitals  /76 (BP Location: Left arm, Cuff Size: Large Adult)   Pulse 109   Temp 98 °F (36.7 °C)   Ht 165.1 cm (65\")   Wt 99.9 kg (220 lb 3.2 oz)   SpO2 98%   BMI 36.64 kg/m²         Diabetes   She presents for her follow-up diabetic visit. She has type 2 diabetes mellitus. Her disease course has been stable. Pertinent negatives for hypoglycemia include no confusion, dizziness, headaches or nervousness/anxiousness. Associated symptoms include visual change. Pertinent negatives for diabetes include no blurred vision, no chest pain, no fatigue, no foot paresthesias, no foot ulcerations, no polydipsia, no polyphagia, no polyuria, no weakness and no weight loss. There are no hypoglycemic complications. Symptoms are stable. There are no diabetic complications. Risk factors for coronary artery disease include obesity, diabetes mellitus and family history. She is compliant with treatment most of the time. Her weight is stable. She is following a generally healthy diet. Meal planning includes avoidance of concentrated sweets. She participates in exercise intermittently. Her breakfast blood glucose range is generally 110-130 mg/dl. Her lunch blood glucose range is generally 180-200 mg/dl. An ACE inhibitor/angiotensin II receptor blocker is not being taken. She does not see a podiatrist.Eye exam is not current.   Anxiety   Presents for follow-up visit. Symptoms include irritability (rare if she does not drink caffeine). Patient reports no chest pain, compulsions, confusion, decreased concentration, depressed mood, dizziness, dry mouth, excessive worry, feeling of choking, hyperventilation, insomnia, malaise, muscle tension, nausea, nervous/anxious behavior, obsessions, palpitations, panic, restlessness, shortness of breath or suicidal ideas. " Symptoms occur rarely. The severity of symptoms is mild. The quality of sleep is good. Nighttime awakenings: none.     Compliance with medications is %.      Pt has group session the end on the month to go over gastric sleeve. Pt planning for surgery next month.     Pt's insurance no covering Jardiance.     Pt has had a couple of sugars peak at 300 when she forgot PM insulin.    Pt has had elevated lipids kameron triglycerides and fatty liver. Currently not on statins.   The following portions of the patient's history were reviewed and updated as appropriate: allergies, current medications, past family history, past medical history, past social history, past surgical history and problem list.    Past Medical History:   Diagnosis Date   • Anxiety    • Chronic joint pain     denies NSAIDS/steroids   • DM2 (diabetes mellitus, type 2) (CMS/[facility])     w/ hx gestational diabetes , dx May 2017, started on insulin when initially dx, lost weight w/ Adipex, stopped insulin 6 months after dx, but now w/ weight regain, back on insulin.  Managed by PCP   • Dyspepsia    • Dyspnea on exertion    • Fatigue    • Fatty liver     w/ abnormal LFTs, US 2018   • Gestational diabetes    • H. pylori infection     UBT (+) 19 - PrevPak RX   • Mixed hyperlipidemia 10/15/2019   • Obesity (BMI 30-39.9)    • Pap smear for cervical cancer screening 2017      Past Surgical History:   Procedure Laterality Date   • CERVICAL CONE BIOPSY      benign   •  SECTION  08, 5/15/13, 11/6/15    x3    • UPPER GASTROINTESTINAL ENDOSCOPY  2019    Dr VIVIENNE Whatley      Family History   Problem Relation Age of Onset   • Stroke Mother    • Heart disease Mother    • Diabetes Mother    • Obesity Mother    • Hypertension Mother    • Diabetes Father    • Hypertension Father       Social History     Socioeconomic History   • Marital status:      Spouse name: Not on file   • Number of children: Not on file   • Years of education:  Not on file   • Highest education level: Not on file   Tobacco Use   • Smoking status: Former Smoker     Packs/day: 2.00     Years: 2.00     Pack years: 4.00     Last attempt to quit: 2012     Years since quittin.2   • Smokeless tobacco: Never Used   Substance and Sexual Activity   • Alcohol use: No   • Drug use: No   • Sexual activity: Yes     Partners: Male     Birth control/protection: IUD     Comment:    Social History Narrative    Living in [place], [place] w/  and children.   @ [employer] in [employer].        No Known Allergies    Review of Systems   Constitutional: Positive for irritability (rare if she does not drink caffeine). Negative for chills, diaphoresis, fatigue, fever and weight loss.   HENT: Negative.  Negative for ear pain, nosebleeds, postnasal drip, rhinorrhea, sinus pressure, sneezing and sore throat.    Eyes: Negative.  Negative for blurred vision, redness and itching.   Respiratory: Negative.  Negative for cough, shortness of breath and wheezing.    Cardiovascular: Negative.  Negative for chest pain and palpitations.   Gastrointestinal: Negative.  Negative for abdominal pain, constipation, diarrhea, nausea and vomiting.   Endocrine: Negative.  Negative for cold intolerance, heat intolerance, polydipsia, polyphagia and polyuria.   Genitourinary: Negative.  Negative for dysuria, frequency, hematuria and urgency.   Musculoskeletal: Negative.  Negative for arthralgias, back pain and neck pain.   Skin: Negative.  Negative for color change and rash.   Allergic/Immunologic: Negative.  Negative for environmental allergies.   Neurological: Negative.  Negative for dizziness, syncope, weakness, light-headedness and headaches.   Hematological: Negative.  Negative for adenopathy. Does not bruise/bleed easily.   Psychiatric/Behavioral: Negative for confusion, decreased concentration, dysphoric mood and suicidal ideas. The patient is not nervous/anxious and does  not have insomnia.        Wt Readings from Last 3 Encounters:   10/15/19 99.9 kg (220 lb 3.2 oz)   09/12/19 98.9 kg (218 lb)   08/20/19 97.5 kg (215 lb)       Objective   Physical Exam   Constitutional: She is oriented to person, place, and time. She appears well-developed.   HENT:   Head: Normocephalic.   Right Ear: External ear normal.   Left Ear: External ear normal.   Nose: Nose normal.   Eyes: Conjunctivae, EOM and lids are normal. Pupils are equal, round, and reactive to light.   Neck: Trachea normal and normal range of motion. Neck supple. Carotid bruit is not present. No thyroid mass and no thyromegaly present.   Cardiovascular: Normal rate and regular rhythm.   No murmur heard.  Pulmonary/Chest: Effort normal and breath sounds normal. No respiratory distress. She has no decreased breath sounds. She has no wheezes. She has no rhonchi. She has no rales. She exhibits no tenderness.   Abdominal: Soft. Bowel sounds are normal. There is no tenderness.   Musculoskeletal: Normal range of motion.   Neurological: She is alert and oriented to person, place, and time.   Skin: Skin is warm and dry.   Psychiatric: She has a normal mood and affect. Her behavior is normal.   Nursing note and vitals reviewed.      Assessment/Plan   Shelley was seen today for diabetes.    Diagnoses and all orders for this visit:    Mixed hyperlipidemia  -     Comprehensive Metabolic Panel  -     Lipid Panel    Type 2 diabetes mellitus with hyperglycemia, with long-term current use of insulin (CMS/[facility])  -     metFORMIN (GLUCOPHAGE) 1000 MG tablet; Take 1 tablet by mouth 2 (Two) Times a Day With Meals.  -     SITagliptin (JANUVIA) 100 MG tablet; Take 1 tablet by mouth Daily.  -     Ambulatory Referral for Diabetic Eye Exam-Ophthalmology    Anxiety  -     sertraline (ZOLOFT) 25 MG tablet; Take 1 tablet by mouth Daily.    Obesity, Class II, BMI 35-39.9      Discussed tdap vaccine with pt,  that is currently available at the pharmacy.     Please  follow a low animal fat diet that is also low in sugar, low in junk food, low in sweet drinks and low in alcohol.  Please increase the amount of fiber in your diet as well as increasing your daily exercise, such as walking.             Current Outpatient Medications:   •  glucose blood test strip, Used to check blood sugar 3-4 daily for diabetes E11.9., Disp: 125 each, Rfl: 12  •  glucose monitoring kit (FREESTYLE) monitoring kit, Use daily to test blood sugar daily for Diabetes E11.9, Disp: 1 each, Rfl: 0  •  Insulin Glargine (BASAGLAR KWIKPEN) 100 UNIT/ML injection pen, Inject 15 Units under the skin into the appropriate area as directed Every Night., Disp: 2 pen, Rfl: 6  •  Insulin Pen Needle (PEN NEEDLES) 31G X 8 MM misc, Use daily for insulin, Disp: 50 each, Rfl: 2  •  ketoconazole (NIZORAL) 2 % cream, Apply  topically to the appropriate area as directed Every 12 (Twelve) Hours. (Patient taking differently: Apply  topically to the appropriate area as directed As Needed.), Disp: 30 g, Rfl: 0  •  Lancets (FREESTYLE) lancets, Use to check blood sugar for Diabetes E11.9., Disp: 100 each, Rfl: 12  •  Lancets (FREESTYLE) lancets, USE TO CHECK BLOOD SUGAR FOR DIABETES 3 TO 4 TIMES DAILY, Disp: 100 each, Rfl: 11  •  levonorgestrel (MIRENA) 20 MCG/24HR IUD, 1 each by Intrauterine route 1 (One) Time., Disp: , Rfl:   •  metFORMIN (GLUCOPHAGE) 1000 MG tablet, Take 1 tablet by mouth 2 (Two) Times a Day With Meals., Disp: 60 tablet, Rfl: 3  •  sertraline (ZOLOFT) 25 MG tablet, Take 1 tablet by mouth Daily., Disp: 30 tablet, Rfl: 3  •  SITagliptin (JANUVIA) 100 MG tablet, Take 1 tablet by mouth Daily., Disp: 30 tablet, Rfl: 3  •  VALACYCLOVIR HCL PO, Take 1 tablet by mouth Daily., Disp: , Rfl:   •  Empagliflozin (JARDIANCE) 25 MG tablet, Take 25 mg by mouth Daily., Disp: 30 tablet, Rfl: 3    Return in about 3 months (around 1/15/2020), or if symptoms worsen or fail to improve, for Recheck.    Hemoglobin A1C   Date Value Ref  Range Status   09/12/2019 8.7 % Final   07/15/2019 7.7 % Final   02/11/2019 7.6 % Final   11/15/2018 6.70 (H) 4.80 - 5.60 % Final   08/30/2017 6.30 (H) 4.80 - 5.60 % Final

## 2019-10-21 DIAGNOSIS — R53.83 FATIGUE, UNSPECIFIED TYPE: Primary | ICD-10-CM

## 2019-10-21 DIAGNOSIS — R06.00 DYSPNEA, UNSPECIFIED TYPE: ICD-10-CM

## 2019-10-23 ENCOUNTER — LAB (OUTPATIENT)
Dept: LAB | Facility: HOSPITAL | Age: 29
End: 2019-10-23

## 2019-10-23 DIAGNOSIS — R06.00 DYSPNEA, UNSPECIFIED TYPE: ICD-10-CM

## 2019-10-23 DIAGNOSIS — R53.83 FATIGUE, UNSPECIFIED TYPE: ICD-10-CM

## 2019-10-23 PROCEDURE — 80053 COMPREHEN METABOLIC PANEL: CPT

## 2019-10-23 PROCEDURE — 36415 COLL VENOUS BLD VENIPUNCTURE: CPT

## 2019-10-23 PROCEDURE — 85025 COMPLETE CBC W/AUTO DIFF WBC: CPT

## 2019-10-24 LAB
ALBUMIN SERPL-MCNC: 4.7 G/DL (ref 3.5–5.2)
ALBUMIN/GLOB SERPL: 1.3 G/DL
ALP SERPL-CCNC: 95 U/L (ref 39–117)
ALT SERPL W P-5'-P-CCNC: 300 U/L (ref 1–33)
ANION GAP SERPL CALCULATED.3IONS-SCNC: 13.5 MMOL/L (ref 5–15)
AST SERPL-CCNC: 76 U/L (ref 1–32)
BASOPHILS # BLD AUTO: 0.06 10*3/MM3 (ref 0–0.2)
BASOPHILS NFR BLD AUTO: 0.6 % (ref 0–1.5)
BILIRUB SERPL-MCNC: 0.4 MG/DL (ref 0.2–1.2)
BUN BLD-MCNC: 8 MG/DL (ref 6–20)
BUN/CREAT SERPL: 12.7 (ref 7–25)
CALCIUM SPEC-SCNC: 9.7 MG/DL (ref 8.6–10.5)
CHLORIDE SERPL-SCNC: 101 MMOL/L (ref 98–107)
CO2 SERPL-SCNC: 24.5 MMOL/L (ref 22–29)
CREAT BLD-MCNC: 0.63 MG/DL (ref 0.57–1)
DEPRECATED RDW RBC AUTO: 44.5 FL (ref 37–54)
EOSINOPHIL # BLD AUTO: 0.1 10*3/MM3 (ref 0–0.4)
EOSINOPHIL NFR BLD AUTO: 0.9 % (ref 0.3–6.2)
ERYTHROCYTE [DISTWIDTH] IN BLOOD BY AUTOMATED COUNT: 13 % (ref 12.3–15.4)
GFR SERPL CREATININE-BSD FRML MDRD: 111 ML/MIN/1.73
GFR SERPL CREATININE-BSD FRML MDRD: 134 ML/MIN/1.73
GLOBULIN UR ELPH-MCNC: 3.7 GM/DL
GLUCOSE BLD-MCNC: 207 MG/DL (ref 65–99)
HCT VFR BLD AUTO: 46.1 % (ref 34–46.6)
HGB BLD-MCNC: 16 G/DL (ref 12–15.9)
IMM GRANULOCYTES # BLD AUTO: 0.05 10*3/MM3 (ref 0–0.05)
IMM GRANULOCYTES NFR BLD AUTO: 0.5 % (ref 0–0.5)
LYMPHOCYTES # BLD AUTO: 3.43 10*3/MM3 (ref 0.7–3.1)
LYMPHOCYTES NFR BLD AUTO: 32 % (ref 19.6–45.3)
MCH RBC QN AUTO: 32.3 PG (ref 26.6–33)
MCHC RBC AUTO-ENTMCNC: 34.7 G/DL (ref 31.5–35.7)
MCV RBC AUTO: 93.1 FL (ref 79–97)
MONOCYTES # BLD AUTO: 0.53 10*3/MM3 (ref 0.1–0.9)
MONOCYTES NFR BLD AUTO: 4.9 % (ref 5–12)
NEUTROPHILS # BLD AUTO: 6.56 10*3/MM3 (ref 1.7–7)
NEUTROPHILS NFR BLD AUTO: 61.1 % (ref 42.7–76)
NRBC BLD AUTO-RTO: 0 /100 WBC (ref 0–0.2)
PLATELET # BLD AUTO: 260 10*3/MM3 (ref 140–450)
PMV BLD AUTO: 11.6 FL (ref 6–12)
POTASSIUM BLD-SCNC: 4.7 MMOL/L (ref 3.5–5.2)
PROT SERPL-MCNC: 8.4 G/DL (ref 6–8.5)
RBC # BLD AUTO: 4.95 10*6/MM3 (ref 3.77–5.28)
SODIUM BLD-SCNC: 139 MMOL/L (ref 136–145)
WBC NRBC COR # BLD: 10.73 10*3/MM3 (ref 3.4–10.8)

## 2019-10-28 ENCOUNTER — CONSULT (OUTPATIENT)
Dept: BARIATRICS/WEIGHT MGMT | Facility: CLINIC | Age: 29
End: 2019-10-28

## 2019-10-28 ENCOUNTER — RESULTS ENCOUNTER (OUTPATIENT)
Dept: BARIATRICS/WEIGHT MGMT | Facility: CLINIC | Age: 29
End: 2019-10-28

## 2019-10-28 VITALS
SYSTOLIC BLOOD PRESSURE: 118 MMHG | TEMPERATURE: 97.5 F | RESPIRATION RATE: 18 BRPM | WEIGHT: 218 LBS | HEART RATE: 93 BPM | HEIGHT: 65 IN | BODY MASS INDEX: 36.32 KG/M2 | DIASTOLIC BLOOD PRESSURE: 74 MMHG | OXYGEN SATURATION: 97 %

## 2019-10-28 DIAGNOSIS — R53.83 FATIGUE, UNSPECIFIED TYPE: ICD-10-CM

## 2019-10-28 DIAGNOSIS — E66.9 OBESITY, CLASS II, BMI 35-39.9: ICD-10-CM

## 2019-10-28 DIAGNOSIS — Z79.4 TYPE 2 DIABETES MELLITUS WITH HYPERGLYCEMIA, WITH LONG-TERM CURRENT USE OF INSULIN (HCC): ICD-10-CM

## 2019-10-28 DIAGNOSIS — A04.8 H. PYLORI INFECTION: Primary | ICD-10-CM

## 2019-10-28 DIAGNOSIS — E11.65 TYPE 2 DIABETES MELLITUS WITH HYPERGLYCEMIA, WITH LONG-TERM CURRENT USE OF INSULIN (HCC): ICD-10-CM

## 2019-10-28 DIAGNOSIS — K76.0 FATTY LIVER: ICD-10-CM

## 2019-10-28 DIAGNOSIS — A04.8 H. PYLORI INFECTION: ICD-10-CM

## 2019-10-28 PROCEDURE — 99215 OFFICE O/P EST HI 40 MIN: CPT | Performed by: SURGERY

## 2019-10-28 NOTE — PROGRESS NOTES
ADDENDUM:    H. Pylori stool +.  Will tx again at 1 month post op.    [facility] BARIATRIC SURGERY  [address]  [facility] [address] [phone]      Patient  Name:  Shelley Graff  :  1990      Date of Visit: 10/28/2019      Chief Complaint:  weight gain; unable to maintain weight loss    History of Present Illness:  Shelley Graff is a 29 y.o. female who presents today for evaluation, education and consultation regarding weight loss surgery.     Patient has been overweight for many years, with numerous failed dietary/weight loss attempts.  She now has obesity related comorbidities of DM2, fatty liver, fatigue, HLD and as such has decided to pursue weight loss surgery.    Reviewed DM2 plan per endocrinologist: 10 U basaglar night before surgery, metofrmin only if needed after.    No personal or family hx of VTE or clotting d/o.  No liver, lung, heart, or renal disease      Review of data:    VELASQUEZ: phentermine.  CBC: nl  CMP: nl  EGD: PQ 19 40 cm, possible tiny HH, DE reflux/gastritis  HP +, finished treatment, no retest yet??  Cardiac clearance:cleared  PFTs: normal  Pulm clearance: cleared  EKG: nl  CXR: nl      Last tobacco: quit , also no drugs since .  Last NSAIDs: n/a  Last ASA: n/a  Last steroids: n/a  Last hormones: Mirena      Past Medical History:   Diagnosis Date   • Anxiety    • Chronic joint pain     denies NSAIDS/steroids   • DM2 (diabetes mellitus, type 2) (CMS/HCC)     w/ hx gestational diabetes , dx May 2017, started on insulin when initially dx, lost weight w/ Adipex, stopped insulin 6 months after dx, but now w/ weight regain, back on insulin.  Managed by PCP   • Dyspepsia    • Dyspnea on exertion    • Fatigue    • Fatty liver     w/ abnormal LFTs, US 2018   • Gestational diabetes    • H. pylori infection     UBT (+) 19 - PrevPak RX   • Mixed hyperlipidemia 10/15/2019   • Obesity (BMI 30-39.9)    • Pap  smear for cervical cancer screening 2017     Past Surgical History:   Procedure Laterality Date   • CERVICAL CONE BIOPSY      benign   •  SECTION  08, 5/15/13, 11/6/15    x3    • UPPER GASTROINTESTINAL ENDOSCOPY  2019    Dr VIVIENNE Whatley       No Known Allergies    Current Outpatient Medications:   •  Empagliflozin (JARDIANCE) 25 MG tablet, Take 25 mg by mouth Daily. (Patient taking differently: Take 1 tablet by mouth Daily. 10 mg), Disp: 30 tablet, Rfl: 3  •  glucose blood test strip, Used to check blood sugar 3-4 daily for diabetes E11.9., Disp: 125 each, Rfl: 12  •  glucose monitoring kit (FREESTYLE) monitoring kit, Use daily to test blood sugar daily for Diabetes E11.9, Disp: 1 each, Rfl: 0  •  Insulin Glargine (BASAGLAR KWIKPEN) 100 UNIT/ML injection pen, Inject 15 Units under the skin into the appropriate area as directed Every Night., Disp: 2 pen, Rfl: 6  •  Insulin Pen Needle (PEN NEEDLES) 31G X 8 MM misc, Use daily for insulin, Disp: 50 each, Rfl: 2  •  ketoconazole (NIZORAL) 2 % cream, Apply  topically to the appropriate area as directed Every 12 (Twelve) Hours. (Patient taking differently: Apply  topically to the appropriate area as directed As Needed.), Disp: 30 g, Rfl: 0  •  Lancets (FREESTYLE) lancets, Use to check blood sugar for Diabetes E11.9., Disp: 100 each, Rfl: 12  •  Lancets (FREESTYLE) lancets, USE TO CHECK BLOOD SUGAR FOR DIABETES 3 TO 4 TIMES DAILY, Disp: 100 each, Rfl: 11  •  levonorgestrel (MIRENA) 20 MCG/24HR IUD, 1 each by Intrauterine route 1 (One) Time., Disp: , Rfl:   •  metFORMIN (GLUCOPHAGE) 1000 MG tablet, Take 1 tablet by mouth 2 (Two) Times a Day With Meals., Disp: 60 tablet, Rfl: 3  •  sertraline (ZOLOFT) 25 MG tablet, Take 1 tablet by mouth Daily., Disp: 30 tablet, Rfl: 3  •  SITagliptin (JANUVIA) 100 MG tablet, Take 1 tablet by mouth Daily., Disp: 30 tablet, Rfl: 3  •  VALACYCLOVIR HCL PO, Take 1 tablet by mouth Daily., Disp: , Rfl:     Social History      Socioeconomic History   • Marital status:      Spouse name: Not on file   • Number of children: Not on file   • Years of education: Not on file   • Highest education level: Not on file   Tobacco Use   • Smoking status: Former Smoker     Packs/day: 2.00     Years: 2.00     Pack years: 4.00     Last attempt to quit: 2012     Years since quittin.3   • Smokeless tobacco: Never Used   Substance and Sexual Activity   • Alcohol use: No   • Drug use: No   • Sexual activity: Yes     Partners: Male     Birth control/protection: IUD     Comment:    Social History Narrative    Living in [place], [place] w/  and children.   @ [employer] in [facility].       Family History   Problem Relation Age of Onset   • Stroke Mother    • Heart disease Mother    • Diabetes Mother    • Obesity Mother    • Hypertension Mother    • Diabetes Father    • Hypertension Father        Review of Systems   Constitutional: Positive for fatigue and unexpected weight gain. Negative for chills, diaphoresis, fever and unexpected weight loss.   HENT: Negative for congestion and facial swelling.    Eyes: Negative for blurred vision, double vision and discharge.   Respiratory: Negative for chest tightness, shortness of breath and stridor.    Cardiovascular: Negative for chest pain, palpitations and leg swelling.   Gastrointestinal: Negative for blood in stool.   Endocrine: Negative for polydipsia.   Genitourinary: Negative for hematuria.   Musculoskeletal: Positive for arthralgias.   Skin: Negative for color change.   Allergic/Immunologic: Negative for immunocompromised state.   Neurological: Negative for confusion.   Psychiatric/Behavioral: Negative for self-injury.       Physical Exam:  Vital Signs:  Weight: 98.9 kg (218 lb)   Body mass index is 36.28 kg/m².  Temp: 97.5 °F (36.4 °C)   Heart Rate: 93   BP: 118/74     Physical Exam   Constitutional: She is oriented to person, place, and time. She appears  well-developed and well-nourished.   HENT:   Head: Normocephalic and atraumatic.   Nose: Nose normal.   Eyes: Conjunctivae and EOM are normal. Pupils are equal, round, and reactive to light.   Neck: Normal range of motion. Neck supple. Carotid bruit is not present. No tracheal deviation present. No thyromegaly present.   Cardiovascular: Normal rate, regular rhythm and normal heart sounds.   Pulmonary/Chest: Effort normal and breath sounds normal. No respiratory distress.   Abdominal: Soft. She exhibits no distension. There is no hepatosplenomegaly. There is no tenderness.   Low transverse C section incision.   Musculoskeletal: Normal range of motion. She exhibits no edema or deformity.   Neurological: She is alert and oriented to person, place, and time. No cranial nerve deficit. Coordination normal.   Skin: Skin is warm and dry. No rash noted.   Psychiatric: She has a normal mood and affect. Her behavior is normal. Judgment and thought content normal.   Vitals reviewed.      Patient Active Problem List   Diagnosis   • Polydipsia   • Personal history of gestational diabetes   • Type 2 diabetes mellitus with hyperglycemia, with long-term current use of insulin (CMS/HCC)   • Anxiety   • Fatigue   • Dyspepsia   • Dyspnea on exertion   • Fatty liver   • Chronic joint pain   • Obesity, Class II, BMI 35-39.9   • H. pylori infection   • Mixed hyperlipidemia       Assessment:    Shelley Graff is a 29 y.o. year old female with medically complicated obesity.    Weight loss surgery is deemed medically necessary given the following obesity related comorbidities including  DM2, fatty liver, fatigue, HLD with current Weight: 98.9 kg (218 lb) and Body mass index is 36.28 kg/m²..    Patient is aware that surgery is a tool, and that weight loss is not guaranteed but only seen in the context of appropriate use, follow up and exercise.    The patient was present for an approximately a 2.5 hour discussion of the purpose of  "weight loss surgery, how WLS is a tool to assist in achieving weight loss goals, the most common complications and how best to avoid them, and the strategies for short and long term weight loss.  Ample opportunity to discuss questions was available both in group and during the time of individual examination.    I reviewed all available preop labs, Xrays, tests, clearances, etc and signed off on these in the record.  All of this in addition to the patient's unique history and exam has been taken into consideration in determining their appropriate candidacy for weight loss surgery.    Complications  of laparoscopic/possible robotic gastric sleeve were discussed. The patient is well aware of the potential complications of surgery that include but not limited to bleeding, infections, deep venous thrombosis, pulmonary embolism, pulmonary complications such as pneumonia, cardiac events, hernias, small bowel obstruction, damage to the spleen or other organs, bowel injury, disfiguring scars, failure to lose weight, need for additional surgery, conversion to an open procedure, and death. Patient is also aware of complications which apply in this particular procedure that can include but are not limited to a \"leak\" at the staple line which in some instances may require conversion to gastric bypass.    The patient is aware if a hiatal hernia is encountered, it likely will be repaired.  R/B/A Rx to hiatal hernia repair were discussed as outlined in our long consent form.  Briefly risks in addition to those for LSG include recurrent hernia, TASNEEM, dysphagia, esophageal injury, pneumothorax, injury to the vagus nerves, injury to the thoracic duct, aorta or vena cava.    Greater than 3 minutes was spent with the patient discussing avoiding all tobacco products and second hand smoke at least 2 weeks pre-operatively and 6 weeks post-operatively to minimize the risk of sleeve leak.  This included discussing the importance of avoiding " even secondhand smoke as the risk of leak is increased.  Examples discussed:  I made it very clear that the patient understands they should avoid even riding in a car where someone has previously smoked in the last 2 weeks, living in a house where someone smokes (even if it's in a separate room/patio/attached garage, etc.) we discussed that they should not have a conversation with a group of people who are smoking even if it's outside.  They can be around wood burning fires and barbecue.  I told them I do not know if marijuana has a same effects but my overall recommendation is to avoid it for 2 weeks prior in 6 weeks after surgery.  They also are aware that nicotine may also increase the risk of leak and I strongly encouraged him to avoid that as well for 2 weeks prior in 6 weeks after surgery.    Discussed the risks, benefits and alternative therapies at great length as outlined in our extensive consent forms, consent videos, and educational teaching process under the direction of the center's .    A copy of the patient's signed informed consent is on file.    Plan:  Laparoscopic sleeve gastrectomy + possible HHR at [facility].      On omeprazole PRN.  Needs to retest for H. Pylori with stool test.      R/B/A Rx discussed to postop anticoagulation incl but not limited to bleeding, drug reaction, venothromboembolic events, etc. and she declined.    MDM high:  Elective procedure with the following risk factors: morbid obesity, DM2  4+ chronic medical problems reviewed.      Mila Whatley MD

## 2019-10-31 ENCOUNTER — PREP FOR SURGERY (OUTPATIENT)
Dept: OTHER | Facility: HOSPITAL | Age: 29
End: 2019-10-31

## 2019-10-31 DIAGNOSIS — E66.9 OBESITY, CLASS II, BMI 35-39.9: Primary | ICD-10-CM

## 2019-10-31 RX ORDER — ACETAMINOPHEN 500 MG
1000 TABLET ORAL ONCE
Status: CANCELLED | OUTPATIENT
Start: 2019-10-31 | End: 2019-10-31

## 2019-10-31 RX ORDER — SODIUM CHLORIDE 9 MG/ML
150 INJECTION, SOLUTION INTRAVENOUS CONTINUOUS
Status: CANCELLED | OUTPATIENT
Start: 2019-10-31

## 2019-10-31 RX ORDER — GABAPENTIN 300 MG/1
600 CAPSULE ORAL ONCE
Status: CANCELLED | OUTPATIENT
Start: 2019-10-31 | End: 2019-10-31

## 2019-10-31 RX ORDER — SODIUM CHLORIDE 0.9 % (FLUSH) 0.9 %
3 SYRINGE (ML) INJECTION EVERY 12 HOURS SCHEDULED
Status: CANCELLED | OUTPATIENT
Start: 2019-10-31

## 2019-10-31 RX ORDER — CHLORHEXIDINE GLUCONATE 0.12 MG/ML
15 RINSE ORAL
Status: CANCELLED | OUTPATIENT
Start: 2019-10-31 | End: 2019-10-31

## 2019-10-31 RX ORDER — CEFAZOLIN SODIUM 2 G/100ML
2 INJECTION, SOLUTION INTRAVENOUS ONCE
Status: CANCELLED | OUTPATIENT
Start: 2019-10-31 | End: 2019-10-31

## 2019-10-31 RX ORDER — SODIUM CHLORIDE 0.9 % (FLUSH) 0.9 %
10 SYRINGE (ML) INJECTION AS NEEDED
Status: CANCELLED | OUTPATIENT
Start: 2019-10-31

## 2019-10-31 RX ORDER — SCOLOPAMINE TRANSDERMAL SYSTEM 1 MG/1
1 PATCH, EXTENDED RELEASE TRANSDERMAL ONCE
Status: CANCELLED | OUTPATIENT
Start: 2019-10-31 | End: 2019-10-31

## 2019-10-31 RX ORDER — PANTOPRAZOLE SODIUM 40 MG/10ML
40 INJECTION, POWDER, LYOPHILIZED, FOR SOLUTION INTRAVENOUS ONCE
Status: CANCELLED | OUTPATIENT
Start: 2019-10-31 | End: 2019-10-31

## 2019-10-31 NOTE — H&P
[facility] BARIATRIC SURGERY  [address]  [facility] [address] [phone]      Patient  Name:  Shelley Graff  :  1990      Date of Visit: 10/28/2019      Chief Complaint:  weight gain; unable to maintain weight loss    History of Present Illness:  Shelley Graff is a 29 y.o. female who presents today for evaluation, education and consultation regarding weight loss surgery.     Patient has been overweight for many years, with numerous failed dietary/weight loss attempts.  She now has obesity related comorbidities of DM2, fatty liver, fatigue, HLD and as such has decided to pursue weight loss surgery.    Reviewed DM2 plan per endocrinologist: 10 U basaglar night before surgery, metofrmin only if needed after.    No personal or family hx of VTE or clotting d/o.  No liver, lung, heart, or renal disease      Review of data:    VELASQUEZ: phentermine.  CBC: nl  CMP: nl  EGD: PQ 19 40 cm, possible tiny HH, DE reflux/gastritis  HP +, finished treatment, no retest yet??  Cardiac clearance:cleared  PFTs: normal  Pulm clearance: cleared  EKG: nl  CXR: nl      Last tobacco: quit , also no drugs since .  Last NSAIDs: n/a  Last ASA: n/a  Last steroids: n/a  Last hormones: Mirena      Past Medical History:   Diagnosis Date   • Anxiety    • Chronic joint pain     denies NSAIDS/steroids   • DM2 (diabetes mellitus, type 2) (CMS/HCC)     w/ hx gestational diabetes , dx May 2017, started on insulin when initially dx, lost weight w/ Adipex, stopped insulin 6 months after dx, but now w/ weight regain, back on insulin.  Managed by PCP   • Dyspepsia    • Dyspnea on exertion    • Fatigue    • Fatty liver     w/ abnormal LFTs, US 2018   • Gestational diabetes    • H. pylori infection     UBT (+) 19 - PrevPak RX   • Mixed hyperlipidemia 10/15/2019   • Obesity (BMI 30-39.9)    • Pap smear for cervical cancer screening 2017     Past Surgical History:    Procedure Laterality Date   • CERVICAL CONE BIOPSY      benign   •  SECTION  08, 5/15/13, 11/6/15    x3    • UPPER GASTROINTESTINAL ENDOSCOPY  2019    Dr VIVIENNE Whatley       No Known Allergies    Current Outpatient Medications:   •  Empagliflozin (JARDIANCE) 25 MG tablet, Take 25 mg by mouth Daily. (Patient taking differently: Take 1 tablet by mouth Daily. 10 mg), Disp: 30 tablet, Rfl: 3  •  glucose blood test strip, Used to check blood sugar 3-4 daily for diabetes E11.9., Disp: 125 each, Rfl: 12  •  glucose monitoring kit (FREESTYLE) monitoring kit, Use daily to test blood sugar daily for Diabetes E11.9, Disp: 1 each, Rfl: 0  •  Insulin Glargine (BASAGLAR KWIKPEN) 100 UNIT/ML injection pen, Inject 15 Units under the skin into the appropriate area as directed Every Night., Disp: 2 pen, Rfl: 6  •  Insulin Pen Needle (PEN NEEDLES) 31G X 8 MM misc, Use daily for insulin, Disp: 50 each, Rfl: 2  •  ketoconazole (NIZORAL) 2 % cream, Apply  topically to the appropriate area as directed Every 12 (Twelve) Hours. (Patient taking differently: Apply  topically to the appropriate area as directed As Needed.), Disp: 30 g, Rfl: 0  •  Lancets (FREESTYLE) lancets, Use to check blood sugar for Diabetes E11.9., Disp: 100 each, Rfl: 12  •  Lancets (FREESTYLE) lancets, USE TO CHECK BLOOD SUGAR FOR DIABETES 3 TO 4 TIMES DAILY, Disp: 100 each, Rfl: 11  •  levonorgestrel (MIRENA) 20 MCG/24HR IUD, 1 each by Intrauterine route 1 (One) Time., Disp: , Rfl:   •  metFORMIN (GLUCOPHAGE) 1000 MG tablet, Take 1 tablet by mouth 2 (Two) Times a Day With Meals., Disp: 60 tablet, Rfl: 3  •  sertraline (ZOLOFT) 25 MG tablet, Take 1 tablet by mouth Daily., Disp: 30 tablet, Rfl: 3  •  SITagliptin (JANUVIA) 100 MG tablet, Take 1 tablet by mouth Daily., Disp: 30 tablet, Rfl: 3  •  VALACYCLOVIR HCL PO, Take 1 tablet by mouth Daily., Disp: , Rfl:     Social History     Socioeconomic History   • Marital status:      Spouse name:  Not on file   • Number of children: Not on file   • Years of education: Not on file   • Highest education level: Not on file   Tobacco Use   • Smoking status: Former Smoker     Packs/day: 2.00     Years: 2.00     Pack years: 4.00     Last attempt to quit: 2012     Years since quittin.3   • Smokeless tobacco: Never Used   Substance and Sexual Activity   • Alcohol use: No   • Drug use: No   • Sexual activity: Yes     Partners: Male     Birth control/protection: IUD     Comment:    Social History Narrative    Living in [place], [place] w/  and children.   @ [employer] in [employer].       Family History   Problem Relation Age of Onset   • Stroke Mother    • Heart disease Mother    • Diabetes Mother    • Obesity Mother    • Hypertension Mother    • Diabetes Father    • Hypertension Father        Review of Systems   Constitutional: Positive for fatigue and unexpected weight gain. Negative for chills, diaphoresis, fever and unexpected weight loss.   HENT: Negative for congestion and facial swelling.    Eyes: Negative for blurred vision, double vision and discharge.   Respiratory: Negative for chest tightness, shortness of breath and stridor.    Cardiovascular: Negative for chest pain, palpitations and leg swelling.   Gastrointestinal: Negative for blood in stool.   Endocrine: Negative for polydipsia.   Genitourinary: Negative for hematuria.   Musculoskeletal: Positive for arthralgias.   Skin: Negative for color change.   Allergic/Immunologic: Negative for immunocompromised state.   Neurological: Negative for confusion.   Psychiatric/Behavioral: Negative for self-injury.       Physical Exam:  Vital Signs:  Weight: 98.9 kg (218 lb)   Body mass index is 36.28 kg/m².  Temp: 97.5 °F (36.4 °C)   Heart Rate: 93   BP: 118/74     Physical Exam   Constitutional: She is oriented to person, place, and time. She appears well-developed and well-nourished.   HENT:   Head: Normocephalic and  atraumatic.   Nose: Nose normal.   Eyes: Conjunctivae and EOM are normal. Pupils are equal, round, and reactive to light.   Neck: Normal range of motion. Neck supple. Carotid bruit is not present. No tracheal deviation present. No thyromegaly present.   Cardiovascular: Normal rate, regular rhythm and normal heart sounds.   Pulmonary/Chest: Effort normal and breath sounds normal. No respiratory distress.   Abdominal: Soft. She exhibits no distension. There is no hepatosplenomegaly. There is no tenderness.   Low transverse C section incision.   Musculoskeletal: Normal range of motion. She exhibits no edema or deformity.   Neurological: She is alert and oriented to person, place, and time. No cranial nerve deficit. Coordination normal.   Skin: Skin is warm and dry. No rash noted.   Psychiatric: She has a normal mood and affect. Her behavior is normal. Judgment and thought content normal.   Vitals reviewed.      Patient Active Problem List   Diagnosis   • Polydipsia   • Personal history of gestational diabetes   • Type 2 diabetes mellitus with hyperglycemia, with long-term current use of insulin (CMS/HCC)   • Anxiety   • Fatigue   • Dyspepsia   • Dyspnea on exertion   • Fatty liver   • Chronic joint pain   • Obesity, Class II, BMI 35-39.9   • H. pylori infection   • Mixed hyperlipidemia       Assessment:    Shelley Graff is a 29 y.o. year old female with medically complicated obesity.    Weight loss surgery is deemed medically necessary given the following obesity related comorbidities including  DM2, fatty liver, fatigue, HLD with current Weight: 98.9 kg (218 lb) and Body mass index is 36.28 kg/m²..    Patient is aware that surgery is a tool, and that weight loss is not guaranteed but only seen in the context of appropriate use, follow up and exercise.    The patient was present for an approximately a 2.5 hour discussion of the purpose of weight loss surgery, how WLS is a tool to assist in achieving weight  "loss goals, the most common complications and how best to avoid them, and the strategies for short and long term weight loss.  Ample opportunity to discuss questions was available both in group and during the time of individual examination.    I reviewed all available preop labs, Xrays, tests, clearances, etc and signed off on these in the record.  All of this in addition to the patient's unique history and exam has been taken into consideration in determining their appropriate candidacy for weight loss surgery.    Complications  of laparoscopic/possible robotic gastric sleeve were discussed. The patient is well aware of the potential complications of surgery that include but not limited to bleeding, infections, deep venous thrombosis, pulmonary embolism, pulmonary complications such as pneumonia, cardiac events, hernias, small bowel obstruction, damage to the spleen or other organs, bowel injury, disfiguring scars, failure to lose weight, need for additional surgery, conversion to an open procedure, and death. Patient is also aware of complications which apply in this particular procedure that can include but are not limited to a \"leak\" at the staple line which in some instances may require conversion to gastric bypass.    The patient is aware if a hiatal hernia is encountered, it likely will be repaired.  R/B/A Rx to hiatal hernia repair were discussed as outlined in our long consent form.  Briefly risks in addition to those for LSG include recurrent hernia, TASNEEM, dysphagia, esophageal injury, pneumothorax, injury to the vagus nerves, injury to the thoracic duct, aorta or vena cava.    Greater than 3 minutes was spent with the patient discussing avoiding all tobacco products and second hand smoke at least 2 weeks pre-operatively and 6 weeks post-operatively to minimize the risk of sleeve leak.  This included discussing the importance of avoiding even secondhand smoke as the risk of leak is increased.  Examples " discussed:  I made it very clear that the patient understands they should avoid even riding in a car where someone has previously smoked in the last 2 weeks, living in a house where someone smokes (even if it's in a separate room/patio/attached garage, etc.) we discussed that they should not have a conversation with a group of people who are smoking even if it's outside.  They can be around wood burning fires and barbecue.  I told them I do not know if marijuana has a same effects but my overall recommendation is to avoid it for 2 weeks prior in 6 weeks after surgery.  They also are aware that nicotine may also increase the risk of leak and I strongly encouraged him to avoid that as well for 2 weeks prior in 6 weeks after surgery.    Discussed the risks, benefits and alternative therapies at great length as outlined in our extensive consent forms, consent videos, and educational teaching process under the direction of the center's .    A copy of the patient's signed informed consent is on file.    Plan:  Laparoscopic sleeve gastrectomy + possible HHR at [facility].      On omeprazole PRN.  Needs to retest for H. Pylori with stool test.      R/B/A Rx discussed to postop anticoagulation incl but not limited to bleeding, drug reaction, venothromboembolic events, etc. and she declined.    MDM high:  Elective procedure with the following risk factors: morbid obesity, DM2  4+ chronic medical problems reviewed.      Mila Whatley MD

## 2019-11-12 ENCOUNTER — APPOINTMENT (OUTPATIENT)
Dept: LAB | Facility: HOSPITAL | Age: 29
End: 2019-11-12

## 2019-11-12 PROCEDURE — 87338 HPYLORI STOOL AG IA: CPT | Performed by: SURGERY

## 2019-11-16 LAB — H PYLORI AG STL QL IA: POSITIVE

## 2019-11-24 ENCOUNTER — APPOINTMENT (OUTPATIENT)
Dept: PREADMISSION TESTING | Facility: HOSPITAL | Age: 29
End: 2019-11-24

## 2019-11-24 LAB
DEPRECATED RDW RBC AUTO: 41.3 FL (ref 37–54)
ERYTHROCYTE [DISTWIDTH] IN BLOOD BY AUTOMATED COUNT: 11.9 % (ref 12.3–15.4)
HBA1C MFR BLD: 9.2 % (ref 4.8–5.6)
HCT VFR BLD AUTO: 44.6 % (ref 34–46.6)
HGB BLD-MCNC: 15.3 G/DL (ref 12–15.9)
MCH RBC QN AUTO: 32.3 PG (ref 26.6–33)
MCHC RBC AUTO-ENTMCNC: 34.3 G/DL (ref 31.5–35.7)
MCV RBC AUTO: 94.1 FL (ref 79–97)
PLATELET # BLD AUTO: 198 10*3/MM3 (ref 140–450)
PMV BLD AUTO: 10.6 FL (ref 6–12)
RBC # BLD AUTO: 4.74 10*6/MM3 (ref 3.77–5.28)
WBC NRBC COR # BLD: 6.41 10*3/MM3 (ref 3.4–10.8)

## 2019-11-24 PROCEDURE — 83036 HEMOGLOBIN GLYCOSYLATED A1C: CPT | Performed by: ANESTHESIOLOGY

## 2019-11-24 PROCEDURE — 36415 COLL VENOUS BLD VENIPUNCTURE: CPT

## 2019-11-24 PROCEDURE — 85027 COMPLETE CBC AUTOMATED: CPT | Performed by: ANESTHESIOLOGY

## 2019-11-26 ENCOUNTER — ANESTHESIA (OUTPATIENT)
Dept: PERIOP | Facility: HOSPITAL | Age: 29
End: 2019-11-26

## 2019-11-26 ENCOUNTER — HOSPITAL ENCOUNTER (INPATIENT)
Facility: HOSPITAL | Age: 29
LOS: 1 days | Discharge: HOME | End: 2019-11-27
Attending: SURGERY | Admitting: SURGERY

## 2019-11-26 ENCOUNTER — ANESTHESIA EVENT (OUTPATIENT)
Dept: PERIOP | Facility: HOSPITAL | Age: 29
End: 2019-11-26

## 2019-11-26 DIAGNOSIS — E11.65 UNCONTROLLED TYPE 2 DIABETES MELLITUS WITH HYPERGLYCEMIA (HCC): ICD-10-CM

## 2019-11-26 DIAGNOSIS — E66.9 OBESITY, CLASS II, BMI 35-39.9: ICD-10-CM

## 2019-11-26 PROBLEM — E66.01 MORBID OBESITY (HCC): Status: ACTIVE | Noted: 2019-11-26

## 2019-11-26 LAB
B-HCG UR QL: NEGATIVE
GLUCOSE BLDC GLUCOMTR-MCNC: 218 MG/DL (ref 70–130)
GLUCOSE BLDC GLUCOMTR-MCNC: 222 MG/DL (ref 70–130)
GLUCOSE BLDC GLUCOMTR-MCNC: 251 MG/DL (ref 70–130)
GLUCOSE BLDC GLUCOMTR-MCNC: 279 MG/DL (ref 70–130)
INTERNAL NEGATIVE CONTROL: NEGATIVE
INTERNAL POSITIVE CONTROL: POSITIVE
Lab: NORMAL

## 2019-11-26 PROCEDURE — 0BQT4ZZ REPAIR DIAPHRAGM, PERCUTANEOUS ENDOSCOPIC APPROACH: ICD-10-PCS | Performed by: SURGERY

## 2019-11-26 PROCEDURE — 25010000003 CEFAZOLIN IN DEXTROSE 2-4 GM/100ML-% SOLUTION: Performed by: SURGERY

## 2019-11-26 PROCEDURE — 81025 URINE PREGNANCY TEST: CPT | Performed by: ANESTHESIOLOGY

## 2019-11-26 PROCEDURE — 25010000002 ENOXAPARIN PER 10 MG: Performed by: SURGERY

## 2019-11-26 PROCEDURE — 25010000002 DEXAMETHASONE SODIUM PHOSPHATE 10 MG/ML SOLUTION: Performed by: ANESTHESIOLOGY

## 2019-11-26 PROCEDURE — 25010000002 FENTANYL CITRATE (PF) 100 MCG/2ML SOLUTION: Performed by: NURSE ANESTHETIST, CERTIFIED REGISTERED

## 2019-11-26 PROCEDURE — 25010000002 ONDANSETRON PER 1 MG: Performed by: NURSE ANESTHETIST, CERTIFIED REGISTERED

## 2019-11-26 PROCEDURE — 63710000001 INSULIN LISPRO (HUMAN) PER 5 UNITS: Performed by: SURGERY

## 2019-11-26 PROCEDURE — 0DJ08ZZ INSPECTION OF UPPER INTESTINAL TRACT, VIA NATURAL OR ARTIFICIAL OPENING ENDOSCOPIC: ICD-10-PCS | Performed by: SURGERY

## 2019-11-26 PROCEDURE — 88307 TISSUE EXAM BY PATHOLOGIST: CPT | Performed by: SURGERY

## 2019-11-26 PROCEDURE — 0DB64Z3 EXCISION OF STOMACH, PERCUTANEOUS ENDOSCOPIC APPROACH, VERTICAL: ICD-10-PCS | Performed by: SURGERY

## 2019-11-26 PROCEDURE — 25010000002 PROPOFOL 10 MG/ML EMULSION: Performed by: NURSE ANESTHETIST, CERTIFIED REGISTERED

## 2019-11-26 PROCEDURE — 25010000002 BUPRENORPHINE PER 0.1 MG: Performed by: ANESTHESIOLOGY

## 2019-11-26 PROCEDURE — 25010000002 ONDANSETRON PER 1 MG: Performed by: SURGERY

## 2019-11-26 PROCEDURE — 82962 GLUCOSE BLOOD TEST: CPT

## 2019-11-26 PROCEDURE — 25010000002 DEXAMETHASONE PER 1 MG: Performed by: NURSE ANESTHETIST, CERTIFIED REGISTERED

## 2019-11-26 PROCEDURE — 43775 LAP SLEEVE GASTRECTOMY: CPT | Performed by: SURGERY

## 2019-11-26 PROCEDURE — 25010000002 PHENYLEPHRINE PER 1 ML: Performed by: NURSE ANESTHETIST, CERTIFIED REGISTERED

## 2019-11-26 PROCEDURE — 25010000002 NEOSTIGMINE 10 MG/10ML SOLUTION: Performed by: NURSE ANESTHETIST, CERTIFIED REGISTERED

## 2019-11-26 PROCEDURE — 88342 IMHCHEM/IMCYTCHM 1ST ANTB: CPT | Performed by: SURGERY

## 2019-11-26 DEVICE — SEALANT FIBRIN TISSEEL FZ 4ML: Type: IMPLANTABLE DEVICE | Site: STOMACH | Status: FUNCTIONAL

## 2019-11-26 DEVICE — REINFORCED INTELLIGENT RELOAD, FOR USE WITH SIGNIA STAPLING SYSTEM
Type: IMPLANTABLE DEVICE | Site: STOMACH | Status: FUNCTIONAL
Brand: TRI-STAPLE 2.0

## 2019-11-26 DEVICE — BLACK REINFORCED INTELLIGENT RELOAD, FOR USE WITH SIGNIA STAPLING SYSTEM
Type: IMPLANTABLE DEVICE | Site: STOMACH | Status: FUNCTIONAL
Brand: TRI-STAPLE 2.0

## 2019-11-26 RX ORDER — METOCLOPRAMIDE 10 MG/1
10 TABLET ORAL EVERY 8 HOURS PRN
Status: DISCONTINUED | OUTPATIENT
Start: 2019-11-26 | End: 2019-11-27 | Stop reason: HOSPADM

## 2019-11-26 RX ORDER — BUPIVACAINE HYDROCHLORIDE 2.5 MG/ML
INJECTION, SOLUTION EPIDURAL; INFILTRATION; INTRACAUDAL
Status: COMPLETED | OUTPATIENT
Start: 2019-11-26 | End: 2019-11-26

## 2019-11-26 RX ORDER — ONDANSETRON 2 MG/ML
4 INJECTION INTRAMUSCULAR; INTRAVENOUS ONCE AS NEEDED
Status: DISCONTINUED | OUTPATIENT
Start: 2019-11-26 | End: 2019-11-26 | Stop reason: HOSPADM

## 2019-11-26 RX ORDER — HYDROMORPHONE HYDROCHLORIDE 1 MG/ML
0.5 INJECTION, SOLUTION INTRAMUSCULAR; INTRAVENOUS; SUBCUTANEOUS
Status: DISCONTINUED | OUTPATIENT
Start: 2019-11-26 | End: 2019-11-27 | Stop reason: HOSPADM

## 2019-11-26 RX ORDER — PROMETHAZINE HYDROCHLORIDE 25 MG/ML
12.5 INJECTION, SOLUTION INTRAMUSCULAR; INTRAVENOUS ONCE AS NEEDED
Status: DISCONTINUED | OUTPATIENT
Start: 2019-11-26 | End: 2019-11-26 | Stop reason: HOSPADM

## 2019-11-26 RX ORDER — PANTOPRAZOLE SODIUM 40 MG/10ML
40 INJECTION, POWDER, LYOPHILIZED, FOR SOLUTION INTRAVENOUS ONCE
Status: COMPLETED | OUTPATIENT
Start: 2019-11-26 | End: 2019-11-26

## 2019-11-26 RX ORDER — PROCHLORPERAZINE 25 MG
25 SUPPOSITORY, RECTAL RECTAL EVERY 12 HOURS PRN
Status: DISCONTINUED | OUTPATIENT
Start: 2019-11-26 | End: 2019-11-27 | Stop reason: HOSPADM

## 2019-11-26 RX ORDER — METOCLOPRAMIDE HYDROCHLORIDE 5 MG/ML
10 INJECTION INTRAMUSCULAR; INTRAVENOUS EVERY 6 HOURS PRN
Status: DISCONTINUED | OUTPATIENT
Start: 2019-11-26 | End: 2019-11-27 | Stop reason: HOSPADM

## 2019-11-26 RX ORDER — GABAPENTIN 300 MG/1
600 CAPSULE ORAL ONCE
Status: COMPLETED | OUTPATIENT
Start: 2019-11-26 | End: 2019-11-26

## 2019-11-26 RX ORDER — NALOXONE HCL 0.4 MG/ML
0.1 VIAL (ML) INJECTION
Status: DISCONTINUED | OUTPATIENT
Start: 2019-11-26 | End: 2019-11-27 | Stop reason: HOSPADM

## 2019-11-26 RX ORDER — SCOLOPAMINE TRANSDERMAL SYSTEM 1 MG/1
1 PATCH, EXTENDED RELEASE TRANSDERMAL ONCE
Status: DISCONTINUED | OUTPATIENT
Start: 2019-11-26 | End: 2019-11-27 | Stop reason: HOSPADM

## 2019-11-26 RX ORDER — LABETALOL HYDROCHLORIDE 5 MG/ML
10 INJECTION, SOLUTION INTRAVENOUS
Status: DISCONTINUED | OUTPATIENT
Start: 2019-11-26 | End: 2019-11-27 | Stop reason: HOSPADM

## 2019-11-26 RX ORDER — CEFAZOLIN SODIUM 2 G/100ML
2 INJECTION, SOLUTION INTRAVENOUS EVERY 8 HOURS
Status: COMPLETED | OUTPATIENT
Start: 2019-11-26 | End: 2019-11-27

## 2019-11-26 RX ORDER — SODIUM CHLORIDE 0.9 % (FLUSH) 0.9 %
1-10 SYRINGE (ML) INJECTION AS NEEDED
Status: DISCONTINUED | OUTPATIENT
Start: 2019-11-26 | End: 2019-11-27 | Stop reason: HOSPADM

## 2019-11-26 RX ORDER — ONDANSETRON 2 MG/ML
INJECTION INTRAMUSCULAR; INTRAVENOUS AS NEEDED
Status: DISCONTINUED | OUTPATIENT
Start: 2019-11-26 | End: 2019-11-26 | Stop reason: SURG

## 2019-11-26 RX ORDER — FAMOTIDINE 20 MG/1
20 TABLET, FILM COATED ORAL ONCE
Status: CANCELLED | OUTPATIENT
Start: 2019-11-26 | End: 2019-11-26

## 2019-11-26 RX ORDER — LIDOCAINE HYDROCHLORIDE 10 MG/ML
INJECTION, SOLUTION EPIDURAL; INFILTRATION; INTRACAUDAL; PERINEURAL AS NEEDED
Status: DISCONTINUED | OUTPATIENT
Start: 2019-11-26 | End: 2019-11-26 | Stop reason: SURG

## 2019-11-26 RX ORDER — PROCHLORPERAZINE EDISYLATE 5 MG/ML
5 INJECTION INTRAMUSCULAR; INTRAVENOUS EVERY 6 HOURS PRN
Status: DISCONTINUED | OUTPATIENT
Start: 2019-11-26 | End: 2019-11-27 | Stop reason: HOSPADM

## 2019-11-26 RX ORDER — PROMETHAZINE HYDROCHLORIDE 12.5 MG/1
12.5 TABLET ORAL EVERY 6 HOURS PRN
Status: DISCONTINUED | OUTPATIENT
Start: 2019-11-26 | End: 2019-11-27 | Stop reason: HOSPADM

## 2019-11-26 RX ORDER — DEXTROSE MONOHYDRATE 25 G/50ML
25 INJECTION, SOLUTION INTRAVENOUS
Status: DISCONTINUED | OUTPATIENT
Start: 2019-11-26 | End: 2019-11-27 | Stop reason: HOSPADM

## 2019-11-26 RX ORDER — DEXAMETHASONE SODIUM PHOSPHATE 4 MG/ML
INJECTION, SOLUTION INTRA-ARTICULAR; INTRALESIONAL; INTRAMUSCULAR; INTRAVENOUS; SOFT TISSUE AS NEEDED
Status: DISCONTINUED | OUTPATIENT
Start: 2019-11-26 | End: 2019-11-26 | Stop reason: SURG

## 2019-11-26 RX ORDER — CYANOCOBALAMIN 1000 UG/ML
1000 INJECTION, SOLUTION INTRAMUSCULAR; SUBCUTANEOUS ONCE
Status: COMPLETED | OUTPATIENT
Start: 2019-11-27 | End: 2019-11-27

## 2019-11-26 RX ORDER — PROMETHAZINE HYDROCHLORIDE 25 MG/ML
12.5 INJECTION, SOLUTION INTRAMUSCULAR; INTRAVENOUS EVERY 6 HOURS PRN
Status: DISCONTINUED | OUTPATIENT
Start: 2019-11-26 | End: 2019-11-27 | Stop reason: HOSPADM

## 2019-11-26 RX ORDER — GABAPENTIN 100 MG/1
100 CAPSULE ORAL 3 TIMES DAILY
Status: DISCONTINUED | OUTPATIENT
Start: 2019-11-26 | End: 2019-11-27 | Stop reason: HOSPADM

## 2019-11-26 RX ORDER — ACETAMINOPHEN 500 MG
1000 TABLET ORAL ONCE
Status: COMPLETED | OUTPATIENT
Start: 2019-11-26 | End: 2019-11-26

## 2019-11-26 RX ORDER — SODIUM CHLORIDE 9 MG/ML
150 INJECTION, SOLUTION INTRAVENOUS CONTINUOUS
Status: DISCONTINUED | OUTPATIENT
Start: 2019-11-26 | End: 2019-11-26 | Stop reason: HOSPADM

## 2019-11-26 RX ORDER — NICOTINE POLACRILEX 4 MG
15 LOZENGE BUCCAL
Status: DISCONTINUED | OUTPATIENT
Start: 2019-11-26 | End: 2019-11-27 | Stop reason: HOSPADM

## 2019-11-26 RX ORDER — ALPRAZOLAM 0.25 MG/1
0.25 TABLET ORAL 2 TIMES DAILY PRN
Status: DISCONTINUED | OUTPATIENT
Start: 2019-11-26 | End: 2019-11-27 | Stop reason: HOSPADM

## 2019-11-26 RX ORDER — PROPOFOL 10 MG/ML
VIAL (ML) INTRAVENOUS AS NEEDED
Status: DISCONTINUED | OUTPATIENT
Start: 2019-11-26 | End: 2019-11-26 | Stop reason: SURG

## 2019-11-26 RX ORDER — ONDANSETRON 2 MG/ML
4 INJECTION INTRAMUSCULAR; INTRAVENOUS EVERY 6 HOURS
Status: COMPLETED | OUTPATIENT
Start: 2019-11-26 | End: 2019-11-27

## 2019-11-26 RX ORDER — PROMETHAZINE HYDROCHLORIDE 25 MG/1
25 TABLET ORAL ONCE AS NEEDED
Status: DISCONTINUED | OUTPATIENT
Start: 2019-11-26 | End: 2019-11-26 | Stop reason: HOSPADM

## 2019-11-26 RX ORDER — FAMOTIDINE 10 MG/ML
20 INJECTION, SOLUTION INTRAVENOUS ONCE
Status: CANCELLED | OUTPATIENT
Start: 2019-11-26 | End: 2019-11-26

## 2019-11-26 RX ORDER — GLYCOPYRROLATE 0.2 MG/ML
INJECTION INTRAMUSCULAR; INTRAVENOUS AS NEEDED
Status: DISCONTINUED | OUTPATIENT
Start: 2019-11-26 | End: 2019-11-26 | Stop reason: SURG

## 2019-11-26 RX ORDER — SODIUM CHLORIDE 0.9 % (FLUSH) 0.9 %
3 SYRINGE (ML) INJECTION EVERY 12 HOURS SCHEDULED
Status: DISCONTINUED | OUTPATIENT
Start: 2019-11-26 | End: 2019-11-27 | Stop reason: HOSPADM

## 2019-11-26 RX ORDER — NEOSTIGMINE METHYLSULFATE 1 MG/ML
INJECTION, SOLUTION INTRAVENOUS AS NEEDED
Status: DISCONTINUED | OUTPATIENT
Start: 2019-11-26 | End: 2019-11-26 | Stop reason: SURG

## 2019-11-26 RX ORDER — OXYCODONE HYDROCHLORIDE 5 MG/1
5 TABLET ORAL EVERY 4 HOURS PRN
Status: DISCONTINUED | OUTPATIENT
Start: 2019-11-26 | End: 2019-11-27 | Stop reason: HOSPADM

## 2019-11-26 RX ORDER — LIDOCAINE HYDROCHLORIDE 10 MG/ML
0.5 INJECTION, SOLUTION EPIDURAL; INFILTRATION; INTRACAUDAL; PERINEURAL ONCE AS NEEDED
Status: COMPLETED | OUTPATIENT
Start: 2019-11-26 | End: 2019-11-26

## 2019-11-26 RX ORDER — DIPHENHYDRAMINE HYDROCHLORIDE 50 MG/ML
25 INJECTION INTRAMUSCULAR; INTRAVENOUS EVERY 4 HOURS PRN
Status: DISCONTINUED | OUTPATIENT
Start: 2019-11-26 | End: 2019-11-27 | Stop reason: HOSPADM

## 2019-11-26 RX ORDER — FENTANYL CITRATE 50 UG/ML
INJECTION, SOLUTION INTRAMUSCULAR; INTRAVENOUS AS NEEDED
Status: DISCONTINUED | OUTPATIENT
Start: 2019-11-26 | End: 2019-11-26 | Stop reason: SURG

## 2019-11-26 RX ORDER — CEFAZOLIN SODIUM 2 G/100ML
2 INJECTION, SOLUTION INTRAVENOUS ONCE
Status: COMPLETED | OUTPATIENT
Start: 2019-11-26 | End: 2019-11-26

## 2019-11-26 RX ORDER — CHLORHEXIDINE GLUCONATE 0.12 MG/ML
15 RINSE ORAL
Status: COMPLETED | OUTPATIENT
Start: 2019-11-26 | End: 2019-11-26

## 2019-11-26 RX ORDER — HYDROMORPHONE HYDROCHLORIDE 2 MG/1
2 TABLET ORAL
Status: DISCONTINUED | OUTPATIENT
Start: 2019-11-26 | End: 2019-11-27 | Stop reason: HOSPADM

## 2019-11-26 RX ORDER — ONDANSETRON 4 MG/1
4 TABLET, FILM COATED ORAL EVERY 6 HOURS PRN
Status: DISCONTINUED | OUTPATIENT
Start: 2019-11-27 | End: 2019-11-27 | Stop reason: HOSPADM

## 2019-11-26 RX ORDER — SODIUM CHLORIDE AND POTASSIUM CHLORIDE 150; 450 MG/100ML; MG/100ML
100 INJECTION, SOLUTION INTRAVENOUS CONTINUOUS
Status: DISCONTINUED | OUTPATIENT
Start: 2019-11-27 | End: 2019-11-27 | Stop reason: HOSPADM

## 2019-11-26 RX ORDER — SODIUM CHLORIDE, SODIUM LACTATE, POTASSIUM CHLORIDE, CALCIUM CHLORIDE 600; 310; 30; 20 MG/100ML; MG/100ML; MG/100ML; MG/100ML
150 INJECTION, SOLUTION INTRAVENOUS CONTINUOUS
Status: ACTIVE | OUTPATIENT
Start: 2019-11-26 | End: 2019-11-27

## 2019-11-26 RX ORDER — MAGNESIUM HYDROXIDE 1200 MG/15ML
LIQUID ORAL AS NEEDED
Status: DISCONTINUED | OUTPATIENT
Start: 2019-11-26 | End: 2019-11-26 | Stop reason: HOSPADM

## 2019-11-26 RX ORDER — PANTOPRAZOLE SODIUM 40 MG/1
40 TABLET, DELAYED RELEASE ORAL
Status: DISCONTINUED | OUTPATIENT
Start: 2019-11-27 | End: 2019-11-27 | Stop reason: HOSPADM

## 2019-11-26 RX ORDER — ACETAMINOPHEN 500 MG
1000 TABLET ORAL EVERY 12 HOURS
Status: DISCONTINUED | OUTPATIENT
Start: 2019-11-26 | End: 2019-11-27 | Stop reason: HOSPADM

## 2019-11-26 RX ORDER — ATRACURIUM BESYLATE 10 MG/ML
INJECTION, SOLUTION INTRAVENOUS AS NEEDED
Status: DISCONTINUED | OUTPATIENT
Start: 2019-11-26 | End: 2019-11-26 | Stop reason: SURG

## 2019-11-26 RX ORDER — HYDROMORPHONE HYDROCHLORIDE 1 MG/ML
0.5 INJECTION, SOLUTION INTRAMUSCULAR; INTRAVENOUS; SUBCUTANEOUS
Status: DISCONTINUED | OUTPATIENT
Start: 2019-11-26 | End: 2019-11-26 | Stop reason: HOSPADM

## 2019-11-26 RX ORDER — BUPRENORPHINE HYDROCHLORIDE 0.32 MG/ML
INJECTION INTRAMUSCULAR; INTRAVENOUS
Status: COMPLETED | OUTPATIENT
Start: 2019-11-26 | End: 2019-11-26

## 2019-11-26 RX ORDER — SODIUM CHLORIDE, SODIUM LACTATE, POTASSIUM CHLORIDE, CALCIUM CHLORIDE 600; 310; 30; 20 MG/100ML; MG/100ML; MG/100ML; MG/100ML
9 INJECTION, SOLUTION INTRAVENOUS CONTINUOUS
Status: DISCONTINUED | OUTPATIENT
Start: 2019-11-26 | End: 2019-11-26 | Stop reason: HOSPADM

## 2019-11-26 RX ORDER — ONDANSETRON 4 MG/1
4 TABLET, FILM COATED ORAL EVERY 6 HOURS PRN
Status: DISCONTINUED | OUTPATIENT
Start: 2019-11-26 | End: 2019-11-27 | Stop reason: HOSPADM

## 2019-11-26 RX ORDER — SODIUM CHLORIDE 9 MG/ML
INJECTION, SOLUTION INTRAVENOUS AS NEEDED
Status: DISCONTINUED | OUTPATIENT
Start: 2019-11-26 | End: 2019-11-26 | Stop reason: HOSPADM

## 2019-11-26 RX ORDER — SODIUM CHLORIDE 0.9 % (FLUSH) 0.9 %
3-10 SYRINGE (ML) INJECTION AS NEEDED
Status: CANCELLED | OUTPATIENT
Start: 2019-11-26

## 2019-11-26 RX ORDER — FENTANYL CITRATE 50 UG/ML
50 INJECTION, SOLUTION INTRAMUSCULAR; INTRAVENOUS
Status: DISCONTINUED | OUTPATIENT
Start: 2019-11-26 | End: 2019-11-26 | Stop reason: HOSPADM

## 2019-11-26 RX ORDER — DEXAMETHASONE SODIUM PHOSPHATE 10 MG/ML
INJECTION, SOLUTION INTRAMUSCULAR; INTRAVENOUS
Status: COMPLETED | OUTPATIENT
Start: 2019-11-26 | End: 2019-11-26

## 2019-11-26 RX ORDER — SIMETHICONE 80 MG
80 TABLET,CHEWABLE ORAL 4 TIMES DAILY PRN
Status: DISCONTINUED | OUTPATIENT
Start: 2019-11-26 | End: 2019-11-27 | Stop reason: HOSPADM

## 2019-11-26 RX ORDER — PROCHLORPERAZINE MALEATE 5 MG/1
5 TABLET ORAL EVERY 6 HOURS PRN
Status: DISCONTINUED | OUTPATIENT
Start: 2019-11-26 | End: 2019-11-27 | Stop reason: HOSPADM

## 2019-11-26 RX ORDER — PROMETHAZINE HYDROCHLORIDE 25 MG/1
25 SUPPOSITORY RECTAL ONCE AS NEEDED
Status: DISCONTINUED | OUTPATIENT
Start: 2019-11-26 | End: 2019-11-26 | Stop reason: HOSPADM

## 2019-11-26 RX ORDER — SODIUM CHLORIDE 0.9 % (FLUSH) 0.9 %
3 SYRINGE (ML) INJECTION EVERY 12 HOURS SCHEDULED
Status: CANCELLED | OUTPATIENT
Start: 2019-11-26

## 2019-11-26 RX ORDER — PROCHLORPERAZINE MALEATE 10 MG
10 TABLET ORAL EVERY 6 HOURS PRN
Status: DISCONTINUED | OUTPATIENT
Start: 2019-11-26 | End: 2019-11-27 | Stop reason: HOSPADM

## 2019-11-26 RX ADMIN — SODIUM CHLORIDE, POTASSIUM CHLORIDE, SODIUM LACTATE AND CALCIUM CHLORIDE 150 ML/HR: 600; 310; 30; 20 INJECTION, SOLUTION INTRAVENOUS at 15:54

## 2019-11-26 RX ADMIN — NEOSTIGMINE METHYLSULFATE 2.5 MG: 1 INJECTION, SOLUTION INTRAVENOUS at 13:30

## 2019-11-26 RX ADMIN — ONDANSETRON 4 MG: 2 INJECTION INTRAMUSCULAR; INTRAVENOUS at 18:22

## 2019-11-26 RX ADMIN — PANTOPRAZOLE SODIUM 40 MG: 40 INJECTION, POWDER, FOR SOLUTION INTRAVENOUS at 21:17

## 2019-11-26 RX ADMIN — SERTRALINE HYDROCHLORIDE 25 MG: 50 TABLET ORAL at 15:52

## 2019-11-26 RX ADMIN — CEFAZOLIN SODIUM 2 G: 2 INJECTION, SOLUTION INTRAVENOUS at 11:56

## 2019-11-26 RX ADMIN — FENTANYL CITRATE 50 MCG: 0.05 INJECTION, SOLUTION INTRAMUSCULAR; INTRAVENOUS at 14:20

## 2019-11-26 RX ADMIN — SODIUM CHLORIDE 1000 ML: 9 INJECTION, SOLUTION INTRAVENOUS at 09:59

## 2019-11-26 RX ADMIN — SODIUM CHLORIDE, POTASSIUM CHLORIDE, SODIUM LACTATE AND CALCIUM CHLORIDE 150 ML/HR: 600; 310; 30; 20 INJECTION, SOLUTION INTRAVENOUS at 15:52

## 2019-11-26 RX ADMIN — PHENYLEPHRINE HYDROCHLORIDE 100 MCG: 10 INJECTION INTRAVENOUS at 12:31

## 2019-11-26 RX ADMIN — GABAPENTIN 100 MG: 100 CAPSULE ORAL at 21:15

## 2019-11-26 RX ADMIN — PROPOFOL 25 MCG/KG/MIN: 10 INJECTION, EMULSION INTRAVENOUS at 12:08

## 2019-11-26 RX ADMIN — FENTANYL CITRATE 50 MCG: 50 INJECTION, SOLUTION INTRAMUSCULAR; INTRAVENOUS at 12:02

## 2019-11-26 RX ADMIN — DEXAMETHASONE SODIUM PHOSPHATE 4 MG: 10 INJECTION INTRAMUSCULAR; INTRAVENOUS at 12:17

## 2019-11-26 RX ADMIN — GABAPENTIN 600 MG: 300 CAPSULE ORAL at 09:58

## 2019-11-26 RX ADMIN — LIDOCAINE HYDROCHLORIDE 50 MG: 10 INJECTION, SOLUTION EPIDURAL; INFILTRATION; INTRACAUDAL; PERINEURAL at 12:02

## 2019-11-26 RX ADMIN — CHLORHEXIDINE GLUCONATE 0.12% ORAL RINSE 15 ML: 1.2 LIQUID ORAL at 09:58

## 2019-11-26 RX ADMIN — SODIUM CHLORIDE, PRESERVATIVE FREE 3 ML: 5 INJECTION INTRAVENOUS at 21:16

## 2019-11-26 RX ADMIN — BUPIVACAINE HYDROCHLORIDE 60 ML: 2.5 INJECTION, SOLUTION EPIDURAL; INFILTRATION; INTRACAUDAL; PERINEURAL at 12:17

## 2019-11-26 RX ADMIN — ONDANSETRON 4 MG: 2 INJECTION INTRAMUSCULAR; INTRAVENOUS at 13:17

## 2019-11-26 RX ADMIN — DEXAMETHASONE SODIUM PHOSPHATE 8 MG: 4 INJECTION, SOLUTION INTRAMUSCULAR; INTRAVENOUS at 12:10

## 2019-11-26 RX ADMIN — INSULIN LISPRO 8 UNITS: 100 INJECTION, SOLUTION INTRAVENOUS; SUBCUTANEOUS at 16:53

## 2019-11-26 RX ADMIN — ACETAMINOPHEN 1000 MG: 500 TABLET ORAL at 09:58

## 2019-11-26 RX ADMIN — SODIUM CHLORIDE: 9 INJECTION, SOLUTION INTRAVENOUS at 11:54

## 2019-11-26 RX ADMIN — ATRACURIUM BESYLATE 40 MG: 10 INJECTION, SOLUTION INTRAVENOUS at 12:02

## 2019-11-26 RX ADMIN — PANTOPRAZOLE SODIUM 40 MG: 40 INJECTION, POWDER, FOR SOLUTION INTRAVENOUS at 09:58

## 2019-11-26 RX ADMIN — GLYCOPYRROLATE 0.4 MG: 0.2 INJECTION, SOLUTION INTRAMUSCULAR; INTRAVENOUS at 13:30

## 2019-11-26 RX ADMIN — GABAPENTIN 100 MG: 100 CAPSULE ORAL at 15:52

## 2019-11-26 RX ADMIN — FENTANYL CITRATE 50 MCG: 50 INJECTION, SOLUTION INTRAMUSCULAR; INTRAVENOUS at 13:40

## 2019-11-26 RX ADMIN — INSULIN LISPRO 5 UNITS: 100 INJECTION, SOLUTION INTRAVENOUS; SUBCUTANEOUS at 21:17

## 2019-11-26 RX ADMIN — ACETAMINOPHEN 1000 MG: 500 TABLET, FILM COATED ORAL at 21:15

## 2019-11-26 RX ADMIN — LIDOCAINE HYDROCHLORIDE 0.5 ML: 10 INJECTION, SOLUTION EPIDURAL; INFILTRATION; INTRACAUDAL; PERINEURAL at 09:58

## 2019-11-26 RX ADMIN — CEFAZOLIN SODIUM 2 G: 2 INJECTION, SOLUTION INTRAVENOUS at 21:16

## 2019-11-26 RX ADMIN — FENTANYL CITRATE 50 MCG: 0.05 INJECTION, SOLUTION INTRAMUSCULAR; INTRAVENOUS at 14:35

## 2019-11-26 RX ADMIN — BUPRENORPHINE HYDROCHLORIDE 0.3 MG: 0.32 INJECTION INTRAMUSCULAR; INTRAVENOUS at 12:17

## 2019-11-26 RX ADMIN — SCOPALAMINE 1 PATCH: 1 PATCH, EXTENDED RELEASE TRANSDERMAL at 09:58

## 2019-11-26 RX ADMIN — CHLORHEXIDINE GLUCONATE 0.12% ORAL RINSE 15 ML: 1.2 LIQUID ORAL at 10:06

## 2019-11-26 RX ADMIN — PROPOFOL 200 MG: 10 INJECTION, EMULSION INTRAVENOUS at 12:02

## 2019-11-26 NOTE — ANESTHESIA PREPROCEDURE EVALUATION
Anesthesia Evaluation     NPO Solid Status: > 8 hours  NPO Liquid Status: > 8 hours           Airway   Mallampati: II  TM distance: >3 FB  Neck ROM: full  No difficulty expected  Dental - normal exam     Pulmonary     breath sounds clear to auscultation  (+) a smoker Former,   (-) asthma  Cardiovascular     Rhythm: regular    (-) pacemaker, angina, murmur      Neuro/Psych  (-) seizures  GI/Hepatic/Renal/Endo    (+) obesity,   diabetes mellitus (IDDM),   (-) no renal disease    Musculoskeletal     Abdominal    Substance History      OB/GYN          Other                        Anesthesia Plan    ASA 3     general   (GA)  intravenous induction       Plan discussed with CRNA.

## 2019-11-26 NOTE — ANESTHESIA PROCEDURE NOTES
Peripheral Block      Patient reassessed immediately prior to procedure    Patient location during procedure: OR  Reason for block: at surgeon's request and post-op pain management  Performed by  Anesthesiologist: Juancarlos Narvaez MD  Preanesthetic Checklist  Completed: patient identified, site marked, surgical consent, pre-op evaluation, timeout performed, IV checked, risks and benefits discussed and monitors and equipment checked  Prep:  Pt Position: supine  Sterile barriers:cap, gloves, sterile barriers and mask  Prep: ChloraPrep  Patient monitoring: blood pressure monitoring, continuous pulse oximetry and EKG  Procedure  Sedation:yes  Performed under: general  Guidance:ultrasound guided  Images:still images obtained, printed/placed on chart    Laterality:Bilateral  Block Type:TAP  Injection Technique:single-shot  Needle Type:short-bevel and echogenic  Needle Gauge:20 G  Resistance on Injection: none    Medications Used: buprenorphine (BUPRENEX) injection, 0.3 mg  bupivacaine PF (MARCAINE) 0.25 % injection, 60 mL  dexamethasone sodium phosphate injection, 4 mg      Medications  Comment:Block Injection:  LA dose divided between Right and Left block        Post Assessment  Injection Assessment: negative aspiration for heme, incremental injection and no paresthesia on injection  Patient Tolerance:comfortable throughout block  Complications:no  Additional Notes      Under Ultrasound guidance, a BBraun 4inch 360 degree needle was advanced with Normal Saline hydro dissection of tissue.  The Internal Oblique and Transversus Abdominus muscles where visualized.  At or before the aponeurosis of Internal Oblique, local anesthetic spread was visualized in the Transversus Abdominus Plane. Injection was made incrementally with aspiration every 5 mls.  There was no  intravascular injection,  injection pressure was normal, there was no neural injection, and the procedure was completed without difficulty.  Thank You.

## 2019-11-26 NOTE — ANESTHESIA POSTPROCEDURE EVALUATION
Patient: Shelley Graff    Procedure Summary     Date:  11/26/19 Room / Location:   [facility]    Anesthesia Start:  1155 Anesthesia Stop:      Procedures:       GASTRIC SLEEVE LAPAROSCOPIC (N/A Abdomen)      ESOPHAGOGASTRODUODENOSCOPY (N/A Esophagus)      HIATAL HERNIA REPAIR LAPAROSCOPIC (N/A Abdomen) Diagnosis:       Obesity, Class II, BMI 35-39.9      (Obesity, Class II, BMI 35-39.9 [E66.9])    Surgeon:  Mila Whatley MD Provider:  Juancarlos Narvaez MD    Anesthesia Type:  general ASA Status:  3          Anesthesia Type: general  Last vitals  /84  101/86 (11/26/19 0941)   Temp 98.9  98 °F (36.7 °C) (11/26/19 0941)   Pulse 94  107 (11/26/19 0941)   Resp 16  18 (11/26/19 0941)     SpO2 99  97 % (11/26/19 0941)     Post Anesthesia Care and Evaluation    Patient location during evaluation: PACU  Patient participation: complete - patient participated  Level of consciousness: awake and alert  Pain score: 0  Pain management: adequate  Airway patency: patent  Anesthetic complications: No anesthetic complications  PONV Status: none  Cardiovascular status: hemodynamically stable and acceptable  Respiratory status: nonlabored ventilation, acceptable and nasal cannula  Hydration status: acceptable

## 2019-11-26 NOTE — ANESTHESIA PROCEDURE NOTES
Airway  Urgency: elective    Date/Time: 11/26/2019 12:04 PM  Airway not difficult    General Information and Staff    Patient location during procedure: OR    Indications and Patient Condition  Indications for airway management: airway protection    Preoxygenated: yes  MILS not maintained throughout  Mask difficulty assessment: 1 - vent by mask    Final Airway Details  Final airway type: endotracheal airway      Successful airway: ETT  Cuffed: yes   Successful intubation technique: direct laryngoscopy  Endotracheal tube insertion site: oral  Blade: Vy  Blade size: 3  ETT size (mm): 7.5  Cormack-Lehane Classification: grade I - full view of glottis  Placement verified by: chest auscultation and capnometry   Measured from: lips  ETT/EBT  to lips (cm): 20  Number of attempts at approach: 1  Assessment: lips, teeth, and gum same as pre-op and atraumatic intubation    Additional Comments  Negative epigastric sounds, Breath sound equal bilaterally with symmetric chest rise and fall

## 2019-11-27 ENCOUNTER — APPOINTMENT (OUTPATIENT)
Dept: GENERAL RADIOLOGY | Facility: HOSPITAL | Age: 29
End: 2019-11-27

## 2019-11-27 ENCOUNTER — APPOINTMENT (OUTPATIENT)
Dept: CT IMAGING | Facility: HOSPITAL | Age: 29
End: 2019-11-27

## 2019-11-27 ENCOUNTER — HOSPITAL ENCOUNTER (INPATIENT)
Facility: HOSPITAL | Age: 29
LOS: 1 days | Discharge: HOME | End: 2019-11-29
Attending: EMERGENCY MEDICINE | Admitting: INTERNAL MEDICINE

## 2019-11-27 VITALS
HEART RATE: 83 BPM | BODY MASS INDEX: 36.33 KG/M2 | HEIGHT: 65 IN | DIASTOLIC BLOOD PRESSURE: 93 MMHG | RESPIRATION RATE: 18 BRPM | WEIGHT: 218.03 LBS | OXYGEN SATURATION: 97 % | TEMPERATURE: 98.8 F | SYSTOLIC BLOOD PRESSURE: 136 MMHG

## 2019-11-27 DIAGNOSIS — Z79.4 TYPE 2 DIABETES MELLITUS WITH HYPERGLYCEMIA, WITH LONG-TERM CURRENT USE OF INSULIN (HCC): ICD-10-CM

## 2019-11-27 DIAGNOSIS — D72.829 LEUKOCYTOSIS, UNSPECIFIED TYPE: ICD-10-CM

## 2019-11-27 DIAGNOSIS — E11.65 TYPE 2 DIABETES MELLITUS WITH HYPERGLYCEMIA, WITH LONG-TERM CURRENT USE OF INSULIN (HCC): ICD-10-CM

## 2019-11-27 DIAGNOSIS — Z87.19 HISTORY OF REPAIR OF HIATAL HERNIA: ICD-10-CM

## 2019-11-27 DIAGNOSIS — R10.12 ABDOMINAL PAIN, ACUTE, LEFT UPPER QUADRANT: Primary | ICD-10-CM

## 2019-11-27 DIAGNOSIS — R18.8 INTRA-ABDOMINAL FLUID COLLECTION: ICD-10-CM

## 2019-11-27 DIAGNOSIS — I95.9 HYPOTENSION, UNSPECIFIED HYPOTENSION TYPE: ICD-10-CM

## 2019-11-27 DIAGNOSIS — Z98.890 HISTORY OF REPAIR OF HIATAL HERNIA: ICD-10-CM

## 2019-11-27 DIAGNOSIS — Z98.84 HISTORY OF GASTRIC RESTRICTIVE SURGERY: ICD-10-CM

## 2019-11-27 PROBLEM — R00.0 TACHYCARDIA: Status: ACTIVE | Noted: 2019-11-27

## 2019-11-27 LAB
ALBUMIN SERPL-MCNC: 3.4 G/DL (ref 3.5–5.2)
ALBUMIN SERPL-MCNC: 3.7 G/DL (ref 3.5–5.2)
ALBUMIN/GLOB SERPL: 1.1 G/DL
ALBUMIN/GLOB SERPL: 1.2 G/DL
ALP SERPL-CCNC: 60 U/L (ref 39–117)
ALP SERPL-CCNC: 69 U/L (ref 39–117)
ALT SERPL W P-5'-P-CCNC: 239 U/L (ref 1–33)
ALT SERPL W P-5'-P-CCNC: 267 U/L (ref 1–33)
ANION GAP SERPL CALCULATED.3IONS-SCNC: 12 MMOL/L (ref 5–15)
ANION GAP SERPL CALCULATED.3IONS-SCNC: 16 MMOL/L (ref 5–15)
AST SERPL-CCNC: 69 U/L (ref 1–32)
AST SERPL-CCNC: 83 U/L (ref 1–32)
B-HCG UR QL: NEGATIVE
BACTERIA UR QL AUTO: ABNORMAL /HPF
BASOPHILS # BLD AUTO: 0.02 10*3/MM3 (ref 0–0.2)
BASOPHILS # BLD AUTO: 0.08 10*3/MM3 (ref 0–0.2)
BASOPHILS NFR BLD AUTO: 0.2 % (ref 0–1.5)
BASOPHILS NFR BLD AUTO: 0.3 % (ref 0–1.5)
BILIRUB SERPL-MCNC: 0.5 MG/DL (ref 0.2–1.2)
BILIRUB SERPL-MCNC: 0.5 MG/DL (ref 0.2–1.2)
BILIRUB UR QL STRIP: NEGATIVE
BUN BLD-MCNC: 12 MG/DL (ref 6–20)
BUN BLD-MCNC: 7 MG/DL (ref 6–20)
BUN/CREAT SERPL: 13.2 (ref 7–25)
BUN/CREAT SERPL: 20.3 (ref 7–25)
CALCIUM SPEC-SCNC: 8.3 MG/DL (ref 8.6–10.5)
CALCIUM SPEC-SCNC: 8.8 MG/DL (ref 8.6–10.5)
CHLORIDE SERPL-SCNC: 101 MMOL/L (ref 98–107)
CHLORIDE SERPL-SCNC: 102 MMOL/L (ref 98–107)
CLARITY UR: CLEAR
CO2 SERPL-SCNC: 19 MMOL/L (ref 22–29)
CO2 SERPL-SCNC: 23 MMOL/L (ref 22–29)
COLOR UR: YELLOW
CREAT BLD-MCNC: 0.53 MG/DL (ref 0.57–1)
CREAT BLD-MCNC: 0.59 MG/DL (ref 0.57–1)
D-LACTATE SERPL-SCNC: 1.3 MMOL/L (ref 0.5–2)
DEPRECATED RDW RBC AUTO: 40.5 FL (ref 37–54)
DEPRECATED RDW RBC AUTO: 42 FL (ref 37–54)
EOSINOPHIL # BLD AUTO: 0 10*3/MM3 (ref 0–0.4)
EOSINOPHIL # BLD AUTO: 0.02 10*3/MM3 (ref 0–0.4)
EOSINOPHIL NFR BLD AUTO: 0 % (ref 0.3–6.2)
EOSINOPHIL NFR BLD AUTO: 0.1 % (ref 0.3–6.2)
ERYTHROCYTE [DISTWIDTH] IN BLOOD BY AUTOMATED COUNT: 11.8 % (ref 12.3–15.4)
ERYTHROCYTE [DISTWIDTH] IN BLOOD BY AUTOMATED COUNT: 12.1 % (ref 12.3–15.4)
GFR SERPL CREATININE-BSD FRML MDRD: 120 ML/MIN/1.73
GFR SERPL CREATININE-BSD FRML MDRD: 135 ML/MIN/1.73
GFR SERPL CREATININE-BSD FRML MDRD: 145 ML/MIN/1.73
GFR SERPL CREATININE-BSD FRML MDRD: >150 ML/MIN/1.73
GLOBULIN UR ELPH-MCNC: 2.8 GM/DL
GLOBULIN UR ELPH-MCNC: 3.4 GM/DL
GLUCOSE BLD-MCNC: 190 MG/DL (ref 65–99)
GLUCOSE BLD-MCNC: 299 MG/DL (ref 65–99)
GLUCOSE BLDC GLUCOMTR-MCNC: 166 MG/DL (ref 70–130)
GLUCOSE BLDC GLUCOMTR-MCNC: 182 MG/DL (ref 70–130)
GLUCOSE BLDC GLUCOMTR-MCNC: 196 MG/DL (ref 70–130)
GLUCOSE BLDC GLUCOMTR-MCNC: 255 MG/DL (ref 70–130)
GLUCOSE UR STRIP-MCNC: ABNORMAL MG/DL
HCT VFR BLD AUTO: 36 % (ref 34–46.6)
HCT VFR BLD AUTO: 41.3 % (ref 34–46.6)
HGB BLD-MCNC: 12.2 G/DL (ref 12–15.9)
HGB BLD-MCNC: 14.3 G/DL (ref 12–15.9)
HGB UR QL STRIP.AUTO: ABNORMAL
HOLD SPECIMEN: NORMAL
HOLD SPECIMEN: NORMAL
HYALINE CASTS UR QL AUTO: ABNORMAL /LPF
IMM GRANULOCYTES # BLD AUTO: 0.05 10*3/MM3 (ref 0–0.05)
IMM GRANULOCYTES # BLD AUTO: 0.25 10*3/MM3 (ref 0–0.05)
IMM GRANULOCYTES NFR BLD AUTO: 0.4 % (ref 0–0.5)
IMM GRANULOCYTES NFR BLD AUTO: 1.1 % (ref 0–0.5)
INTERNAL NEGATIVE CONTROL: NEGATIVE
INTERNAL POSITIVE CONTROL: POSITIVE
IRON 24H UR-MRATE: 102 MCG/DL (ref 37–145)
KETONES UR QL STRIP: ABNORMAL
LEUKOCYTE ESTERASE UR QL STRIP.AUTO: NEGATIVE
LIPASE SERPL-CCNC: 36 U/L (ref 13–60)
LYMPHOCYTES # BLD AUTO: 2.11 10*3/MM3 (ref 0.7–3.1)
LYMPHOCYTES # BLD AUTO: 3.08 10*3/MM3 (ref 0.7–3.1)
LYMPHOCYTES NFR BLD AUTO: 13 % (ref 19.6–45.3)
LYMPHOCYTES NFR BLD AUTO: 17.3 % (ref 19.6–45.3)
Lab: NORMAL
MCH RBC QN AUTO: 32.2 PG (ref 26.6–33)
MCH RBC QN AUTO: 32.3 PG (ref 26.6–33)
MCHC RBC AUTO-ENTMCNC: 33.9 G/DL (ref 31.5–35.7)
MCHC RBC AUTO-ENTMCNC: 34.6 G/DL (ref 31.5–35.7)
MCV RBC AUTO: 93.2 FL (ref 79–97)
MCV RBC AUTO: 95 FL (ref 79–97)
MONOCYTES # BLD AUTO: 0.67 10*3/MM3 (ref 0.1–0.9)
MONOCYTES # BLD AUTO: 1.13 10*3/MM3 (ref 0.1–0.9)
MONOCYTES NFR BLD AUTO: 4.8 % (ref 5–12)
MONOCYTES NFR BLD AUTO: 5.5 % (ref 5–12)
MUCOUS THREADS URNS QL MICRO: ABNORMAL /HPF
NEUTROPHILS # BLD AUTO: 19.11 10*3/MM3 (ref 1.7–7)
NEUTROPHILS # BLD AUTO: 9.35 10*3/MM3 (ref 1.7–7)
NEUTROPHILS NFR BLD AUTO: 76.6 % (ref 42.7–76)
NEUTROPHILS NFR BLD AUTO: 80.7 % (ref 42.7–76)
NITRITE UR QL STRIP: NEGATIVE
NRBC BLD AUTO-RTO: 0 /100 WBC (ref 0–0.2)
NRBC BLD AUTO-RTO: 0 /100 WBC (ref 0–0.2)
PH UR STRIP.AUTO: 5.5 [PH] (ref 5–8)
PLATELET # BLD AUTO: 226 10*3/MM3 (ref 140–450)
PLATELET # BLD AUTO: 301 10*3/MM3 (ref 140–450)
PMV BLD AUTO: 10.7 FL (ref 6–12)
PMV BLD AUTO: 11.3 FL (ref 6–12)
POTASSIUM BLD-SCNC: 3.9 MMOL/L (ref 3.5–5.2)
POTASSIUM BLD-SCNC: 3.9 MMOL/L (ref 3.5–5.2)
PROT SERPL-MCNC: 6.2 G/DL (ref 6–8.5)
PROT SERPL-MCNC: 7.1 G/DL (ref 6–8.5)
PROT UR QL STRIP: ABNORMAL
RBC # BLD AUTO: 3.79 10*6/MM3 (ref 3.77–5.28)
RBC # BLD AUTO: 4.43 10*6/MM3 (ref 3.77–5.28)
RBC # UR: ABNORMAL /HPF
REF LAB TEST METHOD: ABNORMAL
SODIUM BLD-SCNC: 136 MMOL/L (ref 136–145)
SODIUM BLD-SCNC: 137 MMOL/L (ref 136–145)
SP GR UR STRIP: 1.03 (ref 1–1.03)
SQUAMOUS #/AREA URNS HPF: ABNORMAL /HPF
UROBILINOGEN UR QL STRIP: ABNORMAL
WBC NRBC COR # BLD: 12.2 10*3/MM3 (ref 3.4–10.8)
WBC NRBC COR # BLD: 23.67 10*3/MM3 (ref 3.4–10.8)
WBC UR QL AUTO: ABNORMAL /HPF
WHOLE BLOOD HOLD SPECIMEN: NORMAL
WHOLE BLOOD HOLD SPECIMEN: NORMAL
YEAST URNS QL MICRO: ABNORMAL /HPF

## 2019-11-27 PROCEDURE — 25010000002 HYDROMORPHONE PER 4 MG: Performed by: EMERGENCY MEDICINE

## 2019-11-27 PROCEDURE — 74241: CPT

## 2019-11-27 PROCEDURE — 25010000002 ONDANSETRON PER 1 MG: Performed by: SURGERY

## 2019-11-27 PROCEDURE — 87040 BLOOD CULTURE FOR BACTERIA: CPT | Performed by: PHYSICIAN ASSISTANT

## 2019-11-27 PROCEDURE — 25010000002 IOPAMIDOL 61 % SOLUTION: Performed by: EMERGENCY MEDICINE

## 2019-11-27 PROCEDURE — 85025 COMPLETE CBC W/AUTO DIFF WBC: CPT | Performed by: EMERGENCY MEDICINE

## 2019-11-27 PROCEDURE — 25010000003 CEFAZOLIN IN DEXTROSE 2-4 GM/100ML-% SOLUTION: Performed by: SURGERY

## 2019-11-27 PROCEDURE — 85025 COMPLETE CBC W/AUTO DIFF WBC: CPT | Performed by: SURGERY

## 2019-11-27 PROCEDURE — 83605 ASSAY OF LACTIC ACID: CPT | Performed by: PHYSICIAN ASSISTANT

## 2019-11-27 PROCEDURE — 83540 ASSAY OF IRON: CPT | Performed by: SURGERY

## 2019-11-27 PROCEDURE — 25010000002 PIPERACILLIN SOD-TAZOBACTAM PER 1 G: Performed by: PHYSICIAN ASSISTANT

## 2019-11-27 PROCEDURE — G0378 HOSPITAL OBSERVATION PER HR: HCPCS

## 2019-11-27 PROCEDURE — 25010000002 THIAMINE PER 100 MG: Performed by: SURGERY

## 2019-11-27 PROCEDURE — 25010000002 CYANOCOBALAMIN PER 1000 MCG: Performed by: SURGERY

## 2019-11-27 PROCEDURE — 99024 POSTOP FOLLOW-UP VISIT: CPT | Performed by: SURGERY

## 2019-11-27 PROCEDURE — 74177 CT ABD & PELVIS W/CONTRAST: CPT

## 2019-11-27 PROCEDURE — 82962 GLUCOSE BLOOD TEST: CPT

## 2019-11-27 PROCEDURE — G0108 DIAB MANAGE TRN  PER INDIV: HCPCS

## 2019-11-27 PROCEDURE — 99284 EMERGENCY DEPT VISIT MOD MDM: CPT

## 2019-11-27 PROCEDURE — 25010000002 ONDANSETRON PER 1 MG: Performed by: EMERGENCY MEDICINE

## 2019-11-27 PROCEDURE — 81025 URINE PREGNANCY TEST: CPT | Performed by: EMERGENCY MEDICINE

## 2019-11-27 PROCEDURE — 81001 URINALYSIS AUTO W/SCOPE: CPT | Performed by: EMERGENCY MEDICINE

## 2019-11-27 PROCEDURE — 25010000002 ENOXAPARIN PER 10 MG: Performed by: SURGERY

## 2019-11-27 PROCEDURE — 80053 COMPREHEN METABOLIC PANEL: CPT | Performed by: EMERGENCY MEDICINE

## 2019-11-27 PROCEDURE — 0 DIATRIZOATE MEGLUMINE & SODIUM PER 1 ML: Performed by: SURGERY

## 2019-11-27 PROCEDURE — 83690 ASSAY OF LIPASE: CPT | Performed by: EMERGENCY MEDICINE

## 2019-11-27 PROCEDURE — 80053 COMPREHEN METABOLIC PANEL: CPT | Performed by: SURGERY

## 2019-11-27 PROCEDURE — 99255 IP/OBS CONSLTJ NEW/EST HI 80: CPT | Performed by: INTERNAL MEDICINE

## 2019-11-27 RX ORDER — SODIUM CHLORIDE 9 MG/ML
125 INJECTION, SOLUTION INTRAVENOUS CONTINUOUS
Status: DISCONTINUED | OUTPATIENT
Start: 2019-11-27 | End: 2019-11-29

## 2019-11-27 RX ORDER — ONDANSETRON 2 MG/ML
4 INJECTION INTRAMUSCULAR; INTRAVENOUS ONCE
Status: COMPLETED | OUTPATIENT
Start: 2019-11-27 | End: 2019-11-27

## 2019-11-27 RX ORDER — OMEPRAZOLE 40 MG/1
40 CAPSULE, DELAYED RELEASE ORAL DAILY
Qty: 60 CAPSULE | Refills: 0 | Status: SHIPPED | OUTPATIENT
Start: 2019-11-27 | End: 2020-01-14 | Stop reason: SDUPTHER

## 2019-11-27 RX ORDER — HYDROMORPHONE HYDROCHLORIDE 1 MG/ML
0.5 INJECTION, SOLUTION INTRAMUSCULAR; INTRAVENOUS; SUBCUTANEOUS ONCE
Status: COMPLETED | OUTPATIENT
Start: 2019-11-27 | End: 2019-11-27

## 2019-11-27 RX ORDER — INSULIN GLARGINE 100 [IU]/ML
5 INJECTION, SOLUTION SUBCUTANEOUS NIGHTLY
Qty: 2 PEN | Refills: 6
Start: 2019-11-27 | End: 2020-01-14

## 2019-11-27 RX ORDER — PROMETHAZINE HYDROCHLORIDE 12.5 MG/1
12.5 TABLET ORAL EVERY 6 HOURS PRN
Qty: 10 TABLET | Refills: 0 | Status: SHIPPED | OUTPATIENT
Start: 2019-11-27 | End: 2020-01-14

## 2019-11-27 RX ORDER — HYDROCODONE BITARTRATE AND ACETAMINOPHEN 7.5; 325 MG/1; MG/1
1 TABLET ORAL EVERY 6 HOURS PRN
Qty: 10 TABLET | Refills: 0 | Status: SHIPPED | OUTPATIENT
Start: 2019-11-27 | End: 2019-12-04

## 2019-11-27 RX ORDER — SODIUM CHLORIDE 0.9 % (FLUSH) 0.9 %
10 SYRINGE (ML) INJECTION AS NEEDED
Status: DISCONTINUED | OUTPATIENT
Start: 2019-11-27 | End: 2019-11-29 | Stop reason: HOSPADM

## 2019-11-27 RX ORDER — PANTOPRAZOLE SODIUM 40 MG/10ML
40 INJECTION, POWDER, LYOPHILIZED, FOR SOLUTION INTRAVENOUS
Status: DISCONTINUED | OUTPATIENT
Start: 2019-11-28 | End: 2019-11-28

## 2019-11-27 RX ADMIN — CEFAZOLIN SODIUM 2 G: 2 INJECTION, SOLUTION INTRAVENOUS at 03:51

## 2019-11-27 RX ADMIN — IOPAMIDOL 95 ML: 612 INJECTION, SOLUTION INTRAVENOUS at 22:20

## 2019-11-27 RX ADMIN — SODIUM CHLORIDE, POTASSIUM CHLORIDE, SODIUM LACTATE AND CALCIUM CHLORIDE 150 ML/HR: 600; 310; 30; 20 INJECTION, SOLUTION INTRAVENOUS at 00:25

## 2019-11-27 RX ADMIN — ACETAMINOPHEN 1000 MG: 500 TABLET, FILM COATED ORAL at 08:15

## 2019-11-27 RX ADMIN — HYDROMORPHONE HYDROCHLORIDE 0.5 MG: 1 INJECTION, SOLUTION INTRAMUSCULAR; INTRAVENOUS; SUBCUTANEOUS at 21:01

## 2019-11-27 RX ADMIN — SODIUM CHLORIDE 1000 ML: 9 INJECTION, SOLUTION INTRAVENOUS at 20:57

## 2019-11-27 RX ADMIN — ONDANSETRON 4 MG: 2 INJECTION INTRAMUSCULAR; INTRAVENOUS at 00:25

## 2019-11-27 RX ADMIN — GABAPENTIN 100 MG: 100 CAPSULE ORAL at 15:41

## 2019-11-27 RX ADMIN — GABAPENTIN 100 MG: 100 CAPSULE ORAL at 08:16

## 2019-11-27 RX ADMIN — HYDROMORPHONE HYDROCHLORIDE 0.5 MG: 1 INJECTION, SOLUTION INTRAMUSCULAR; INTRAVENOUS; SUBCUTANEOUS at 22:15

## 2019-11-27 RX ADMIN — ENOXAPARIN SODIUM 40 MG: 40 INJECTION SUBCUTANEOUS at 08:15

## 2019-11-27 RX ADMIN — TAZOBACTAM SODIUM AND PIPERACILLIN SODIUM 3.38 G: 375; 3 INJECTION, SOLUTION INTRAVENOUS at 23:21

## 2019-11-27 RX ADMIN — THIAMINE HYDROCHLORIDE 100 ML/HR: 100 INJECTION, SOLUTION INTRAMUSCULAR; INTRAVENOUS at 03:51

## 2019-11-27 RX ADMIN — INSULIN LISPRO 3 UNITS: 100 INJECTION, SOLUTION INTRAVENOUS; SUBCUTANEOUS at 09:18

## 2019-11-27 RX ADMIN — ONDANSETRON 4 MG: 2 INJECTION INTRAMUSCULAR; INTRAVENOUS at 20:58

## 2019-11-27 RX ADMIN — ONDANSETRON 4 MG: 2 INJECTION INTRAMUSCULAR; INTRAVENOUS at 05:38

## 2019-11-27 RX ADMIN — SODIUM CHLORIDE, PRESERVATIVE FREE 3 ML: 5 INJECTION INTRAVENOUS at 08:23

## 2019-11-27 RX ADMIN — THIAMINE HYDROCHLORIDE 100 ML/HR: 100 INJECTION, SOLUTION INTRAMUSCULAR; INTRAVENOUS at 09:20

## 2019-11-27 RX ADMIN — INSULIN LISPRO 3 UNITS: 100 INJECTION, SOLUTION INTRAVENOUS; SUBCUTANEOUS at 12:41

## 2019-11-27 RX ADMIN — ONDANSETRON 4 MG: 2 INJECTION INTRAMUSCULAR; INTRAVENOUS at 12:41

## 2019-11-27 RX ADMIN — ONDANSETRON 4 MG: 2 INJECTION INTRAMUSCULAR; INTRAVENOUS at 22:15

## 2019-11-27 RX ADMIN — SODIUM CHLORIDE 1000 ML: 9 INJECTION, SOLUTION INTRAVENOUS at 10:41

## 2019-11-27 RX ADMIN — SODIUM CHLORIDE 1000 ML: 9 INJECTION, SOLUTION INTRAVENOUS at 23:50

## 2019-11-27 RX ADMIN — OXYCODONE HYDROCHLORIDE 5 MG: 5 TABLET ORAL at 07:05

## 2019-11-27 RX ADMIN — SODIUM CHLORIDE 1000 ML: 9 INJECTION, SOLUTION INTRAVENOUS at 22:15

## 2019-11-27 RX ADMIN — ALPRAZOLAM 0.25 MG: 0.25 TABLET ORAL at 08:16

## 2019-11-27 RX ADMIN — POTASSIUM CHLORIDE AND SODIUM CHLORIDE 100 ML/HR: 450; 150 INJECTION, SOLUTION INTRAVENOUS at 14:22

## 2019-11-27 RX ADMIN — Medication 30 ML: at 08:52

## 2019-11-27 RX ADMIN — OXYCODONE HYDROCHLORIDE 5 MG: 5 TABLET ORAL at 16:58

## 2019-11-27 RX ADMIN — CYANOCOBALAMIN 1000 MCG: 1000 INJECTION, SOLUTION INTRAMUSCULAR; SUBCUTANEOUS at 08:16

## 2019-11-27 RX ADMIN — PANTOPRAZOLE SODIUM 40 MG: 40 TABLET, DELAYED RELEASE ORAL at 05:38

## 2019-11-28 ENCOUNTER — READMISSION MANAGEMENT (OUTPATIENT)
Dept: CALL CENTER | Facility: HOSPITAL | Age: 29
End: 2019-11-28

## 2019-11-28 PROBLEM — N39.0 UTI (URINARY TRACT INFECTION): Status: ACTIVE | Noted: 2019-11-28

## 2019-11-28 PROBLEM — I95.9 HYPOTENSION: Status: ACTIVE | Noted: 2019-11-28

## 2019-11-28 PROBLEM — R10.12 ABDOMINAL PAIN, ACUTE, LEFT UPPER QUADRANT: Status: ACTIVE | Noted: 2019-11-28

## 2019-11-28 PROBLEM — Z98.84 S/P LAPAROSCOPIC SLEEVE GASTRECTOMY: Status: ACTIVE | Noted: 2019-11-28

## 2019-11-28 LAB
ABO GROUP BLD: NORMAL
ABO GROUP BLD: NORMAL
ALBUMIN SERPL-MCNC: 2.8 G/DL (ref 3.5–5.2)
ALBUMIN/GLOB SERPL: 1.2 G/DL
ALP SERPL-CCNC: 46 U/L (ref 39–117)
ALT SERPL W P-5'-P-CCNC: 136 U/L (ref 1–33)
ANION GAP SERPL CALCULATED.3IONS-SCNC: 10 MMOL/L (ref 5–15)
AST SERPL-CCNC: 27 U/L (ref 1–32)
BASOPHILS # BLD AUTO: 0.04 10*3/MM3 (ref 0–0.2)
BASOPHILS NFR BLD AUTO: 0.4 % (ref 0–1.5)
BILIRUB SERPL-MCNC: 1.4 MG/DL (ref 0.2–1.2)
BLD GP AB SCN SERPL QL: NEGATIVE
BUN BLD-MCNC: 7 MG/DL (ref 6–20)
BUN/CREAT SERPL: 15.6 (ref 7–25)
CALCIUM SPEC-SCNC: 7.6 MG/DL (ref 8.6–10.5)
CHLORIDE SERPL-SCNC: 107 MMOL/L (ref 98–107)
CO2 SERPL-SCNC: 22 MMOL/L (ref 22–29)
CREAT BLD-MCNC: 0.45 MG/DL (ref 0.57–1)
DEPRECATED RDW RBC AUTO: 45.7 FL (ref 37–54)
EOSINOPHIL # BLD AUTO: 0.05 10*3/MM3 (ref 0–0.4)
EOSINOPHIL NFR BLD AUTO: 0.5 % (ref 0.3–6.2)
ERYTHROCYTE [DISTWIDTH] IN BLOOD BY AUTOMATED COUNT: 14.2 % (ref 12.3–15.4)
GFR SERPL CREATININE-BSD FRML MDRD: >150 ML/MIN/1.73
GFR SERPL CREATININE-BSD FRML MDRD: >150 ML/MIN/1.73
GLOBULIN UR ELPH-MCNC: 2.4 GM/DL
GLUCOSE BLD-MCNC: 179 MG/DL (ref 65–99)
GLUCOSE BLDC GLUCOMTR-MCNC: 167 MG/DL (ref 70–130)
GLUCOSE BLDC GLUCOMTR-MCNC: 172 MG/DL (ref 70–130)
GLUCOSE BLDC GLUCOMTR-MCNC: 173 MG/DL (ref 70–130)
GLUCOSE BLDC GLUCOMTR-MCNC: 186 MG/DL (ref 70–130)
GLUCOSE BLDC GLUCOMTR-MCNC: 221 MG/DL (ref 70–130)
HCT VFR BLD AUTO: 26.9 % (ref 34–46.6)
HCT VFR BLD AUTO: 31 % (ref 34–46.6)
HCT VFR BLD AUTO: 33.3 % (ref 34–46.6)
HGB BLD-MCNC: 10.4 G/DL (ref 12–15.9)
HGB BLD-MCNC: 11 G/DL (ref 12–15.9)
HGB BLD-MCNC: 11.4 G/DL (ref 12–15.9)
HGB BLD-MCNC: 9.3 G/DL (ref 12–15.9)
IMM GRANULOCYTES # BLD AUTO: 0.1 10*3/MM3 (ref 0–0.05)
IMM GRANULOCYTES NFR BLD AUTO: 1 % (ref 0–0.5)
LYMPHOCYTES # BLD AUTO: 2.54 10*3/MM3 (ref 0.7–3.1)
LYMPHOCYTES NFR BLD AUTO: 25.8 % (ref 19.6–45.3)
MCH RBC QN AUTO: 31.1 PG (ref 26.6–33)
MCHC RBC AUTO-ENTMCNC: 35.5 G/DL (ref 31.5–35.7)
MCV RBC AUTO: 87.6 FL (ref 79–97)
MONOCYTES # BLD AUTO: 0.55 10*3/MM3 (ref 0.1–0.9)
MONOCYTES NFR BLD AUTO: 5.6 % (ref 5–12)
NEUTROPHILS # BLD AUTO: 6.58 10*3/MM3 (ref 1.7–7)
NEUTROPHILS NFR BLD AUTO: 66.7 % (ref 42.7–76)
NRBC BLD AUTO-RTO: 0 /100 WBC (ref 0–0.2)
PLAT MORPH BLD: NORMAL
PLATELET # BLD AUTO: 125 10*3/MM3 (ref 140–450)
PMV BLD AUTO: 11.6 FL (ref 6–12)
POTASSIUM BLD-SCNC: 3.9 MMOL/L (ref 3.5–5.2)
PROT SERPL-MCNC: 5.2 G/DL (ref 6–8.5)
RBC # BLD AUTO: 3.54 10*6/MM3 (ref 3.77–5.28)
RBC MORPH BLD: NORMAL
RH BLD: POSITIVE
RH BLD: POSITIVE
SODIUM BLD-SCNC: 139 MMOL/L (ref 136–145)
T&S EXPIRATION DATE: NORMAL
WBC MORPH BLD: NORMAL
WBC NRBC COR # BLD: 9.86 10*3/MM3 (ref 3.4–10.8)

## 2019-11-28 PROCEDURE — P9016 RBC LEUKOCYTES REDUCED: HCPCS

## 2019-11-28 PROCEDURE — 63710000001 INSULIN LISPRO (HUMAN) PER 5 UNITS: Performed by: INTERNAL MEDICINE

## 2019-11-28 PROCEDURE — 86901 BLOOD TYPING SEROLOGIC RH(D): CPT | Performed by: EMERGENCY MEDICINE

## 2019-11-28 PROCEDURE — 85025 COMPLETE CBC W/AUTO DIFF WBC: CPT | Performed by: SURGERY

## 2019-11-28 PROCEDURE — 82962 GLUCOSE BLOOD TEST: CPT

## 2019-11-28 PROCEDURE — 80053 COMPREHEN METABOLIC PANEL: CPT | Performed by: SURGERY

## 2019-11-28 PROCEDURE — 99291 CRITICAL CARE FIRST HOUR: CPT | Performed by: INTERNAL MEDICINE

## 2019-11-28 PROCEDURE — 85014 HEMATOCRIT: CPT | Performed by: NURSE PRACTITIONER

## 2019-11-28 PROCEDURE — 63710000001 INSULIN DETEMIR PER 5 UNITS: Performed by: INTERNAL MEDICINE

## 2019-11-28 PROCEDURE — 25010000002 PIPERACILLIN SOD-TAZOBACTAM PER 1 G

## 2019-11-28 PROCEDURE — 99024 POSTOP FOLLOW-UP VISIT: CPT | Performed by: SURGERY

## 2019-11-28 PROCEDURE — 85018 HEMOGLOBIN: CPT | Performed by: PHYSICIAN ASSISTANT

## 2019-11-28 PROCEDURE — 99232 SBSQ HOSP IP/OBS MODERATE 35: CPT | Performed by: INTERNAL MEDICINE

## 2019-11-28 PROCEDURE — 86900 BLOOD TYPING SEROLOGIC ABO: CPT

## 2019-11-28 PROCEDURE — 25010000002 HYDROMORPHONE PER 4 MG: Performed by: SURGERY

## 2019-11-28 PROCEDURE — 85014 HEMATOCRIT: CPT | Performed by: PHYSICIAN ASSISTANT

## 2019-11-28 PROCEDURE — 25010000002 VANCOMYCIN 10 G RECONSTITUTED SOLUTION

## 2019-11-28 PROCEDURE — 85018 HEMOGLOBIN: CPT | Performed by: NURSE PRACTITIONER

## 2019-11-28 PROCEDURE — 85007 BL SMEAR W/DIFF WBC COUNT: CPT | Performed by: SURGERY

## 2019-11-28 PROCEDURE — 86901 BLOOD TYPING SEROLOGIC RH(D): CPT

## 2019-11-28 PROCEDURE — 25010000002 HYDROMORPHONE PER 4 MG: Performed by: NURSE PRACTITIONER

## 2019-11-28 PROCEDURE — 86900 BLOOD TYPING SEROLOGIC ABO: CPT | Performed by: EMERGENCY MEDICINE

## 2019-11-28 PROCEDURE — 86923 COMPATIBILITY TEST ELECTRIC: CPT

## 2019-11-28 PROCEDURE — 36430 TRANSFUSION BLD/BLD COMPNT: CPT

## 2019-11-28 PROCEDURE — 85018 HEMOGLOBIN: CPT | Performed by: SURGERY

## 2019-11-28 PROCEDURE — 86850 RBC ANTIBODY SCREEN: CPT | Performed by: EMERGENCY MEDICINE

## 2019-11-28 RX ORDER — DEXTROSE MONOHYDRATE 25 G/50ML
25 INJECTION, SOLUTION INTRAVENOUS
Status: DISCONTINUED | OUTPATIENT
Start: 2019-11-28 | End: 2019-11-28

## 2019-11-28 RX ORDER — SODIUM CHLORIDE 0.9 % (FLUSH) 0.9 %
10 SYRINGE (ML) INJECTION EVERY 12 HOURS SCHEDULED
Status: DISCONTINUED | OUTPATIENT
Start: 2019-11-28 | End: 2019-11-29 | Stop reason: HOSPADM

## 2019-11-28 RX ORDER — PROMETHAZINE HYDROCHLORIDE 12.5 MG/1
12.5 TABLET ORAL EVERY 6 HOURS PRN
Status: DISCONTINUED | OUTPATIENT
Start: 2019-11-28 | End: 2019-11-29

## 2019-11-28 RX ORDER — HYDROCODONE BITARTRATE AND ACETAMINOPHEN 7.5; 325 MG/1; MG/1
1 TABLET ORAL EVERY 6 HOURS PRN
Status: DISCONTINUED | OUTPATIENT
Start: 2019-11-28 | End: 2019-11-29

## 2019-11-28 RX ORDER — NICOTINE POLACRILEX 4 MG
15 LOZENGE BUCCAL
Status: DISCONTINUED | OUTPATIENT
Start: 2019-11-28 | End: 2019-11-28

## 2019-11-28 RX ORDER — SODIUM CHLORIDE, SODIUM LACTATE, POTASSIUM CHLORIDE, CALCIUM CHLORIDE 600; 310; 30; 20 MG/100ML; MG/100ML; MG/100ML; MG/100ML
150 INJECTION, SOLUTION INTRAVENOUS CONTINUOUS
Status: DISCONTINUED | OUTPATIENT
Start: 2019-11-28 | End: 2019-11-29

## 2019-11-28 RX ORDER — PROCHLORPERAZINE MALEATE 5 MG/1
5 TABLET ORAL EVERY 6 HOURS PRN
Status: DISCONTINUED | OUTPATIENT
Start: 2019-11-28 | End: 2019-11-29

## 2019-11-28 RX ORDER — ONDANSETRON 4 MG/1
4 TABLET, FILM COATED ORAL EVERY 6 HOURS PRN
Status: DISCONTINUED | OUTPATIENT
Start: 2019-11-28 | End: 2019-11-28 | Stop reason: SDUPTHER

## 2019-11-28 RX ORDER — ONDANSETRON 2 MG/ML
4 INJECTION INTRAMUSCULAR; INTRAVENOUS EVERY 6 HOURS PRN
Status: DISCONTINUED | OUTPATIENT
Start: 2019-11-28 | End: 2019-11-29

## 2019-11-28 RX ORDER — ONDANSETRON 4 MG/1
4 TABLET, FILM COATED ORAL EVERY 6 HOURS PRN
Status: DISCONTINUED | OUTPATIENT
Start: 2019-11-28 | End: 2019-11-28

## 2019-11-28 RX ORDER — PROCHLORPERAZINE 25 MG
25 SUPPOSITORY, RECTAL RECTAL EVERY 12 HOURS PRN
Status: DISCONTINUED | OUTPATIENT
Start: 2019-11-28 | End: 2019-11-29

## 2019-11-28 RX ORDER — PROCHLORPERAZINE EDISYLATE 5 MG/ML
5 INJECTION INTRAMUSCULAR; INTRAVENOUS EVERY 6 HOURS PRN
Status: DISCONTINUED | OUTPATIENT
Start: 2019-11-28 | End: 2019-11-29

## 2019-11-28 RX ORDER — HYDROMORPHONE HYDROCHLORIDE 1 MG/ML
0.5 INJECTION, SOLUTION INTRAMUSCULAR; INTRAVENOUS; SUBCUTANEOUS EVERY 6 HOURS PRN
Status: DISCONTINUED | OUTPATIENT
Start: 2019-11-28 | End: 2019-11-28 | Stop reason: SDUPTHER

## 2019-11-28 RX ORDER — HYDROMORPHONE HYDROCHLORIDE 1 MG/ML
0.5 INJECTION, SOLUTION INTRAMUSCULAR; INTRAVENOUS; SUBCUTANEOUS
Status: DISCONTINUED | OUTPATIENT
Start: 2019-11-28 | End: 2019-11-29

## 2019-11-28 RX ORDER — SODIUM CHLORIDE 0.9 % (FLUSH) 0.9 %
10 SYRINGE (ML) INJECTION AS NEEDED
Status: DISCONTINUED | OUTPATIENT
Start: 2019-11-28 | End: 2019-11-29 | Stop reason: HOSPADM

## 2019-11-28 RX ORDER — NALOXONE HCL 0.4 MG/ML
0.4 VIAL (ML) INJECTION
Status: DISCONTINUED | OUTPATIENT
Start: 2019-11-28 | End: 2019-11-28

## 2019-11-28 RX ORDER — METOCLOPRAMIDE 10 MG/1
10 TABLET ORAL EVERY 8 HOURS PRN
Status: DISCONTINUED | OUTPATIENT
Start: 2019-11-28 | End: 2019-11-29

## 2019-11-28 RX ORDER — CYCLOBENZAPRINE HCL 10 MG
5 TABLET ORAL EVERY 8 HOURS SCHEDULED
Status: DISCONTINUED | OUTPATIENT
Start: 2019-11-28 | End: 2019-11-29 | Stop reason: HOSPADM

## 2019-11-28 RX ORDER — PANTOPRAZOLE SODIUM 40 MG/10ML
40 INJECTION, POWDER, LYOPHILIZED, FOR SOLUTION INTRAVENOUS
Status: DISCONTINUED | OUTPATIENT
Start: 2019-11-28 | End: 2019-11-29

## 2019-11-28 RX ORDER — SODIUM CHLORIDE 9 MG/ML
125 INJECTION, SOLUTION INTRAVENOUS CONTINUOUS
Status: DISCONTINUED | OUTPATIENT
Start: 2019-11-28 | End: 2019-11-28 | Stop reason: SDUPTHER

## 2019-11-28 RX ADMIN — SODIUM CHLORIDE, PRESERVATIVE FREE 10 ML: 5 INJECTION INTRAVENOUS at 08:28

## 2019-11-28 RX ADMIN — PANTOPRAZOLE SODIUM 40 MG: 40 INJECTION, POWDER, FOR SOLUTION INTRAVENOUS at 18:01

## 2019-11-28 RX ADMIN — SODIUM CHLORIDE, POTASSIUM CHLORIDE, SODIUM LACTATE AND CALCIUM CHLORIDE 150 ML/HR: 600; 310; 30; 20 INJECTION, SOLUTION INTRAVENOUS at 23:51

## 2019-11-28 RX ADMIN — HYDROCODONE BITARTRATE AND ACETAMINOPHEN 1 TABLET: 7.5; 325 TABLET ORAL at 15:57

## 2019-11-28 RX ADMIN — HYDROMORPHONE HYDROCHLORIDE 0.5 MG: 1 INJECTION, SOLUTION INTRAMUSCULAR; INTRAVENOUS; SUBCUTANEOUS at 20:01

## 2019-11-28 RX ADMIN — HYDROMORPHONE HYDROCHLORIDE 0.5 MG: 1 INJECTION, SOLUTION INTRAMUSCULAR; INTRAVENOUS; SUBCUTANEOUS at 08:18

## 2019-11-28 RX ADMIN — HYDROCODONE BITARTRATE AND ACETAMINOPHEN 1 TABLET: 7.5; 325 TABLET ORAL at 23:50

## 2019-11-28 RX ADMIN — HYDROMORPHONE HYDROCHLORIDE 0.5 MG: 1 INJECTION, SOLUTION INTRAMUSCULAR; INTRAVENOUS; SUBCUTANEOUS at 02:05

## 2019-11-28 RX ADMIN — TAZOBACTAM SODIUM AND PIPERACILLIN SODIUM 4.5 G: 500; 4 INJECTION, SOLUTION INTRAVENOUS at 08:28

## 2019-11-28 RX ADMIN — SODIUM CHLORIDE, POTASSIUM CHLORIDE, SODIUM LACTATE AND CALCIUM CHLORIDE 150 ML/HR: 600; 310; 30; 20 INJECTION, SOLUTION INTRAVENOUS at 08:25

## 2019-11-28 RX ADMIN — SODIUM CHLORIDE, POTASSIUM CHLORIDE, SODIUM LACTATE AND CALCIUM CHLORIDE 150 ML/HR: 600; 310; 30; 20 INJECTION, SOLUTION INTRAVENOUS at 16:10

## 2019-11-28 RX ADMIN — TAZOBACTAM SODIUM AND PIPERACILLIN SODIUM 4.5 G: 500; 4 INJECTION, SOLUTION INTRAVENOUS at 23:53

## 2019-11-28 RX ADMIN — VANCOMYCIN HYDROCHLORIDE 1500 MG: 10 INJECTION, POWDER, LYOPHILIZED, FOR SOLUTION INTRAVENOUS at 21:07

## 2019-11-28 RX ADMIN — SERTRALINE HYDROCHLORIDE 25 MG: 50 TABLET ORAL at 08:38

## 2019-11-28 RX ADMIN — SODIUM CHLORIDE, PRESERVATIVE FREE 10 ML: 5 INJECTION INTRAVENOUS at 21:08

## 2019-11-28 RX ADMIN — PANTOPRAZOLE SODIUM 40 MG: 40 INJECTION, POWDER, FOR SOLUTION INTRAVENOUS at 06:48

## 2019-11-28 RX ADMIN — SODIUM CHLORIDE 125 ML/HR: 9 INJECTION, SOLUTION INTRAVENOUS at 01:27

## 2019-11-28 RX ADMIN — PANTOPRAZOLE SODIUM 40 MG: 40 INJECTION, POWDER, FOR SOLUTION INTRAVENOUS at 01:43

## 2019-11-28 RX ADMIN — INSULIN DETEMIR 5 UNITS: 100 INJECTION, SOLUTION SUBCUTANEOUS at 20:50

## 2019-11-28 RX ADMIN — CYCLOBENZAPRINE HYDROCHLORIDE 5 MG: 10 TABLET, FILM COATED ORAL at 18:01

## 2019-11-28 RX ADMIN — TAZOBACTAM SODIUM AND PIPERACILLIN SODIUM 4.5 G: 500; 4 INJECTION, SOLUTION INTRAVENOUS at 16:10

## 2019-11-28 RX ADMIN — VANCOMYCIN HYDROCHLORIDE 2500 MG: 10 INJECTION, POWDER, LYOPHILIZED, FOR SOLUTION INTRAVENOUS at 02:08

## 2019-11-28 NOTE — OUTREACH NOTE
Prep Survey      Responses   Facility patient discharged from?  [place]   Is patient eligible?  No   What are the reasons patient is not eligible?  Readmitted   Does the patient have one of the following disease processes/diagnoses(primary or secondary)?  General Surgery   Prep survey completed?  Yes          Yi Link RN

## 2019-11-29 VITALS
OXYGEN SATURATION: 95 % | DIASTOLIC BLOOD PRESSURE: 83 MMHG | TEMPERATURE: 98.5 F | WEIGHT: 220 LBS | HEART RATE: 108 BPM | RESPIRATION RATE: 18 BRPM | HEIGHT: 65 IN | BODY MASS INDEX: 36.65 KG/M2 | SYSTOLIC BLOOD PRESSURE: 122 MMHG

## 2019-11-29 LAB
ABO + RH BLD: NORMAL
ABO + RH BLD: NORMAL
ALBUMIN SERPL-MCNC: 2.8 G/DL (ref 3.5–5.2)
ALBUMIN/GLOB SERPL: 1.2 G/DL
ALP SERPL-CCNC: 50 U/L (ref 39–117)
ALT SERPL W P-5'-P-CCNC: 107 U/L (ref 1–33)
ANION GAP SERPL CALCULATED.3IONS-SCNC: 11 MMOL/L (ref 5–15)
AST SERPL-CCNC: 22 U/L (ref 1–32)
BASOPHILS # BLD AUTO: 0.03 10*3/MM3 (ref 0–0.2)
BASOPHILS NFR BLD AUTO: 0.4 % (ref 0–1.5)
BH BB BLOOD EXPIRATION DATE: NORMAL
BH BB BLOOD EXPIRATION DATE: NORMAL
BH BB BLOOD TYPE BARCODE: 7300
BH BB BLOOD TYPE BARCODE: 7300
BH BB DISPENSE STATUS: NORMAL
BH BB DISPENSE STATUS: NORMAL
BH BB PRODUCT CODE: NORMAL
BH BB PRODUCT CODE: NORMAL
BH BB UNIT NUMBER: NORMAL
BH BB UNIT NUMBER: NORMAL
BILIRUB SERPL-MCNC: 0.7 MG/DL (ref 0.2–1.2)
BUN BLD-MCNC: 5 MG/DL (ref 6–20)
BUN/CREAT SERPL: 11.4 (ref 7–25)
CALCIUM SPEC-SCNC: 7.7 MG/DL (ref 8.6–10.5)
CHLORIDE SERPL-SCNC: 105 MMOL/L (ref 98–107)
CO2 SERPL-SCNC: 23 MMOL/L (ref 22–29)
CREAT BLD-MCNC: 0.44 MG/DL (ref 0.57–1)
CYTO UR: NORMAL
DEPRECATED RDW RBC AUTO: 48.9 FL (ref 37–54)
EOSINOPHIL # BLD AUTO: 0.08 10*3/MM3 (ref 0–0.4)
EOSINOPHIL NFR BLD AUTO: 1.1 % (ref 0.3–6.2)
ERYTHROCYTE [DISTWIDTH] IN BLOOD BY AUTOMATED COUNT: 14.4 % (ref 12.3–15.4)
GFR SERPL CREATININE-BSD FRML MDRD: >150 ML/MIN/1.73
GFR SERPL CREATININE-BSD FRML MDRD: >150 ML/MIN/1.73
GLOBULIN UR ELPH-MCNC: 2.4 GM/DL
GLUCOSE BLD-MCNC: 140 MG/DL (ref 65–99)
GLUCOSE BLDC GLUCOMTR-MCNC: 138 MG/DL (ref 70–130)
GLUCOSE BLDC GLUCOMTR-MCNC: 149 MG/DL (ref 70–130)
HCT VFR BLD AUTO: 27.2 % (ref 34–46.6)
HGB BLD-MCNC: 9.2 G/DL (ref 12–15.9)
HGB BLD-MCNC: 9.3 G/DL (ref 12–15.9)
IMM GRANULOCYTES # BLD AUTO: 0.03 10*3/MM3 (ref 0–0.05)
IMM GRANULOCYTES NFR BLD AUTO: 0.4 % (ref 0–0.5)
LAB AP CASE REPORT: NORMAL
LAB AP CLINICAL INFORMATION: NORMAL
LYMPHOCYTES # BLD AUTO: 2.51 10*3/MM3 (ref 0.7–3.1)
LYMPHOCYTES NFR BLD AUTO: 35.2 % (ref 19.6–45.3)
MCH RBC QN AUTO: 31.3 PG (ref 26.6–33)
MCHC RBC AUTO-ENTMCNC: 33.8 G/DL (ref 31.5–35.7)
MCV RBC AUTO: 92.5 FL (ref 79–97)
MONOCYTES # BLD AUTO: 0.43 10*3/MM3 (ref 0.1–0.9)
MONOCYTES NFR BLD AUTO: 6 % (ref 5–12)
NEUTROPHILS # BLD AUTO: 4.06 10*3/MM3 (ref 1.7–7)
NEUTROPHILS NFR BLD AUTO: 56.9 % (ref 42.7–76)
NRBC BLD AUTO-RTO: 0 /100 WBC (ref 0–0.2)
PATH REPORT.FINAL DX SPEC: NORMAL
PATH REPORT.GROSS SPEC: NORMAL
PLATELET # BLD AUTO: 145 10*3/MM3 (ref 140–450)
PMV BLD AUTO: 11 FL (ref 6–12)
POTASSIUM BLD-SCNC: 3.6 MMOL/L (ref 3.5–5.2)
PROT SERPL-MCNC: 5.2 G/DL (ref 6–8.5)
RBC # BLD AUTO: 2.94 10*6/MM3 (ref 3.77–5.28)
SODIUM BLD-SCNC: 139 MMOL/L (ref 136–145)
UNIT  ABO: NORMAL
UNIT  ABO: NORMAL
UNIT  RH: NORMAL
UNIT  RH: NORMAL
WBC NRBC COR # BLD: 7.14 10*3/MM3 (ref 3.4–10.8)

## 2019-11-29 PROCEDURE — 80053 COMPREHEN METABOLIC PANEL: CPT | Performed by: SURGERY

## 2019-11-29 PROCEDURE — 99024 POSTOP FOLLOW-UP VISIT: CPT | Performed by: SURGERY

## 2019-11-29 PROCEDURE — 85025 COMPLETE CBC W/AUTO DIFF WBC: CPT | Performed by: SURGERY

## 2019-11-29 PROCEDURE — 25010000002 VANCOMYCIN 10 G RECONSTITUTED SOLUTION

## 2019-11-29 PROCEDURE — 82962 GLUCOSE BLOOD TEST: CPT

## 2019-11-29 PROCEDURE — 99232 SBSQ HOSP IP/OBS MODERATE 35: CPT | Performed by: INTERNAL MEDICINE

## 2019-11-29 PROCEDURE — 25010000002 PIPERACILLIN SOD-TAZOBACTAM PER 1 G

## 2019-11-29 RX ORDER — AMOXICILLIN AND CLAVULANATE POTASSIUM 875; 125 MG/1; MG/1
1 TABLET, FILM COATED ORAL 2 TIMES DAILY
Qty: 14 TABLET | Refills: 0 | Status: SHIPPED | OUTPATIENT
Start: 2019-11-29 | End: 2019-12-27

## 2019-11-29 RX ORDER — PANTOPRAZOLE SODIUM 40 MG/1
40 TABLET, DELAYED RELEASE ORAL
Status: DISCONTINUED | OUTPATIENT
Start: 2019-11-30 | End: 2019-11-29

## 2019-11-29 RX ORDER — CYCLOBENZAPRINE HCL 5 MG
5 TABLET ORAL 3 TIMES DAILY PRN
Qty: 90 TABLET | Refills: 2 | Status: SHIPPED | OUTPATIENT
Start: 2019-11-29 | End: 2019-12-27

## 2019-11-29 RX ADMIN — HYDROCODONE BITARTRATE AND ACETAMINOPHEN 1 TABLET: 7.5; 325 TABLET ORAL at 12:10

## 2019-11-29 RX ADMIN — TAZOBACTAM SODIUM AND PIPERACILLIN SODIUM 4.5 G: 500; 4 INJECTION, SOLUTION INTRAVENOUS at 07:54

## 2019-11-29 RX ADMIN — HYDROCODONE BITARTRATE AND ACETAMINOPHEN 1 TABLET: 7.5; 325 TABLET ORAL at 06:05

## 2019-11-29 RX ADMIN — VANCOMYCIN HYDROCHLORIDE 1500 MG: 10 INJECTION, POWDER, LYOPHILIZED, FOR SOLUTION INTRAVENOUS at 06:04

## 2019-11-29 RX ADMIN — POLYETHYLENE GLYCOL 3350 17 G: 17 POWDER, FOR SOLUTION ORAL at 12:10

## 2019-11-29 RX ADMIN — SERTRALINE HYDROCHLORIDE 25 MG: 50 TABLET ORAL at 08:00

## 2019-11-29 RX ADMIN — CYCLOBENZAPRINE HYDROCHLORIDE 5 MG: 10 TABLET, FILM COATED ORAL at 06:06

## 2019-11-29 RX ADMIN — SODIUM CHLORIDE, POTASSIUM CHLORIDE, SODIUM LACTATE AND CALCIUM CHLORIDE 150 ML/HR: 600; 310; 30; 20 INJECTION, SOLUTION INTRAVENOUS at 08:54

## 2019-11-29 RX ADMIN — CYCLOBENZAPRINE HYDROCHLORIDE 5 MG: 10 TABLET, FILM COATED ORAL at 13:31

## 2019-11-29 RX ADMIN — PANTOPRAZOLE SODIUM 40 MG: 40 INJECTION, POWDER, FOR SOLUTION INTRAVENOUS at 07:54

## 2019-11-30 ENCOUNTER — READMISSION MANAGEMENT (OUTPATIENT)
Dept: CALL CENTER | Facility: HOSPITAL | Age: 29
End: 2019-11-30

## 2019-11-30 NOTE — OUTREACH NOTE
Prep Survey      Responses   Facility patient discharged from?  [place]   Is patient eligible?  Yes   Discharge diagnosis  Abdominal pain, acute, left upper quadrant (S/P LSG 11/26)   Does the patient have one of the following disease processes/diagnoses(primary or secondary)?  Other   Does the patient have Home health ordered?  No   Is there a DME ordered?  No   Prep survey completed?  Yes          Milagros Perla RN

## 2019-12-02 ENCOUNTER — DOCUMENTATION (OUTPATIENT)
Dept: BARIATRICS/WEIGHT MGMT | Facility: CLINIC | Age: 29
End: 2019-12-02

## 2019-12-02 NOTE — PAYOR COMM NOTE
"Shelley Mclean May (29 y.o. Female)     Date of Birth Social Security Number Address Home Phone MRN    1990  [address] [facility] [address] [phone] [id number]    Zoroastrianism Marital Status          Denominational        Admission Date Admission Type Admitting Provider Attending Provider Department, Room/Bed    19 Emergency Herb Garcia MD  [facility] 2A ICU, N212/1    Discharge Date Discharge Disposition Discharge Destination        2019 Home or Self Care              Attending Provider:  (none)   Allergies:  Prednisone, Toradol [Ketorolac Tromethamine]    Isolation:  None   Infection:  None   Code Status:  Prior    Ht:  165.1 cm (65\")   Wt:  99.8 kg (220 lb)    Admission Cmt:  None   Principal Problem:  S/P LSG  - Postop Hemorrhage [Z98.84]                 Active Insurance as of 2019     Primary Coverage     Payor Plan Insurance Group Employer/Plan Group    [employer]ORT [id number]     Payor Plan Address Payor Plan Phone Number Payor Plan Fax Number Effective Dates    [address] [phone]  2019 - None Entered    [facility] [address]       Subscriber Name Subscriber Birth Date Member ID       SHELLEY MCLEAN MAY 1990 [id number]                 Emergency Contacts      (Rel.) Home Phone Work Phone Mobile Phone    Harley Lo (Spouse) [phone] -- [phone]               Discharge Summary      Olaf Gan MD at 19 1413          DISCHARGE SUMMARY       Patient name: Shelley Graff  CSN: [id number]  MRN: [id number]  : 1990  Today's date: 2019     Date of Admission: 2019  Date of Discharge:  2019    Admitting Physician: Herb Garcia MD  Primary Care Provider: Amee Larson MD  Consultations:   Mila Whatley MD Surgery    Admission Diagnosis: S/P LSG  - Postoperative hemorrhage     Discharge Diagnoses:     S/P " LSG 11/26 - Postop Hemorrhage    Type 2 diabetes mellitus with hyperglycemia, with long-term current use of insulin (CMS/[facility])    Obesity, Class II, BMI 35-39.9    H/O H. pylori infection    Morbid obesity (CMS/HCC)    Tachycardia    Hypotension    R/O UTI (urinary tract infection) - POA    Abdominal pain, acute, left upper quadrant    Radiology:  Ct Abdomen Pelvis With Contrast    Result Date: 11/27/2019  Moderate amount of free intraperitoneal fluid with fluid in all 4 quadrants and questionable hyperdensity the fluid in subhepatic space. In the setting of recent surgery this would be concerning for active bleeding. Postsurgical changes from apparent gastric sleeve procedure with inflammatory changes within the surrounding soft tissues. No focal fluid collection in the operative bed. Small amount of pneumoperitoneum normal expected finding. Small left pleural effusion with bibasilar atelectasis. NOTIFICATION: Critical Value/emergent results were called by telephone at the time of interpretation on 11/27/2019 10:50 PM to Salo Acosta MD who verbally acknowledged these results. Signer Name: STEPHANIE Mota MD  Signed: 11/27/2019 10:51 PM  Workstation Name: [facility]  [facility]       History of Present Illness:  Ms. Wally Graff is a 29-year-old female with PMH significant for diabetes, fatty liver with abnormal LFTs, H. pylori, hyperlipidemia, and obesity who presented to the ED on the evening of 11/27/2019 with abdominal pain status post laparoscopic gastric sleeve placement with hiatal hernia repair on 11/26/2019 with Dr. Whatley. According to documentation she was discharged on the afternoon of 11/26/19.  On her way home she developed significant abdominal pain with nausea but no vomiting, no bowel movements and no fevers so she presented back to the hospital for evaluation in the ER.      On ED arrival significant labs included white count 23.67 (up from 12.2 with morning labs  on day of discharge), hemoglobin 12.2 (down from 14.3 with morning labs on day of discharge), lipase 36, glucose 299, BUN 12, creatinine 0.59, sodium 136, potassium 3.9, chloride 101, CO2 19, calcium 8.3, albumin 3.4, AST 83, , alk phos 60, UA positive for glucose, ketones, blood, protein, WBCs and bacteria. Hemoglobin approximately 4 hours after initial lab draw down to 11.4. CT abdomen showed moderate amount of free intraperitoneal fluid with fluid in all 4 quadrants and questionable hyperdensity fluid in the subhepatic space with concern for active bleeding.  Post surgical changes from the apparent gastric sleeve procedure with inflammatory changes within the surrounding soft tissue.  No focal fluid collection in the operative bed.  Small amount of pneumoperitoneum which is a normal expected finding.  Small left pleural effusion bibasilar atelectasis. She was given a dose of Zosyn and 3L fluid bolus while in the ED. She was initially felt to be appropriate for the hospitalist service with admission by Dr. Whatley's practice however she had hypotension with tachycardia in the ER so she was admitted to the ICU service for evaluation and management.    Hospital Course:  On admission to the ICU she was continued on Zosyn with addition of Vancomycin. She was transfused 2 units PRBC with positive response. She was evaluated by Dr. Whatley and since the patient was fluid/blood product responsive she felt there was cessation of bleeding. She discussed the option of laparoscopic drainage with the patient, who ultimately refused intervention. She continued to improve over the next 24 hrs with good pain control, no fevers, and normalization of WBC count. She was able to tolerate PO intake and ambulate without orthostatic symptoms. Today she is felt to be medically ready for discharge and she is ready to go home.     Vitals:  /81 (BP Location: Right arm, Patient Position: Lying)   Pulse 98   Temp 98.5 °F  "(36.9 °C) (Oral)   Resp 18   Ht 165.1 cm (65\")   Wt 99.8 kg (220 lb)   SpO2 94%   BMI 36.61 kg/m²      Physical Examination  Telemetry:  Rhythm: normal sinus rhythm, sinus tachycardia (11/29/19 1000)   Constitutional:  No acute distress.   Cardiovascular: RRR.   Normal heart sounds.  No murmurs, gallop or rub.   Respiratory: Normal breath sounds  No adventitious sounds.   Abdominal:  Soft with no tenderness.  No distension.   No HSM.  Tenderness on palpation RLQ. No guarding. No rebound.   Extremities: Warm.  Dry.  No cyanosis.  No Edema   Neurological:             Alert, Oriented, Cooperative.  Best Eye Response: 4-->(E4) spontaneous (11/29/19 1000)  Best Motor Response: 6-->(M6) obeys commands (11/29/19 1000)  Best Verbal Response: 5-->(V5) oriented (11/29/19 1000)  Aubrey Coma Scale Score: 15 (11/29/19 1000)   Lines/Drains/Airways: Peripheral IV(s)     Labs:  Results from last 7 days   Lab Units 11/29/19  0525   WBC 10*3/mm3 7.14   HEMOGLOBIN g/dL 9.2*   HEMATOCRIT % 27.2*   PLATELETS 10*3/mm3 145     Results from last 7 days   Lab Units 11/29/19  0525   SODIUM mmol/L 139   POTASSIUM mmol/L 3.6   CHLORIDE mmol/L 105   CO2 mmol/L 23.0   BUN mg/dL 5*   CREATININE mg/dL 0.44*   CALCIUM mg/dL 7.7*   BILIRUBIN mg/dL 0.7   ALK PHOS U/L 50   ALT (SGPT) U/L 107*   AST (SGOT) U/L 22   GLUCOSE mg/dL 140*         No results found for: MG, PHOS        Discharge Medications:     Discharge Medications      New Medications      Instructions Start Date   amoxicillin-clavulanate 875-125 MG per tablet  Commonly known as:  AUGMENTIN   1 tablet, Oral, 2 Times Daily      cyclobenzaprine 5 MG tablet  Commonly known as:  FLEXERIL   5 mg, Oral, 3 Times Daily PRN         ASK your doctor about these medications      Instructions Start Date   HYDROcodone-acetaminophen 7.5-325 MG per tablet  Commonly known as:  NORCO   1 tablet, Oral, Every 6 Hours PRN      Insulin Glargine 100 UNIT/ML injection pen  Commonly known as:  BASAGLAR " KWIKPEN   5 Units, Subcutaneous, Nightly      ketoconazole 2 % cream  Commonly known as:  NIZORAL   Topical, Every 12 Hours Scheduled      levonorgestrel 20 MCG/24HR IUD  Commonly known as:  MIRENA   1 each, Intrauterine, Once      omeprazole 40 MG capsule  Commonly known as:  PrilOSEC   40 mg, Oral, Daily      promethazine 12.5 MG tablet  Commonly known as:  PHENERGAN   12.5 mg, Oral, Every 6 Hours PRN      sertraline 25 MG tablet  Commonly known as:  ZOLOFT   25 mg, Oral, Daily      VALACYCLOVIR HCL PO   1 tablet, Oral, Daily           Discharge Diet:  Clear Liquid, Bariatric; Thin Liquids, No Restrictions; Stage 1 - Sugar Free Clear Liquids (No Carbonation, No Straw)       Activity at Discharge:  As tolerated    Follow-up Appointments  Future Appointments   Date Time Provider Department Center   12/4/2019  9:30 AM Lashay Siu PA-C [facility] None   12/13/2019  8:30 AM Rosana Hanks PA-C [facility] None   12/27/2019  9:30 AM Kenzie Vega PA [facility] None   1/14/2020  8:15 AM Amee Larson MD [facility] None       Discharge Instructions:  1. Ok to D/C home  2. Prescriptions sent electronically  3. F/U as above    Current Code Status     Date Active Code Status Order ID Comments User Context       11/27/2019 23:20 CPR [id number]  Mila Whatley MD ED       Questions for Current Code Status     Question Answer Comment    Code Status CPR     Medical Interventions (Level of Support Prior to Arrest) Full            Juany Morrison, DNP, APRN, AGACNP-BC  Pulmonary and Critical Care Medicine  11/29/19     Time: Discharge 45 min    CC: Amee Larson MD     *Please note that portions of this note were completed with a voice recognition program. Efforts were made to edit the dictations, but occasionally words are mistranscribed.    Electronically signed by Olaf Gan MD at 11/29/19 9813

## 2019-12-02 NOTE — PROGRESS NOTES
Patient called exchange 11/27 with new onset upper abdominal pain, intolerable.  Also c/o SOA, very nauseated. No vomiting or fever. Advised [facility] ED for evaluation, Dr. Whatley notified.

## 2019-12-03 LAB
BACTERIA SPEC AEROBE CULT: NORMAL
BACTERIA SPEC AEROBE CULT: NORMAL

## 2019-12-04 ENCOUNTER — READMISSION MANAGEMENT (OUTPATIENT)
Dept: CALL CENTER | Facility: HOSPITAL | Age: 29
End: 2019-12-04

## 2019-12-04 ENCOUNTER — OFFICE VISIT (OUTPATIENT)
Dept: BARIATRICS/WEIGHT MGMT | Facility: CLINIC | Age: 29
End: 2019-12-04

## 2019-12-04 VITALS
DIASTOLIC BLOOD PRESSURE: 74 MMHG | HEART RATE: 118 BPM | WEIGHT: 209 LBS | TEMPERATURE: 97.1 F | OXYGEN SATURATION: 99 % | SYSTOLIC BLOOD PRESSURE: 128 MMHG | BODY MASS INDEX: 34.82 KG/M2 | RESPIRATION RATE: 18 BRPM | HEIGHT: 65 IN

## 2019-12-04 DIAGNOSIS — Z98.84 STATUS POST BARIATRIC SURGERY: ICD-10-CM

## 2019-12-04 DIAGNOSIS — D62 ACUTE BLOOD LOSS ANEMIA: ICD-10-CM

## 2019-12-04 DIAGNOSIS — E66.9 OBESITY, CLASS I, BMI 30-34.9: Primary | ICD-10-CM

## 2019-12-04 PROCEDURE — 99024 POSTOP FOLLOW-UP VISIT: CPT | Performed by: PHYSICIAN ASSISTANT

## 2019-12-04 NOTE — OUTREACH NOTE
Medical Week 1 Survey      Responses   Facility patient discharged from?  [facility]   Does the patient have one of the following disease processes/diagnoses(primary or secondary)?  Other   Is there a successful TCM telephone encounter documented?  No   Week 1 attempt successful?  Yes   Call start time  1832   Call end time  1833   Discharge diagnosis  Abdominal pain, acute, left upper quadrant (S/P LSG 11/26)   Meds reviewed with patient/caregiver?  Yes   Is the patient having any side effects they believe may be caused by any medication additions or changes?  No   Does the patient have all medications ordered at discharge?  Yes   Is the patient taking all medications as directed (includes completed medication regime)?  Yes   Does the patient have a primary care provider?   Yes   Does the patient have an appointment with their PCP within 7 days of discharge?  Yes   Has the patient kept scheduled appointments due by today?  Yes   Psychosocial issues?  No   Did the patient receive a copy of their discharge instructions?  Yes   Nursing interventions  Reviewed instructions with patient   What is the patient's perception of their health status since discharge?  Same   Is the patient/caregiver able to teach back signs and symptoms related to disease process for when to call PCP?  Yes   Is the patient/caregiver able to teach back signs and symptoms related to disease process for when to call 911?  Yes   Is the patient/caregiver able to teach back the hierarchy of who to call/visit for symptoms/problems? PCP, Specialist, Home health nurse, Urgent Care, ED, 911  Yes   Week 1 call completed?  Yes          Dodie Valero RN

## 2019-12-04 NOTE — PROGRESS NOTES
[facility] Bariatric Surgery  [address]  [facility] [address] [phone]      Patient Name:  Shelley Graff.  :  1990      Reason for Visit:  POD #8    HPI:  Shelley Graff is a 29 y.o. female s/p LSG/ HHR by  on 19       FINDINGS: Normal gallbladder.  Small hiatal hernia.  A few omental adhesions in lower abdomen.    Discharged POD#1 doing well.  Called exchange after discharge with acute onset severe abd pain, went to ED and was readmitted with acute blood loss anemia, tachycardia, evidence of postop hemorrhage.  Followed in ICU, treated with zosyn + vanc, 2units PRBC.  D/c on augmentin and flexeril.     Doing ok. Has had a rough week, had quite a bit of abdominal pain/ SOA/ fatigue after hospital discharge but improving each day. Continues abdominal pain mid abdomen, using flexeril TID prn, helping quite a bit, also taking tylenol, doesn't feel like she needs narcotics.  Has been fatigued but improved with being more active last few days.  Denies dysphagia, reflux, nausea, vomiting, pulmonary issues and fevers.  Tolerating diet progression - on stage 1.  Getting 75g prot/day, 2.5 shakes a day.  Drinking 50? fluid oz/day, trying to get more water intake with her shakes.  Taking Patches: MVI + B12+ + iron.  On Omeprazole , taking augmentin and flexeril.  Holding ASA , NSAIDs , Tramadol, Hormones, Diuretics , Steroids and Immunologics.  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    Highest  after eating, taking insulin and following closely     Presurgery weight: 218 pounds.  Today's weight is 94.8 kg (209 lb) pounds, today's  Body mass index is 34.78 kg/m²., and her weight loss since surgery is 9 pounds.       Final Diagnosis    STOMACH, SUBTOTAL GASTRECTOMY:  Chronic gastritis.  Immunohistochemistry strongly positive for H. pylori.      DGD/dlb      Had + h pylori 19 on UBT, treated with prevpack.  Positive stool study 19 and positive pathology from LSG.  Will need h pylori treatment at 1 month postop.     Past Medical History:   Diagnosis Date   • Anxiety    • Chronic joint pain     denies NSAIDS/steroids   • DM2 (diabetes mellitus, type 2) (CMS/HCC)     w/ hx gestational diabetes , dx May 2017, started on insulin when initially dx, lost weight w/ Adipex, stopped insulin 6 months after dx, but now w/ weight regain, back on insulin.  Managed by PCP   • Dyspepsia    • Dyspnea on exertion    • Fatigue    • Fatty liver     w/ abnormal LFTs, US 2018   • H. pylori infection     UBT (+) 19 - PrevPak RX - repeat HPSA (+) 2019, will need tx post LSG   • Mixed hyperlipidemia 10/15/2019   • Obesity (BMI 30-39.9)    • Pap smear for cervical cancer screening 2017   • Wears eyeglasses      Past Surgical History:   Procedure Laterality Date   • CERVICAL CONE BIOPSY      benign   •  SECTION  08, 5/15/13, 11/6/15    x3    • ENDOSCOPY N/A 2019    Procedure: ESOPHAGOGASTRODUODENOSCOPY;  Surgeon: Mila Whatley MD;  Location:  [facility];  Service: Bariatric   • GASTRIC SLEEVE LAPAROSCOPIC N/A 2019    Procedure: GASTRIC SLEEVE LAPAROSCOPIC;  Surgeon: Mila Whatley MD;  Location:  [facility];  Service: Bariatric   • HIATAL HERNIA REPAIR N/A 2019    Procedure: HIATAL HERNIA REPAIR LAPAROSCOPIC;  Surgeon: Mila Whatley MD;  Location:  [facility];  Service: Bariatric   • UPPER GASTROINTESTINAL ENDOSCOPY  2019    Dr VIVIENNE Whatley     Outpatient Medications Marked as Taking for the 19 encounter (Office Visit) with Lashay Siu PA-C   Medication Sig Dispense Refill   • amoxicillin-clavulanate (AUGMENTIN) 875-125 MG per tablet Take 1 tablet by mouth 2 (Two) Times a Day. 14 tablet 0   • cyclobenzaprine (FLEXERIL) 5 MG tablet Take 1 tablet by mouth 3 (Three) Times a Day As Needed for Muscle Spasms. 90 tablet 2   • Insulin Glargine (BASAGLAR KWIKPEN)  100 UNIT/ML injection pen Inject 5 Units under the skin into the appropriate area as directed Every Night. 2 pen 6   • ketoconazole (NIZORAL) 2 % cream Apply  topically to the appropriate area as directed Every 12 (Twelve) Hours. (Patient taking differently: Apply  topically to the appropriate area as directed As Needed.) 30 g 0   • levonorgestrel (MIRENA) 20 MCG/24HR IUD 1 each by Intrauterine route 1 (One) Time.     • omeprazole (PrilOSEC) 40 MG capsule Take 1 capsule by mouth Daily for 60 days. 60 capsule 0   • sertraline (ZOLOFT) 25 MG tablet Take 1 tablet by mouth Daily. 30 tablet 3   • VALACYCLOVIR HCL PO Take 1 tablet by mouth Daily.       Allergies   Allergen Reactions   • Prednisone Other (See Comments)     Please avoid all IV or oral steroids until after 19 to avoid risk of life threatening gastric leak after recent sleeve gastrectomy   • Toradol [Ketorolac Tromethamine] Other (See Comments)     Please avoid all IV or oral NSAIDs, including aspirin, until after 19 to avoid risk of life threatening gastric leak after recent sleeve gastrectomy       Social History     Socioeconomic History   • Marital status:      Spouse name: Not on file   • Number of children: Not on file   • Years of education: Not on file   • Highest education level: Not on file   Tobacco Use   • Smoking status: Former Smoker     Packs/day: 2.00     Years: 2.00     Pack years: 4.00     Last attempt to quit: 2012     Years since quittin.4   • Smokeless tobacco: Never Used   Substance and Sexual Activity   • Alcohol use: No   • Drug use: No   • Sexual activity: Yes     Partners: Male     Birth control/protection: IUD     Comment:    Social History Narrative    Living in [place], [place] w/  and children.   @ [employer] in [employer].         /74 (BP Location: Left arm, Patient Position: Sitting, Cuff Size: Large Adult)   Pulse 118   Temp 97.1 °F (36.2 °C) (Temporal)    "Resp 18   Ht 165.1 cm (65\")   Wt 94.8 kg (209 lb)   SpO2 99%   BMI 34.78 kg/m²   Physical Exam   Constitutional: She is oriented to person, place, and time. She appears well-developed and well-nourished.   HENT:   Head: Normocephalic and atraumatic.   Cardiovascular: Normal rate, regular rhythm and normal heart sounds.   Pulmonary/Chest: Effort normal and breath sounds normal.   Abdominal: Soft. Bowel sounds are normal. She exhibits no distension. There is tenderness (minimal LUQ pain to palpation).   Incisions healing well  Bruising blue/ purple around right mid abdomen incision     Neurological: She is alert and oriented to person, place, and time.   Skin: Skin is warm and dry.   Psychiatric: She has a normal mood and affect. Her behavior is normal. Judgment and thought content normal.         Assessment:   POD #8 s/p LSG/ HHR by  on 11/26/19    ICD-10-CM ICD-9-CM   1. Obesity, Class I, BMI 30-34.9 E66.9 278.00   2. Status post bariatric surgery Z98.84 V45.86           Plan:  Doing ok, will check labs today. Finish augmentin course. Continue flexeril/ tylenol prn.  Continue to advance diet per manual.  Increase protein intake to 100g/day.  Increase exercise/activity as tolerated.  Reviewed lifting restrictions, nothing >25 lbs x 2 more weeks.  Continue vitamins.  Continue PPI.  Continue to avoid ASA/NSAIDs/tramadol/tobacco x 6 weeks postop, steroids x 8 weeks postop.  Call w/ problems/concerns.    The patient was instructed to follow up in 3 weeks, sooner if needed.   "

## 2019-12-05 LAB
ALBUMIN SERPL-MCNC: 4.2 G/DL (ref 3.5–5.2)
ALBUMIN/GLOB SERPL: 1.4 G/DL
ALP SERPL-CCNC: 75 U/L (ref 39–117)
ALT SERPL-CCNC: 90 U/L (ref 1–33)
AST SERPL-CCNC: 40 U/L (ref 1–32)
BASOPHILS # BLD AUTO: 0.03 10*3/MM3 (ref 0–0.2)
BASOPHILS NFR BLD AUTO: 0.4 % (ref 0–1.5)
BILIRUB SERPL-MCNC: 1 MG/DL (ref 0.2–1.2)
BUN SERPL-MCNC: 11 MG/DL (ref 6–20)
BUN/CREAT SERPL: 20 (ref 7–25)
CALCIUM SERPL-MCNC: 9.3 MG/DL (ref 8.6–10.5)
CHLORIDE SERPL-SCNC: 103 MMOL/L (ref 98–107)
CO2 SERPL-SCNC: 25.9 MMOL/L (ref 22–29)
CREAT SERPL-MCNC: 0.55 MG/DL (ref 0.57–1)
EOSINOPHIL # BLD AUTO: 0.19 10*3/MM3 (ref 0–0.4)
EOSINOPHIL NFR BLD AUTO: 2.4 % (ref 0.3–6.2)
ERYTHROCYTE [DISTWIDTH] IN BLOOD BY AUTOMATED COUNT: 13.7 % (ref 12.3–15.4)
GLOBULIN SER CALC-MCNC: 2.9 GM/DL
GLUCOSE SERPL-MCNC: 136 MG/DL (ref 65–99)
HCT VFR BLD AUTO: 36.8 % (ref 34–46.6)
HGB BLD-MCNC: 12.8 G/DL (ref 12–15.9)
IMM GRANULOCYTES # BLD AUTO: 0.07 10*3/MM3 (ref 0–0.05)
IMM GRANULOCYTES NFR BLD AUTO: 0.9 % (ref 0–0.5)
LYMPHOCYTES # BLD AUTO: 1.85 10*3/MM3 (ref 0.7–3.1)
LYMPHOCYTES NFR BLD AUTO: 23 % (ref 19.6–45.3)
MCH RBC QN AUTO: 31.8 PG (ref 26.6–33)
MCHC RBC AUTO-ENTMCNC: 34.8 G/DL (ref 31.5–35.7)
MCV RBC AUTO: 91.3 FL (ref 79–97)
MONOCYTES # BLD AUTO: 0.44 10*3/MM3 (ref 0.1–0.9)
MONOCYTES NFR BLD AUTO: 5.5 % (ref 5–12)
NEUTROPHILS # BLD AUTO: 5.48 10*3/MM3 (ref 1.7–7)
NEUTROPHILS NFR BLD AUTO: 67.8 % (ref 42.7–76)
NRBC BLD AUTO-RTO: 0 /100 WBC (ref 0–0.2)
PLATELET # BLD AUTO: 247 10*3/MM3 (ref 140–450)
POTASSIUM SERPL-SCNC: 4.6 MMOL/L (ref 3.5–5.2)
PROT SERPL-MCNC: 7.1 G/DL (ref 6–8.5)
RBC # BLD AUTO: 4.03 10*6/MM3 (ref 3.77–5.28)
SODIUM SERPL-SCNC: 141 MMOL/L (ref 136–145)
WBC # BLD AUTO: 8.06 10*3/MM3 (ref 3.4–10.8)

## 2019-12-11 ENCOUNTER — READMISSION MANAGEMENT (OUTPATIENT)
Dept: CALL CENTER | Facility: HOSPITAL | Age: 29
End: 2019-12-11

## 2019-12-11 NOTE — OUTREACH NOTE
Medical Week 2 Survey      Responses   Facility patient discharged from?  [facility]   Does the patient have one of the following disease processes/diagnoses(primary or secondary)?  Other   Week 2 attempt successful?  Yes   Call start time  1543   Revoke  Decline to participate [has returned to work]   Call end time  [phone]          Soniya aFlk, RN

## 2019-12-27 ENCOUNTER — OFFICE VISIT (OUTPATIENT)
Dept: BARIATRICS/WEIGHT MGMT | Facility: CLINIC | Age: 29
End: 2019-12-27

## 2019-12-27 VITALS
BODY MASS INDEX: 32.24 KG/M2 | TEMPERATURE: 97.4 F | SYSTOLIC BLOOD PRESSURE: 116 MMHG | OXYGEN SATURATION: 98 % | DIASTOLIC BLOOD PRESSURE: 70 MMHG | HEART RATE: 88 BPM | HEIGHT: 65 IN | WEIGHT: 193.5 LBS | RESPIRATION RATE: 18 BRPM

## 2019-12-27 DIAGNOSIS — D64.9 ANEMIA, UNSPECIFIED TYPE: ICD-10-CM

## 2019-12-27 DIAGNOSIS — E55.9 VITAMIN D DEFICIENCY: ICD-10-CM

## 2019-12-27 DIAGNOSIS — E66.9 OBESITY, CLASS I, BMI 30-34.9: ICD-10-CM

## 2019-12-27 DIAGNOSIS — R53.83 FATIGUE, UNSPECIFIED TYPE: ICD-10-CM

## 2019-12-27 DIAGNOSIS — Z98.84 S/P BARIATRIC SURGERY: ICD-10-CM

## 2019-12-27 DIAGNOSIS — E66.9 DIABETES MELLITUS TYPE 2 IN OBESE (HCC): Primary | ICD-10-CM

## 2019-12-27 DIAGNOSIS — E11.69 DIABETES MELLITUS TYPE 2 IN OBESE (HCC): Primary | ICD-10-CM

## 2019-12-27 PROBLEM — E66.812 OBESITY, CLASS II, BMI 35-39.9: Status: RESOLVED | Noted: 2019-06-14 | Resolved: 2019-12-27

## 2019-12-27 PROCEDURE — 99024 POSTOP FOLLOW-UP VISIT: CPT | Performed by: PHYSICIAN ASSISTANT

## 2019-12-27 RX ORDER — URSODIOL 300 MG/1
300 CAPSULE ORAL 2 TIMES DAILY
Qty: 60 CAPSULE | Refills: 4 | Status: SHIPPED | OUTPATIENT
Start: 2019-12-27 | End: 2020-11-30

## 2019-12-27 NOTE — PROGRESS NOTES
"[facility] Bariatric Surgery  [address]  [address]  [facility] [id number] [phone]      Patient Name:  Shelley Graff.  :  1990      Date of Visit: 2019      Reason for Visit:  1 month postop    HPI:  Shelley Graff is a 29 y.o. female s/p LSG/HHR 19 w/ Dr. Whatley    FINDINGS: Normal gallbladder.  Small hiatal hernia.  A few omental adhesions in lower abdomen.     Discharged POD#1 doing well.  Called exchange after discharge with acute onset severe abd pain, went to ED and was readmitted with acute blood loss anemia, tachycardia, evidence of postop hemorrhage.  Followed in ICU, treated with zosyn + vanc, 2units PRBC.  D/C on augmentin and flexeril.  LOV POD #8 still feeling rough, but improving each day.    Feeling much better today.  Has finished abx RX.  No further issues w/ abd.pain.  Denies N/V/F/C.  Voiding well.  On stage 5 diet.  Tolerating most everything except tuna.  Getting 60-80g prot/day.  Says really struggling to get more b/c she can't stand the protein shakes.  Wearing MVI, B12, and iron patches.  On Omeprazole .  Needs Actigall RX.  Says has not needed insulin the last 3 nights - BS running \"low.\"  Walking for exercise, but feeling ready to do more.     Presurgery weight:  218 pounds. Today's weight is 87.8 kg (193 lb 8 oz) pounds, today's Body mass index is 32.2 kg/m²., and her weight loss since surgery is 25 pounds.       Final Diagnosis    STOMACH, SUBTOTAL GASTRECTOMY:  Chronic gastritis.  Immunohistochemistry strongly positive for H. pylori.      DGD/dlb      Note: H.Pylori (+) 19 on UBT, treated with prevpack. Positive stool study 19 and positive pathology from LSG.  Will need repeat H.Pylori treatment post LSG.      Past Medical History:   Diagnosis Date   • Anxiety    • Chronic joint pain     denies NSAIDS/steroids   • DM2 (diabetes mellitus, type 2) (CMS/[facility])     w/ hx gestational diabetes , dx May " , started on insulin when initially dx, lost weight w/ Adipex, stopped insulin 6 months after dx, but now w/ weight regain, back on insulin.  Managed by PCP   • Dyspepsia    • Dyspnea on exertion    • Fatigue    • Fatty liver     w/ abnormal LFTs, US 2018   • H. pylori infection     UBT (+) 19 - PrevPak RX - repeat HPSA (+) 2019, will need tx post LSG   • Mixed hyperlipidemia 10/15/2019   • Obesity (BMI 30-39.9)    • Pap smear for cervical cancer screening 2017   • Wears eyeglasses      Past Surgical History:   Procedure Laterality Date   • CERVICAL CONE BIOPSY      benign   •  SECTION  08, 5/15/13, 11/6/15    x3    • ENDOSCOPY N/A 2019    Procedure: ESOPHAGOGASTRODUODENOSCOPY;  Surgeon: Mila Whatley MD;  Location:  [facility];  Service: Bariatric   • GASTRIC SLEEVE LAPAROSCOPIC N/A 2019    Procedure: GASTRIC SLEEVE LAPAROSCOPIC;  Surgeon: Mila Whatley MD;  Location:  [facility];  Service: Bariatric   • HIATAL HERNIA REPAIR N/A 2019    Procedure: HIATAL HERNIA REPAIR LAPAROSCOPIC;  Surgeon: Mila Whatley MD;  Location:  [facility];  Service: Bariatric   • UPPER GASTROINTESTINAL ENDOSCOPY  2019    Dr VIVIENNE Whatley     Outpatient Medications Marked as Taking for the 19 encounter (Office Visit) with Kenzie Vega PA   Medication Sig Dispense Refill   • ketoconazole (NIZORAL) 2 % cream Apply  topically to the appropriate area as directed Every 12 (Twelve) Hours. (Patient taking differently: Apply  topically to the appropriate area as directed As Needed.) 30 g 0   • levonorgestrel (MIRENA) 20 MCG/24HR IUD 1 each by Intrauterine route 1 (One) Time.     • omeprazole (PrilOSEC) 40 MG capsule Take 1 capsule by mouth Daily for 60 days. 60 capsule 0   • VALACYCLOVIR HCL PO Take 1 tablet by mouth Daily.       Allergies   Allergen Reactions   • Prednisone Other (See Comments)     Please avoid all IV or oral steroids until after 19 to  "avoid risk of life threatening gastric leak after recent sleeve gastrectomy   • Toradol [Ketorolac Tromethamine] Other (See Comments)     Please avoid all IV or oral NSAIDs, including aspirin, until after 19 to avoid risk of life threatening gastric leak after recent sleeve gastrectomy       Social History     Socioeconomic History   • Marital status:      Spouse name: Not on file   • Number of children: Not on file   • Years of education: Not on file   • Highest education level: Not on file   Tobacco Use   • Smoking status: Former Smoker     Packs/day: 2.00     Years: 2.00     Pack years: 4.00     Last attempt to quit: 2012     Years since quittin.4   • Smokeless tobacco: Never Used   Substance and Sexual Activity   • Alcohol use: No   • Drug use: No   • Sexual activity: Yes     Partners: Male     Birth control/protection: IUD     Comment:    Social History Narrative    Living in [place], [place] w/  and children.   @ [employer] in [employer].         /70 (BP Location: Left arm, Patient Position: Sitting, Cuff Size: Adult)   Pulse 88   Temp 97.4 °F (36.3 °C) (Temporal)   Resp 18   Ht 165.1 cm (65\")   Wt 87.8 kg (193 lb 8 oz)   SpO2 98%   BMI 32.20 kg/m²     Physical Exam   Constitutional: She appears well-developed and well-nourished. She is cooperative.   HENT:   Mouth/Throat: Oropharynx is clear and moist and mucous membranes are normal.   Eyes: Conjunctivae are normal. No scleral icterus.   Cardiovascular: Normal rate.   Pulmonary/Chest: Effort normal.   Abdominal: Soft. Bowel sounds are normal. There is no tenderness.   incisions healing well   Musculoskeletal: Normal range of motion. She exhibits no edema.   Neurological: She is alert.   Skin: Skin is warm and dry. No rash noted.   Psychiatric: She has a normal mood and affect. Judgment normal.         Assessment:  1 month s/p LSG/HHR 19 w/ Dr. Whatley    ICD-10-CM ICD-9-CM   1. " Diabetes mellitus type 2 in obese (CMS/[facility]) E11.69 250.00    E66.9 278.00   2. Anemia, unspecified type D64.9 285.9   3. Vitamin D deficiency E55.9 268.9   4. Fatigue, unspecified type R53.83 780.79   5. Obesity, Class I, BMI 30-34.9 E66.9 278.00   6. S/P bariatric surgery Z98.84 V45.86         Plan:  Doing fine.  Continue to advance diet per manual.  Increase protein to 100g/day.  Increase exercise as tolerated.  Routine bariatric labs ordered.  Continue current vitamin regimen w/ adjustments pending lab results.  Start Actigall.  Continue PPI.  Continue to avoid ASA/NSAIDs/tobacco x 6 weeks postop, steroids x 8 weeks postop.  Followup w/ PCP/ENDO re: diabetic med management.  Will RX H.Pylori treatment at next OV.  Call w/ problems/concerns.      The patient was instructed to follow up in 2 months, sooner if needed.

## 2019-12-29 LAB
25(OH)D3+25(OH)D2 SERPL-MCNC: 28.5 NG/ML (ref 30–100)
ALBUMIN SERPL-MCNC: 4.3 G/DL (ref 3.5–5.2)
ALBUMIN/GLOB SERPL: 1.4 G/DL
ALP SERPL-CCNC: 75 U/L (ref 39–117)
ALT SERPL-CCNC: 60 U/L (ref 1–33)
AST SERPL-CCNC: 27 U/L (ref 1–32)
BASOPHILS # BLD AUTO: 0.04 10*3/MM3 (ref 0–0.2)
BASOPHILS NFR BLD AUTO: 0.7 % (ref 0–1.5)
BILIRUB SERPL-MCNC: 0.5 MG/DL (ref 0.2–1.2)
BUN SERPL-MCNC: 8 MG/DL (ref 6–20)
BUN/CREAT SERPL: 11.4 (ref 7–25)
CALCIUM SERPL-MCNC: 9.5 MG/DL (ref 8.6–10.5)
CHLORIDE SERPL-SCNC: 105 MMOL/L (ref 98–107)
CO2 SERPL-SCNC: 23.6 MMOL/L (ref 22–29)
CREAT SERPL-MCNC: 0.7 MG/DL (ref 0.57–1)
EOSINOPHIL # BLD AUTO: 0.15 10*3/MM3 (ref 0–0.4)
EOSINOPHIL NFR BLD AUTO: 2.6 % (ref 0.3–6.2)
ERYTHROCYTE [DISTWIDTH] IN BLOOD BY AUTOMATED COUNT: 13.3 % (ref 12.3–15.4)
FERRITIN SERPL-MCNC: 443 NG/ML (ref 13–150)
FOLATE SERPL-MCNC: 16.8 NG/ML (ref 4.78–24.2)
GLOBULIN SER CALC-MCNC: 3.1 GM/DL
GLUCOSE SERPL-MCNC: 130 MG/DL (ref 65–99)
HCT VFR BLD AUTO: 42.3 % (ref 34–46.6)
HGB BLD-MCNC: 14.4 G/DL (ref 12–15.9)
IMM GRANULOCYTES # BLD AUTO: 0.01 10*3/MM3 (ref 0–0.05)
IMM GRANULOCYTES NFR BLD AUTO: 0.2 % (ref 0–0.5)
IRON SERPL-MCNC: 140 MCG/DL (ref 37–145)
LYMPHOCYTES # BLD AUTO: 1.83 10*3/MM3 (ref 0.7–3.1)
LYMPHOCYTES NFR BLD AUTO: 31.3 % (ref 19.6–45.3)
Lab: NORMAL
MCH RBC QN AUTO: 30.3 PG (ref 26.6–33)
MCHC RBC AUTO-ENTMCNC: 34 G/DL (ref 31.5–35.7)
MCV RBC AUTO: 88.9 FL (ref 79–97)
METHYLMALONATE SERPL-SCNC: 61 NMOL/L (ref 0–378)
MONOCYTES # BLD AUTO: 0.27 10*3/MM3 (ref 0.1–0.9)
MONOCYTES NFR BLD AUTO: 4.6 % (ref 5–12)
NEUTROPHILS # BLD AUTO: 3.54 10*3/MM3 (ref 1.7–7)
NEUTROPHILS NFR BLD AUTO: 60.6 % (ref 42.7–76)
NRBC BLD AUTO-RTO: 0 /100 WBC (ref 0–0.2)
PLATELET # BLD AUTO: 161 10*3/MM3 (ref 140–450)
POTASSIUM SERPL-SCNC: 4.7 MMOL/L (ref 3.5–5.2)
PREALB SERPL-MCNC: 22 MG/DL (ref 14–35)
PROT SERPL-MCNC: 7.4 G/DL (ref 6–8.5)
RBC # BLD AUTO: 4.76 10*6/MM3 (ref 3.77–5.28)
SODIUM SERPL-SCNC: 144 MMOL/L (ref 136–145)
VIT B1 BLD-SCNC: 88.5 NMOL/L (ref 66.5–200)
WBC # BLD AUTO: 5.84 10*3/MM3 (ref 3.4–10.8)

## 2020-01-14 ENCOUNTER — OFFICE VISIT (OUTPATIENT)
Dept: INTERNAL MEDICINE | Facility: CLINIC | Age: 30
End: 2020-01-14

## 2020-01-14 VITALS
DIASTOLIC BLOOD PRESSURE: 62 MMHG | HEART RATE: 78 BPM | SYSTOLIC BLOOD PRESSURE: 106 MMHG | OXYGEN SATURATION: 99 % | TEMPERATURE: 99 F | WEIGHT: 193 LBS | BODY MASS INDEX: 32.15 KG/M2 | HEIGHT: 65 IN

## 2020-01-14 DIAGNOSIS — E11.69 DIABETES MELLITUS TYPE 2 IN OBESE (HCC): ICD-10-CM

## 2020-01-14 DIAGNOSIS — E11.9 TYPE 2 DIABETES MELLITUS WITHOUT COMPLICATION, WITHOUT LONG-TERM CURRENT USE OF INSULIN (HCC): Primary | ICD-10-CM

## 2020-01-14 DIAGNOSIS — E66.9 CLASS 1 OBESITY WITH SERIOUS COMORBIDITY AND BODY MASS INDEX (BMI) OF 32.0 TO 32.9 IN ADULT, UNSPECIFIED OBESITY TYPE: ICD-10-CM

## 2020-01-14 DIAGNOSIS — R79.89 ELEVATED FERRITIN: ICD-10-CM

## 2020-01-14 DIAGNOSIS — E66.9 DIABETES MELLITUS TYPE 2 IN OBESE (HCC): ICD-10-CM

## 2020-01-14 DIAGNOSIS — F41.9 ANXIETY: ICD-10-CM

## 2020-01-14 PROBLEM — E66.01 MORBID OBESITY: Status: RESOLVED | Noted: 2019-11-26 | Resolved: 2020-01-14

## 2020-01-14 PROBLEM — Z84.89 FH: BARIATRIC SURGERY: Status: ACTIVE | Noted: 2020-01-14

## 2020-01-14 LAB — HBA1C MFR BLD: 6 %

## 2020-01-14 PROCEDURE — 83036 HEMOGLOBIN GLYCOSYLATED A1C: CPT | Performed by: FAMILY MEDICINE

## 2020-01-14 PROCEDURE — 99213 OFFICE O/P EST LOW 20 MIN: CPT | Performed by: FAMILY MEDICINE

## 2020-01-14 RX ORDER — OMEPRAZOLE 40 MG/1
40 CAPSULE, DELAYED RELEASE ORAL DAILY
Qty: 30 CAPSULE | Refills: 4 | Status: SHIPPED | OUTPATIENT
Start: 2020-01-14 | End: 2020-02-28

## 2020-01-14 RX ORDER — SERTRALINE HYDROCHLORIDE 25 MG/1
25 TABLET, FILM COATED ORAL DAILY
Qty: 30 TABLET | Refills: 3 | Status: SHIPPED | OUTPATIENT
Start: 2020-01-14 | End: 2020-04-14

## 2020-01-14 NOTE — PROGRESS NOTES
"Subjective   Shelley Graff is a 29 y.o. female.     Chief Complaint   Patient presents with   • Anxiety     f/u   • Depression   • Diabetes     Vitals:    01/14/20 0758   BP: 106/62   BP Location: Left arm   Cuff Size: Large Adult   Pulse: 78   Temp: 99 °F (37.2 °C)   SpO2: 99%   Weight: 87.5 kg (193 lb)   Height: 165.1 cm (65\")    Body mass index is 32.12 kg/m².        Wt Readings from Last 3 Encounters:   01/14/20 87.5 kg (193 lb)   12/27/19 87.8 kg (193 lb 8 oz)   12/04/19 94.8 kg (209 lb)         Diabetes   She presents for her follow-up diabetic visit. She has type 2 diabetes mellitus. Her disease course has been improving. Pertinent negatives for hypoglycemia include no confusion, dizziness, headaches or nervousness/anxiousness. Associated symptoms include weight loss. Pertinent negatives for diabetes include no chest pain and no fatigue. There are no hypoglycemic complications. Symptoms are improving. Pertinent negatives for diabetic complications include no CVA, heart disease, nephropathy, peripheral neuropathy, PVD or retinopathy. Risk factors for coronary artery disease include diabetes mellitus and obesity. Current diabetic treatment includes diet. She is compliant with treatment all of the time. Her weight is stable. She is following a generally healthy diet. Meal planning includes avoidance of concentrated sweets. She participates in exercise daily. Her breakfast blood glucose range is generally 110-130 mg/dl. An ACE inhibitor/angiotensin II receptor blocker is not being taken. She does not see a podiatrist.Eye exam is not current.   Anxiety   Presents for follow-up visit. Patient reports no chest pain, compulsions, confusion, decreased concentration, depressed mood, dizziness, dry mouth, excessive worry, feeling of choking, hyperventilation, insomnia, irritability, malaise, muscle tension, nausea, nervous/anxious behavior, obsessions, palpitations, panic, restlessness, shortness of breath " or suicidal ideas. Symptoms occur rarely. The severity of symptoms is mild. The quality of sleep is good. Nighttime awakenings: none.     Her past medical history is significant for depression. Compliance with medications is %.      Pt had gastric sleeve in November. Pt had bleed post op and was transfused.   Pt's last iron was elevated post transfusion.   Pt's sugars are 125-130 max.  Pt has stopped her insulin.  Pt is only following diet for DM.   Pt is doing Rosanna nightly.  Pt denies depression.     The following portions of the patient's history were reviewed and updated as appropriate: allergies, current medications, past family history, past medical history, past social history, past surgical history and problem list.    Past Medical History:   Diagnosis Date   • Anxiety    • Chronic joint pain     denies NSAIDS/steroids   • DM2 (diabetes mellitus, type 2) (CMS/HCC)     w/ hx gestational diabetes , dx May 2017, started on insulin when initially dx, lost weight w/ Adipex, stopped insulin 6 months after dx, but now w/ weight regain, back on insulin.  Managed by PCP   • Dyspepsia    • Dyspnea on exertion    • Fatigue    • Fatty liver     w/ abnormal LFTs, US 2018   • FH: bariatric surgery 2020    Gastric sleeve 19   • H. pylori infection     UBT (+) 19 - PrevPak RX - repeat HPSA (+) 2019, will need tx post LSG   • Mixed hyperlipidemia 10/15/2019   • Obesity (BMI 30-39.9)    • Pap smear for cervical cancer screening 2017   • Wears eyeglasses       Past Surgical History:   Procedure Laterality Date   • CERVICAL CONE BIOPSY      benign   •  SECTION  08, 5/15/13, 11/6/15    x3    • ENDOSCOPY N/A 2019    Procedure: ESOPHAGOGASTRODUODENOSCOPY;  Surgeon: iMla Whatley MD;  [facility];  Service: Bariatric   • GASTRIC SLEEVE LAPAROSCOPIC N/A 2019    Procedure: GASTRIC SLEEVE LAPAROSCOPIC;  Surgeon: Mila Whatley MD;  Location:  [facility];  Service: Bariatric   • HIATAL HERNIA REPAIR N/A 2019    Procedure: HIATAL HERNIA REPAIR LAPAROSCOPIC;  Surgeon: Mila Whatley MD;  Location: [facility] OR;  Service: Bariatric   • UPPER GASTROINTESTINAL ENDOSCOPY  2019    Dr VIVIENNE Whatley      Family History   Problem Relation Age of Onset   • Stroke Mother    • Heart disease Mother    • Diabetes Mother    • Obesity Mother    • Hypertension Mother            • Diabetes Father    • Hypertension Father       Social History     Socioeconomic History   • Marital status:      Spouse name: Not on file   • Number of children: Not on file   • Years of education: Not on file   • Highest education level: Not on file   Tobacco Use   • Smoking status: Former Smoker     Packs/day: 2.00     Years: 2.00     Pack years: 4.00     Last attempt to quit: 2012     Years since quittin.5   • Smokeless tobacco: Never Used   Substance and Sexual Activity   • Alcohol use: No   • Drug use: No   • Sexual activity: Yes     Partners: Male     Birth control/protection: IUD     Comment:    Social History Narrative    Living in [place], [place] w/  and children.   @ [employer] in [employer] [employer].        Allergies   Allergen Reactions   • Prednisone Other (See Comments)     Please avoid all IV or oral steroids until after 19 to avoid risk of life threatening gastric leak after recent sleeve gastrectomy   • Toradol [Ketorolac Tromethamine] Other (See Comments)     Please avoid all IV or oral NSAIDs, including aspirin, until after 19 to avoid risk of life threatening gastric leak after recent sleeve gastrectomy       Review of Systems   Constitutional: Positive for weight loss. Negative for chills, diaphoresis, fatigue, fever, irritability and weight gain.   HENT: Negative.  Negative for ear pain, nosebleeds, postnasal drip, rhinorrhea, sinus pressure, sneezing and sore throat.    Eyes: Negative.  Negative for redness  and itching.   Respiratory: Negative.  Negative for cough, choking, shortness of breath and wheezing.    Cardiovascular: Negative.  Negative for chest pain and palpitations.   Gastrointestinal: Negative.  Negative for abdominal pain, constipation, diarrhea, nausea and vomiting.   Endocrine: Positive for cold intolerance. Negative for heat intolerance.   Genitourinary: Negative.  Negative for dysuria, frequency, hematuria and urgency.   Musculoskeletal: Negative.  Negative for arthralgias, back pain and neck pain.   Skin: Negative.  Negative for color change and rash.   Allergic/Immunologic: Negative.  Negative for environmental allergies.   Neurological: Negative.  Negative for dizziness, syncope, light-headedness and headaches.   Hematological: Negative.  Negative for adenopathy. Does not bruise/bleed easily.   Psychiatric/Behavioral: Negative.  Negative for confusion, decreased concentration, dysphoric mood and suicidal ideas. The patient is not nervous/anxious and does not have insomnia.        Objective   Physical Exam   Constitutional: She is oriented to person, place, and time. She appears well-developed.   HENT:   Head: Normocephalic.   Right Ear: External ear normal.   Left Ear: External ear normal.   Nose: Nose normal.   Eyes: Pupils are equal, round, and reactive to light. Conjunctivae, EOM and lids are normal.   Neck: Trachea normal and normal range of motion. Neck supple. Carotid bruit is not present. No thyroid mass and no thyromegaly present.   Cardiovascular: Normal rate and regular rhythm.   No murmur heard.  Pulmonary/Chest: Effort normal and breath sounds normal. No respiratory distress. She has no decreased breath sounds. She has no wheezes. She has no rhonchi. She has no rales. She exhibits no tenderness.   Abdominal: Soft. Bowel sounds are normal. There is no tenderness.   Musculoskeletal: Normal range of motion.   Neurological: She is alert and oriented to person, place, and time.   Skin: Skin  is warm and dry.   Psychiatric: She has a normal mood and affect. Her behavior is normal.   Nursing note and vitals reviewed.      Assessment/Plan   Shelley was seen today for anxiety, depression and diabetes.    Diagnoses and all orders for this visit:    Type 2 diabetes mellitus without complication, without long-term current use of insulin (CMS/[facility])  -     POC Glycosylated Hemoglobin (Hb A1C)    Class 1 obesity with serious comorbidity and body mass index (BMI) of 32.0 to 32.9 in adult, unspecified obesity type    Anxiety  -     sertraline (ZOLOFT) 25 MG tablet; Take 1 tablet by mouth Daily.    Diabetes mellitus type 2 in obese (CMS/HCC)    Elevated ferritin  -     omeprazole (PrilOSEC) 40 MG capsule; Take 1 capsule by mouth Daily.                   Current Outpatient Medications:   •  Cholecalciferol (VITAMIN D3) 1.25 MG (62825 UT) tablet, Take 1 tablet by mouth 1 (One) Time Per Week for 12 doses., Disp: 12 tablet, Rfl: 0  •  Cyanocobalamin (B-12) 1000 MCG tablet controlled-release, Take  by mouth. Patches, Disp: , Rfl:   •  ketoconazole (NIZORAL) 2 % cream, Apply  topically to the appropriate area as directed Every 12 (Twelve) Hours. (Patient taking differently: Apply  topically to the appropriate area as directed As Needed.), Disp: 30 g, Rfl: 0  •  levonorgestrel (MIRENA) 20 MCG/24HR IUD, 1 each by Intrauterine route 1 (One) Time., Disp: , Rfl:   •  omeprazole (PrilOSEC) 40 MG capsule, Take 1 capsule by mouth Daily., Disp: 30 capsule, Rfl: 4  •  ursodiol (ACTIGALL) 300 MG capsule, Take 1 capsule by mouth 2 (Two) Times a Day., Disp: 60 capsule, Rfl: 4  •  VALACYCLOVIR HCL PO, Take 1 tablet by mouth Daily., Disp: , Rfl:   •  sertraline (ZOLOFT) 25 MG tablet, Take 1 tablet by mouth Daily., Disp: 30 tablet, Rfl: 3    No follow-ups on file.  Hemoglobin A1C   Date Value Ref Range Status   01/14/2020 6.0 % Final   11/24/2019 9.20 (H) 4.80 - 5.60 % Final   09/12/2019 8.7 % Final   07/15/2019 7.7 % Final   11/15/2018  6.70 (H) 4.80 - 5.60 % Final   08/30/2017 6.30 (H) 4.80 - 5.60 % Final        Recent Results (from the past 840 hour(s))   CBC & Differential    Collection Time: 12/27/19 10:07 AM   Result Value Ref Range    WBC 5.84 3.40 - 10.80 10*3/mm3    RBC 4.76 3.77 - 5.28 10*6/mm3    Hemoglobin 14.4 12.0 - 15.9 g/dL    Hematocrit 42.3 34.0 - 46.6 %    MCV 88.9 79.0 - 97.0 fL    MCH 30.3 26.6 - 33.0 pg    MCHC 34.0 31.5 - 35.7 g/dL    RDW 13.3 12.3 - 15.4 %    Platelets 161 140 - 450 10*3/mm3    Neutrophil Rel % 60.6 42.7 - 76.0 %    Lymphocyte Rel % 31.3 19.6 - 45.3 %    Monocyte Rel % 4.6 (L) 5.0 - 12.0 %    Eosinophil Rel % 2.6 0.3 - 6.2 %    Basophil Rel % 0.7 0.0 - 1.5 %    Neutrophils Absolute 3.54 1.70 - 7.00 10*3/mm3    Lymphocytes Absolute 1.83 0.70 - 3.10 10*3/mm3    Monocytes Absolute 0.27 0.10 - 0.90 10*3/mm3    Eosinophils Absolute 0.15 0.00 - 0.40 10*3/mm3    Basophils Absolute 0.04 0.00 - 0.20 10*3/mm3    Immature Granulocyte Rel % 0.2 0.0 - 0.5 %    Immature Grans Absolute 0.01 0.00 - 0.05 10*3/mm3    nRBC 0.0 0.0 - 0.2 /100 WBC   Comprehensive Metabolic Panel    Collection Time: 12/27/19 10:07 AM   Result Value Ref Range    Glucose 130 (H) 65 - 99 mg/dL    BUN 8 6 - 20 mg/dL    Creatinine 0.70 0.57 - 1.00 mg/dL    eGFR Non African Am 98 >60 mL/min/1.73    eGFR African Am 119 >60 mL/min/1.73    BUN/Creatinine Ratio 11.4 7.0 - 25.0    Sodium 144 136 - 145 mmol/L    Potassium 4.7 3.5 - 5.2 mmol/L    Chloride 105 98 - 107 mmol/L    Total CO2 23.6 22.0 - 29.0 mmol/L    Calcium 9.5 8.6 - 10.5 mg/dL    Total Protein 7.4 6.0 - 8.5 g/dL    Albumin 4.30 3.50 - 5.20 g/dL    Globulin 3.1 gm/dL    A/G Ratio 1.4 g/dL    Total Bilirubin 0.5 0.2 - 1.2 mg/dL    Alkaline Phosphatase 75 39 - 117 U/L    AST (SGOT) 27 1 - 32 U/L    ALT (SGPT) 60 (H) 1 - 33 U/L   Ferritin    Collection Time: 12/27/19 10:07 AM   Result Value Ref Range    Ferritin 443.00 (H) 13.00 - 150.00 ng/mL   Folate    Collection Time: 12/27/19 10:07 AM   Result  Value Ref Range    Folate 16.80 4.78 - 24.20 ng/mL   Iron    Collection Time: 12/27/19 10:07 AM   Result Value Ref Range    Iron 140 37 - 145 mcg/dL   Methylmalonic Acid, Serum    Collection Time: 12/27/19 10:07 AM   Result Value Ref Range    Methylmalonic Acid 61 0 - 378 nmol/L    Disclaimer: Comment    Prealbumin    Collection Time: 12/27/19 10:07 AM   Result Value Ref Range    Prealbumin 22 14 - 35 mg/dL   Vitamin B1, Whole Blood    Collection Time: 12/27/19 10:07 AM   Result Value Ref Range    Vitamin B1, Whole Blood 88.5 66.5 - 200.0 nmol/L   Vitamin D 25 Hydroxy    Collection Time: 12/27/19 10:07 AM   Result Value Ref Range    25 Hydroxy, Vitamin D 28.5 (L) 30.0 - 100.0 ng/ml   POC Glycosylated Hemoglobin (Hb A1C)    Collection Time: 01/14/20  8:16 AM   Result Value Ref Range    Hemoglobin A1C 6.0 %

## 2020-01-29 DIAGNOSIS — B35.6 TINEA CRURIS: ICD-10-CM

## 2020-01-30 ENCOUNTER — TELEPHONE (OUTPATIENT)
Dept: INTERNAL MEDICINE | Facility: CLINIC | Age: 30
End: 2020-01-30

## 2020-01-30 RX ORDER — KETOCONAZOLE 20 MG/G
CREAM TOPICAL AS NEEDED
Qty: 30 G | Refills: 0 | Status: SHIPPED | OUTPATIENT
Start: 2020-01-30 | End: 2020-11-30

## 2020-01-30 NOTE — TELEPHONE ENCOUNTER
[facility] calling to clarify dosage information on ketoconazole 2% cream    [facility] [phone]

## 2020-01-30 NOTE — TELEPHONE ENCOUNTER
Spoke to Alaina at pharmacy.  They need to know how many times a day pt uses med.  Pt uses it twice a day (I did call pt to ask her).  They will get med ready for pt

## 2020-01-31 ENCOUNTER — TELEPHONE (OUTPATIENT)
Dept: INTERNAL MEDICINE | Facility: CLINIC | Age: 30
End: 2020-01-31

## 2020-01-31 NOTE — TELEPHONE ENCOUNTER
[employer] will not cover more than a 12 week supply proton pump inhibitor therapy within a 365 day calendar.

## 2020-02-28 ENCOUNTER — OFFICE VISIT (OUTPATIENT)
Dept: BARIATRICS/WEIGHT MGMT | Facility: CLINIC | Age: 30
End: 2020-02-28

## 2020-02-28 VITALS
DIASTOLIC BLOOD PRESSURE: 68 MMHG | TEMPERATURE: 97.1 F | OXYGEN SATURATION: 99 % | RESPIRATION RATE: 16 BRPM | HEIGHT: 65 IN | HEART RATE: 103 BPM | BODY MASS INDEX: 29.99 KG/M2 | SYSTOLIC BLOOD PRESSURE: 102 MMHG | WEIGHT: 180 LBS

## 2020-02-28 DIAGNOSIS — E66.9 DIABETES MELLITUS TYPE 2 IN OBESE (HCC): ICD-10-CM

## 2020-02-28 DIAGNOSIS — R53.83 FATIGUE, UNSPECIFIED TYPE: ICD-10-CM

## 2020-02-28 DIAGNOSIS — E11.69 DIABETES MELLITUS TYPE 2 IN OBESE (HCC): ICD-10-CM

## 2020-02-28 DIAGNOSIS — E55.9 VITAMIN D DEFICIENCY: ICD-10-CM

## 2020-02-28 DIAGNOSIS — R79.0 ABNORMAL BLOOD LEVEL OF IRON: ICD-10-CM

## 2020-02-28 DIAGNOSIS — E66.9 OBESITY, CLASS I, BMI 30-34.9: ICD-10-CM

## 2020-02-28 DIAGNOSIS — A04.8 H. PYLORI INFECTION: Primary | ICD-10-CM

## 2020-02-28 PROCEDURE — 99214 OFFICE O/P EST MOD 30 MIN: CPT | Performed by: PHYSICIAN ASSISTANT

## 2020-02-28 RX ORDER — OMEPRAZOLE 20 MG/1
20 CAPSULE, DELAYED RELEASE ORAL 2 TIMES DAILY
Qty: 28 CAPSULE | Refills: 0 | Status: SHIPPED | OUTPATIENT
Start: 2020-02-28 | End: 2020-03-13

## 2020-02-28 RX ORDER — METRONIDAZOLE 250 MG/1
250 TABLET ORAL 4 TIMES DAILY
Qty: 56 TABLET | Refills: 0 | Status: SHIPPED | OUTPATIENT
Start: 2020-02-28 | End: 2020-03-13

## 2020-02-28 RX ORDER — FLUCONAZOLE 150 MG/1
150 TABLET ORAL ONCE
Qty: 1 TABLET | Refills: 0 | Status: SHIPPED | OUTPATIENT
Start: 2020-02-28 | End: 2020-02-28

## 2020-02-28 RX ORDER — TETRACYCLINE HYDROCHLORIDE 500 MG/1
500 CAPSULE ORAL 4 TIMES DAILY
Qty: 56 CAPSULE | Refills: 0 | Status: SHIPPED | OUTPATIENT
Start: 2020-02-28 | End: 2020-03-13

## 2020-02-28 NOTE — PROGRESS NOTES
"[facility] Bariatric Surgery  [address]  [address]  [facility] [id number] [phone]      Patient Name:  Shelley Graff.  :  1990      Date of Visit: 2020      Reason for Visit:  3 months postop    HPI:  Shelley Graff is a 30 y.o. female s/p LSG/HHR 19 PQ (w/ postop hemorrhage)    Feeling good.  Tolerated diet progression w/out issue.  Eating \"a lot of meat\".  Still drinking 1 protein shake/day.  Getting 80+g prot/day.  Drinking coffee each AM w/ some sugar, but drinking water o/w.  Exercising 5-6x/week - strength training.  Only concern is some hair loss.     Continues on Actigall BID.  No longer needing Omeprazole - says insurance would not cover any longer as well.        Labs 19 - low Vit D (28.5), high ferritin (443).  Doing MVI + B12 patches + wkly Vit D.     Presurgery weight:  218 pounds. Today's weight is 81.6 kg (180 lb) pounds, today's Body mass index is 29.95 kg/m²., and her weight loss since surgery is 38 pounds.       Final Diagnosis    STOMACH, SUBTOTAL GASTRECTOMY:  Chronic gastritis.  Immunohistochemistry strongly positive for H. pylori.      DGD/dlb      Note: H.Pylori (+) 19 on UBT, treated with prevpack. Positive stool study 19 and positive pathology from LSG.  Will need repeat H.Pylori treatment post LSG.      Past Medical History:   Diagnosis Date   • Anxiety    • Chronic joint pain     denies NSAIDS/steroids   • DM2 (diabetes mellitus, type 2) (CMS/HCC)     w/ hx gestational diabetes , dx May 2017, started on insulin when initially dx, lost weight w/ Adipex, stopped insulin 6 months after dx, but now w/ weight regain, back on insulin.  Managed by PCP   • Dyspepsia    • Dyspnea on exertion    • Fatigue    • Fatty liver     w/ abnormal LFTs, US 2018   • FH: bariatric surgery 2020    Gastric sleeve 19   • H. pylori infection     UBT (+) 19 - PrevPak RX - repeat HPSA (+) 2019, will " need tx post LSG   • Mixed hyperlipidemia 10/15/2019   • Obesity (BMI 30-39.9)    • Pap smear for cervical cancer screening 2017   • Wears eyeglasses      Past Surgical History:   Procedure Laterality Date   • CERVICAL CONE BIOPSY      benign   •  SECTION  08, 5/15/13, 11/6/15    x3    • ENDOSCOPY N/A 2019    Procedure: ESOPHAGOGASTRODUODENOSCOPY;  Surgeon: Mila Whatley MD;  Location:  [facility];  Service: Bariatric   • GASTRIC SLEEVE LAPAROSCOPIC N/A 2019    Procedure: GASTRIC SLEEVE LAPAROSCOPIC;  Surgeon: Mila Whatley MD;  Location:  [facility];  Service: Bariatric   • HIATAL HERNIA REPAIR N/A 2019    Procedure: HIATAL HERNIA REPAIR LAPAROSCOPIC;  Surgeon: Mila Whatley MD;  Location:  [facility];  Service: Bariatric   • UPPER GASTROINTESTINAL ENDOSCOPY  2019    Dr VIVIENNE Whatley     Outpatient Medications Marked as Taking for the 20 encounter (Office Visit) with Kenzie Vega PA   Medication Sig Dispense Refill   • Cholecalciferol (VITAMIN D3) 1.25 MG (81140 UT) tablet Take 1 tablet by mouth 1 (One) Time Per Week for 12 doses. 12 tablet 0   • Cyanocobalamin (B-12) 1000 MCG tablet controlled-release Take  by mouth. Patches     • ketoconazole (NIZORAL) 2 % cream Apply  topically to the appropriate area as directed As Needed for Itching, Irritation or Rash. 30 g 0   • levonorgestrel (MIRENA) 20 MCG/24HR IUD 1 each by Intrauterine route 1 (One) Time.     • ursodiol (ACTIGALL) 300 MG capsule Take 1 capsule by mouth 2 (Two) Times a Day. 60 capsule 4   • VALACYCLOVIR HCL PO Take 1 tablet by mouth Daily.       No Known Allergies    Social History     Socioeconomic History   • Marital status:      Spouse name: Not on file   • Number of children: Not on file   • Years of education: Not on file   • Highest education level: Not on file   Tobacco Use   • Smoking status: Former Smoker     Packs/day: 2.00     Years: 2.00     Pack years: 4.00      "Last attempt to quit: 2012     Years since quittin.6   • Smokeless tobacco: Never Used   Substance and Sexual Activity   • Alcohol use: No   • Drug use: No   • Sexual activity: Yes     Partners: Male     Birth control/protection: IUD     Comment:    Social History Narrative    Living in [place], [place] w/  and children.   @ [employer] in [facility] [employer] [employer].         /68   Pulse 103   Temp 97.1 °F (36.2 °C) (Temporal)   Resp 16   Ht 165.1 cm (65\")   Wt 81.6 kg (180 lb)   SpO2 99%   BMI 29.95 kg/m²     Physical Exam   Constitutional: She appears well-developed and well-nourished. She is cooperative.   HENT:   Mouth/Throat: Oropharynx is clear and moist and mucous membranes are normal.   Eyes: Conjunctivae are normal. No scleral icterus.   Cardiovascular:   Tachy 103   Pulmonary/Chest: Effort normal.   Abdominal: Soft. There is no tenderness.   Incisions well healed   Musculoskeletal: Normal range of motion. She exhibits no edema.   Neurological: She is alert.   Skin: Skin is warm and dry. No rash noted.   Psychiatric: She has a normal mood and affect. Judgment normal.         Assessment:  3 months s/p LSG/HHR 19 w/ Dr. Whatley    ICD-10-CM ICD-9-CM   1. H/O H. pylori infection A04.8 041.86   2. Fatigue, unspecified type R53.83 780.79   3. Abnormal blood level of iron R79.0 790.6   4. Vitamin D deficiency E55.9 268.9   5. Diabetes mellitus type 2 in obese (CMS/[facility]) E11.69 250.00    E66.9 278.00   6. Obesity, Class I, BMI 30-34.9 E66.9 278.00         Plan:  Doing well.  Increase protein to >100g/day.  Continue routine exercise.  Routine bariatric labs ordered + TSH.  Continue current vitamin regimen w/ adjustments pending lab results.  Will RX Pylera + Diflucan (if needed).  Call w/ problems/concerns.      The patient was instructed to follow up in 3 months, sooner if needed.   "

## 2020-03-03 LAB
25(OH)D3+25(OH)D2 SERPL-MCNC: 41 NG/ML (ref 30–100)
ALBUMIN SERPL-MCNC: 4.4 G/DL (ref 3.5–5.2)
ALBUMIN/GLOB SERPL: 1.6 G/DL
ALP SERPL-CCNC: 72 U/L (ref 39–117)
ALT SERPL-CCNC: 20 U/L (ref 1–33)
AST SERPL-CCNC: 10 U/L (ref 1–32)
BASOPHILS # BLD AUTO: 0.03 10*3/MM3 (ref 0–0.2)
BASOPHILS NFR BLD AUTO: 0.5 % (ref 0–1.5)
BILIRUB SERPL-MCNC: 0.4 MG/DL (ref 0.2–1.2)
BUN SERPL-MCNC: 8 MG/DL (ref 6–20)
BUN/CREAT SERPL: 11 (ref 7–25)
CALCIUM SERPL-MCNC: 9.1 MG/DL (ref 8.6–10.5)
CHLORIDE SERPL-SCNC: 106 MMOL/L (ref 98–107)
CO2 SERPL-SCNC: 25.4 MMOL/L (ref 22–29)
CREAT SERPL-MCNC: 0.73 MG/DL (ref 0.57–1)
EOSINOPHIL # BLD AUTO: 0.11 10*3/MM3 (ref 0–0.4)
EOSINOPHIL NFR BLD AUTO: 1.8 % (ref 0.3–6.2)
ERYTHROCYTE [DISTWIDTH] IN BLOOD BY AUTOMATED COUNT: 13.1 % (ref 12.3–15.4)
FERRITIN SERPL-MCNC: 271 NG/ML (ref 13–150)
FOLATE SERPL-MCNC: 8.04 NG/ML (ref 4.78–24.2)
GLOBULIN SER CALC-MCNC: 2.7 GM/DL
GLUCOSE SERPL-MCNC: 90 MG/DL (ref 65–99)
HCT VFR BLD AUTO: 43.9 % (ref 34–46.6)
HGB BLD-MCNC: 14.9 G/DL (ref 12–15.9)
IMM GRANULOCYTES # BLD AUTO: 0.01 10*3/MM3 (ref 0–0.05)
IMM GRANULOCYTES NFR BLD AUTO: 0.2 % (ref 0–0.5)
IRON SERPL-MCNC: 122 MCG/DL (ref 37–145)
LYMPHOCYTES # BLD AUTO: 2.01 10*3/MM3 (ref 0.7–3.1)
LYMPHOCYTES NFR BLD AUTO: 32.6 % (ref 19.6–45.3)
Lab: NORMAL
MCH RBC QN AUTO: 30.7 PG (ref 26.6–33)
MCHC RBC AUTO-ENTMCNC: 33.9 G/DL (ref 31.5–35.7)
MCV RBC AUTO: 90.3 FL (ref 79–97)
METHYLMALONATE SERPL-SCNC: 66 NMOL/L (ref 0–378)
MONOCYTES # BLD AUTO: 0.32 10*3/MM3 (ref 0.1–0.9)
MONOCYTES NFR BLD AUTO: 5.2 % (ref 5–12)
NEUTROPHILS # BLD AUTO: 3.68 10*3/MM3 (ref 1.7–7)
NEUTROPHILS NFR BLD AUTO: 59.7 % (ref 42.7–76)
NRBC BLD AUTO-RTO: 0 /100 WBC (ref 0–0.2)
PLATELET # BLD AUTO: 191 10*3/MM3 (ref 140–450)
POTASSIUM SERPL-SCNC: 4.3 MMOL/L (ref 3.5–5.2)
PREALB SERPL-MCNC: 23 MG/DL (ref 14–35)
PROT SERPL-MCNC: 7.1 G/DL (ref 6–8.5)
RBC # BLD AUTO: 4.86 10*6/MM3 (ref 3.77–5.28)
SODIUM SERPL-SCNC: 143 MMOL/L (ref 136–145)
TSH SERPL DL<=0.005 MIU/L-ACNC: 0.89 UIU/ML (ref 0.27–4.2)
VIT B1 BLD-SCNC: 230.9 NMOL/L (ref 66.5–200)
WBC # BLD AUTO: 6.16 10*3/MM3 (ref 3.4–10.8)

## 2020-03-12 RX ORDER — OMEPRAZOLE 20 MG/1
CAPSULE, DELAYED RELEASE ORAL
Qty: 28 CAPSULE | Refills: 0 | OUTPATIENT
Start: 2020-03-12

## 2020-04-06 ENCOUNTER — PATIENT MESSAGE (OUTPATIENT)
Dept: INTERNAL MEDICINE | Facility: CLINIC | Age: 30
End: 2020-04-06

## 2020-04-06 RX ORDER — TIZANIDINE 4 MG/1
4 TABLET ORAL NIGHTLY PRN
Qty: 30 TABLET | Refills: 0 | Status: SHIPPED | OUTPATIENT
Start: 2020-04-06 | End: 2020-11-30
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
## 2020-04-14 ENCOUNTER — OFFICE VISIT (OUTPATIENT)
Dept: INTERNAL MEDICINE | Facility: CLINIC | Age: 30
End: 2020-04-14

## 2020-04-14 VITALS — WEIGHT: 175 LBS | BODY MASS INDEX: 29.12 KG/M2

## 2020-04-14 DIAGNOSIS — Z98.84 HISTORY OF WEIGHT LOSS SURGERY: ICD-10-CM

## 2020-04-14 DIAGNOSIS — Z86.39 HISTORY OF DIABETES MELLITUS RESOLVED FOLLOWING BARIATRIC SURGERY: Primary | ICD-10-CM

## 2020-04-14 DIAGNOSIS — Z98.84 HISTORY OF DIABETES MELLITUS RESOLVED FOLLOWING BARIATRIC SURGERY: Primary | ICD-10-CM

## 2020-04-14 PROBLEM — Z79.4 TYPE 2 DIABETES MELLITUS WITH HYPERGLYCEMIA, WITH LONG-TERM CURRENT USE OF INSULIN (HCC): Status: RESOLVED | Noted: 2017-05-17 | Resolved: 2020-04-14

## 2020-04-14 PROBLEM — E11.65 TYPE 2 DIABETES MELLITUS WITH HYPERGLYCEMIA, WITH LONG-TERM CURRENT USE OF INSULIN (HCC): Status: RESOLVED | Noted: 2017-05-17 | Resolved: 2020-04-14

## 2020-04-14 PROBLEM — F41.9 ANXIETY: Status: RESOLVED | Noted: 2019-01-02 | Resolved: 2020-04-14

## 2020-04-14 PROCEDURE — 99441 PR PHYS/QHP TELEPHONE EVALUATION 5-10 MIN: CPT | Performed by: FAMILY MEDICINE

## 2020-04-14 RX ORDER — BIOTIN 1000 MCG
TABLET,CHEWABLE ORAL
COMMUNITY
End: 2022-02-17

## 2020-04-14 NOTE — PROGRESS NOTES
Subjective   Shelley Graff is a 30 y.o. female.     Chief Complaint   Patient presents with   • Follow-up     anxiety and depression     You have chosen to receive care through a telephone visit. Do you consent to use a telephone visit for your medical care today? Yes    Visit Vitals  Wt 79.4 kg (175 lb) Comment: per pt   BMI 29.12 kg/m²       Wt Readings from Last 3 Encounters:   20 79.4 kg (175 lb)   20 81.6 kg (180 lb)   20 87.5 kg (193 lb)         History of Present Illness   Pt has lost weight with gastric sleeve. Pt reports she is now 175#. Pt is no longer on insulin and her blood sugar is around 90 in the morning.     Pt reports that anxiety and depression are resolved and she no longer needs sertraline.     Pt reports decreased frequency of migraines. Migraines usually start in her neck.    The following portions of the patient's history were reviewed and updated as appropriate: allergies, current medications, past family history, past medical history, past social history, past surgical history and problem list.    Past Medical History:   Diagnosis Date   • Anxiety    • Chronic joint pain     denies NSAIDS/steroids   • DM2 (diabetes mellitus, type 2) (CMS/[facility])     w/ hx gestational diabetes , dx May 2017, started on insulin when initially dx, lost weight w/ Adipex, stopped insulin 6 months after dx, but now w/ weight regain, back on insulin.  Managed by PCP   • Dyspepsia    • Dyspnea on exertion    • Fatigue    • Fatty liver     w/ abnormal LFTs, US 2018   • FH: bariatric surgery 2020    Gastric sleeve 19   • H. pylori infection     UBT (+) 19 - PrevPak RX - repeat HPSA (+) 2019, will need tx post LSG   • Mixed hyperlipidemia 10/15/2019   • Obesity (BMI 30-39.9)    • Pap smear for cervical cancer screening 2017   • Wears eyeglasses       Past Surgical History:   Procedure Laterality Date   • CERVICAL CONE BIOPSY      benign   •  SECTION   08, 5/15/13, 11/6/15    x3    • ENDOSCOPY N/A 2019    Procedure: ESOPHAGOGASTRODUODENOSCOPY;  Surgeon: Mila Whatley MD;  Location:  [facility];  Service: Bariatric   • GASTRIC SLEEVE LAPAROSCOPIC N/A 2019    Procedure: GASTRIC SLEEVE LAPAROSCOPIC;  Surgeon: Mila Whatley MD;  Location:  [facility];  Service: Bariatric   • HIATAL HERNIA REPAIR N/A 2019    Procedure: HIATAL HERNIA REPAIR LAPAROSCOPIC;  Surgeon: Mila Whatley MD;  Location:  [facility];  Service: Bariatric   • UPPER GASTROINTESTINAL ENDOSCOPY  2019    Dr VIVIENNE Whatley      Family History   Problem Relation Age of Onset   • Stroke Mother    • Heart disease Mother    • Diabetes Mother    • Obesity Mother    • Hypertension Mother            • Diabetes Father    • Hypertension Father       Social History     Socioeconomic History   • Marital status:      Spouse name: Not on file   • Number of children: Not on file   • Years of education: Not on file   • Highest education level: Not on file   Tobacco Use   • Smoking status: Former Smoker     Packs/day: 2.00     Years: 2.00     Pack years: 4.00     Last attempt to quit: 2012     Years since quittin.7   • Smokeless tobacco: Never Used   Substance and Sexual Activity   • Alcohol use: No   • Drug use: No   • Sexual activity: Yes     Partners: Male     Birth control/protection: IUD     Comment:    Social History Narrative    Living in [place], [place] w/  and children.   @ [employer] in [employer].        Allergies   Allergen Reactions   • Nsaids Other (See Comments)     Gastric sleeve   • Prednisone Other (See Comments)     Please avoid all IV or oral steroids until after 19 to avoid risk of life threatening gastric leak after recent sleeve gastrectomy       Review of Systems   Constitutional: Negative.  Negative for chills, diaphoresis, fatigue and fever.   HENT: Negative.  Negative for ear pain,  nosebleeds, postnasal drip, rhinorrhea, sinus pressure, sneezing and sore throat.    Eyes: Negative.  Negative for redness and itching.   Respiratory: Negative.  Negative for cough, shortness of breath and wheezing.    Cardiovascular: Negative.  Negative for chest pain and palpitations.   Gastrointestinal: Negative.  Negative for abdominal pain, constipation, diarrhea, nausea and vomiting.   Endocrine: Negative.  Negative for cold intolerance and heat intolerance.   Genitourinary: Negative.  Negative for dysuria, frequency, hematuria and urgency.   Musculoskeletal: Negative.  Negative for arthralgias, back pain and neck pain.   Skin: Negative.  Negative for color change and rash.   Allergic/Immunologic: Negative.  Negative for environmental allergies.   Neurological: Negative.  Negative for dizziness, syncope, light-headedness and headaches.   Hematological: Negative.  Negative for adenopathy. Does not bruise/bleed easily.   Psychiatric/Behavioral: Negative.  Negative for dysphoric mood. The patient is not nervous/anxious.        Objective   Physical Exam    Assessment/Plan   Shelley was seen today for follow-up.    Diagnoses and all orders for this visit:    History of diabetes mellitus resolved following bariatric surgery    History of weight loss surgery        Pt has discontinued the sertraline and not having anxiety or depression.   Pt advised if she should be cut this summer to get tetanus, no recent tetanus on record and pt does no remember when she had last tetanus.          Current Outpatient Medications:   •  Biotin 1000 MCG chewable tablet, Chew., Disp: , Rfl:   •  Bismuth Subsalicylate 262 MG tablet, Take 524 mg by mouth 4 (Four) Times a Day. Take 2 tablets by mouth 4 times per day., Disp: 112 each, Rfl: 0  •  Cyanocobalamin (B-12) 1000 MCG tablet controlled-release, Take  by mouth. Patches, Disp: , Rfl:   •  ketoconazole (NIZORAL) 2 % cream, Apply  topically to the appropriate area as directed As Needed  for Itching, Irritation or Rash., Disp: 30 g, Rfl: 0  •  levonorgestrel (MIRENA) 20 MCG/24HR IUD, 1 each by Intrauterine route 1 (One) Time., Disp: , Rfl:   •  Multiple Vitamins-Minerals (MULTIVITAL-M) tablet, Take  by mouth., Disp: , Rfl:   •  tiZANidine (ZANAFLEX) 4 MG tablet, Take 1 tablet by mouth At Night As Needed for Muscle Spasms., Disp: 30 tablet, Rfl: 0  •  ursodiol (ACTIGALL) 300 MG capsule, Take 1 capsule by mouth 2 (Two) Times a Day., Disp: 60 capsule, Rfl: 4  •  VALACYCLOVIR HCL PO, Take 1 tablet by mouth Daily., Disp: , Rfl:     Return if symptoms worsen or fail to improve, for Recheck, Annual.     This visit has been rescheduled as a phone visit to comply with patient safety concerns in accordance with CDC recommendations. Total time of discussion was 4 minutes.

## 2020-05-28 ENCOUNTER — OFFICE VISIT (OUTPATIENT)
Dept: BARIATRICS/WEIGHT MGMT | Facility: CLINIC | Age: 30
End: 2020-05-28

## 2020-05-28 VITALS
RESPIRATION RATE: 18 BRPM | OXYGEN SATURATION: 99 % | BODY MASS INDEX: 28.74 KG/M2 | HEART RATE: 101 BPM | TEMPERATURE: 98.4 F | WEIGHT: 172.5 LBS | SYSTOLIC BLOOD PRESSURE: 118 MMHG | DIASTOLIC BLOOD PRESSURE: 64 MMHG | HEIGHT: 65 IN

## 2020-05-28 DIAGNOSIS — E66.3 OVERWEIGHT (BMI 25.0-29.9): ICD-10-CM

## 2020-05-28 DIAGNOSIS — Z98.84 STATUS POST BARIATRIC SURGERY: Primary | ICD-10-CM

## 2020-05-28 PROCEDURE — 99214 OFFICE O/P EST MOD 30 MIN: CPT | Performed by: PHYSICIAN ASSISTANT

## 2020-05-28 RX ORDER — ERGOCALCIFEROL 1.25 MG/1
50000 CAPSULE ORAL WEEKLY
COMMUNITY
End: 2020-11-30

## 2020-05-28 NOTE — PROGRESS NOTES
[facility] Bariatric Surgery  [address]  [address]  [facility] [id number] [phone]        Patient Name:  Shelley Graff.  :  1990        Reason for Visit:   6 months postop      HPI: Shelley Graff is a 30 y.o. female s/p LSG/HHR 19 PQ (w/ postop hemorrhage)    LOV 20- doing well, weight 180lb, pylera Rx for h pylori + pathology    Doing well. Concerned that she has stalled in weightloss despite exercising 6 days a week and eating well + getting all her protein.  No other issues/concerns. Denies dysphagia, reflux, nausea, vomiting and abdominal pain.  Getting 90-100g prot/day. Protein shake in morning or coffee with collagen powder.  Lunch: apple, ham and cheese or chicken salad. Dinner: steak or meat. Snacks on apples or watermelon. Not tracking calories.   Drinking 64+ fluid oz/day, protein water + water.  3 month labs revealed bariatric levels wnl .  Taking MVI, B12, Vit D and Biotin. Not on antacid Exercising 6 days a week.     Presurgery weight: 218 pounds.  Today's weight is 78.2 kg (172 lb 8 oz) pounds, today's  Body mass index is 28.71 kg/m²., and her weight loss since surgery is 46 pounds.      Past Medical History:   Diagnosis Date   • Anxiety    • Chronic joint pain     denies NSAIDS/steroids   • DM2 (diabetes mellitus, type 2) (CMS/[facility])     w/ hx gestational diabetes , dx May 2017, started on insulin when initially dx, lost weight w/ Adipex, stopped insulin 6 months after dx, but now w/ weight regain, back on insulin.  Managed by PCP   • Dyspepsia    • Dyspnea on exertion    • Fatigue    • Fatty liver     w/ abnormal LFTs, US 2018   • FH: bariatric surgery 2020    Gastric sleeve 19   • H. pylori infection     UBT (+) 19 - PrevPak RX - repeat HPSA (+) 2019, will need tx post LSG   • Mixed hyperlipidemia 10/15/2019   • Obesity (BMI 30-39.9)    • Pap smear for cervical cancer screening 2017   • Wears  eyeglasses      Past Surgical History:   Procedure Laterality Date   • CERVICAL CONE BIOPSY      benign   •  SECTION  08, 5/15/13, 11/6/15    x3    • ENDOSCOPY N/A 2019    Procedure: ESOPHAGOGASTRODUODENOSCOPY;  Surgeon: Mila Whatley MD;  Location:  [facility];  Service: Bariatric   • GASTRIC SLEEVE LAPAROSCOPIC N/A 2019    Procedure: GASTRIC SLEEVE LAPAROSCOPIC;  Surgeon: Mila Whatley MD;  [facility]:  [facility];  Service: Bariatric   • HIATAL HERNIA REPAIR N/A 2019    Procedure: HIATAL HERNIA REPAIR LAPAROSCOPIC;  Surgeon: Mila Whatley MD;  Location:  [facility];  Service: Bariatric   • UPPER GASTROINTESTINAL ENDOSCOPY  2019    Dr VIVIENNE Whatley     Outpatient Medications Marked as Taking for the 20 encounter (Office Visit) with Lashay Siu PA-C   Medication Sig Dispense Refill   • Biotin 1000 MCG chewable tablet Chew.     • Cyanocobalamin (B-12) 1000 MCG tablet controlled-release Take  by mouth. Patches     • ketoconazole (NIZORAL) 2 % cream Apply  topically to the appropriate area as directed As Needed for Itching, Irritation or Rash. 30 g 0   • levonorgestrel (MIRENA) 20 MCG/24HR IUD 1 each by Intrauterine route 1 (One) Time.     • Multiple Vitamins-Minerals (MULTIVITAL-M) tablet Take  by mouth.     • ursodiol (ACTIGALL) 300 MG capsule Take 1 capsule by mouth 2 (Two) Times a Day. 60 capsule 4   • VALACYCLOVIR HCL PO Take 1 tablet by mouth Daily.     • vitamin D (ERGOCALCIFEROL) 1.25 MG (56483 UT) capsule capsule Take 50,000 Units by mouth 1 (One) Time Per Week.         Allergies   Allergen Reactions   • Nsaids Other (See Comments)     Gastric sleeve   • Prednisone Other (See Comments)     Please avoid all IV or oral steroids until after 19 to avoid risk of life threatening gastric leak after recent sleeve gastrectomy       Social History     Socioeconomic History   • Marital status:      Spouse name: Not on file   • Number of  "children: Not on file   • Years of education: Not on file   • Highest education level: Not on file   Tobacco Use   • Smoking status: Former Smoker     Packs/day: 2.00     Years: 2.00     Pack years: 4.00     Last attempt to quit: 2012     Years since quittin.9   • Smokeless tobacco: Never Used   Substance and Sexual Activity   • Alcohol use: No   • Drug use: No   • Sexual activity: Yes     Partners: Male     Birth control/protection: IUD     Comment:    Social History Narrative    Living in [place], [place] w/  and children.   @ [employer] in [employer].         /64 (BP Location: Left arm, Patient Position: Sitting, Cuff Size: Large Adult)   Pulse 101   Temp 98.4 °F (36.9 °C) (Temporal)   Resp 18   Ht 165.1 cm (65\")   Wt 78.2 kg (172 lb 8 oz)   SpO2 99%   BMI 28.71 kg/m²     Physical Exam   Constitutional: She is oriented to person, place, and time. She appears well-developed and well-nourished.   HENT:   Head: Normocephalic and atraumatic.   Cardiovascular: Normal rate, regular rhythm and normal heart sounds.   Pulmonary/Chest: Effort normal and breath sounds normal. No respiratory distress. She has no wheezes.   Abdominal: Soft. Bowel sounds are normal. She exhibits no distension. There is no tenderness.   Neurological: She is alert and oriented to person, place, and time.   Skin: Skin is warm and dry.   Psychiatric: She has a normal mood and affect. Her behavior is normal. Judgment and thought content normal.         Assessment:  6 months s/p LSG/HHR 19 PQ (w/ postop hemorrhage)    ICD-10-CM ICD-9-CM   1. Status post bariatric surgery Z98.84 V45.86   2. Overweight (BMI 25.0-29.9) E66.3 278.02         Plan:  Doing well. Discussed hitting caloric goal of 1300, especially with regular exercise, substitute protein/ healthy fat snacks for fruits. Continue w/ good food choices and healthy habits.  Continue to focus on high protein, low carb.  Keep " tracking intake.  Continue routine exercise.  Routine bariatric labs deferred.  Continue vitamins, will recheck next visit. Call w/ problems/concerns.     The patient was instructed to follow up in 3 months, sooner if needed.      Total time spent w/ patient 25 minutes and 15 minutes spent counseling the patient on nutrition and necessary dietary/lifestyle modifications.

## 2020-08-27 ENCOUNTER — OFFICE VISIT (OUTPATIENT)
Dept: BARIATRICS/WEIGHT MGMT | Facility: CLINIC | Age: 30
End: 2020-08-27

## 2020-08-27 VITALS
HEART RATE: 92 BPM | WEIGHT: 165 LBS | OXYGEN SATURATION: 99 % | TEMPERATURE: 98.6 F | SYSTOLIC BLOOD PRESSURE: 115 MMHG | BODY MASS INDEX: 27.49 KG/M2 | DIASTOLIC BLOOD PRESSURE: 70 MMHG | HEIGHT: 65 IN | RESPIRATION RATE: 16 BRPM

## 2020-08-27 DIAGNOSIS — R53.83 FATIGUE, UNSPECIFIED TYPE: ICD-10-CM

## 2020-08-27 DIAGNOSIS — Z13.0 SCREENING, IRON DEFICIENCY ANEMIA: ICD-10-CM

## 2020-08-27 DIAGNOSIS — Z98.84 STATUS POST BARIATRIC SURGERY: Primary | ICD-10-CM

## 2020-08-27 DIAGNOSIS — E66.3 OVERWEIGHT: ICD-10-CM

## 2020-08-27 DIAGNOSIS — Z90.3 POSTGASTRECTOMY MALABSORPTION: ICD-10-CM

## 2020-08-27 DIAGNOSIS — Z13.21 MALNUTRITION SCREEN: ICD-10-CM

## 2020-08-27 DIAGNOSIS — K91.2 POSTGASTRECTOMY MALABSORPTION: ICD-10-CM

## 2020-08-27 PROCEDURE — 99214 OFFICE O/P EST MOD 30 MIN: CPT | Performed by: PHYSICIAN ASSISTANT

## 2020-08-27 RX ORDER — NYSTATIN 100000 [USP'U]/G
POWDER TOPICAL 3 TIMES DAILY
Qty: 60 G | Refills: 3 | Status: SHIPPED | OUTPATIENT
Start: 2020-08-27 | End: 2020-11-30 | Stop reason: SDUPTHER

## 2020-08-27 NOTE — PROGRESS NOTES
[facility] Bariatric Surgery  [address]  [address]  formerly [facility] [id number] [phone]        Patient Name:  Shelley Graff.  :  1990        Reason for Visit:   9 months postop      HPI: Shelley Graff is a 30 y.o. female s/p LSG/HHR 19 PQ (w/ postop hemorrhage)    Doing well.  Very pleased with progress. Had a goal of 165lb for today and made it! Now wondering what her next goal should be.   keeps telling her she looks skinnier but she is not noticing the scale move. Cannot tolerate sugar/ sweets, makes her sick.  Has rash of skin folds, use otc powder and some antifungal cream. No issues/concerns. Denies dysphagia, reflux, nausea, vomiting, abdominal pain, pulmonary issues and fevers.  Getting 70-100g prot/day.  Drinking 64+ fluid oz/day.  Coffee with protein powder in the morning. Collagen protein.  Steak/ chicken usually for dinner.  Snacks on fruits and nuts.   occ shake in evening.  Forgot lunch and at as gas station 2 days and felt horrible.  3 month labs revealed bariatric levels wnl .  Taking MVI, B12 and Vit D weekly D. Not on antacid. Exercising- biking and hiking, starting resistance bands.     Presurgery weight: 218 pounds.  Today's weight is 74.8 kg (165 lb) pounds, today's  Body mass index is 27.46 kg/m²., and@ weight loss since surgery is 53 pounds.      Past Medical History:   Diagnosis Date   • Anxiety    • Chronic joint pain     denies NSAIDS/steroids   • DM2 (diabetes mellitus, type 2) (CMS/HCC)     w/ hx gestational diabetes , dx May 2017, started on insulin when initially dx, lost weight w/ Adipex, stopped insulin 6 months after dx, but now w/ weight regain, back on insulin.  Managed by PCP   • Dyspepsia    • Dyspnea on exertion    • Fatigue    • Fatty liver     w/ abnormal LFTs, US 2018   • FH: bariatric surgery 2020    Gastric sleeve 19   • H. pylori infection     UBT (+) 19 - PrevPak RX - repeat  HPSA (+) 2019, will need tx post LSG   • Mixed hyperlipidemia 10/15/2019   • Obesity (BMI 30-39.9)    • Pap smear for cervical cancer screening 2017   • Wears eyeglasses      Past Surgical History:   Procedure Laterality Date   • CERVICAL CONE BIOPSY      benign   •  SECTION  08, 5/15/13, 11/6/15    x3    • ENDOSCOPY N/A 2019    Procedure: ESOPHAGOGASTRODUODENOSCOPY;  Surgeon: Mila Whatley MD;  Location:  [facility];  Service: Bariatric   • GASTRIC SLEEVE LAPAROSCOPIC N/A 2019    Procedure: GASTRIC SLEEVE LAPAROSCOPIC;  Surgeon: Mila Whatley MD;  Location:  [facility];  Service: Bariatric   • HIATAL HERNIA REPAIR N/A 2019    Procedure: HIATAL HERNIA REPAIR LAPAROSCOPIC;  Surgeon: Mila Whatley MD;  Location:  [facility];  Service: Bariatric   • UPPER GASTROINTESTINAL ENDOSCOPY  2019    Dr VIVIENNE Whatley     Outpatient Medications Marked as Taking for the 20 encounter (Office Visit) with Lashay Siu PA-C   Medication Sig Dispense Refill   • Biotin 1000 MCG chewable tablet Chew.     • Cyanocobalamin (B-12) 1000 MCG tablet controlled-release Take  by mouth. Patches     • ketoconazole (NIZORAL) 2 % cream Apply  topically to the appropriate area as directed As Needed for Itching, Irritation or Rash. 30 g 0   • levonorgestrel (MIRENA) 20 MCG/24HR IUD 1 each by Intrauterine route 1 (One) Time.     • Multiple Vitamins-Minerals (MULTIVITAL-M) tablet Take  by mouth.     • tiZANidine (ZANAFLEX) 4 MG tablet Take 1 tablet by mouth At Night As Needed for Muscle Spasms. 30 tablet 0   • vitamin D (ERGOCALCIFEROL) 1.25 MG (10919 UT) capsule capsule Take 50,000 Units by mouth 1 (One) Time Per Week.         Allergies   Allergen Reactions   • Nsaids Other (See Comments)     Gastric sleeve   • Prednisone Other (See Comments)     Please avoid all IV or oral steroids until after 19 to avoid risk of life threatening gastric leak after recent sleeve  "gastrectomy       Social History     Socioeconomic History   • Marital status:      Spouse name: Not on file   • Number of children: Not on file   • Years of education: Not on file   • Highest education level: Not on file   Tobacco Use   • Smoking status: Former Smoker     Packs/day: 2.00     Years: 2.00     Pack years: 4.00     Last attempt to quit: 2012     Years since quittin.1   • Smokeless tobacco: Never Used   Substance and Sexual Activity   • Alcohol use: No   • Drug use: No   • Sexual activity: Yes     Partners: Male     Birth control/protection: IUD     Comment:    Social History Narrative    Living in [place], [place] w/  and children.   @ [employer] in [facility].         /70   Pulse 92   Temp 98.6 °F (37 °C) (Temporal)   Resp 16   Ht 165.1 cm (65\")   Wt 74.8 kg (165 lb)   SpO2 99%   Breastfeeding No   BMI 27.46 kg/m²     Physical Exam   Constitutional: She appears well-developed and well-nourished.   HENT:   Head: Normocephalic and atraumatic.   Cardiovascular: Normal rate and regular rhythm.   Pulmonary/Chest: Effort normal and breath sounds normal.   Abdominal: Soft. Bowel sounds are normal.   Incisions healing well   Neurological: She is alert.   Skin: Skin is warm and dry.   Psychiatric: She has a normal mood and affect. Her behavior is normal. Judgment and thought content normal.         Assessment:  9 months s/p LSG/HHR 19 PQ (w/ postop hemorrhage)    ICD-10-CM ICD-9-CM   1. Status post bariatric surgery Z98.84 V45.86   2. Overweight E66.3 278.02   3. Screening, iron deficiency anemia Z13.0 V78.0   4. Malnutrition screen Z13.21 V77.2   5. Postgastrectomy malabsorption K91.2 579.3    Z90.3    6. Fatigue, unspecified type R53.83 780.79         Plan:  Will send nystatin powder Rx. Advised eating regular meals and not skipping. May have a goal to lose another 10lb.  Body fat % at high end of normal now.  Continue w/ good food " choices and healthy habits.  Continue to focus on high protein, low carb.  Keep tracking intake.  Continue routine exercise.  Routine bariatric labs ordered.  Continue vitamins w/ adjustments pending lab results.  Call w/ problems/concerns.     Patient's Body mass index is 27.46 kg/m². BMI is above normal parameters. Recommendations include: exercise counseling and nutrition counseling.        The patient was instructed to follow up in 3 months, sooner if needed.      Total time spent w/ patient 25 minutes and 15 minutes spent counseling the patient on nutrition and necessary dietary/lifestyle modifications.

## 2020-08-31 LAB
25(OH)D3+25(OH)D2 SERPL-MCNC: 42 NG/ML (ref 30–100)
ALBUMIN SERPL-MCNC: 4.4 G/DL (ref 3.5–5.2)
ALBUMIN/GLOB SERPL: 2.1 G/DL
ALP SERPL-CCNC: 59 U/L (ref 39–117)
ALT SERPL-CCNC: 20 U/L (ref 1–33)
AST SERPL-CCNC: 11 U/L (ref 1–32)
BASOPHILS # BLD AUTO: 0.04 10*3/MM3 (ref 0–0.2)
BASOPHILS NFR BLD AUTO: 0.6 % (ref 0–1.5)
BILIRUB SERPL-MCNC: 0.5 MG/DL (ref 0–1.2)
BUN SERPL-MCNC: 10 MG/DL (ref 6–20)
BUN/CREAT SERPL: 14.5 (ref 7–25)
CALCIUM SERPL-MCNC: 9 MG/DL (ref 8.6–10.5)
CHLORIDE SERPL-SCNC: 106 MMOL/L (ref 98–107)
CO2 SERPL-SCNC: 26.2 MMOL/L (ref 22–29)
CREAT SERPL-MCNC: 0.69 MG/DL (ref 0.57–1)
EOSINOPHIL # BLD AUTO: 0.09 10*3/MM3 (ref 0–0.4)
EOSINOPHIL NFR BLD AUTO: 1.4 % (ref 0.3–6.2)
ERYTHROCYTE [DISTWIDTH] IN BLOOD BY AUTOMATED COUNT: 12.8 % (ref 12.3–15.4)
FERRITIN SERPL-MCNC: 255 NG/ML (ref 13–150)
FOLATE SERPL-MCNC: 17.8 NG/ML (ref 4.78–24.2)
GLOBULIN SER CALC-MCNC: 2.1 GM/DL
GLUCOSE SERPL-MCNC: 110 MG/DL (ref 65–99)
HCT VFR BLD AUTO: 41.6 % (ref 34–46.6)
HGB BLD-MCNC: 13.8 G/DL (ref 12–15.9)
IMM GRANULOCYTES # BLD AUTO: 0.01 10*3/MM3 (ref 0–0.05)
IMM GRANULOCYTES NFR BLD AUTO: 0.2 % (ref 0–0.5)
IRON SERPL-MCNC: 123 MCG/DL (ref 37–145)
LYMPHOCYTES # BLD AUTO: 1.91 10*3/MM3 (ref 0.7–3.1)
LYMPHOCYTES NFR BLD AUTO: 28.8 % (ref 19.6–45.3)
Lab: NORMAL
MCH RBC QN AUTO: 31.5 PG (ref 26.6–33)
MCHC RBC AUTO-ENTMCNC: 33.2 G/DL (ref 31.5–35.7)
MCV RBC AUTO: 95 FL (ref 79–97)
METHYLMALONATE SERPL-SCNC: 58 NMOL/L (ref 0–378)
MONOCYTES # BLD AUTO: 0.33 10*3/MM3 (ref 0.1–0.9)
MONOCYTES NFR BLD AUTO: 5 % (ref 5–12)
NEUTROPHILS # BLD AUTO: 4.25 10*3/MM3 (ref 1.7–7)
NEUTROPHILS NFR BLD AUTO: 64 % (ref 42.7–76)
NRBC BLD AUTO-RTO: 0 /100 WBC (ref 0–0.2)
PLATELET # BLD AUTO: 163 10*3/MM3 (ref 140–450)
POTASSIUM SERPL-SCNC: 4.6 MMOL/L (ref 3.5–5.2)
PREALB SERPL-MCNC: 22 MG/DL (ref 14–35)
PROT SERPL-MCNC: 6.5 G/DL (ref 6–8.5)
RBC # BLD AUTO: 4.38 10*6/MM3 (ref 3.77–5.28)
SODIUM SERPL-SCNC: 141 MMOL/L (ref 136–145)
VIT B1 BLD-SCNC: 94.8 NMOL/L (ref 66.5–200)
WBC # BLD AUTO: 6.63 10*3/MM3 (ref 3.4–10.8)

## 2020-10-04 PROCEDURE — U0003 INFECTIOUS AGENT DETECTION BY NUCLEIC ACID (DNA OR RNA); SEVERE ACUTE RESPIRATORY SYNDROME CORONAVIRUS 2 (SARS-COV-2) (CORONAVIRUS DISEASE [COVID-19]), AMPLIFIED PROBE TECHNIQUE, MAKING USE OF HIGH THROUGHPUT TECHNOLOGIES AS DESCRIBED BY CMS-2020-01-R: HCPCS | Performed by: NURSE PRACTITIONER

## 2020-10-05 ENCOUNTER — TELEPHONE (OUTPATIENT)
Dept: INTERNAL MEDICINE | Facility: CLINIC | Age: 30
End: 2020-10-05

## 2020-10-05 DIAGNOSIS — R06.2 WHEEZING: Primary | ICD-10-CM

## 2020-10-05 NOTE — TELEPHONE ENCOUNTER
Caller: Shelley Mclean    Relationship: Self    Best call back number: [phone]    What medication are you requesting: albuterol inhaler    What are your current symptoms: shortness of breath, wheezing    How long have you been experiencing symptoms: 10/3/20    If a prescription is needed, what is your preferred pharmacy and phone number: [facility] [place] [address] [address] [facility] [phone] [facility] [phone]      Additional notes:  Patient would like to know if an inhaler could be called in to help her breathe. Patient stated she went to the Urgent Care on 10/4/20 and is waiting on COVID -19 test results. Patient has body aches, fever, loss of taste and smell, shortness of breath, chills, and wheezing.

## 2020-10-06 RX ORDER — ALBUTEROL SULFATE 90 UG/1
2 AEROSOL, METERED RESPIRATORY (INHALATION) EVERY 4 HOURS PRN
Qty: 18 G | Refills: 0 | Status: SHIPPED | OUTPATIENT
Start: 2020-10-06 | End: 2020-11-30

## 2020-10-07 RX ORDER — CHOLECALCIFEROL (VITAMIN D3) 1250 MCG
CAPSULE ORAL
Qty: 4 CAPSULE | Refills: 0 | OUTPATIENT
Start: 2020-10-07

## 2020-11-30 ENCOUNTER — OFFICE VISIT (OUTPATIENT)
Dept: BARIATRICS/WEIGHT MGMT | Facility: CLINIC | Age: 30
End: 2020-11-30

## 2020-11-30 VITALS
RESPIRATION RATE: 18 BRPM | SYSTOLIC BLOOD PRESSURE: 118 MMHG | TEMPERATURE: 98.6 F | WEIGHT: 164 LBS | OXYGEN SATURATION: 99 % | BODY MASS INDEX: 27.32 KG/M2 | HEIGHT: 65 IN | DIASTOLIC BLOOD PRESSURE: 64 MMHG | HEART RATE: 83 BPM

## 2020-11-30 DIAGNOSIS — Z90.3 POSTGASTRECTOMY MALABSORPTION: ICD-10-CM

## 2020-11-30 DIAGNOSIS — Z98.84 STATUS POST BARIATRIC SURGERY: Primary | ICD-10-CM

## 2020-11-30 DIAGNOSIS — Z13.0 SCREENING, IRON DEFICIENCY ANEMIA: ICD-10-CM

## 2020-11-30 DIAGNOSIS — K91.2 POSTGASTRECTOMY MALABSORPTION: ICD-10-CM

## 2020-11-30 DIAGNOSIS — R11.10 VOMITING, INTRACTABILITY OF VOMITING NOT SPECIFIED, PRESENCE OF NAUSEA NOT SPECIFIED, UNSPECIFIED VOMITING TYPE: ICD-10-CM

## 2020-11-30 DIAGNOSIS — R53.83 FATIGUE, UNSPECIFIED TYPE: ICD-10-CM

## 2020-11-30 DIAGNOSIS — E55.9 HYPOVITAMINOSIS D: ICD-10-CM

## 2020-11-30 DIAGNOSIS — R10.13 DYSPEPSIA: ICD-10-CM

## 2020-11-30 DIAGNOSIS — Z13.21 MALNUTRITION SCREEN: ICD-10-CM

## 2020-11-30 PROCEDURE — 99214 OFFICE O/P EST MOD 30 MIN: CPT | Performed by: PHYSICIAN ASSISTANT

## 2020-11-30 RX ORDER — NYSTATIN 100000 [USP'U]/G
POWDER TOPICAL 3 TIMES DAILY
Qty: 60 G | Refills: 3 | Status: SHIPPED | OUTPATIENT
Start: 2020-11-30 | End: 2021-09-17

## 2020-11-30 RX ORDER — OMEPRAZOLE 40 MG/1
40 CAPSULE, DELAYED RELEASE ORAL DAILY
Qty: 30 CAPSULE | Refills: 5 | Status: SHIPPED | OUTPATIENT
Start: 2020-11-30 | End: 2022-02-17

## 2020-11-30 NOTE — PROGRESS NOTES
[facility] Bariatric Surgery  [address]  [address]  Former[facility] [address] [phone]        Patient Name:  Shelley Graff.  :  1990        Reason for Visit:   1 year postop      HPI: Shelley Graff is a 30 y.o. female s/p LSG/HHR 19 PQ (w/ postop hemorrhage)        Doing ok although has been struggling to lose more weight.  165lb last visit, goal to lose another 10lb. Also has been vomiting regularly, seems to be daily. Unsure if its with overeating but always after a meal. Feels like things stay in chest. Is not on PPI.  Denies reflux, nausea, abdominal pain, pulmonary issues and fevers.  Getting 70-90g prot/day. Eating mostly meats. Protein coffee in morning, collagen peptide powder. Beef jerky, eggs.  Dinner: meat. Snacks on Optimus3 sausaOneSun.   Not tracking calories.  Drinking < 64 fluid oz/day. Struggles getting water down, feels like she is going to vomit for the last month.    Last labs 20- bariatric levels wnl, ferritin elevated.   Taking MVI, B12 and Biotin, plans to go back to patch due to nausea with MVI pills.  Not on antacid.  Exercising with resistance bands.     Presurgery weight: 218 pounds.  Today's weight is 74.4 kg (164 lb) pounds, today's  Body mass index is 27.29 kg/m²., and@ weight loss since surgery is 54 pounds.      Past Medical History:   Diagnosis Date   • Anxiety    • Chronic joint pain     denies NSAIDS/steroids   • DM2 (diabetes mellitus, type 2) (CMS/[facility])     w/ hx gestational diabetes , dx May 2017, started on insulin when initially dx, lost weight w/ Adipex, stopped insulin 6 months after dx, but now w/ weight regain, back on insulin.  Managed by PCP   • Dyspepsia    • Dyspnea on exertion    • Fatigue    • Fatty liver     w/ abnormal LFTs, US 2018   • FH: bariatric surgery 2020    Gastric sleeve 19   • H. pylori infection     UBT (+) 19 - PrevPak RX - repeat HPSA (+) 2019, will need tx post  LSG   • Mixed hyperlipidemia 10/15/2019   • Obesity (BMI 30-39.9)    • Pap smear for cervical cancer screening 2017   • Wears eyeglasses      Past Surgical History:   Procedure Laterality Date   • CERVICAL CONE BIOPSY      benign   •  SECTION  08, 5/15/13, 11/6/15    x3    • ENDOSCOPY N/A 2019    Procedure: ESOPHAGOGASTRODUODENOSCOPY;  Surgeon: Mila Whatley MD;  Location:  [facility];  Service: Bariatric   • GASTRIC SLEEVE LAPAROSCOPIC N/A 2019    Procedure: GASTRIC SLEEVE LAPAROSCOPIC;  Surgeon: Mila Whatley MD;  Location:  [facility];  Service: Bariatric   • HIATAL HERNIA REPAIR N/A 2019    Procedure: HIATAL HERNIA REPAIR LAPAROSCOPIC;  Surgeon: Mila Whatley MD;  Location:  [facility];  Service: Bariatric   • UPPER GASTROINTESTINAL ENDOSCOPY  2019    Dr VIVIENNE Whatley     Outpatient Medications Marked as Taking for the 20 encounter (Office Visit) with Lashay Siu PA-C   Medication Sig Dispense Refill   • Biotin 1000 MCG chewable tablet Chew.     • Cyanocobalamin (B-12) 1000 MCG tablet controlled-release Take  by mouth. Patches     • Multiple Vitamins-Minerals (MULTIVITAL-M) tablet Take  by mouth.     • nystatin (MYCOSTATIN) 034842 UNIT/GM powder Apply  topically to the appropriate area as directed 3 (Three) Times a Day. 60 g 3   • VALACYCLOVIR HCL PO Take 1 tablet by mouth Daily.     • [DISCONTINUED] nystatin (MYCOSTATIN) 492521 UNIT/GM powder Apply  topically to the appropriate area as directed 3 (Three) Times a Day. 60 g 3       Allergies   Allergen Reactions   • Nsaids Other (See Comments)     Gastric sleeve   • Prednisone Other (See Comments)     Please avoid all IV or oral steroids until after 19 to avoid risk of life threatening gastric leak after recent sleeve gastrectomy       Social History     Socioeconomic History   • Marital status:      Spouse name: Not on file   • Number of children: Not on file   • Years of  "education: Not on file   • Highest education level: Not on file   Tobacco Use   • Smoking status: Former Smoker     Packs/day: 2.00     Years: 2.00     Pack years: 4.00     Quit date: 2012     Years since quittin.4   • Smokeless tobacco: Never Used   Substance and Sexual Activity   • Alcohol use: No   • Drug use: No   • Sexual activity: Yes     Partners: Male     Birth control/protection: I.U.D.     Comment:    Social History Narrative    Living in [place], [place] w/  and children.   @ [employer] in [facility] [address].         /64 (BP Location: Left arm, Patient Position: Sitting, Cuff Size: Adult)   Pulse 83   Temp 98.6 °F (37 °C) (Temporal)   Resp 18   Ht 165.1 cm (65\")   Wt 74.4 kg (164 lb)   SpO2 99%   BMI 27.29 kg/m²     Physical Exam  Constitutional:       Appearance: She is well-developed.   HENT:      Head: Normocephalic and atraumatic.   Cardiovascular:      Rate and Rhythm: Normal rate and regular rhythm.   Pulmonary:      Effort: Pulmonary effort is normal.      Breath sounds: Normal breath sounds.   Abdominal:      General: Bowel sounds are normal.      Palpations: Abdomen is soft.      Comments: Incisions healing well   Skin:     General: Skin is warm and dry.   Neurological:      Mental Status: She is alert.   Psychiatric:         Behavior: Behavior normal.           Assessment:  1 year s/p LSG/HHR 19 PQ (w/ postop hemorrhage)    ICD-10-CM ICD-9-CM   1. Status post bariatric surgery  Z98.84 V45.86   2. Fatigue, unspecified type  R53.83 780.79   3. Postgastrectomy malabsorption  K91.2 579.3    Z90.3    4. Hypovitaminosis D  E55.9 268.9   5. Screening, iron deficiency anemia  Z13.0 V78.0   6. Malnutrition screen  Z13.21 V77.2         Plan:    Will start omeprazole 40mg daily. will evaluate with GBUS and UGI, HIDA if needed.  Will also send nystatin powder. Advised targeting 1200 calories. F/u with [facility] dietician in a couple weeks.Continue w/ good " food choices and healthy habits.  Continue to focus on high protein, low carb.  Keep tracking intake.  Continue routine exercise.  Routine bariatric labs ordered.  Continue vitamins w/ adjustments pending lab results.  Call w/ problems/concerns.     Patient's Body mass index is 27.29 kg/m². BMI is above normal parameters. Recommendations include: exercise counseling and nutrition counseling.        The patient was instructed to follow up in 6 months, sooner if needed.      Total time spent w/ patient 25 minutes and 15 minutes spent counseling the patient on nutrition and necessary dietary/lifestyle modifications.

## 2020-12-03 LAB
25(OH)D3+25(OH)D2 SERPL-MCNC: 38.2 NG/ML (ref 30–100)
ALBUMIN SERPL-MCNC: 4.3 G/DL (ref 3.5–5.2)
ALBUMIN/GLOB SERPL: 2 G/DL
ALP SERPL-CCNC: 65 U/L (ref 39–117)
ALT SERPL-CCNC: 25 U/L (ref 1–33)
AST SERPL-CCNC: 10 U/L (ref 1–32)
BASOPHILS # BLD AUTO: 0.04 10*3/MM3 (ref 0–0.2)
BASOPHILS NFR BLD AUTO: 0.7 % (ref 0–1.5)
BILIRUB SERPL-MCNC: 0.4 MG/DL (ref 0–1.2)
BUN SERPL-MCNC: 13 MG/DL (ref 6–20)
BUN/CREAT SERPL: 20.3 (ref 7–25)
CALCIUM SERPL-MCNC: 9.2 MG/DL (ref 8.6–10.5)
CHLORIDE SERPL-SCNC: 104 MMOL/L (ref 98–107)
CO2 SERPL-SCNC: 28.5 MMOL/L (ref 22–29)
CREAT SERPL-MCNC: 0.64 MG/DL (ref 0.57–1)
EOSINOPHIL # BLD AUTO: 0.12 10*3/MM3 (ref 0–0.4)
EOSINOPHIL NFR BLD AUTO: 2 % (ref 0.3–6.2)
ERYTHROCYTE [DISTWIDTH] IN BLOOD BY AUTOMATED COUNT: 12.3 % (ref 12.3–15.4)
FERRITIN SERPL-MCNC: 222 NG/ML (ref 13–150)
FOLATE SERPL-MCNC: 4.92 NG/ML (ref 4.78–24.2)
GLOBULIN SER CALC-MCNC: 2.1 GM/DL
GLUCOSE SERPL-MCNC: 88 MG/DL (ref 65–99)
HCT VFR BLD AUTO: 40.8 % (ref 34–46.6)
HGB BLD-MCNC: 13.9 G/DL (ref 12–15.9)
IMM GRANULOCYTES # BLD AUTO: 0.01 10*3/MM3 (ref 0–0.05)
IMM GRANULOCYTES NFR BLD AUTO: 0.2 % (ref 0–0.5)
IRON SERPL-MCNC: 128 MCG/DL (ref 37–145)
LYMPHOCYTES # BLD AUTO: 1.93 10*3/MM3 (ref 0.7–3.1)
LYMPHOCYTES NFR BLD AUTO: 32 % (ref 19.6–45.3)
Lab: NORMAL
MCH RBC QN AUTO: 30.6 PG (ref 26.6–33)
MCHC RBC AUTO-ENTMCNC: 34.1 G/DL (ref 31.5–35.7)
MCV RBC AUTO: 89.9 FL (ref 79–97)
METHYLMALONATE SERPL-SCNC: 74 NMOL/L (ref 0–378)
MONOCYTES # BLD AUTO: 0.32 10*3/MM3 (ref 0.1–0.9)
MONOCYTES NFR BLD AUTO: 5.3 % (ref 5–12)
NEUTROPHILS # BLD AUTO: 3.62 10*3/MM3 (ref 1.7–7)
NEUTROPHILS NFR BLD AUTO: 59.8 % (ref 42.7–76)
NRBC BLD AUTO-RTO: 0 /100 WBC (ref 0–0.2)
PLATELET # BLD AUTO: 176 10*3/MM3 (ref 140–450)
POTASSIUM SERPL-SCNC: 4.5 MMOL/L (ref 3.5–5.2)
PREALB SERPL-MCNC: 22 MG/DL (ref 14–35)
PROT SERPL-MCNC: 6.4 G/DL (ref 6–8.5)
RBC # BLD AUTO: 4.54 10*6/MM3 (ref 3.77–5.28)
SODIUM SERPL-SCNC: 138 MMOL/L (ref 136–145)
VIT B1 BLD-SCNC: 108.5 NMOL/L (ref 66.5–200)
WBC # BLD AUTO: 6.04 10*3/MM3 (ref 3.4–10.8)

## 2021-01-03 ENCOUNTER — APPOINTMENT (OUTPATIENT)
Dept: PREADMISSION TESTING | Facility: HOSPITAL | Age: 31
End: 2021-01-03

## 2021-01-13 PROCEDURE — U0004 COV-19 TEST NON-CDC HGH THRU: HCPCS | Performed by: FAMILY MEDICINE

## 2021-01-14 ENCOUNTER — TELEPHONE (OUTPATIENT)
Dept: URGENT CARE | Facility: CLINIC | Age: 31
End: 2021-01-14

## 2021-03-03 ENCOUNTER — TELEPHONE (OUTPATIENT)
Dept: INTERNAL MEDICINE | Facility: CLINIC | Age: 31
End: 2021-03-03

## 2021-03-03 DIAGNOSIS — Z34.90 PREGNANCY, UNSPECIFIED GESTATIONAL AGE: Primary | ICD-10-CM

## 2021-03-03 NOTE — TELEPHONE ENCOUNTER
PATIENT CALLED AND STATED SHE IS PREGNANT AND CURRENTLY SEES [facility], DR. SOTELO.  THEY WILL NEED A REFERRAL FOR HER TO CONTINUE SEEING THEM.  SHE IS HAVING AN ULTRASOUND TODAY.      IT CAN BE FAXED DIRECTLY TO DR. SOTELO.    PLEASE CONTACT PATIENT TO ADVISE.    CALLBACK:  [phone]

## 2021-05-11 ENCOUNTER — LAB (OUTPATIENT)
Dept: LAB | Facility: HOSPITAL | Age: 31
End: 2021-05-11

## 2021-05-11 ENCOUNTER — OFFICE VISIT (OUTPATIENT)
Dept: INTERNAL MEDICINE | Facility: CLINIC | Age: 31
End: 2021-05-11

## 2021-05-11 VITALS
BODY MASS INDEX: 29.82 KG/M2 | SYSTOLIC BLOOD PRESSURE: 110 MMHG | HEART RATE: 88 BPM | TEMPERATURE: 97.8 F | WEIGHT: 179 LBS | DIASTOLIC BLOOD PRESSURE: 60 MMHG | OXYGEN SATURATION: 99 % | HEIGHT: 65 IN

## 2021-05-11 DIAGNOSIS — J39.2 IRRITATED THROAT: ICD-10-CM

## 2021-05-11 DIAGNOSIS — R05.9 COUGH: Primary | ICD-10-CM

## 2021-05-11 DIAGNOSIS — R05.9 COUGH: ICD-10-CM

## 2021-05-11 PROCEDURE — 99213 OFFICE O/P EST LOW 20 MIN: CPT | Performed by: FAMILY MEDICINE

## 2021-05-11 PROCEDURE — U0003 INFECTIOUS AGENT DETECTION BY NUCLEIC ACID (DNA OR RNA); SEVERE ACUTE RESPIRATORY SYNDROME CORONAVIRUS 2 (SARS-COV-2) (CORONAVIRUS DISEASE [COVID-19]), AMPLIFIED PROBE TECHNIQUE, MAKING USE OF HIGH THROUGHPUT TECHNOLOGIES AS DESCRIBED BY CMS-2020-01-R: HCPCS | Performed by: FAMILY MEDICINE

## 2021-05-11 RX ORDER — AMOXICILLIN 400 MG/5ML
400 POWDER, FOR SUSPENSION ORAL 3 TIMES DAILY
Qty: 150 ML | Refills: 0 | Status: SHIPPED | OUTPATIENT
Start: 2021-05-11 | End: 2021-09-17

## 2021-05-11 NOTE — PROGRESS NOTES
"Subjective   Shelley Graff is a 31 y.o. female.     Chief Complaint   Patient presents with   • scratchy throat     x 4 days denies fever , more scrachy than sore    • Cough       Visit Vitals  /60 (BP Location: Right arm, Patient Position: Sitting, Cuff Size: Adult)   Pulse 88   Temp 97.8 °F (36.6 °C) (Infrared)   Ht 165.1 cm (65\")   Wt 81.2 kg (179 lb)   SpO2 99%   BMI 29.79 kg/m²       Wt Readings from Last 3 Encounters:   05/11/21 81.2 kg (179 lb)   01/13/21 74.8 kg (165 lb)   11/30/20 74.4 kg (164 lb)         Cough  This is a new problem. The current episode started in the past 7 days. The problem has been unchanged. The problem occurs every few hours. The cough is productive of blood-tinged sputum. Associated symptoms include heartburn and shortness of breath (mild). Pertinent negatives include no chest pain, chills, ear congestion, ear pain, fever, headaches, hemoptysis, myalgias, nasal congestion, postnasal drip, rash, rhinorrhea, sore throat, sweats, weight loss or wheezing. Nothing aggravates the symptoms. She has tried nothing for the symptoms. The treatment provided no relief. There is no history of asthma, bronchiectasis, bronchitis, COPD, emphysema, environmental allergies or pneumonia.      Pt has been sick since Friday last week 5 days ago, with cough and scratchy throat.  Pt had Covid in October.  Pt had gastric sleeve  11/26/19.   Pt has mild SOB with her mask.     Pt reports that she is 5 months pregnant. Pt sees Dr Fragoso for OBGYN    The following portions of the patient's history were reviewed and updated as appropriate: allergies, current medications, past family history, past medical history, past social history, past surgical history and problem list.    Past Medical History:   Diagnosis Date   • Anxiety    • Chronic joint pain     denies NSAIDS/steroids   • DM2 (diabetes mellitus, type 2) (CMS/[facility])     w/ hx gestational diabetes 2015, dx May 2017, started on insulin when " initially dx, lost weight w/ Adipex, stopped insulin 6 months after dx, but now w/ weight regain, back on insulin.  Managed by PCP   • Dyspepsia    • Dyspnea on exertion    • Fatigue    • Fatty liver     w/ abnormal LFTs, US 2018   • FH: bariatric surgery 2020    Gastric sleeve 19   • H. pylori infection     UBT (+) 19 - PrevPak RX - repeat HPSA (+) 2019, will need tx post LSG   • Mixed hyperlipidemia 10/15/2019   • Obesity (BMI 30-39.9)    • Pap smear for cervical cancer screening 2017   • Wears eyeglasses       Past Surgical History:   Procedure Laterality Date   • CERVICAL CONE BIOPSY      benign   •  SECTION  08, 5/15/13, 11/6/15    x3    • ENDOSCOPY N/A 2019    Procedure: ESOPHAGOGASTRODUODENOSCOPY;  Surgeon: Mila Whatley MD;  Location:  [facility];  Service: Bariatric   • GASTRIC SLEEVE LAPAROSCOPIC N/A 2019    Procedure: GASTRIC SLEEVE LAPAROSCOPIC;  Surgeon: Mila Whatley MD;  Location:  [facility];  Service: Bariatric   • HIATAL HERNIA REPAIR N/A 2019    Procedure: HIATAL HERNIA REPAIR LAPAROSCOPIC;  Surgeon: Mila Whatley MD;  Location:  [facility];  Service: Bariatric   • UPPER GASTROINTESTINAL ENDOSCOPY  2019    Dr VIVIENNE Whatley      Family History   Problem Relation Age of Onset   • Stroke Mother    • Heart disease Mother    • Diabetes Mother    • Obesity Mother    • Hypertension Mother            • Diabetes Father    • Hypertension Father       Social History     Socioeconomic History   • Marital status:      Spouse name: Not on file   • Number of children: Not on file   • Years of education: Not on file   • Highest education level: Not on file   Tobacco Use   • Smoking status: Former Smoker     Packs/day: 2.00     Years: 2.00     Pack years: 4.00     Quit date: 2012     Years since quittin.8   • Smokeless tobacco: Never Used   Substance and Sexual Activity   • Alcohol use: No   • Drug use: No   •  Sexual activity: Yes     Partners: Male     Birth control/protection: I.U.D.     Comment:       Allergies   Allergen Reactions   • Nsaids Other (See Comments)     Gastric sleeve   • Prednisone Other (See Comments)     Please avoid all IV or oral steroids until after 1/21/19 to avoid risk of life threatening gastric leak after recent sleeve gastrectomy       Review of Systems   Constitutional: Negative for chills, fever and weight loss.   HENT: Negative for ear pain, postnasal drip, rhinorrhea, sinus pressure, sinus pain and sore throat.    Respiratory: Positive for cough and shortness of breath (mild). Negative for hemoptysis and wheezing.    Cardiovascular: Negative for chest pain, palpitations and leg swelling.   Gastrointestinal: Positive for heartburn. Negative for abdominal pain.   Musculoskeletal: Negative for myalgias.   Skin: Negative for rash.   Allergic/Immunologic: Negative for environmental allergies.   Neurological: Negative for headaches.       Objective   Physical Exam  Vitals and nursing note reviewed.   Constitutional:       Appearance: She is well-developed.   HENT:      Head: Normocephalic.      Right Ear: Tympanic membrane, ear canal and external ear normal.      Left Ear: Tympanic membrane, ear canal and external ear normal.      Nose: Nose normal.      Right Sinus: No maxillary sinus tenderness or frontal sinus tenderness.      Left Sinus: No maxillary sinus tenderness or frontal sinus tenderness.      Mouth/Throat:      Lips: Pink.      Mouth: Mucous membranes are moist. No injury.      Dentition: Normal dentition.      Tongue: No lesions.      Palate: No mass.      Pharynx: Oropharynx is clear. Uvula midline. Posterior oropharyngeal erythema (minimal) present. No pharyngeal swelling, oropharyngeal exudate or uvula swelling.   Eyes:      General: Lids are normal.      Conjunctiva/sclera: Conjunctivae normal.      Pupils: Pupils are equal, round, and reactive to light.   Neck:       Thyroid: No thyroid mass or thyromegaly.      Vascular: No carotid bruit.      Trachea: Trachea normal.   Cardiovascular:      Rate and Rhythm: Normal rate and regular rhythm.      Heart sounds: No murmur heard.     Pulmonary:      Effort: Pulmonary effort is normal. No respiratory distress.      Breath sounds: Normal breath sounds. No decreased breath sounds, wheezing, rhonchi or rales.   Chest:      Chest wall: No tenderness.   Abdominal:      General: Bowel sounds are normal.      Palpations: Abdomen is soft.      Tenderness: There is no abdominal tenderness.   Musculoskeletal:         General: Normal range of motion.      Cervical back: Normal range of motion and neck supple.   Skin:     General: Skin is warm and dry.   Neurological:      Mental Status: She is alert and oriented to person, place, and time.   Psychiatric:         Behavior: Behavior normal.         Assessment/Plan   Diagnoses and all orders for this visit:    1. Cough (Primary)  -     COVID-19,[facility],NP/OP Swab in Transport Media or ESwab 72 HR TAT - Swab, Nasopharynx; Future    2. Irritated throat    Other orders  -     amoxicillin (AMOXIL) 400 MG/5ML suspension; Take 5 mL by mouth 3 (Three) Times a Day.  Dispense: 150 mL; Refill: 0                   Current Outpatient Medications:   •  amoxicillin (AMOXIL) 400 MG/5ML suspension, Take 5 mL by mouth 3 (Three) Times a Day., Disp: 150 mL, Rfl: 0  •  Biotin 1000 MCG chewable tablet, Chew., Disp: , Rfl:   •  Cyanocobalamin (B-12) 1000 MCG tablet controlled-release, Take  by mouth. Patches, Disp: , Rfl:   •  Multiple Vitamins-Minerals (MULTIVITAL-M) tablet, Take  by mouth., Disp: , Rfl:   •  nystatin (MYCOSTATIN) 392544 UNIT/GM powder, Apply  topically to the appropriate area as directed 3 (Three) Times a Day., Disp: 60 g, Rfl: 3  •  omeprazole (priLOSEC) 40 MG capsule, Take 1 capsule by mouth Daily., Disp: 30 capsule, Rfl: 5  •  VALACYCLOVIR HCL PO, Take 1 tablet by mouth Daily., Disp: , Rfl:      Return if symptoms worsen or fail to improve, for Recheck.

## 2021-05-13 ENCOUNTER — TELEPHONE (OUTPATIENT)
Dept: INTERNAL MEDICINE | Facility: CLINIC | Age: 31
End: 2021-05-13

## 2021-05-13 NOTE — TELEPHONE ENCOUNTER
----- Message from Amee DUMONT MD sent at 5/12/2021  5:20 PM EDT -----  Covid nasal swab was negative

## 2021-09-07 ENCOUNTER — TELEPHONE (OUTPATIENT)
Dept: INTERNAL MEDICINE | Facility: CLINIC | Age: 31
End: 2021-09-07

## 2021-09-07 NOTE — TELEPHONE ENCOUNTER
Caller: DAHLIA AHUMADA    Relationship:     Best call back number: [phone]    What is the medical concern/diagnosis: OB PATIENT     Any additional details: INCREASE THE NUMBER OF VISITS TO 99.

## 2021-09-13 ENCOUNTER — TELEPHONE (OUTPATIENT)
Dept: INTERNAL MEDICINE | Facility: CLINIC | Age: 31
End: 2021-09-13

## 2021-09-17 PROCEDURE — U0004 COV-19 TEST NON-CDC HGH THRU: HCPCS | Performed by: FAMILY MEDICINE

## 2021-11-30 ENCOUNTER — OFFICE VISIT (OUTPATIENT)
Dept: BARIATRICS/WEIGHT MGMT | Facility: CLINIC | Age: 31
End: 2021-11-30

## 2021-11-30 VITALS
HEART RATE: 108 BPM | BODY MASS INDEX: 29.82 KG/M2 | WEIGHT: 179 LBS | SYSTOLIC BLOOD PRESSURE: 114 MMHG | RESPIRATION RATE: 18 BRPM | TEMPERATURE: 98 F | DIASTOLIC BLOOD PRESSURE: 68 MMHG | HEIGHT: 65 IN | OXYGEN SATURATION: 99 %

## 2021-11-30 DIAGNOSIS — R79.0 ABNORMAL BLOOD LEVEL OF IRON: ICD-10-CM

## 2021-11-30 DIAGNOSIS — Z98.84 S/P BARIATRIC SURGERY: Primary | ICD-10-CM

## 2021-11-30 DIAGNOSIS — E55.9 VITAMIN D DEFICIENCY: ICD-10-CM

## 2021-11-30 DIAGNOSIS — A04.8 H. PYLORI INFECTION: ICD-10-CM

## 2021-11-30 DIAGNOSIS — E66.3 OVERWEIGHT (BMI 25.0-29.9): ICD-10-CM

## 2021-11-30 DIAGNOSIS — R53.83 FATIGUE, UNSPECIFIED TYPE: ICD-10-CM

## 2021-11-30 DIAGNOSIS — K21.9 GASTROESOPHAGEAL REFLUX DISEASE, UNSPECIFIED WHETHER ESOPHAGITIS PRESENT: ICD-10-CM

## 2021-11-30 PROCEDURE — 99214 OFFICE O/P EST MOD 30 MIN: CPT | Performed by: PHYSICIAN ASSISTANT

## 2021-11-30 RX ORDER — OMEPRAZOLE 40 MG/1
40 CAPSULE, DELAYED RELEASE ORAL 2 TIMES DAILY
Qty: 30 CAPSULE | Refills: 0 | Status: SHIPPED | OUTPATIENT
Start: 2021-11-30 | End: 2022-02-17

## 2021-11-30 RX ORDER — NORETHINDRONE 0.35 MG/1
1 TABLET ORAL DAILY
COMMUNITY
Start: 2021-11-19 | End: 2023-01-04 | Stop reason: ALTCHOICE

## 2021-11-30 RX ORDER — NYSTATIN 100000 [USP'U]/G
POWDER TOPICAL 3 TIMES DAILY
Qty: 60 G | Refills: 1 | Status: SHIPPED | OUTPATIENT
Start: 2021-11-30 | End: 2022-02-17

## 2021-12-04 LAB
25(OH)D3+25(OH)D2 SERPL-MCNC: 39.3 NG/ML (ref 30–100)
ALBUMIN SERPL-MCNC: 4.6 G/DL (ref 3.5–5.2)
ALBUMIN/GLOB SERPL: 1.7 G/DL
ALP SERPL-CCNC: 113 U/L (ref 39–117)
ALT SERPL-CCNC: 33 U/L (ref 1–33)
AST SERPL-CCNC: 18 U/L (ref 1–32)
BASOPHILS # BLD AUTO: 0.05 10*3/MM3 (ref 0–0.2)
BASOPHILS NFR BLD AUTO: 0.7 % (ref 0–1.5)
BILIRUB SERPL-MCNC: 0.3 MG/DL (ref 0–1.2)
BUN SERPL-MCNC: 11 MG/DL (ref 6–20)
BUN/CREAT SERPL: 18 (ref 7–25)
CALCIUM SERPL-MCNC: 9.7 MG/DL (ref 8.6–10.5)
CHLORIDE SERPL-SCNC: 101 MMOL/L (ref 98–107)
CO2 SERPL-SCNC: 28 MMOL/L (ref 22–29)
CREAT SERPL-MCNC: 0.61 MG/DL (ref 0.57–1)
EOSINOPHIL # BLD AUTO: 0.19 10*3/MM3 (ref 0–0.4)
EOSINOPHIL NFR BLD AUTO: 2.7 % (ref 0.3–6.2)
ERYTHROCYTE [DISTWIDTH] IN BLOOD BY AUTOMATED COUNT: 12.5 % (ref 12.3–15.4)
FERRITIN SERPL-MCNC: 52 NG/ML (ref 13–150)
FOLATE SERPL-MCNC: 4.68 NG/ML (ref 4.78–24.2)
GLOBULIN SER CALC-MCNC: 2.7 GM/DL
GLUCOSE SERPL-MCNC: 129 MG/DL (ref 65–99)
HCT VFR BLD AUTO: 42.7 % (ref 34–46.6)
HGB BLD-MCNC: 14 G/DL (ref 12–15.9)
IMM GRANULOCYTES # BLD AUTO: 0.01 10*3/MM3 (ref 0–0.05)
IMM GRANULOCYTES NFR BLD AUTO: 0.1 % (ref 0–0.5)
IRON SERPL-MCNC: 88 MCG/DL (ref 37–145)
LYMPHOCYTES # BLD AUTO: 2.33 10*3/MM3 (ref 0.7–3.1)
LYMPHOCYTES NFR BLD AUTO: 33.2 % (ref 19.6–45.3)
Lab: NORMAL
MCH RBC QN AUTO: 29 PG (ref 26.6–33)
MCHC RBC AUTO-ENTMCNC: 32.8 G/DL (ref 31.5–35.7)
MCV RBC AUTO: 88.4 FL (ref 79–97)
METHYLMALONATE SERPL-SCNC: 127 NMOL/L (ref 0–378)
MONOCYTES # BLD AUTO: 0.31 10*3/MM3 (ref 0.1–0.9)
MONOCYTES NFR BLD AUTO: 4.4 % (ref 5–12)
NEUTROPHILS # BLD AUTO: 4.13 10*3/MM3 (ref 1.7–7)
NEUTROPHILS NFR BLD AUTO: 58.9 % (ref 42.7–76)
NRBC BLD AUTO-RTO: 0 /100 WBC (ref 0–0.2)
PLATELET # BLD AUTO: 212 10*3/MM3 (ref 140–450)
POTASSIUM SERPL-SCNC: 4.5 MMOL/L (ref 3.5–5.2)
PREALB SERPL-MCNC: 23 MG/DL (ref 14–35)
PROT SERPL-MCNC: 7.3 G/DL (ref 6–8.5)
RBC # BLD AUTO: 4.83 10*6/MM3 (ref 3.77–5.28)
SODIUM SERPL-SCNC: 139 MMOL/L (ref 136–145)
VIT B1 BLD-SCNC: 108.5 NMOL/L (ref 66.5–200)
WBC # BLD AUTO: 7.02 10*3/MM3 (ref 3.4–10.8)

## 2021-12-08 ENCOUNTER — TELEPHONE (OUTPATIENT)
Dept: BARIATRICS/WEIGHT MGMT | Facility: CLINIC | Age: 31
End: 2021-12-08

## 2021-12-08 DIAGNOSIS — A04.8 H. PYLORI INFECTION: Primary | ICD-10-CM

## 2021-12-22 NOTE — TELEPHONE ENCOUNTER
Contacted pt to make sure she was notified that she needed to retake her HPylori breath test and to make sure she was told to hold her Omeprazole x2 weeks prior, and can take tums as needed. Pt stated that she was contacted and she should be able to retest in 4 days, and she will got to [facility] to have this completed, or come to our office to have this done. I reminded pt to make sure she was NPO 1 hr prior. Pt verbalized understanding.

## 2022-01-04 DIAGNOSIS — A04.8 H. PYLORI INFECTION: Primary | ICD-10-CM

## 2022-01-05 LAB — UREA BREATH TEST QL: POSITIVE

## 2022-01-11 ENCOUNTER — TELEPHONE (OUTPATIENT)
Dept: BARIATRICS/WEIGHT MGMT | Facility: CLINIC | Age: 32
End: 2022-01-11

## 2022-01-11 NOTE — TELEPHONE ENCOUNTER
----- Message from GHULAM Rodríguez sent at 1/11/2022 10:25 AM EST -----  Please notify patient her repeat H.Pylori test was positive.  Confirm that she was holding her stomach medication x 2 wks prior to the test and that she had not been on any antibiotics x 2 wks prior.  Advise GI yashiraal for further treatment recommendations - referral in Epic.  Thank yoU!

## 2022-01-13 DIAGNOSIS — Z01.818 PRE-OP TESTING: Primary | ICD-10-CM

## 2022-01-14 ENCOUNTER — LAB (OUTPATIENT)
Dept: PREADMISSION TESTING | Facility: HOSPITAL | Age: 32
End: 2022-01-14

## 2022-01-14 DIAGNOSIS — Z01.818 PRE-OP TESTING: ICD-10-CM

## 2022-01-14 PROCEDURE — C9803 HOPD COVID-19 SPEC COLLECT: HCPCS

## 2022-01-14 PROCEDURE — U0004 COV-19 TEST NON-CDC HGH THRU: HCPCS | Performed by: INTERNAL MEDICINE

## 2022-01-15 ENCOUNTER — TELEPHONE (OUTPATIENT)
Dept: GASTROENTEROLOGY | Facility: OTHER | Age: 32
End: 2022-01-15

## 2022-01-15 LAB — SARS-COV-2 RNA PNL SPEC NAA+PROBE: DETECTED

## 2022-01-16 NOTE — TELEPHONE ENCOUNTER
The patient's preprocedure COVID test is positive.  The patient is completely asymptomatic.  I advised her that EGD scheduled for 11/17/2022 will need to be rescheduled.

## 2022-01-17 ENCOUNTER — TELEPHONE (OUTPATIENT)
Dept: GASTROENTEROLOGY | Facility: CLINIC | Age: 32
End: 2022-01-17

## 2022-01-17 NOTE — TELEPHONE ENCOUNTER
----- Message from Kristian Aldrich MD sent at 1/17/2022  9:01 AM EST -----  Dr. Brunner called patient on 1/15/22 to inform of +covid test and that her egd is cancelled today.  She needs to update her pcp and quarantine.

## 2022-02-09 ENCOUNTER — OUTSIDE FACILITY SERVICE (OUTPATIENT)
Dept: GASTROENTEROLOGY | Facility: CLINIC | Age: 32
End: 2022-02-09

## 2022-02-09 PROCEDURE — 88342 IMHCHEM/IMCYTCHM 1ST ANTB: CPT | Performed by: INTERNAL MEDICINE

## 2022-02-09 PROCEDURE — 43239 EGD BIOPSY SINGLE/MULTIPLE: CPT | Performed by: INTERNAL MEDICINE

## 2022-02-09 PROCEDURE — 88305 TISSUE EXAM BY PATHOLOGIST: CPT | Performed by: INTERNAL MEDICINE

## 2022-02-09 PROCEDURE — 43249 ESOPH EGD DILATION <30 MM: CPT | Performed by: INTERNAL MEDICINE

## 2022-02-10 ENCOUNTER — LAB REQUISITION (OUTPATIENT)
Dept: LAB | Facility: HOSPITAL | Age: 32
End: 2022-02-10

## 2022-02-10 DIAGNOSIS — Z98.84 BARIATRIC SURGERY STATUS: ICD-10-CM

## 2022-02-10 DIAGNOSIS — A04.8 OTHER SPECIFIED BACTERIAL INTESTINAL INFECTIONS: ICD-10-CM

## 2022-02-10 DIAGNOSIS — R11.10 VOMITING, UNSPECIFIED: ICD-10-CM

## 2022-02-10 DIAGNOSIS — K31.89 OTHER DISEASES OF STOMACH AND DUODENUM: ICD-10-CM

## 2022-02-14 DIAGNOSIS — B96.81 HELICOBACTER PYLORI GASTRITIS: Primary | ICD-10-CM

## 2022-02-14 DIAGNOSIS — K29.70 HELICOBACTER PYLORI GASTRITIS: Primary | ICD-10-CM

## 2022-02-14 RX ORDER — METRONIDAZOLE 250 MG/1
250 TABLET ORAL 4 TIMES DAILY
Qty: 56 TABLET | Refills: 0 | Status: SHIPPED | OUTPATIENT
Start: 2022-02-14 | End: 2022-02-17

## 2022-02-14 RX ORDER — LEVOFLOXACIN 500 MG/1
500 TABLET, FILM COATED ORAL DAILY
Qty: 14 TABLET | Refills: 0 | Status: SHIPPED | OUTPATIENT
Start: 2022-02-14 | End: 2022-02-17

## 2022-02-14 RX ORDER — PANTOPRAZOLE SODIUM 40 MG/1
40 TABLET, DELAYED RELEASE ORAL 2 TIMES DAILY
Qty: 28 TABLET | Refills: 0 | Status: SHIPPED | OUTPATIENT
Start: 2022-02-14 | End: 2022-02-17

## 2022-02-14 RX ORDER — BISMUTH SUBSALICYLATE 262 MG/1
524 TABLET, CHEWABLE ORAL 4 TIMES DAILY
Qty: 112 TABLET | Refills: 0 | Status: SHIPPED | OUTPATIENT
Start: 2022-02-14 | End: 2022-02-17

## 2022-02-17 ENCOUNTER — OFFICE VISIT (OUTPATIENT)
Dept: BARIATRICS/WEIGHT MGMT | Facility: CLINIC | Age: 32
End: 2022-02-17

## 2022-02-17 VITALS
RESPIRATION RATE: 18 BRPM | OXYGEN SATURATION: 98 % | TEMPERATURE: 97.3 F | SYSTOLIC BLOOD PRESSURE: 124 MMHG | HEART RATE: 91 BPM | DIASTOLIC BLOOD PRESSURE: 64 MMHG | HEIGHT: 65 IN | BODY MASS INDEX: 29.49 KG/M2 | WEIGHT: 177 LBS

## 2022-02-17 DIAGNOSIS — E78.2 MIXED HYPERLIPIDEMIA: ICD-10-CM

## 2022-02-17 DIAGNOSIS — E66.3 OVERWEIGHT (BMI 25.0-29.9): Primary | ICD-10-CM

## 2022-02-17 DIAGNOSIS — M25.50 CHRONIC JOINT PAIN: ICD-10-CM

## 2022-02-17 DIAGNOSIS — Z90.3 POSTGASTRECTOMY MALABSORPTION: ICD-10-CM

## 2022-02-17 DIAGNOSIS — R10.13 DYSPEPSIA: ICD-10-CM

## 2022-02-17 DIAGNOSIS — R53.83 FATIGUE, UNSPECIFIED TYPE: ICD-10-CM

## 2022-02-17 DIAGNOSIS — K76.0 FATTY LIVER: ICD-10-CM

## 2022-02-17 DIAGNOSIS — G89.29 CHRONIC JOINT PAIN: ICD-10-CM

## 2022-02-17 DIAGNOSIS — Z86.39 HISTORY OF DIABETES MELLITUS RESOLVED FOLLOWING BARIATRIC SURGERY: ICD-10-CM

## 2022-02-17 DIAGNOSIS — Z98.84 HISTORY OF DIABETES MELLITUS RESOLVED FOLLOWING BARIATRIC SURGERY: ICD-10-CM

## 2022-02-17 DIAGNOSIS — K91.2 POSTGASTRECTOMY MALABSORPTION: ICD-10-CM

## 2022-02-17 PROCEDURE — 99215 OFFICE O/P EST HI 40 MIN: CPT | Performed by: NURSE PRACTITIONER

## 2022-02-17 RX ORDER — OMEPRAZOLE 40 MG/1
40 CAPSULE, DELAYED RELEASE ORAL DAILY
Qty: 30 CAPSULE | Refills: 5
Start: 2022-02-17 | End: 2022-02-17

## 2022-02-17 RX ORDER — MULTIVIT WITH MINERALS/LUTEIN
1000 TABLET ORAL DAILY
COMMUNITY

## 2022-02-17 RX ORDER — GLUCOSAMINE/D3/BOSWELLIA SERRA 1500MG-400
TABLET ORAL
COMMUNITY

## 2022-02-17 RX ORDER — VALACYCLOVIR HYDROCHLORIDE 1 G/1
1 TABLET, FILM COATED ORAL DAILY
COMMUNITY
Start: 2022-01-29

## 2022-02-17 NOTE — ASSESSMENT & PLAN NOTE
Patient's (Body mass index is 29.45 kg/m².) indicates that they are overweight with health conditions that include diabetes mellitus, dyslipidemias, GERD and osteoarthritis . Weight is unchanged. BMI is is above average; BMI management plan is completed. We discussed low calorie, low carb based diet program, portion control, increasing exercise and an yaima-based approach such as [employer] Pal or Lose It.    Topics of discussion included obesity as a disease, nutritional education on food groups, exercise, and medications. Patient was instructed in adequate protein, controlled carb and controlled fat intake.   Patient received instructions on using the medicines as a tool in controlling their weight with nutritional and behavioral changes. Risks and benefits were discussed. I believe the potential benefits of medication helping to decrease weight outweighs the risks. Patient is to try nutritonal/behavioral changes only first.   Patient received our clinic education booklet.   Our patient consent form was reviewed including potential risks of weight loss. We also reviewed our confidentiality and HIPPA statements. Patients current FITT score was reviewed along with current capability for exercise tolerance and a patient will work towards a FITT score of:     Frequency   Intensity Time Strength Training   []   0 None  []   0 None  []   0 None  []   0 None    []   1 (1-2x/week) []   1 (light) []   1 (<10 min) []   1 (1x/week)   [x]   2 (3-5x/week) [x]   2 (moderate) []   2 (10-20 min) [x]   2 (2x/week)   []   3 (daily)   []   3 (moderately hard)  []   4 (very hard) []   3 (20-30 min)  [x]   4 (>30 min) []   3 (3-4x/week)       Patient's past medical history was reviewed in detail and barriers to weight loss were identified and discussed. Past efforts at weight reduction on their own as well as under physician supervision were documented and discussed.  I advised patient to continue routine care with their Primary Care  Provider.     Nutritional recommendations and goals were reviewed including Calories:2057-8165 daily adjusted for exercise calories burnt, Protein:  g daily, Net carbs (total carb - fiber) of 50-75g per day.  Start to keep a food journal (baritastic) and bring into next visit in 2 weeks for review. Practice the behavioral modification technique of mindful eating. Take one MVI daily and 2000mg fish oil daily. Take other medications and supplements as directed.  Refer for exercise education- having trouble finding motivation to get restarted.   Follow up with bariatrics to discuss recent EGD results and ongoing reflux.   We discussed that calorie restriction and rapid weight loss can lower milk supply in lactating women. Also most anti-obesity medications are contraindicated. Consider metformin.

## 2022-02-17 NOTE — PROGRESS NOTES
[facility]  [address]  [place] [address]   Office Note      Date: 2022  Patient Name: Shelley Graff  MRN: [id number]  : 1990  Subjective  Subjective     Chief Complaint  Obesity Management follow-up          Shelley Graff presents to [facility] for obesity management. female s/p LSG/HHR 19 PQ, Presurgery weight:  218 pounds. stefania: 165lb, goal weight: 155 lbs. She is post-partum 4 months and breastfeeding her normal weight infant. Intends to breastfeed for a year. Weight got up to 201lb with pregnancy, lost 20lb and has been stalled for 2 months despite a low carb diet. Not counting calories, staying under 50 carbs. Used baritastic yaima in the past. Admits she is not exercising the way she was prior to delivery. Can't find the motivation.Was doing resistance bands, cardio at home and at the gym. Likes to lift weights. Energy is good, her son is sleeping well so sleep deprivation is not a factor. Denies symptoms of depression. She took zoloft in the past but that was situational. Had gestational diabetes and took glyburide during pregnancy. Was recently discontinued. She is having severe nausea and vomiting 2-3 times a day. Recently increased pantoprazole to bid, no help. Followed by GI. Had EGD and was found to be + h.Pylori. She is not sure what the results mean. Was told the h.pylori would be treated but nothing has been ordered by GI.     The following seem to sabotage weight loss efforts:eating late, waking up eating, eating too fast, boredom eating and specific cravings like carbohydrates    Review of Systems   Constitutional: Negative for fatigue.        Positive for weight gain   HENT: Negative for trouble swallowing.         Negative for throat swelling   Respiratory: Negative for shortness of breath and wheezing.         Negative for snoring   Cardiovascular: Negative for chest pain,  "palpitations and leg swelling.   Gastrointestinal: Positive for nausea, vomiting and indigestion. Negative for abdominal pain, constipation, diarrhea and GERD.   Endocrine: Negative for cold intolerance, heat intolerance, polydipsia, polyphagia and polyuria.        Negative for loss of hair  Negative for hirsutism     Genitourinary:        Denies menstrual irregularities   Musculoskeletal: Negative for arthralgias.        Denies exercise limitations  Denies chronic pain   Skin: Negative for dry skin.        Negative for acne   Neurological: Negative for headache and memory problem.        Negative for paresthesias   Psychiatric/Behavioral: Negative for self-injury, sleep disturbance, suicidal ideas and depressed mood. The patient is not nervous/anxious.      PHQ-9 Total Score:   2    Objective   Body mass index is 29.45 kg/m².  Body composition analysis completed and showed:   %body fat: 36.5    Measurements (in inches)  Neck: 13  Chest: 40.2  Waist: 42.7  Hips: 44.5  Thighs: 37.6    Vital Signs:   /64 (BP Location: Right arm, Patient Position: Sitting, Cuff Size: Adult)   Pulse 91   Temp 97.3 °F (36.3 °C) (Temporal)   Resp 18   Ht 165.1 cm (65\")   Wt 80.3 kg (177 lb)   SpO2 98%   BMI 29.45 kg/m²     Physical Exam   General appears stated age and normal appearance   HEENT PERRLA, EOM intact, conjunctivae normal and without thyromegaly or thyroid nodules   Chest/lungs Normal rate, Regular rhythm and Breathing is unlabored   Abdomen Hypopigmented linear striae, Soft, normal bowel sounds, without mass or tenderness and Central adiposity   Extremities without edema   Neuro Good historian and No focal deficit   Skin Warm, dry, intact   Psych normal behavior, normal thought content and normal concentration     Result Review :                   Assessment / Plan       Diagnoses and all orders for this visit:    1. Overweight (BMI 25.0-29.9) (Primary)  Assessment & Plan:  Patient's (Body mass index is 29.45 " kg/m².) indicates that they are overweight with health conditions that include diabetes mellitus, dyslipidemias, GERD and osteoarthritis . Weight is unchanged. BMI is is above average; BMI management plan is completed. We discussed low calorie, low carb based diet program, portion control, increasing exercise and an yaima-based approach such as [employer] Pal or Lose It.    Topics of discussion included obesity as a disease, nutritional education on food groups, exercise, and medications. Patient was instructed in adequate protein, controlled carb and controlled fat intake.   Patient received instructions on using the medicines as a tool in controlling their weight with nutritional and behavioral changes. Risks and benefits were discussed. I believe the potential benefits of medication helping to decrease weight outweighs the risks. Patient is to try nutritonal/behavioral changes only first.   Patient received our clinic education booklet.   Our patient consent form was reviewed including potential risks of weight loss. We also reviewed our confidentiality and HIPPA statements. Patients current FITT score was reviewed along with current capability for exercise tolerance and a patient will work towards a FITT score of:     Frequency   Intensity Time Strength Training   []   0 None  []   0 None  []   0 None  []   0 None    []   1 (1-2x/week) []   1 (light) []   1 (<10 min) []   1 (1x/week)   [x]   2 (3-5x/week) [x]   2 (moderate) []   2 (10-20 min) [x]   2 (2x/week)   []   3 (daily)   []   3 (moderately hard)  []   4 (very hard) []   3 (20-30 min)  [x]   4 (>30 min) []   3 (3-4x/week)       Patient's past medical history was reviewed in detail and barriers to weight loss were identified and discussed. Past efforts at weight reduction on their own as well as under physician supervision were documented and discussed.  I advised patient to continue routine care with their Primary Care Provider.     Nutritional  recommendations and goals were reviewed including Calories:7280-7901 daily adjusted for exercise calories burnt, Protein:  g daily, Net carbs (total carb - fiber) of 50-75g per day.  Start to keep a food journal (LoveSpace) and bring into next visit in 2 weeks for review. Practice the behavioral modification technique of mindful eating. Take one MVI daily and 2000mg fish oil daily. Take other medications and supplements as directed.  Refer for exercise education- having trouble finding motivation to get restarted.   Follow up with bariatrics to discuss recent EGD results and ongoing reflux.   We discussed that calorie restriction and rapid weight loss can lower milk supply in lactating women. Also most anti-obesity medications are contraindicated. Consider metformin.        Orders:  -     TSH  -     T4, Free  -     T3, Free  -     Hemoglobin A1c  -     Lipid Panel  -     Ambulatory Referral to Nutrition Services    2. Mixed hyperlipidemia  Assessment & Plan:  Repeat labs    Orders:  -     Lipid Panel    3. Fatty liver  Assessment & Plan:  Weight reduction.       4. History of diabetes mellitus resolved following bariatric surgery  Assessment & Plan:  Had gestational diabetes, took glyburide during pregnancy and discontinued a few months ago.     Orders:  -     Hemoglobin A1c    5. Dyspepsia    6. Chronic joint pain    7. Fatigue, unspecified type  -     Discontinue: omeprazole (priLOSEC) 40 MG capsule; Take 1 capsule by mouth Daily.  Dispense: 30 capsule; Refill: 5    8. Postgastrectomy malabsorption  -     Ambulatory Referral to Nutrition Services       I spent 60 minutes caring for Shelley on this date of service. This time includes time spent by me in the following activities:preparing for the visit, reviewing tests, obtaining and/or reviewing a separately obtained history, performing a medically appropriate examination and/or evaluation , counseling and educating the patient/family/caregiver, ordering  medications, tests, or procedures, referring and communicating with other health care professionals , documenting information in the medical record and care coordination  Follow Up   Return in about 2 weeks (around 3/3/2022) for Next scheduled follow up.  Patient was given instructions and counseling regarding her condition or for health maintenance advice. Please see specific information pulled into the AVS if appropriate.     Courtney Lew, APRN  02/17/2022

## 2022-02-18 LAB
CHOLEST SERPL-MCNC: 199 MG/DL (ref 0–200)
HBA1C MFR BLD: 6.3 % (ref 4.8–5.6)
HDLC SERPL-MCNC: 88 MG/DL (ref 40–60)
LDLC SERPL CALC-MCNC: 103 MG/DL (ref 0–100)
T3FREE SERPL-MCNC: 2.9 PG/ML (ref 2–4.4)
T4 FREE SERPL-MCNC: 1 NG/DL (ref 0.93–1.7)
TRIGL SERPL-MCNC: 42 MG/DL (ref 0–150)
TSH SERPL DL<=0.005 MIU/L-ACNC: 1.01 UIU/ML (ref 0.27–4.2)
VLDLC SERPL CALC-MCNC: 8 MG/DL (ref 5–40)

## 2022-03-03 ENCOUNTER — OFFICE VISIT (OUTPATIENT)
Dept: BARIATRICS/WEIGHT MGMT | Facility: CLINIC | Age: 32
End: 2022-03-03

## 2022-03-03 VITALS
TEMPERATURE: 97.8 F | SYSTOLIC BLOOD PRESSURE: 120 MMHG | HEART RATE: 85 BPM | RESPIRATION RATE: 14 BRPM | OXYGEN SATURATION: 98 % | BODY MASS INDEX: 29.99 KG/M2 | HEIGHT: 65 IN | WEIGHT: 180 LBS | DIASTOLIC BLOOD PRESSURE: 72 MMHG

## 2022-03-03 DIAGNOSIS — Z86.39 HISTORY OF DIABETES MELLITUS RESOLVED FOLLOWING BARIATRIC SURGERY: ICD-10-CM

## 2022-03-03 DIAGNOSIS — R73.09 ABNORMAL GLUCOSE: ICD-10-CM

## 2022-03-03 DIAGNOSIS — R10.13 DYSPEPSIA: ICD-10-CM

## 2022-03-03 DIAGNOSIS — Z98.84 HISTORY OF DIABETES MELLITUS RESOLVED FOLLOWING BARIATRIC SURGERY: ICD-10-CM

## 2022-03-03 DIAGNOSIS — E78.2 MIXED HYPERLIPIDEMIA: ICD-10-CM

## 2022-03-03 DIAGNOSIS — E66.3 OVERWEIGHT (BMI 25.0-29.9): Primary | ICD-10-CM

## 2022-03-03 PROCEDURE — 99213 OFFICE O/P EST LOW 20 MIN: CPT | Performed by: NURSE PRACTITIONER

## 2022-03-03 RX ORDER — METFORMIN HYDROCHLORIDE 500 MG/1
500 TABLET, EXTENDED RELEASE ORAL 2 TIMES DAILY WITH MEALS
Qty: 60 TABLET | Refills: 2 | Status: SHIPPED | OUTPATIENT
Start: 2022-03-03 | End: 2022-03-31

## 2022-03-03 NOTE — ASSESSMENT & PLAN NOTE
A1c has increased from 5.9 while pregnant (on glyburide) to 6.3 now (no medication).   Low carb diet, regular physical activity, and weight reduction of 10-15%. Start metformin, discussed R/B/L. Took previously while breastfeeding.

## 2022-03-03 NOTE — PROGRESS NOTES
[facility]  [address]  [place] [address]     Office Note      Date: 2022  Patient Name: Shelley Longoria  MRN: [id number]  : 1990  Subjective  Subjective     Chief Complaint  Obesity Management follow-up          Shelley Longoria presents to [facility] for obesity management and weight loss plateau. female s/p LSG/HHR 19 PQ, Presurgery weight:  218 pounds. stefania: 165lb before recent pregnancy. Goal weight:  155 lbs.   Patient is satisfied with weight loss progress. Appetite is well controlled. Reports no side effects of prescribed medications today. The patient is taking multivitamin and is not taking fish oil.  The patient is not using a food journal but she is keeping track of carbs- trying to keep carbs 70-90g/day. Not counting calories.   Typical intake:  B: (6am) coffee with vital proteins,   S (10am):egg and some willson and 1 slice of 45 calorie bread  L: 1-2 coffee drink with protein shake   D: (7pm): boiled chicken with low carb tostada  Occasionally a drink of oat milk (literally one drink)  breastfeeding in the morning at 6:30am then 11am then 3pm then at bedtime then once in the middle of the night   The patient is exercising with a FITT score of:    Frequency Intensity Time Strength Training   []   0, none []   0 []   0 []   0   []   1 (1-2x/week) []   1 (light) []   1 (<10 min) []   1 (1x/week)   [x]   2 (3-5x/week) [x]   2 (moderate) []   2 (10-20 min) [x]   2 (2x/week)   []   3 (daily) [x]   3 (moderately hard)  []   4 (very hard) [x]   3 (20-30 min)  []   4 (>30 min) []   3 (3-4x/week)     Review of Systems   Constitutional: Positive for fatigue.   Eyes: Negative for visual disturbance.   Cardiovascular: Negative for chest pain and palpitations.   Gastrointestinal: Positive for abdominal pain, diarrhea, nausea and GERD. Negative for constipation.   Endocrine: Negative for heat  "intolerance.   Neurological: Negative for dizziness, tremors, light-headedness, headache and memory problem.   Psychiatric/Behavioral: Negative for sleep disturbance and depressed mood. The patient is not nervous/anxious.        Objective   Start weight: 177 pounds.    Total Loss/%Loss of BBW:   Change in weight since last visit: +3lb    Body mass index is 29.95 kg/m².   Body composition analysis completed and showed:   %body fat: 38.1%  Measurements (in inches)  Waist: 39in    Vital Signs:   /72 (BP Location: Right arm, Patient Position: Sitting, Cuff Size: Adult)   Pulse 85   Temp 97.8 °F (36.6 °C) (Infrared)   Resp 14   Ht 165.1 cm (65\")   Wt 81.6 kg (180 lb)   SpO2 98%   BMI 29.95 kg/m²     Physical Exam   General appears stated age and normal appearance   HEENT PERRLA, EOM intact and conjunctivae normal   Chest/lungs Normal rate, Regular rhythm and Breathing is unlabored   Extremities without edema   Neuro Good historian and No focal deficit   Skin Warm, dry, intact   Psych normal behavior, normal thought content and normal concentration     Result Review :   The following data was reviewed by: VALENCIA Rosario on 03/03/2022:  Lipid Panel    Lipid Panel 2/17/22   Total Cholesterol 199   Triglycerides 42   HDL Cholesterol 88 (A)   VLDL Cholesterol 8   LDL Cholesterol  103 (A)   (A) Abnormal value       Comments are available for some flowsheets but are not being displayed.           TSH    TSH 2/17/22   TSH 1.010           Most Recent A1C    HGBA1C Most Recent 2/17/22   Hemoglobin A1C 6.30 (A)   (A) Abnormal value       Comments are available for some flowsheets but are not being displayed.                    Assessment / Plan        Diagnoses and all orders for this visit:    1. Overweight (BMI 25.0-29.9) (Primary)  Assessment & Plan:  Patient's (Body mass index is 29.95 kg/m².) indicates that they are overweight with health conditions that include diabetes mellitus, dyslipidemias and GERD . " Weight is worsening. BMI is is above average; BMI management plan is completed. We discussed low calorie, low carb based diet program, portion control, increasing exercise, joining a fitness center or start home based exercise program, pharmacologic options including metformin and an yaima-based approach such as [employer] Pal or Lose It.    I have instructed the patient to continue with pursuit of medical weight loss as a part of this program. Patient does not meet criteria for use of anorectics at this time as she is breastfeeding.     Continue nutritional focus and work towards new exercise FITT goal of: 2-2-4-2. Has appointment with exercise counselor 3/24. Continue current efforts.   The current plan for this month includes: continue current exercise efforts, continue to work on lifestyle behavioral changes and continue nutrition focus. Carbs 70-90g/day, protein >100g/day, calories 2498-6066. Goal 4-6.         2. Abnormal glucose  Assessment & Plan:  A1c has increased from 5.9 while pregnant (on glyburide) to 6.3 now (no medication).   Low carb diet, regular physical activity, and weight reduction of 10-15%. Start metformin, discussed R/B/L. Took previously while breastfeeding.       Orders:  -     metFORMIN ER (GLUCOPHAGE-XR) 500 MG 24 hr tablet; Take 1 tablet by mouth 2 (Two) Times a Day With Meals. Take 1 daily with dinner or bedtime for one week, then increase to 2 daily.  Dispense: 60 tablet; Refill: 2    3. Dyspepsia  Assessment & Plan:  Make follow up with Dr. PACK Already saw GI but has further questions.       4. Mixed hyperlipidemia  Assessment & Plan:  Lipid abnormalities are mild.  Nutritional counseling was provided.  Lipids will be reassessed in 1 year.          Follow Up   Return in about 4 weeks (around 3/31/2022) for Next scheduled follow up.  Patient was given instructions and counseling regarding her condition or for health maintenance advice. Please see specific information pulled into the AVS if  appropriate.     Courtney Lew, VALENCIA  03/03/2022

## 2022-03-03 NOTE — ASSESSMENT & PLAN NOTE
Lipid abnormalities are mild.  Nutritional counseling was provided.  Lipids will be reassessed in 1 year.

## 2022-03-03 NOTE — ASSESSMENT & PLAN NOTE
Patient's (Body mass index is 29.95 kg/m².) indicates that they are overweight with health conditions that include diabetes mellitus, dyslipidemias and GERD . Weight is worsening. BMI is is above average; BMI management plan is completed. We discussed low calorie, low carb based diet program, portion control, increasing exercise, joining a fitness center or start home based exercise program, pharmacologic options including metformin and an yaima-based approach such as [employer] Pal or Lose It.    I have instructed the patient to continue with pursuit of medical weight loss as a part of this program. Patient does not meet criteria for use of anorectics at this time as she is breastfeeding.     Continue nutritional focus and work towards new exercise FITT goal of: 2-2-4-2. Has appointment with exercise counselor 3/24. Continue current efforts.   The current plan for this month includes: continue current exercise efforts, continue to work on lifestyle behavioral changes and continue nutrition focus. Carbs 70-90g/day, protein >100g/day, calories 7149-0591. Goal 4-6.

## 2022-03-25 ENCOUNTER — PRIOR AUTHORIZATION (OUTPATIENT)
Dept: GASTROENTEROLOGY | Facility: CLINIC | Age: 32
End: 2022-03-25

## 2022-03-25 DIAGNOSIS — B96.81 HELICOBACTER PYLORI GASTRITIS: ICD-10-CM

## 2022-03-25 DIAGNOSIS — K29.70 HELICOBACTER PYLORI GASTRITIS: ICD-10-CM

## 2022-03-25 RX ORDER — PANTOPRAZOLE SODIUM 40 MG/1
TABLET, DELAYED RELEASE ORAL
Qty: 28 TABLET | Refills: 0 | Status: SHIPPED | OUTPATIENT
Start: 2022-03-25 | End: 2022-05-04

## 2022-03-31 ENCOUNTER — OFFICE VISIT (OUTPATIENT)
Dept: BARIATRICS/WEIGHT MGMT | Facility: CLINIC | Age: 32
End: 2022-03-31

## 2022-03-31 VITALS
WEIGHT: 176.5 LBS | RESPIRATION RATE: 18 BRPM | BODY MASS INDEX: 29.41 KG/M2 | HEART RATE: 88 BPM | HEIGHT: 65 IN | SYSTOLIC BLOOD PRESSURE: 118 MMHG | DIASTOLIC BLOOD PRESSURE: 68 MMHG | TEMPERATURE: 98.6 F | OXYGEN SATURATION: 98 %

## 2022-03-31 DIAGNOSIS — R73.09 ABNORMAL GLUCOSE: ICD-10-CM

## 2022-03-31 DIAGNOSIS — E66.3 OVERWEIGHT (BMI 25.0-29.9): Primary | ICD-10-CM

## 2022-03-31 PROCEDURE — 99213 OFFICE O/P EST LOW 20 MIN: CPT | Performed by: NURSE PRACTITIONER

## 2022-03-31 RX ORDER — CHLORAL HYDRATE 500 MG
1000 CAPSULE ORAL
COMMUNITY

## 2022-03-31 RX ORDER — METFORMIN HYDROCHLORIDE 500 MG/1
TABLET, EXTENDED RELEASE ORAL
Qty: 90 TABLET | Refills: 2 | Status: SHIPPED | OUTPATIENT
Start: 2022-03-31 | End: 2022-06-30

## 2022-03-31 NOTE — PROGRESS NOTES
Office Note      Date: 2022  Patient Name: Shelley Longoria  MRN: [id number]  : 1990       Chief Complaint  Obesity and Follow-up  Subjective  Subjective           Shelley Longoria presents to [facility] for obesity management and weight loss plateau. female s/p LSG/HHR 19 PQ, Presurgery weight:  218 pounds. stefania: 165lb before recent pregnancy. Goal weight:  155 lbs.   Patient is unsure with weight loss progress. Appetite is well controlled. Reports no side effects of prescribed medications today (started meformin). The patient is taking multivitamin and is taking fish oil.  The patient is using a food journal, written. Forgot to bring to visit. Started new protien shake- ghost- 3g net carbs, 25g protein, whey based (sweetened with high fructose corn syrup). Doing 24oz 3-4 times a day. Cutting back on coffee. Still exclusively breast feeding.   The patient is exercising, doing just dance with her kids every day. with a FITT score of:    Frequency Intensity Time Strength Training   []   0, none []   0 []   0 []   0   []   1 (1-2x/week) []   1 (light) []   1 (<10 min) []   1 (1x/week)   [x]   2 (3-5x/week) []   2 (moderate) []   2 (10-20 min) [x]   2 (2x/week)   []   3 (daily) [x]   3 (moderately hard)  []   4 (very hard) [x]   3 (20-30 min)  [x]   4 (>30 min) []   3 (3-4x/week)       Start weight: 177 pounds.    Total Loss/%Loss of BBW: -0.28%  Change in weight since last visit: -3.5lb    Review of Systems   Constitutional: Negative for fatigue.   Eyes: Negative for visual disturbance.   Cardiovascular: Negative for chest pain and palpitations.   Gastrointestinal: Negative for abdominal pain, constipation, diarrhea, nausea and GERD.   Neurological: Negative for dizziness, tremors, light-headedness, headache and memory problem.        Parasthesias negative   Psychiatric/Behavioral: Negative for sleep disturbance and depressed mood. The  "patient is not nervous/anxious.        Objective     Body mass index is 29.37 kg/m².   Body composition analysis completed and showed:   %body fat: 38.9  Measurements (in inches)  Waist: 37.7  Vital Signs:   /68 (BP Location: Left arm, Patient Position: Sitting, Cuff Size: Adult)   Pulse 88   Temp 98.6 °F (37 °C) (Temporal)   Resp 18   Ht 165.1 cm (65\")   Wt 80.1 kg (176 lb 8 oz)   SpO2 98%   BMI 29.37 kg/m²     Physical Exam   General appears stated age and normal appearance   HEENT PERRLA, EOM intact and conjunctivae normal   Chest/lungs Normal rate, Regular rhythm and Breathing is unlabored   Extremities without edema   Neuro Good historian and No focal deficit   Skin Warm, dry, intact   Psych normal behavior, normal thought content and normal concentration     Result Review :                Assessment / Plan        Diagnoses and all orders for this visit:    1. Overweight (BMI 25.0-29.9) (Primary)  Assessment & Plan:  Patient's (Body mass index is 29.37 kg/m².) indicates that they are overweight with health conditions that include dyslipidemias, GERD and fatty liver, insulin resistance, joint pain . Weight is improving with treatment. BMI is is above average; BMI management plan is completed. We discussed low calorie, low carb based diet program, portion control, increasing exercise, management of depression/anxiety/stress to control compensatory eating and pharmacologic options including metformin.    I have instructed the patient to continue with pursuit of medical weight loss as a part of this program. Patient does meet criteria for use of anorectics at this time as BMI >27 and is not at treatment goal HOWEVER, she is exclusively breastfeeding.     Continue nutritional focus and work towards new exercise FITT goal of: 3-2-2-3. Continue current efforts.   The current plan for this month includes: weight loss goal 4-6lbs this month, continue to work on lifestyle behavioral changes and continue " nutrition focus. Increase metformin.              2. Abnormal glucose  Assessment & Plan:  Low carb diet, regular physical activity, and weight reduction of 10-15%. Increase metformin.       Orders:  -     metFORMIN ER (GLUCOPHAGE-XR) 500 MG 24 hr tablet; Take 1 tablet by mouth with breakfast and 2 tablets by mouth with dinner or bedtime daily .  Dispense: 90 tablet; Refill: 2        Follow Up   Return in about 4 weeks (around 4/28/2022) for Next scheduled follow up.  Patient was given instructions and counseling regarding her condition or for health maintenance advice. Please see specific information pulled into the AVS if appropriate.     Courtney Lew, APRN  03/31/2022

## 2022-03-31 NOTE — ASSESSMENT & PLAN NOTE
Patient's (Body mass index is 29.37 kg/m².) indicates that they are overweight with health conditions that include dyslipidemias, GERD and fatty liver, insulin resistance, joint pain . Weight is improving with treatment. BMI is is above average; BMI management plan is completed. We discussed low calorie, low carb based diet program, portion control, increasing exercise, management of depression/anxiety/stress to control compensatory eating and pharmacologic options including metformin.    I have instructed the patient to continue with pursuit of medical weight loss as a part of this program. Patient does meet criteria for use of anorectics at this time as BMI >27 and is not at treatment goal HOWEVER, she is exclusively breastfeeding.     Continue nutritional focus and work towards new exercise FITT goal of: 3-2-2-3. Continue current efforts.   The current plan for this month includes: weight loss goal 4-6lbs this month, continue to work on lifestyle behavioral changes and continue nutrition focus. Increase metformin.

## 2022-04-15 ENCOUNTER — PRIOR AUTHORIZATION (OUTPATIENT)
Dept: GASTROENTEROLOGY | Facility: CLINIC | Age: 32
End: 2022-04-15

## 2022-05-04 ENCOUNTER — OFFICE VISIT (OUTPATIENT)
Dept: BARIATRICS/WEIGHT MGMT | Facility: CLINIC | Age: 32
End: 2022-05-04

## 2022-05-04 VITALS
OXYGEN SATURATION: 98 % | TEMPERATURE: 98 F | WEIGHT: 176 LBS | RESPIRATION RATE: 18 BRPM | BODY MASS INDEX: 29.32 KG/M2 | HEIGHT: 65 IN | SYSTOLIC BLOOD PRESSURE: 122 MMHG | DIASTOLIC BLOOD PRESSURE: 62 MMHG | HEART RATE: 99 BPM

## 2022-05-04 DIAGNOSIS — R73.09 ABNORMAL GLUCOSE: ICD-10-CM

## 2022-05-04 DIAGNOSIS — E66.3 OVERWEIGHT (BMI 25.0-29.9): Primary | ICD-10-CM

## 2022-05-04 PROCEDURE — 99213 OFFICE O/P EST LOW 20 MIN: CPT | Performed by: NURSE PRACTITIONER

## 2022-05-04 NOTE — PROGRESS NOTES
Office Note      Date: 2022  Patient Name: Shelley Longoria  MRN: [id number]  : 1990       Chief Complaint  Obesity and Follow-up  Subjective  Subjective           Shelley Longoria presents to [facility] for obesity management. female s/p LSG/HHR 19 PQ, Presurgery weight:  218 pounds. stefania: 165lb, goal weight: 155 lbs.   Multiple kids with illness/flu this month, stayed home most of the month. Patient is satisfied with weight loss progress. Appetite is well controlled. Reports no side effects of prescribed medications today. Could tell that her energy was better and she had better blood sugar stabilization with increase of metformin. The patient is taking multivitamin and is taking fish oil.  The patient is using a food journal, written, didn't bring today. Logging macronutrients. Keeping calories [id number]. Still having hair loss, got hair cut.   The patient is exercising but not as much as she planned. FITT score of:    Frequency Intensity Time Strength Training   []   0, none []   0 []   0 []   0   []   1 (1-2x/week) []   1 (light) []   1 (<10 min) [x]   1 (1x/week)   [x]   2 (3-5x/week) []   2 (moderate) []   2 (10-20 min) []   2 (2x/week)   []   3 (daily) [x]   3 (moderately hard)  []   4 (very hard) [x]   3 (20-30 min)  [x]   4 (>30 min) []   3 (3-4x/week)         Review of Systems   Constitutional: Negative for fatigue.   Eyes: Negative for visual disturbance.   Cardiovascular: Negative for chest pain and palpitations.   Gastrointestinal: Negative for abdominal pain, constipation, diarrhea, nausea and GERD.   Neurological: Negative for dizziness, tremors, light-headedness, headache and memory problem.        Parasthesias negative   Psychiatric/Behavioral: Negative for sleep disturbance and depressed mood. The patient is not nervous/anxious.      Objective   Body mass index is 29.29 kg/m².   Start weight: 177 pounds.    Total  "Loss/%Loss of BBW: -0.56  Change in weight since last visit: -0.5  Body composition analysis completed and showed:   %body fat: 38.1  Measurements (in inches)  Waist: 39\"  Vital Signs:   /62 (BP Location: Right arm, Patient Position: Sitting, Cuff Size: Adult)   Pulse 99   Temp 98 °F (36.7 °C) (Temporal)   Resp 18   Ht 165.1 cm (65\")   Wt 79.8 kg (176 lb)   SpO2 98%   BMI 29.29 kg/m²     Physical Exam   General appears stated age and normal appearance   HEENT PERRLA, EOM intact and conjunctivae normal   Chest/lungs Normal rate, Regular rhythm and Breathing is unlabored   Extremities without edema   Neuro Good historian and No focal deficit   Skin Warm, dry, intact   Psych normal behavior, normal thought content and normal concentration     Result Review :                Assessment / Plan        There are no diagnoses linked to this encounter.      Follow Up   No follow-ups on file.  Patient was given instructions and counseling regarding her condition or for health maintenance advice. Please see specific information pulled into the AVS if appropriate.     Courtney Lew, APRN  05/04/2022   "

## 2022-05-04 NOTE — ASSESSMENT & PLAN NOTE
Patient's (Body mass index is 29.29 kg/m².) indicates that they are overweight with health conditions that include dyslipidemias and fatty liver, pre-diabetes . Weight is improving with treatment. BMI is is above average; BMI management plan is completed. We discussed low calorie, low carb based diet program, portion control, increasing exercise, joining a fitness center or start home based exercise program, pharmacologic options including metformin and written food journal.    I have instructed the patient to continue with pursuit of medical weight loss as a part of this program. Patient does meet criteria for use of anorectics at this time as BMI >27 and is not at treatment goal.     Continue nutritional focus and work towards new exercise FITT goal of: 2-2-4-2.  The current plan for this month includes: adjust exercise as discussed and continue nutrition focus, bring food journal to next visit for review. Increase water. Switch to oral vitamins for a month to see if hair loss improves.

## 2022-06-30 DIAGNOSIS — R73.09 ABNORMAL GLUCOSE: ICD-10-CM

## 2022-06-30 RX ORDER — METFORMIN HYDROCHLORIDE 500 MG/1
TABLET, EXTENDED RELEASE ORAL
Qty: 90 TABLET | Refills: 2 | Status: SHIPPED | OUTPATIENT
Start: 2022-06-30 | End: 2022-08-11 | Stop reason: SDUPTHER

## 2022-06-30 NOTE — TELEPHONE ENCOUNTER
Rx Refill Note  Requested Prescriptions     Pending Prescriptions Disp Refills   • metFORMIN ER (GLUCOPHAGE-XR) 500 MG 24 hr tablet [Pharmacy Med Name: METFORMIN HCL  MG TABLET] 90 tablet 2     Sig: TAKE ONE TABLET BY MOUTH DAILY WITH BREAKFAST AND TWO TABLETS WITH DINNER OR BEDTIME DAILY      Last office visit with prescribing clinician: 5/4/2022      Next office visit with prescribing clinician: 8/11/2022            Jonathan Stephens MA  06/30/22, 09:43 EDT

## 2022-08-11 ENCOUNTER — OFFICE VISIT (OUTPATIENT)
Dept: BARIATRICS/WEIGHT MGMT | Facility: CLINIC | Age: 32
End: 2022-08-11

## 2022-08-11 VITALS
HEIGHT: 65 IN | SYSTOLIC BLOOD PRESSURE: 128 MMHG | OXYGEN SATURATION: 97 % | TEMPERATURE: 98.7 F | HEART RATE: 90 BPM | BODY MASS INDEX: 28.99 KG/M2 | WEIGHT: 174 LBS | DIASTOLIC BLOOD PRESSURE: 80 MMHG

## 2022-08-11 DIAGNOSIS — E66.3 OVERWEIGHT (BMI 25.0-29.9): Primary | ICD-10-CM

## 2022-08-11 DIAGNOSIS — R73.09 ABNORMAL GLUCOSE: ICD-10-CM

## 2022-08-11 PROCEDURE — 99214 OFFICE O/P EST MOD 30 MIN: CPT | Performed by: NURSE PRACTITIONER

## 2022-08-11 RX ORDER — BLOOD-GLUCOSE METER
1 KIT MISCELLANEOUS DAILY
Qty: 1 EACH | Refills: 0 | Status: SHIPPED | OUTPATIENT
Start: 2022-08-11

## 2022-08-11 RX ORDER — METFORMIN HYDROCHLORIDE 500 MG/1
TABLET, EXTENDED RELEASE ORAL
Qty: 90 TABLET | Refills: 2 | Status: SHIPPED | OUTPATIENT
Start: 2022-08-11

## 2022-08-11 NOTE — ASSESSMENT & PLAN NOTE
Low carb diet, regular physical activity, and weight reduction of 10-15%. Always pair carb with protein or healthy fat to ease hyper/hypoglycemia. Increase physical activity.

## 2022-08-11 NOTE — PROGRESS NOTES
[facility]  [address]  [place] [address]     Office Note      Date: 2022  Patient Name: Shelley Longoria  MRN: [id number]  : 1990  Subjective  Subjective     Chief Complaint  Obesity Management follow-up          Shelley Longoria presents to [facility] for obesity management.   Patient is unsatisfied with weight loss progress. Appetite is well controlled but she is snacking/grazing a lot. Still breastfeeding, doesn't feel like she has any time for herself. Reports no side effects of prescribed medications today. The patient is taking multivitamin and is taking fish oil.  The patient is not using a food journal.    The patient is exercising with a FITT score of:    Frequency Intensity Time Strength Training   []   0, none []   0 []   0 []   0   [x]   1 (1-2x/week) []   1 (light) []   1 (<10 min) []   1 (1x/week)   []   2 (3-5x/week) [x]   2 (moderate) []   2 (10-20 min) [x]   2 (2x/week)   []   3 (daily) [x]   3 (moderately hard)  []   4 (very hard) []   3 (20-30 min)  [x]   4 (>30 min) []   3 (3-4x/week)     Review of Systems   Constitutional: Negative for fatigue.   Eyes: Negative for visual disturbance.   Cardiovascular: Negative for chest pain and palpitations.   Endocrine: Negative for polydipsia, polyphagia and polyuria.        Sweating/hypoglycemia (infrequent)   Musculoskeletal: Negative for arthralgias and myalgias.   Neurological: Positive for weakness and light-headedness.   Psychiatric/Behavioral: Positive for stress. Negative for sleep disturbance and depressed mood. The patient is nervous/anxious.        Objective   Start weight: 177 pounds.    Total Loss lb/%Loss of beginning body weight (BBW): 3lb/1.69%  Change in weight since last visit: 3LBS     Body mass index is 28.96 kg/m².   Body composition analysis completed and showed:   %body fat: 38.5%  Measurements (in inches)  Waist:  "40.25\"    Vital Signs:   /80   Pulse 90   Temp 98.7 °F (37.1 °C)   Ht 165.1 cm (65\")   Wt 78.9 kg (174 lb)   SpO2 97%   BMI 28.96 kg/m²     Physical Exam   General appears stated age and normal appearance   HEENT PERRLA, EOM intact and conjunctivae normal   Chest/lungs Normal rate, Regular rhythm and Breathing is unlabored   Extremities without edema   Neuro Good historian and No focal deficit   Skin Warm, dry, intact   Psych normal behavior, normal thought content and normal concentration     Result Review :                Assessment / Plan        Diagnoses and all orders for this visit:    1. Overweight (BMI 25.0-29.9) (Primary)  Assessment & Plan:  Patient's (Body mass index is 28.96 kg/m².) indicates that they are overweight with health conditions that include hypertension, diabetes mellitus and dyslipidemias . Weight is improving with treatment. BMI is is above average; BMI management plan is completed. We discussed low calorie, low carb based diet program, portion control, increasing exercise, joining a fitness center or start home based exercise program, pharmacologic options including metformin and an yaima-based approach such as clypd Pal or Lose It.    I have instructed the patient to continue with pursuit of medical weight loss as a part of this program. Patient does meet criteria for use of anorectics at this time as BMI >27 and is not at treatment goal. She is breastfeeding.     Continue nutritional focus and work towards new exercise FITT goal of: 2-2-4-2.  The current plan for this month includes: continue to work on lifestyle behavioral changes- cut out mindless snacking, set boundaries, chew gum or ice or try true lemon mix ins. Check out [employer] membership.          2. Abnormal glucose  Assessment & Plan:  Low carb diet, regular physical activity, and weight reduction of 10-15%. Always pair carb with protein or healthy fat to ease hyper/hypoglycemia. Increase physical activity. "       Orders:  -     Blood Glucose Monitoring Suppl (FreeStyle Lite) device; 1 dose Daily.  Dispense: 1 each; Refill: 0  -     glucose blood (FREESTYLE LITE) test strip; Use as instructed  Dispense: 100 each; Refill: 12  -     metFORMIN ER (GLUCOPHAGE-XR) 500 MG 24 hr tablet; TAKE ONE TABLET BY MOUTH DAILY WITH BREAKFAST AND TWO TABLETS WITH DINNER OR BEDTIME DAILY  Dispense: 90 tablet; Refill: 2      I spent 30 minutes caring for Shelley on this date of service. This time includes time spent by me in the following activities:preparing for the visit, obtaining and/or reviewing a separately obtained history, performing a medically appropriate examination and/or evaluation , counseling and educating the patient/family/caregiver, ordering medications, tests, or procedures and documenting information in the medical record  Follow Up   Return in about 4 weeks (around 9/8/2022) for Next scheduled follow up.  Patient was given instructions and counseling regarding her condition or for health maintenance advice. Please see specific information pulled into the AVS if appropriate.     Courtney Lew, APRN  08/11/2022

## 2022-08-11 NOTE — ASSESSMENT & PLAN NOTE
Patient's (Body mass index is 28.96 kg/m².) indicates that they are overweight with health conditions that include hypertension, diabetes mellitus and dyslipidemias . Weight is improving with treatment. BMI is is above average; BMI management plan is completed. We discussed low calorie, low carb based diet program, portion control, increasing exercise, joining a fitness center or start home based exercise program, pharmacologic options including metformin and an yaima-based approach such as [employer] Pal or Lose It.    I have instructed the patient to continue with pursuit of medical weight loss as a part of this program. Patient does meet criteria for use of anorectics at this time as BMI >27 and is not at treatment goal. She is breastfeeding.     Continue nutritional focus and work towards new exercise FITT goal of: 2-2-4-2.  The current plan for this month includes: continue to work on lifestyle behavioral changes- cut out mindless snacking, set boundaries, chew gum or ice or try true lemon mix ins. Check out [employer] membership.

## 2022-09-01 ENCOUNTER — OFFICE VISIT (OUTPATIENT)
Dept: BARIATRICS/WEIGHT MGMT | Facility: CLINIC | Age: 32
End: 2022-09-01

## 2022-09-01 VITALS
HEIGHT: 65 IN | SYSTOLIC BLOOD PRESSURE: 118 MMHG | HEART RATE: 76 BPM | WEIGHT: 176 LBS | DIASTOLIC BLOOD PRESSURE: 76 MMHG | TEMPERATURE: 97.3 F | BODY MASS INDEX: 29.32 KG/M2 | OXYGEN SATURATION: 98 %

## 2022-09-01 DIAGNOSIS — R73.09 ABNORMAL GLUCOSE: ICD-10-CM

## 2022-09-01 DIAGNOSIS — E66.3 OVERWEIGHT (BMI 25.0-29.9): Primary | ICD-10-CM

## 2022-09-01 PROCEDURE — 99214 OFFICE O/P EST MOD 30 MIN: CPT | Performed by: NURSE PRACTITIONER

## 2022-09-01 NOTE — PROGRESS NOTES
[facility]  [address]  [place] [address]     Office Note      Date: 2022  Patient Name: Shelley Longoria  MRN: [id number]  : 1990  Subjective  Subjective     Chief Complaint  Obesity Management follow-up          Shelley Longoria presents to [facility] for obesity management.   Patient is unsatisfied with weight loss progress. Appetite is moderately controlled. Sometimes restriction is really good Reports no side effects of prescribed medications today. The patient is taking multivitamin and is taking fish oil.  The patient is not using a food journal.    B: <1/2 cup of oatmeal with monkfruit sweeterner, cinnamon, fairlife milk, partial banana  S: premier protein  L: steak from [employer]  D: chicken soup with potato and carrot  S: apple  The patient is exercising with a FITT score of:    Frequency Intensity Time Strength Training   []   0, none []   0 []   0 []   0   [x]   1 (1-2x/week) []   1 (light) []   1 (<10 min) []   1 (1x/week)   []   2 (3-5x/week) []   2 (moderate) []   2 (10-20 min) [x]   2 (2x/week)   []   3 (daily) [x]   3 (moderately hard)  []   4 (very hard) []   3 (20-30 min)  [x]   4 (>30 min) []   3 (3-4x/week)     Review of Systems   Constitutional: Negative for appetite change and fatigue.   Eyes: Negative for blurred vision and visual disturbance.   Cardiovascular: Negative for chest pain and palpitations.   Gastrointestinal: Negative for abdominal pain, constipation, diarrhea, nausea and GERD.   Endocrine: Negative for polydipsia, polyphagia and polyuria.   Musculoskeletal: Negative for arthralgias and myalgias.   Neurological: Negative for dizziness, tremors, light-headedness, headache and memory problem.        Parasthesias negative   Psychiatric/Behavioral: Negative for sleep disturbance and depressed mood. The patient is not nervous/anxious.      Objective   Start weight: 177  "pounds..    Total Loss lb/%Loss of beginning body weight (BBW): -0.56%  Change in weight since last visit: -1    Body mass index is 29.29 kg/m².   Body composition analysis completed and showed:   %body fat: 37.4  Measurements (in inches)  Waist: 36.5\"    Vital Signs:   /76   Pulse 76   Temp 97.3 °F (36.3 °C)   Ht 165.1 cm (65\")   Wt 79.8 kg (176 lb)   SpO2 98%   BMI 29.29 kg/m²     Physical Exam   General appears stated age and normal appearance   HEENT PERRLA, EOM intact and conjunctivae normal   Chest/lungs Normal rate, Regular rhythm and Breathing is unlabored   Extremities without edema   Neuro Good historian and No focal deficit   Skin Warm, dry, intact   Psych normal behavior, normal thought content and normal concentration     Result Review :                Assessment / Plan        Diagnoses and all orders for this visit:    1. Overweight (BMI 25.0-29.9) (Primary)  Assessment & Plan:  Patient's (Body mass index is 29.29 kg/m².) indicates that they are overweight with health conditions that include dyslipidemias and pre-diabetes, fatty liver, joint pain . Weight is improving with treatment. BMI is is above average; BMI management plan is completed. We discussed low calorie, low carb based diet program, portion control, increasing exercise, joining a fitness center or start home based exercise program, pharmacologic options including metformin and an yaima-based approach such as POWWOW Pal or Lose It.    I have instructed the patient to continue with pursuit of medical weight loss as a part of this program. Patient does meet criteria for use of anorectics at this time as BMI >27 and is not at treatment goal.     Continue nutritional focus and work towards new exercise FITT goal of: 2-2-4-2. Joining the [employer].  The current plan for this month includes: adjust exercise as discussed and continue to work on lifestyle behavioral changes. Continue to work on food journal. Would like to start phentermine " when she stops breastfeeding next month. Will get UDS before starting. Also need to repeat A1c.          2. Abnormal glucose  Assessment & Plan:  Low carb diet, regular physical activity, and weight reduction of 10-15%. Continue metformin. Repeat A1c next month.           I spent 30 minutes caring for Shelley on this date of service. This time includes time spent by me in the following activities:preparing for the visit, reviewing tests, obtaining and/or reviewing a separately obtained history, performing a medically appropriate examination and/or evaluation , counseling and educating the patient/family/caregiver, ordering medications, tests, or procedures and documenting information in the medical record  Follow Up   Return in about 4 weeks (around 9/29/2022) for Next scheduled follow up, Labs next visit.  Patient was given instructions and counseling regarding her condition or for health maintenance advice. Please see specific information pulled into the AVS if appropriate.     Courtney Lew, VALENCIA  09/01/2022

## 2022-11-30 ENCOUNTER — OFFICE VISIT (OUTPATIENT)
Dept: BARIATRICS/WEIGHT MGMT | Facility: CLINIC | Age: 32
End: 2022-11-30

## 2022-11-30 VITALS
DIASTOLIC BLOOD PRESSURE: 88 MMHG | SYSTOLIC BLOOD PRESSURE: 133 MMHG | WEIGHT: 177 LBS | HEART RATE: 110 BPM | BODY MASS INDEX: 29.49 KG/M2 | TEMPERATURE: 97.7 F | HEIGHT: 65 IN

## 2022-11-30 DIAGNOSIS — E53.8 FOLATE DEFICIENCY: ICD-10-CM

## 2022-11-30 DIAGNOSIS — K21.9 GASTROESOPHAGEAL REFLUX DISEASE, UNSPECIFIED WHETHER ESOPHAGITIS PRESENT: Primary | ICD-10-CM

## 2022-11-30 DIAGNOSIS — E55.9 VITAMIN D DEFICIENCY: ICD-10-CM

## 2022-11-30 DIAGNOSIS — R10.13 DYSPEPSIA: ICD-10-CM

## 2022-11-30 DIAGNOSIS — R53.83 FATIGUE, UNSPECIFIED TYPE: ICD-10-CM

## 2022-11-30 DIAGNOSIS — R79.0 ABNORMAL BLOOD LEVEL OF IRON: ICD-10-CM

## 2022-11-30 PROCEDURE — 99214 OFFICE O/P EST MOD 30 MIN: CPT | Performed by: PHYSICIAN ASSISTANT

## 2022-11-30 NOTE — PROGRESS NOTES
"[facility]  [address]  [address]  [facility] [address] [phone]      Patient Name:  Shelley Longoria.  :  1990      Date of Visit: 2021        Reason for Visit:  Annual Eval - 3 years postop      HPI:  Shelley Longoria is a 32 y.o. female s/p LSG/HHR 19 PQ (w/ postop hemorrhage)      Having uncontrolled reflux - \"every day for the last 2 years\" - \"we've got to do something.\"    LOV 2021 patient declined further eval b/c she was not eager to have another surgery.  Now says she wishes to proceed w/ whatever eval/intervention advised.     Feels food constantly refluxing and getting stuck in her throat.  Vomits for relief (liquid, foam, food).      Taking Omeprazole 20mg QID and prn TUMS w/out relief.      Hx significant for persistent/refractory H.Pylori - referred to GI for further eval/management.  EGD 22 w/ Dr. Aldrich revealed H.Pylori gastritis.  Treated w/ Levaquin/Flagyl/Pepto.  Repeat HPSA advised, but patient did not follow up - says she was unaware additional testing needed.      Last bariatric labs 2021 - low folate.  Wearing MVI and B12 patches + PO Vit C, Vit D and Vitamin B1.    Currently breastfeeding, ready to wean, baby is 14 months.       Presurgery weight:  218 pounds. Today's weight is 80.3 kg (177 lb) pounds, today's Body mass index is 29.45 kg/m²., and her weight loss since surgery is 41 pounds.  Personal goal weight:  155 lbs.         Past Medical History:   Diagnosis Date   • Anxiety    • Chronic joint pain     denies NSAIDS/steroids   • DM2 (diabetes mellitus, type 2) ([facility])     w/ hx gestational diabetes , dx May 2017, started on insulin when initially dx, lost weight w/ Adipex, stopped insulin 6 months after dx, but now w/ weight regain, back on insulin.  Managed by PCP   • Dyspepsia    • Dyspnea on exertion    • Fatigue    • Fatty liver     w/ abnormal LFTs, US 2018   • Gestational diabetes  "   • H. pylori infection     UBT (+) 19 - PrevPak RX - repeat HPSA (+) 2019, Pylera RX - repeat UBT (holding PPI) (+), referred to GI - Levaquin/Flagyl/Pepto RX , needs repeat testing.   • Mixed hyperlipidemia 10/15/2019   • Obesity (BMI 30-39.9)    • Pap smear for cervical cancer screening 2017   • Wears eyeglasses      Past Surgical History:   Procedure Laterality Date   • CERVICAL CONE BIOPSY      benign   •  SECTION  08, 5/15/13, 11/6/15    x3    •  SECTION  /   • ENDOSCOPY N/A 2019    Procedure: ESOPHAGOGASTRODUODENOSCOPY;  Surgeon: Mila Whatley MD;  Location:  [facility];  Service: Bariatric   • GASTRIC SLEEVE LAPAROSCOPIC N/A 2019    Procedure: GASTRIC SLEEVE LAPAROSCOPIC;  Surgeon: Mila Whatley MD;  Location:  [facility];  Service: Bariatric   • HIATAL HERNIA REPAIR N/A 2019    Procedure: HIATAL HERNIA REPAIR LAPAROSCOPIC;  Surgeon: Mila Whatley MD;  Location:  [facility];  Service: Bariatric   • UPPER GASTROINTESTINAL ENDOSCOPY  2019    Dr VIVIENNE Whatley   • UPPER GASTROINTESTINAL ENDOSCOPY  2022    Dr Aldrich, positive H pylori     Outpatient Medications Marked as Taking for the 22 encounter (Office Visit) with Kenzie Vega PA   Medication Sig Dispense Refill   • Biotin 83193 MCG tablet Take  by mouth.     • Blood Glucose Monitoring Suppl (FreeStyle Lite) device 1 dose Daily. 1 each 0   • Calcium Carbonate-Vit D-Min (CALCIUM 1200 PO) Take  by mouth.     • Cyanocobalamin (B-12 PO) Place 1 tablet under the tongue Daily. Patches     • glucose blood (FREESTYLE LITE) test strip Use as instructed 100 each 12   • Freya 0.35 MG tablet Take 1 tablet by mouth Daily.     • metFORMIN ER (GLUCOPHAGE-XR) 500 MG 24 hr tablet TAKE ONE TABLET BY MOUTH DAILY WITH BREAKFAST AND TWO TABLETS WITH DINNER OR BEDTIME DAILY 90 tablet 2   • Multiple Vitamins-Minerals (MULTIVITAL-M) tablet Take  by mouth.     •  "Omega-3 Fatty Acids (fish oil) 1000 MG capsule capsule Take 1,000 mg by mouth Daily With Breakfast.     • valACYclovir (VALTREX) 1000 MG tablet Take 1 g by mouth Daily.     • vitamin C (ASCORBIC ACID) 250 MG tablet Take 1,000 mg by mouth Daily.     • vitamin D3 125 MCG (5000 UT) capsule capsule Take 5,000 Units by mouth Daily.       No Known Allergies    Social History     Socioeconomic History   • Marital status:    Tobacco Use   • Smoking status: Former     Packs/day: 2.00     Years: 2.00     Pack years: 4.00     Types: Cigarettes     Quit date: 07/2012     Years since quitting: 10.4   • Smokeless tobacco: Never   Substance and Sexual Activity   • Alcohol use: No   • Drug use: No   • Sexual activity: Yes     Partners: Male     Birth control/protection: I.U.D.     Comment:        /88   Pulse 110   Temp 97.7 °F (36.5 °C)   Ht 165.1 cm (65\")   Wt 80.3 kg (177 lb)   BMI 29.45 kg/m²     Physical Exam   Constitutional: She appears well-developed and well-nourished.   wearing a mask   Cardiovascular: Normal rate.   Pulmonary/Chest: Effort normal.   Musculoskeletal: Normal range of motion.   Neurological: She is alert.   Psychiatric: She has a normal mood and affect. Judgment and thought content normal.         Assessment:  3 years s/p LSG/HHR 11/26/19 w/ Dr. Whatley    ICD-10-CM ICD-9-CM   1. Gastroesophageal reflux disease, unspecified whether esophagitis present  K21.9 530.81   2. Fatigue, unspecified type  R53.83 780.79   3. Dyspepsia  R10.13 536.8   4. Vitamin D deficiency  E55.9 268.9   5. Folate deficiency  E53.8 266.2   6. Abnormal blood level of iron  R79.0 790.6         Plan:  UGI ordered for further eval.  Advised to follow up w/ GI re: H.Pylori retesting.  Routine bariatric labs ordered.  Additional input to follow.  Call w/ problems/concerns.    The patient was instructed to follow up pending results, sooner if needed.   "

## 2022-12-10 LAB
25(OH)D3+25(OH)D2 SERPL-MCNC: 32.9 NG/ML (ref 30–100)
ALBUMIN SERPL-MCNC: 4.4 G/DL (ref 3.8–4.8)
ALBUMIN/GLOB SERPL: 1.6 {RATIO} (ref 1.2–2.2)
ALP SERPL-CCNC: 101 IU/L (ref 44–121)
ALT SERPL-CCNC: 23 IU/L (ref 0–32)
AST SERPL-CCNC: 9 IU/L (ref 0–40)
BASOPHILS # BLD AUTO: 0 X10E3/UL (ref 0–0.2)
BASOPHILS NFR BLD AUTO: 1 %
BILIRUB SERPL-MCNC: 0.4 MG/DL (ref 0–1.2)
BUN SERPL-MCNC: 17 MG/DL (ref 6–20)
BUN/CREAT SERPL: 26 (ref 9–23)
CALCIUM SERPL-MCNC: 9.6 MG/DL (ref 8.7–10.2)
CHLORIDE SERPL-SCNC: 105 MMOL/L (ref 96–106)
CO2 SERPL-SCNC: 25 MMOL/L (ref 20–29)
CREAT SERPL-MCNC: 0.66 MG/DL (ref 0.57–1)
EGFRCR SERPLBLD CKD-EPI 2021: 119 ML/MIN/1.73
EOSINOPHIL # BLD AUTO: 0.1 X10E3/UL (ref 0–0.4)
EOSINOPHIL NFR BLD AUTO: 2 %
ERYTHROCYTE [DISTWIDTH] IN BLOOD BY AUTOMATED COUNT: 12.6 % (ref 11.7–15.4)
FERRITIN SERPL-MCNC: 27 NG/ML (ref 15–150)
FOLATE SERPL-MCNC: 7.6 NG/ML
GLOBULIN SER CALC-MCNC: 2.8 G/DL (ref 1.5–4.5)
GLUCOSE SERPL-MCNC: 122 MG/DL (ref 70–99)
HCT VFR BLD AUTO: 41.8 % (ref 34–46.6)
HGB BLD-MCNC: 14.6 G/DL (ref 11.1–15.9)
IMM GRANULOCYTES # BLD AUTO: 0 X10E3/UL (ref 0–0.1)
IMM GRANULOCYTES NFR BLD AUTO: 1 %
IRON SERPL-MCNC: 87 UG/DL (ref 27–159)
LYMPHOCYTES # BLD AUTO: 2.2 X10E3/UL (ref 0.7–3.1)
LYMPHOCYTES NFR BLD AUTO: 34 %
MCH RBC QN AUTO: 31.2 PG (ref 26.6–33)
MCHC RBC AUTO-ENTMCNC: 34.9 G/DL (ref 31.5–35.7)
MCV RBC AUTO: 89 FL (ref 79–97)
METHYLMALONATE SERPL-SCNC: NORMAL NMOL/L
MONOCYTES # BLD AUTO: 0.4 X10E3/UL (ref 0.1–0.9)
MONOCYTES NFR BLD AUTO: 6 %
NEUTROPHILS # BLD AUTO: 3.7 X10E3/UL (ref 1.4–7)
NEUTROPHILS NFR BLD AUTO: 56 %
PLATELET # BLD AUTO: 187 X10E3/UL (ref 150–450)
POTASSIUM SERPL-SCNC: 4.5 MMOL/L (ref 3.5–5.2)
PREALB SERPL-MCNC: 27 MG/DL (ref 14–35)
PROT SERPL-MCNC: 7.2 G/DL (ref 6–8.5)
RBC # BLD AUTO: 4.68 X10E6/UL (ref 3.77–5.28)
REQUEST PROBLEM: NORMAL
SODIUM SERPL-SCNC: 143 MMOL/L (ref 134–144)
VIT B1 BLD-SCNC: 118.1 NMOL/L (ref 66.5–200)
WBC # BLD AUTO: 6.4 X10E3/UL (ref 3.4–10.8)

## 2022-12-12 ENCOUNTER — TELEPHONE (OUTPATIENT)
Dept: BARIATRICS/WEIGHT MGMT | Facility: CLINIC | Age: 32
End: 2022-12-12

## 2022-12-12 NOTE — TELEPHONE ENCOUNTER
[facility] called and said that there has been a lab error on their end. They have contacted patient to come in and have her blood re-drawn([place]). Thanks!

## 2022-12-28 ENCOUNTER — HOSPITAL ENCOUNTER (OUTPATIENT)
Dept: GENERAL RADIOLOGY | Facility: HOSPITAL | Age: 32
Discharge: HOME | End: 2022-12-28
Admitting: PHYSICIAN ASSISTANT

## 2022-12-28 DIAGNOSIS — K21.9 GASTROESOPHAGEAL REFLUX DISEASE, UNSPECIFIED WHETHER ESOPHAGITIS PRESENT: ICD-10-CM

## 2022-12-28 PROCEDURE — 74240 X-RAY XM UPR GI TRC 1CNTRST: CPT

## 2022-12-28 RX ADMIN — BARIUM SULFATE 183 ML: 960 POWDER, FOR SUSPENSION ORAL at 09:11

## 2023-01-04 ENCOUNTER — OFFICE VISIT (OUTPATIENT)
Dept: BARIATRICS/WEIGHT MGMT | Facility: CLINIC | Age: 33
End: 2023-01-04
Payer: COMMERCIAL

## 2023-01-04 VITALS
SYSTOLIC BLOOD PRESSURE: 114 MMHG | OXYGEN SATURATION: 99 % | HEIGHT: 65 IN | HEART RATE: 95 BPM | BODY MASS INDEX: 29.99 KG/M2 | DIASTOLIC BLOOD PRESSURE: 74 MMHG | TEMPERATURE: 97.3 F | WEIGHT: 180 LBS | RESPIRATION RATE: 16 BRPM

## 2023-01-04 DIAGNOSIS — K21.9 GASTROESOPHAGEAL REFLUX DISEASE, UNSPECIFIED WHETHER ESOPHAGITIS PRESENT: Primary | ICD-10-CM

## 2023-01-04 DIAGNOSIS — A04.8 H. PYLORI INFECTION: ICD-10-CM

## 2023-01-04 PROCEDURE — 99214 OFFICE O/P EST MOD 30 MIN: CPT | Performed by: PHYSICIAN ASSISTANT

## 2023-01-14 NOTE — PROGRESS NOTES
"[facility]  [address]  [address]  [facility] [id number] [phone]      Patient Name:  Shelley Longoria.  :  1990      Date of Visit: 2023      Reason for Visit:  uncontrolled reflux      HPI:  Shelley Longoria is a 32 y.o. female s/p LSG/HHR 19 w/ Dr. Whatley (w/ postop hemorrhage, managed conservatively)    Having uncontrolled reflux - \"every day for the last 2 years\" - \"we've got to do something.\"    LOV 2021 patient declined further eval b/c she was not eager to have another surgery.  Now says she wishes to proceed w/ whatever eval/intervention advised.     Feels food constantly refluxing and getting stuck in her throat.  Vomits for relief (liquid, foam, food).      Taking Omeprazole 20mg QID and prn TUMS w/out relief.      Hx significant for persistent/refractory H.Pylori - referred to GI for further eval/management.  EGD 22 w/ Dr. Aldrich revealed H.Pylori gastritis.  Treated w/ Levaquin/Flagyl/Pepto.  Repeat HPSA advised, but patient did not follow up - says she was unaware additional testing needed.      Bariatric labs 22 - unremarkable.  Wearing MVI and B12 patches + PO Vit C, Vit D and Vitamin B1.     UGI 22 @[facility] - mild reflux, small sliding HH     Currently breastfeeding, ready to wean, baby is 14 months.       Presurgery weight:  218 pounds. Today's weight is 81.6 kg (180 lb) pounds, today's Body mass index is 29.95 kg/m²., and her weight loss since surgery is 38 pounds.  Personal goal weight:  155 lbs.         Past Medical History:   Diagnosis Date   • Anxiety    • Chronic joint pain     denies NSAIDS/steroids   • DM2 (diabetes mellitus, type 2) ([facility])     w/ hx gestational diabetes , dx May 2017, started on insulin when initially dx, lost weight w/ Adipex, stopped insulin 6 months after dx, but now w/ weight regain, back on insulin.  Managed by PCP   • Dyspepsia    • Dyspnea on exertion    • " Fatigue    • Fatty liver     w/ abnormal LFTs, US 2018   • Gestational diabetes    • H. pylori infection     UBT (+) 19 - PrevPak RX - repeat HPSA (+) 2019, Pylera RX - repeat UBT (holding PPI) (+), referred to GI - Levaquin/Flagyl/Pepto RX , needs repeat testing.   • Mixed hyperlipidemia 10/15/2019   • Obesity (BMI 30-39.9)    • Pap smear for cervical cancer screening 2017   • Wears eyeglasses      Past Surgical History:   Procedure Laterality Date   • CERVICAL CONE BIOPSY      benign   •  SECTION  08, 5/15/13, 11/6/15    x3    •  SECTION  /   • ENDOSCOPY N/A 2019    Procedure: ESOPHAGOGASTRODUODENOSCOPY;  Surgeon: Mila Whatley MD;  Location:  [facility];  Service: Bariatric   • GASTRIC SLEEVE LAPAROSCOPIC N/A 2019    Procedure: GASTRIC SLEEVE LAPAROSCOPIC;  Surgeon: Mila Whatley MD;  Location:  [facility];  Service: Bariatric   • HIATAL HERNIA REPAIR N/A 2019    Procedure: HIATAL HERNIA REPAIR LAPAROSCOPIC;  Surgeon: Mila Whatley MD;  Location:  [facility];  Service: Bariatric   • UPPER GASTROINTESTINAL ENDOSCOPY  2019    Dr VIVIENNE Whatley   • UPPER GASTROINTESTINAL ENDOSCOPY  2022    Dr Aldrich, positive H pylori     Outpatient Medications Marked as Taking for the 23 encounter (Office Visit) with Kenzie Vega PA   Medication Sig Dispense Refill   • Biotin 54193 MCG tablet Take  by mouth.     • Blood Glucose Monitoring Suppl (FreeStyle Lite) device 1 dose Daily. 1 each 0   • Calcium Carbonate-Vit D-Min (CALCIUM 1200 PO) Take  by mouth.     • Cyanocobalamin (B-12 PO) Place 1 tablet under the tongue Daily. Patches     • glucose blood (FREESTYLE LITE) test strip Use as instructed 100 each 12   • metFORMIN ER (GLUCOPHAGE-XR) 500 MG 24 hr tablet TAKE ONE TABLET BY MOUTH DAILY WITH BREAKFAST AND TWO TABLETS WITH DINNER OR BEDTIME DAILY 90 tablet 2   • Multiple Vitamins-Minerals (MULTIVITAL-M) tablet  "Take  by mouth.     • Omega-3 Fatty Acids (fish oil) 1000 MG capsule capsule Take 1,000 mg by mouth Daily With Breakfast.     • valACYclovir (VALTREX) 1000 MG tablet Take 1 g by mouth Daily.     • vitamin C (ASCORBIC ACID) 250 MG tablet Take 1,000 mg by mouth Daily.     • vitamin D3 125 MCG (5000 UT) capsule capsule Take 5,000 Units by mouth Daily.       No Known Allergies    Social History     Socioeconomic History   • Marital status:    Tobacco Use   • Smoking status: Former     Packs/day: 2.00     Years: 2.00     Pack years: 4.00     Types: Cigarettes     Quit date: 07/2012     Years since quitting: 10.5   • Smokeless tobacco: Never   Substance and Sexual Activity   • Alcohol use: No   • Drug use: No   • Sexual activity: Yes     Partners: Male     Birth control/protection: I.U.D.     Comment:        /74 (BP Location: Left arm, Patient Position: Sitting)   Pulse 95   Temp 97.3 °F (36.3 °C)   Resp 16   Ht 165.1 cm (65\")   Wt 81.6 kg (180 lb)   SpO2 99%   Breastfeeding Yes   BMI 29.95 kg/m²     Physical Exam   Constitutional: She appears well-developed.   wearing a mask   Cardiovascular: Normal rate.   Pulmonary/Chest: Effort normal.   Musculoskeletal: Normal range of motion.   Neurological: She is alert.   Psychiatric: Judgment and thought content normal.         Assessment:  3 years s/p LSG/HHR 11/26/19 w/ Dr. Whatley    ICD-10-CM ICD-9-CM   1. Gastroesophageal reflux disease, unspecified whether esophagitis present  K21.9 530.81   2. H. pylori infection  A04.8 041.86         Plan:  EGD w/ Dr. Whatley for further eval.  Risks/benefits discussed.  HPSA ordered.  Additional input to follow.  Call w/ problems/concerns.    "

## 2023-01-16 ENCOUNTER — LAB (OUTPATIENT)
Dept: LAB | Facility: HOSPITAL | Age: 33
End: 2023-01-16
Payer: COMMERCIAL

## 2023-01-16 DIAGNOSIS — A04.8 H. PYLORI INFECTION: ICD-10-CM

## 2023-01-16 PROCEDURE — 87338 HPYLORI STOOL AG IA: CPT

## 2023-01-19 LAB — H PYLORI AG STL QL IA: NEGATIVE

## 2023-02-01 ENCOUNTER — PREP FOR SURGERY (OUTPATIENT)
Dept: OTHER | Facility: HOSPITAL | Age: 33
End: 2023-02-01
Payer: COMMERCIAL

## 2023-02-01 ENCOUNTER — TELEMEDICINE (OUTPATIENT)
Dept: BARIATRICS/WEIGHT MGMT | Facility: CLINIC | Age: 33
End: 2023-02-01
Payer: COMMERCIAL

## 2023-02-01 DIAGNOSIS — A04.8 H. PYLORI INFECTION: ICD-10-CM

## 2023-02-01 DIAGNOSIS — K21.9 GASTROESOPHAGEAL REFLUX DISEASE, UNSPECIFIED WHETHER ESOPHAGITIS PRESENT: ICD-10-CM

## 2023-02-01 DIAGNOSIS — K21.00 GASTROESOPHAGEAL REFLUX DISEASE WITH ESOPHAGITIS, UNSPECIFIED WHETHER HEMORRHAGE: Primary | ICD-10-CM

## 2023-02-01 DIAGNOSIS — K44.9 HIATAL HERNIA: ICD-10-CM

## 2023-02-01 PROCEDURE — 99213 OFFICE O/P EST LOW 20 MIN: CPT | Performed by: SURGERY

## 2023-02-01 RX ORDER — SODIUM CHLORIDE 9 MG/ML
150 INJECTION, SOLUTION INTRAVENOUS CONTINUOUS
Status: CANCELLED | OUTPATIENT
Start: 2023-02-01

## 2023-02-01 RX ORDER — SODIUM CHLORIDE 0.9 % (FLUSH) 0.9 %
10 SYRINGE (ML) INJECTION AS NEEDED
Status: CANCELLED | OUTPATIENT
Start: 2023-02-01

## 2023-02-01 RX ORDER — SCOLOPAMINE TRANSDERMAL SYSTEM 1 MG/1
1 PATCH, EXTENDED RELEASE TRANSDERMAL CONTINUOUS
Status: CANCELLED | OUTPATIENT
Start: 2023-02-01 | End: 2023-02-04

## 2023-02-01 RX ORDER — SODIUM CHLORIDE 0.9 % (FLUSH) 0.9 %
10 SYRINGE (ML) INJECTION EVERY 12 HOURS SCHEDULED
Status: CANCELLED | OUTPATIENT
Start: 2023-02-01

## 2023-02-01 RX ORDER — SODIUM CHLORIDE 9 MG/ML
40 INJECTION, SOLUTION INTRAVENOUS AS NEEDED
Status: CANCELLED | OUTPATIENT
Start: 2023-02-01

## 2023-02-01 RX ORDER — ENOXAPARIN SODIUM 100 MG/ML
40 INJECTION SUBCUTANEOUS ONCE
Status: CANCELLED | OUTPATIENT
Start: 2023-02-01 | End: 2023-02-01

## 2023-02-01 RX ORDER — CEFAZOLIN SODIUM 2 G/100ML
2 INJECTION, SOLUTION INTRAVENOUS ONCE
Status: CANCELLED | OUTPATIENT
Start: 2023-02-01 | End: 2023-02-01

## 2023-02-01 NOTE — PROGRESS NOTES
"[facility]ariatric Surgery  [address]  [address]  [facility] [id number] [phone]        Patient Name: Shelley Longoria.  YOB: 1990      Date of Visit: 02/01/2023      Reason for Visit:  GERD    HPI:  Shelley Longoria is a 33 y.o. female s/p \"LSG/HHR 11/26/19 w/ Dr. Whatley (w/ postop hemorrhage, managed conservatively)    Having uncontrolled reflux - \"every day for the last 2 years\" - \"we've got to do something.\"    LOV 11/2021 patient declined further eval b/c she was not eager to have another surgery.  Now says she wishes to proceed w/ whatever eval/intervention advised.     Feels food constantly refluxing and getting stuck in her throat.  Vomits for relief (liquid, foam, food).      Taking Omeprazole 20mg QID and prn TUMS w/out relief.      Hx significant for persistent/refractory H.Pylori - referred to GI for further eval/management.  EGD 2/9/22 w/ Dr. Aldrich revealed H.Pylori gastritis.  Treated w/ Levaquin/Flagyl/Pepto.  Repeat HPSA advised, but patient did not follow up - says she was unaware additional testing needed.      Bariatric labs 11/30/22 - unremarkable.  Wearing MVI and B12 patches + PO Vit C, Vit D and Vitamin B1.     UGI 12/28/22 @[facility] - mild reflux, small sliding HH\"    02/01/23 Update:      I reviewed recent EGD with her-    1/26/23 EGD PQ: GEJ 39 cm, patulous hiatus.  No stricture, but angulation at incisura and the diameter of the proximal sleeve was dilated.  Path-reflux esophagitis     She did have a pregnancy after last surgery.  She thinks she is done child-bearing.      Past Medical History:   Diagnosis Date   • Anxiety    • Chronic joint pain     denies NSAIDS/steroids   • DM2 (diabetes mellitus, type 2) ([facility])     w/ hx gestational diabetes 2015, dx May 2017, started on insulin when initially dx, lost weight w/ Adipex, stopped insulin 6 months after dx, but now w/ weight regain, back on insulin.  Managed by PCP   • " Dyspepsia    • Dyspnea on exertion    • Fatigue    • Fatty liver     w/ abnormal LFTs, US 2018   • Gestational diabetes    • H. pylori infection     UBT (+) 19 - PrevPak RX; repeat HPSA (+) 2019 - Pylera RX; repeat UBT (holding PPI) (+) - referred to GI, RX Levaquin/Flagyl/Pepto RX  - HPSA negative 2023   • Mixed hyperlipidemia 10/15/2019   • Obesity (BMI 30-39.9)    • Pap smear for cervical cancer screening 2017   • Wears eyeglasses      Past Surgical History:   Procedure Laterality Date   • CERVICAL CONE BIOPSY      benign   •  SECTION  08, 5/15/13, 11/6/15    x3    •  SECTION  /   • ENDOSCOPY N/A 2019    Procedure: ESOPHAGOGASTRODUODENOSCOPY;  Surgeon: Mila Whatley MD;  Location:  [facility];  Service: Bariatric   • GASTRIC SLEEVE LAPAROSCOPIC N/A 2019    Procedure: GASTRIC SLEEVE LAPAROSCOPIC;  Surgeon: Mila Whatley MD;  Location:  [facility];  Service: Bariatric   • HIATAL HERNIA REPAIR N/A 2019    Procedure: HIATAL HERNIA REPAIR LAPAROSCOPIC;  Surgeon: Mila Whatley MD;  Location:  [facility];  Service: Bariatric   • UPPER GASTROINTESTINAL ENDOSCOPY  2019    Dr VIVIENNE Whatley   • UPPER GASTROINTESTINAL ENDOSCOPY  2022    Dr Aldrich, positive H pylori     Outpatient Medications Marked as Taking for the 23 encounter (Appointment) with Mila Whatley MD   Medication Sig Dispense Refill   • Biotin 86017 MCG tablet Take  by mouth.     • Blood Glucose Monitoring Suppl (FreeStyle Lite) device 1 dose Daily. 1 each 0   • Calcium Carbonate-Vit D-Min (CALCIUM 1200 PO) Take  by mouth.     • Cyanocobalamin (B-12 PO) Place 1 tablet under the tongue Daily. Patches     • glucose blood (FREESTYLE LITE) test strip Use as instructed 100 each 12   • metFORMIN ER (GLUCOPHAGE-XR) 500 MG 24 hr tablet TAKE ONE TABLET BY MOUTH DAILY WITH BREAKFAST AND TWO TABLETS WITH DINNER OR BEDTIME DAILY 90 tablet 2   •  Multiple Vitamins-Minerals (MULTIVITAL-M) tablet Take  by mouth.     • Omega-3 Fatty Acids (fish oil) 1000 MG capsule capsule Take 1,000 mg by mouth Daily With Breakfast.     • valACYclovir (VALTREX) 1000 MG tablet Take 1 g by mouth Daily.     • vitamin C (ASCORBIC ACID) 250 MG tablet Take 1,000 mg by mouth Daily.     • vitamin D3 125 MCG (5000 UT) capsule capsule Take 5,000 Units by mouth Daily.       No Known Allergies    Social History     Socioeconomic History   • Marital status:    Tobacco Use   • Smoking status: Former     Packs/day: 2.00     Years: 2.00     Pack years: 4.00     Types: Cigarettes     Quit date: 07/2012     Years since quitting: 10.5   • Smokeless tobacco: Never   Substance and Sexual Activity   • Alcohol use: No   • Drug use: No   • Sexual activity: Yes     Partners: Male     Birth control/protection: I.U.D.     Comment:        There were no vitals filed for this visit.  Weight    There is no height or weight on file to calculate BMI.    Physical Exam  Constitutional:       General: She is not in acute distress.     Appearance: Normal appearance. She is not ill-appearing.   HENT:      Head: Normocephalic and atraumatic.      Nose: Nose normal.   Eyes:      General: No scleral icterus.     Extraocular Movements: Extraocular movements intact.      Conjunctiva/sclera: Conjunctivae normal.      Pupils: Pupils are equal, round, and reactive to light.   Pulmonary:      Effort: Pulmonary effort is normal. No respiratory distress.   Skin:     Coloration: Skin is not pale.   Neurological:      Mental Status: She is alert and oriented to person, place, and time.   Psychiatric:         Mood and Affect: Mood normal.         Behavior: Behavior normal.           Assessment:      ICD-10-CM ICD-9-CM   1. Gastroesophageal reflux disease with esophagitis, unspecified whether hemorrhage  K21.00 530.11       Plan:      The risks, benefits, and alternatives of laparoscopic recurrent hiatal hernia  repair with mesh were discussed, including but limited to recurrent hernia, GERD, dysphagia, esophageal injury, pneumothorax, injury to vagus nerves, injury to thoracic duct/aorta/vena cava, infection, bleeding bowel injury, pulmonary complications, VTE, heart attack, stroke, death.      Her risk factor for recurrent of HH was probably pregnancy.  She does not plan future pregnancy.  If her symptoms do not improve with recurrent HHR, she is aware that the next step could be gastric bypass.    Will have  contact her to schedule procedure.    BMI is >= 25 and <30. (Overweight) The following options were offered after discussion;: exercise counseling/recommendations and nutrition counseling/recommendations       This visit was conducted as a video visit, in an effort to limit spread of the novel coronavirus during the COVID-19 pandemic.  The patient gave consent.

## 2023-02-09 PROBLEM — K44.9 HIATAL HERNIA: Status: ACTIVE | Noted: 2023-02-09

## 2023-02-09 PROBLEM — K21.00 GASTROESOPHAGEAL REFLUX DISEASE WITH ESOPHAGITIS: Status: ACTIVE | Noted: 2023-02-09

## 2023-02-14 ENCOUNTER — OFFICE VISIT (OUTPATIENT)
Dept: BARIATRICS/WEIGHT MGMT | Facility: CLINIC | Age: 33
End: 2023-02-14
Payer: COMMERCIAL

## 2023-02-14 VITALS
HEIGHT: 65 IN | BODY MASS INDEX: 30.26 KG/M2 | HEART RATE: 102 BPM | WEIGHT: 181.6 LBS | OXYGEN SATURATION: 97 % | SYSTOLIC BLOOD PRESSURE: 102 MMHG | DIASTOLIC BLOOD PRESSURE: 60 MMHG

## 2023-02-14 DIAGNOSIS — R73.09 ABNORMAL GLUCOSE: ICD-10-CM

## 2023-02-14 DIAGNOSIS — E66.09 CLASS 1 OBESITY DUE TO EXCESS CALORIES WITH SERIOUS COMORBIDITY AND BODY MASS INDEX (BMI) OF 30.0 TO 30.9 IN ADULT: Primary | ICD-10-CM

## 2023-02-14 PROCEDURE — 99214 OFFICE O/P EST MOD 30 MIN: CPT | Performed by: NURSE PRACTITIONER

## 2023-02-14 RX ORDER — TOPIRAMATE 25 MG/1
TABLET ORAL
Qty: 60 TABLET | Refills: 2 | Status: SHIPPED | OUTPATIENT
Start: 2023-02-14 | End: 2023-03-20 | Stop reason: SDUPTHER

## 2023-02-14 RX ORDER — PHENTERMINE HYDROCHLORIDE 37.5 MG/1
TABLET ORAL
Qty: 30 TABLET | Refills: 0 | Status: SHIPPED | OUTPATIENT
Start: 2023-02-14 | End: 2023-03-20 | Stop reason: SDUPTHER

## 2023-02-14 NOTE — ASSESSMENT & PLAN NOTE
Worsening. Continue metformin. Low carb diet, regular physical activity, and weight reduction of 10-15% should help.

## 2023-02-14 NOTE — PROGRESS NOTES
[facility]  [address]  [place] [address]     Office Note      Date: 2023  Patient Name: Shelley Longoria  MRN: [id number]  : 1990  Subjective  Subjective     Chief Complaint  Obesity Management follow-up          Shelley Longoria presents to [facility] for obesity management. female s/p LSG/HHR 19 PQ, Presurgery weight:  218 pounds. stefania: 165lb, goal weight:  155 lbs.  Patient is unsatisfied with weight loss progress. Weight is going up and up. A1c has worsened, now at 6.3. She is no longer breastfeeding, she is ready to start a medication to help her lose weight and keep her appetite controlled. Appetite is poorly controlled. Reports no side effects of prescribed medications today. The patient is taking multivitamin and is taking fish oil. The patient is not using a food journal.    The patient is exercising, has a membership at the [employer], also does [employer] fit 20 minute workouts 2-3 times a week with a FITT score of:    Frequency Intensity Time Strength Training   []   0, none []   0 []   0 []   0   [x]   1 (1-2x/week) []   1 (light) []   1 (<10 min) [x]   1 (1x/week)   []   2 (3-5x/week) [x]   2 (moderate) []   2 (10-20 min) []   2 (2x/week)   []   3 (daily) []   3 (moderately hard)  []   4 (very hard) [x]   3 (20-30 min)  []   4 (>30 min) []   3 (3-4x/week)     Review of Systems   Constitutional: Negative for appetite change and fatigue.   Eyes: Negative for blurred vision, double vision and visual disturbance.   Cardiovascular: Negative for chest pain and palpitations.   Gastrointestinal: Negative for abdominal pain, constipation, diarrhea, nausea, vomiting and GERD.   Endocrine: Negative for polydipsia, polyphagia and polyuria.   Musculoskeletal: Negative for arthralgias, back pain and myalgias.   Neurological: Negative for dizziness, tremors, light-headedness, headache and memory problem.         "Parasthesias negative   Psychiatric/Behavioral: Negative for sleep disturbance, depressed mood and stress. The patient is not nervous/anxious.        Objective   Start weight: 177 pounds.    Total Loss lb/%Loss of beginning body weight (BBW): +4.6lb/+2.60%  Change in weight since last visit: +7.6    Body mass index is 30.22 kg/m².   Body composition analysis completed and showed:   %body fat: 37  Measurements (in inches)  Waist: 38.5    Vital Signs:   /60   Pulse 102   Ht 165.1 cm (65\")   Wt 82.4 kg (181 lb 9.6 oz)   SpO2 97%   BMI 30.22 kg/m²     Physical Exam   General appears stated age and normal appearance   HEENT PERRLA, EOM intact and conjunctivae normal   Chest/lungs Normal rate, Regular rhythm and Breathing is unlabored   Extremities without edema   Neuro Good historian and No focal deficit   Skin Warm, dry, intact   Psych normal behavior, normal thought content and normal concentration     Result Review :   The following data was reviewed by: VALENCIA Rosario on 02/14/2023:  Common labs    Common Labs 11/30/22 11/30/22    0945 0945   Glucose 122 (A)    BUN 17    Creatinine 0.66    Sodium 143    Potassium 4.5    Chloride 105    Calcium 9.6    Total Protein 7.2    Albumin 4.4    Total Bilirubin 0.4    Alkaline Phosphatase 101    AST (SGOT) 9    ALT (SGPT) 23    WBC  6.4   Hemoglobin  14.6   Hematocrit  41.8   Platelets  187   (A) Abnormal value                     Assessment / Plan        Diagnoses and all orders for this visit:    1. Class 1 obesity due to excess calories with serious comorbidity and body mass index (BMI) of 30.0 to 30.9 in adult (Primary)  Assessment & Plan:  Patient's (Body mass index is 30.22 kg/m².) indicates that they are overweight with health conditions that include dyslipidemias, GERD and pre-diabetes . Weight is worsening. BMI is is above average; BMI management plan is completed. We discussed low calorie, low carb based diet program, portion control, increasing " exercise, pharmacologic options including phentermine/topamax. metformin and an yaima-based approach such as [employer] Pal or Lose It.    I have instructed the patient to continue with pursuit of medical weight loss as a part of this program. Patient does meet criteria for use of anorectics at this time as BMI >30  and is not at treatment goal.     Continue nutritional focus and work towards new exercise FITT goal of: 2-2-4-2. Recommend 150 minutes a week of moderate intensity exercise with 2 resistance training sessions weekly.     The current plan for this month includes: continue to work on lifestyle behavioral changes, bring food journal to next visit. AOMs not on insurance formulary, patient requests generic options only. Start phentermine and topamax, discussed side effects. Will be starting oral contraceptive in the next couple of weeks. Getting hiatal hernia repair in 2 weeks.     After review of Ms. Shelley JohnsonAngleia lab work, EKG, urine drug screen, PMH, and medical risk factors, it has been decided that it is medically appropriate to use an anorectic medication for assistance of weight loss. It is felt that the risks of staying at current body fat percentage and potential adverse co-morbid conditions outweighs the risk of medication use. Medication risks and benefits were again reviewed with patient. she has signed the medication agreement form & has decided to try the use of an anorectic medication for the assistance of weight loss.          Orders:  -     topiramate (Topamax) 25 MG tablet; Take one daily at bedtime for a week then increase to 2 daily at bedtime  Dispense: 60 tablet; Refill: 2  -     phentermine (ADIPEX-P) 37.5 MG tablet; Take 1/2 tablet by mouth daily at 11am.  Dispense: 30 tablet; Refill: 0    2. Abnormal glucose  Assessment & Plan:  Worsening. Continue metformin. Low carb diet, regular physical activity, and weight reduction of 10-15% should help.            I spent 30 minutes  caring for Shelley on this date of service. This time includes time spent by me in the following activities:preparing for the visit, reviewing tests, obtaining and/or reviewing a separately obtained history, performing a medically appropriate examination and/or evaluation , counseling and educating the patient/family/caregiver, ordering medications, tests, or procedures and documenting information in the medical record  Follow Up   Return for Next scheduled follow up, Labs next visit.  Patient was given instructions and counseling regarding her condition or for health maintenance advice. Please see specific information pulled into the AVS if appropriate.     Courtney Lew, APRN  02/14/2023

## 2023-02-14 NOTE — ASSESSMENT & PLAN NOTE
Patient's (Body mass index is 30.22 kg/m².) indicates that they are overweight with health conditions that include dyslipidemias, GERD and pre-diabetes . Weight is worsening. BMI is is above average; BMI management plan is completed. We discussed low calorie, low carb based diet program, portion control, increasing exercise, pharmacologic options including phentermine/topamax. metformin and an yaima-based approach such as [employer] Pal or Lose It.    I have instructed the patient to continue with pursuit of medical weight loss as a part of this program. Patient does meet criteria for use of anorectics at this time as BMI >30  and is not at treatment goal.     Continue nutritional focus and work towards new exercise FITT goal of: 2-2-4-2. Recommend 150 minutes a week of moderate intensity exercise with 2 resistance training sessions weekly.     The current plan for this month includes: continue to work on lifestyle behavioral changes, bring food journal to next visit. AOMs not on insurance formulary, patient requests generic options only. Start phentermine and topamax, discussed side effects. Will be starting oral contraceptive in the next couple of weeks. Getting hiatal hernia repair in 2 weeks.     After review of Ms. Shelley Mills Von lab work, EKG, urine drug screen, PMH, and medical risk factors, it has been decided that it is medically appropriate to use an anorectic medication for assistance of weight loss. It is felt that the risks of staying at current body fat percentage and potential adverse co-morbid conditions outweighs the risk of medication use. Medication risks and benefits were again reviewed with patient. she has signed the medication agreement form & has decided to try the use of an anorectic medication for the assistance of weight loss.

## 2023-02-24 ENCOUNTER — PRE-ADMISSION TESTING (OUTPATIENT)
Dept: PREADMISSION TESTING | Facility: HOSPITAL | Age: 33
End: 2023-02-24
Payer: COMMERCIAL

## 2023-02-24 VITALS — BODY MASS INDEX: 29.86 KG/M2 | HEIGHT: 65 IN | WEIGHT: 179.23 LBS

## 2023-02-24 LAB
DEPRECATED RDW RBC AUTO: 41.3 FL (ref 37–54)
ERYTHROCYTE [DISTWIDTH] IN BLOOD BY AUTOMATED COUNT: 12.8 % (ref 12.3–15.4)
HBA1C MFR BLD: 6.2 % (ref 4.8–5.6)
HCT VFR BLD AUTO: 43.4 % (ref 34–46.6)
HGB BLD-MCNC: 15.2 G/DL (ref 12–15.9)
MCH RBC QN AUTO: 31 PG (ref 26.6–33)
MCHC RBC AUTO-ENTMCNC: 35 G/DL (ref 31.5–35.7)
MCV RBC AUTO: 88.6 FL (ref 79–97)
PLATELET # BLD AUTO: 236 10*3/MM3 (ref 140–450)
PMV BLD AUTO: 11.2 FL (ref 6–12)
POTASSIUM SERPL-SCNC: 3.8 MMOL/L (ref 3.5–5.2)
RBC # BLD AUTO: 4.9 10*6/MM3 (ref 3.77–5.28)
WBC NRBC COR # BLD: 8.11 10*3/MM3 (ref 3.4–10.8)

## 2023-02-24 PROCEDURE — 36415 COLL VENOUS BLD VENIPUNCTURE: CPT

## 2023-02-24 PROCEDURE — 84132 ASSAY OF SERUM POTASSIUM: CPT

## 2023-02-24 PROCEDURE — 83036 HEMOGLOBIN GLYCOSYLATED A1C: CPT

## 2023-02-24 PROCEDURE — 85027 COMPLETE CBC AUTOMATED: CPT

## 2023-02-24 RX ORDER — LACTIC ACID, L-, CITRIC ACID MONOHYDRATE, AND POTASSIUM BITARTRATE 90; 50; 20 MG/5G; MG/5G; MG/5G
GEL VAGINAL
COMMUNITY
End: 2023-03-20

## 2023-02-24 NOTE — PAT
An arrival time for procedure was not provided during PAT visit. If patient had any questions or concerns about their arrival time, they were instructed to contact their surgeon/physician.  Additionally, if the patient referred to an arrival time that was acquired from their my chart account, patient was encouraged to verify that time with their surgeon/physician. Arrival times are NOT provided in Pre Admission Testing Department.    Patient viewed general PAT education video as instructed in their preoperative information received from their surgeon.  Patient stated the general PAT education video was viewed in its entirety and survey completed.  Copies of PAT general education handouts (Incentive Spirometry, Meds to Beds Program, Patient Belongings, Pre-op skin preparation instructions, Blood Glucose testing, Visitor policy, Surgery FAQ, Code H) distributed to patient if not printed. Education related to the PAT pass and skin preparation for surgery (if applicable) completed in PAT as a reinforcement to PAT education video. Patient instructed to return PAT pass provided today as well as completed skin preparation sheet (if applicable) on the day of procedure.     Additionally if patient had not viewed video yet but intended to view it at home or in our waiting area, then referred them to the handout with QR code/link provided during PAT visit.  Instructed patient to complete survey after viewing the video in its entirety.  Encouraged patient/family to read PAT general education handouts thoroughly and notify PAT staff with any questions or concerns. Patient verbalized understanding of all information and priority content.    Patient denies any current skin issues.     Patient to apply Chlorhexadine wipes  to surgical area (as instructed) the night before procedure and the AM of procedure. Wipes provided.

## 2023-02-28 ENCOUNTER — HOSPITAL ENCOUNTER (OUTPATIENT)
Facility: HOSPITAL | Age: 33
Discharge: HOME | End: 2023-02-28
Attending: SURGERY | Admitting: SURGERY
Payer: COMMERCIAL

## 2023-02-28 ENCOUNTER — ANESTHESIA EVENT (OUTPATIENT)
Dept: PERIOP | Facility: HOSPITAL | Age: 33
End: 2023-02-28
Payer: COMMERCIAL

## 2023-02-28 ENCOUNTER — ANESTHESIA (OUTPATIENT)
Dept: PERIOP | Facility: HOSPITAL | Age: 33
End: 2023-02-28
Payer: COMMERCIAL

## 2023-02-28 VITALS
DIASTOLIC BLOOD PRESSURE: 79 MMHG | RESPIRATION RATE: 15 BRPM | TEMPERATURE: 97 F | WEIGHT: 179.23 LBS | HEART RATE: 97 BPM | BODY MASS INDEX: 29.86 KG/M2 | SYSTOLIC BLOOD PRESSURE: 127 MMHG | OXYGEN SATURATION: 99 % | HEIGHT: 65 IN

## 2023-02-28 DIAGNOSIS — K44.9 HIATAL HERNIA: ICD-10-CM

## 2023-02-28 DIAGNOSIS — K21.00 GASTROESOPHAGEAL REFLUX DISEASE WITH ESOPHAGITIS, UNSPECIFIED WHETHER HEMORRHAGE: ICD-10-CM

## 2023-02-28 LAB
B-HCG UR QL: NEGATIVE
EXPIRATION DATE: NORMAL
GLUCOSE BLDC GLUCOMTR-MCNC: 100 MG/DL (ref 70–130)
GLUCOSE BLDC GLUCOMTR-MCNC: 222 MG/DL (ref 70–130)
GLUCOSE BLDC GLUCOMTR-MCNC: 225 MG/DL (ref 70–130)
INTERNAL NEGATIVE CONTROL: NORMAL
INTERNAL POSITIVE CONTROL: NORMAL
Lab: NORMAL

## 2023-02-28 PROCEDURE — 43280 LAPAROSCOPY FUNDOPLASTY: CPT | Performed by: PHYSICIAN ASSISTANT

## 2023-02-28 PROCEDURE — 25010000002 ONDANSETRON PER 1 MG: Performed by: NURSE ANESTHETIST, CERTIFIED REGISTERED

## 2023-02-28 PROCEDURE — 25010000002 PROPOFOL 10 MG/ML EMULSION: Performed by: NURSE ANESTHETIST, CERTIFIED REGISTERED

## 2023-02-28 PROCEDURE — 25010000002 ENOXAPARIN PER 10 MG: Performed by: SURGERY

## 2023-02-28 PROCEDURE — S2900 ROBOTIC SURGICAL SYSTEM: HCPCS | Performed by: SURGERY

## 2023-02-28 PROCEDURE — 25010000002 FENTANYL CITRATE (PF) 100 MCG/2ML SOLUTION: Performed by: NURSE ANESTHETIST, CERTIFIED REGISTERED

## 2023-02-28 PROCEDURE — C1781 MESH (IMPLANTABLE): HCPCS | Performed by: SURGERY

## 2023-02-28 PROCEDURE — 63710000001 INSULIN LISPRO (HUMAN) PER 5 UNITS

## 2023-02-28 PROCEDURE — 25010000002 HYDROMORPHONE 1 MG/ML SOLUTION

## 2023-02-28 PROCEDURE — 25010000002 FENTANYL CITRATE (PF) 50 MCG/ML SOLUTION

## 2023-02-28 PROCEDURE — 25010000002 DEXAMETHASONE PER 1 MG: Performed by: NURSE ANESTHETIST, CERTIFIED REGISTERED

## 2023-02-28 PROCEDURE — 81025 URINE PREGNANCY TEST: CPT | Performed by: SURGERY

## 2023-02-28 PROCEDURE — 25010000002 CEFAZOLIN IN DEXTROSE 2-4 GM/100ML-% SOLUTION: Performed by: SURGERY

## 2023-02-28 PROCEDURE — 82962 GLUCOSE BLOOD TEST: CPT

## 2023-02-28 PROCEDURE — 43280 LAPAROSCOPY FUNDOPLASTY: CPT | Performed by: SURGERY

## 2023-02-28 DEVICE — ABSORBABLE WOUND CLOSURE DEVICE
Type: IMPLANTABLE DEVICE | Site: ABDOMEN | Status: FUNCTIONAL
Brand: SYNETURE

## 2023-02-28 DEVICE — DEV CONTRL TISS STRATAFIX SPIRAL PDS PLS CT1 2-0 45CM: Type: IMPLANTABLE DEVICE | Site: ABDOMEN | Status: FUNCTIONAL

## 2023-02-28 DEVICE — PHASIX ST MESH, RECTANGLE
Type: IMPLANTABLE DEVICE | Site: ABDOMEN | Status: FUNCTIONAL
Brand: PHASIX ST MESH

## 2023-02-28 RX ORDER — HYDROCODONE BITARTRATE AND ACETAMINOPHEN 5; 325 MG/1; MG/1
1 TABLET ORAL ONCE AS NEEDED
Status: DISCONTINUED | OUTPATIENT
Start: 2023-02-28 | End: 2023-02-28 | Stop reason: HOSPADM

## 2023-02-28 RX ORDER — MEPERIDINE HYDROCHLORIDE 25 MG/ML
12.5 INJECTION INTRAMUSCULAR; INTRAVENOUS; SUBCUTANEOUS
Status: DISCONTINUED | OUTPATIENT
Start: 2023-02-28 | End: 2023-02-28 | Stop reason: HOSPADM

## 2023-02-28 RX ORDER — PROMETHAZINE HYDROCHLORIDE 12.5 MG/1
12.5 TABLET ORAL ONCE AS NEEDED
Status: DISCONTINUED | OUTPATIENT
Start: 2023-02-28 | End: 2023-02-28 | Stop reason: HOSPADM

## 2023-02-28 RX ORDER — SODIUM CHLORIDE 0.9 % (FLUSH) 0.9 %
10 SYRINGE (ML) INJECTION AS NEEDED
Status: DISCONTINUED | OUTPATIENT
Start: 2023-02-28 | End: 2023-02-28 | Stop reason: HOSPADM

## 2023-02-28 RX ORDER — INSULIN LISPRO 100 [IU]/ML
5 INJECTION, SOLUTION INTRAVENOUS; SUBCUTANEOUS ONCE
Status: COMPLETED | OUTPATIENT
Start: 2023-02-28 | End: 2023-02-28

## 2023-02-28 RX ORDER — SODIUM CHLORIDE 9 MG/ML
40 INJECTION, SOLUTION INTRAVENOUS AS NEEDED
Status: DISCONTINUED | OUTPATIENT
Start: 2023-02-28 | End: 2023-02-28 | Stop reason: HOSPADM

## 2023-02-28 RX ORDER — SODIUM CHLORIDE 9 MG/ML
150 INJECTION, SOLUTION INTRAVENOUS CONTINUOUS
Status: DISCONTINUED | OUTPATIENT
Start: 2023-02-28 | End: 2023-02-28 | Stop reason: HOSPADM

## 2023-02-28 RX ORDER — PHENYLEPHRINE HCL IN 0.9% NACL 1 MG/10 ML
SYRINGE (ML) INTRAVENOUS AS NEEDED
Status: DISCONTINUED | OUTPATIENT
Start: 2023-02-28 | End: 2023-02-28 | Stop reason: SURG

## 2023-02-28 RX ORDER — ONDANSETRON 4 MG/1
4 TABLET, FILM COATED ORAL ONCE AS NEEDED
Status: DISCONTINUED | OUTPATIENT
Start: 2023-02-28 | End: 2023-02-28 | Stop reason: HOSPADM

## 2023-02-28 RX ORDER — DEXMEDETOMIDINE HYDROCHLORIDE 100 UG/ML
INJECTION, SOLUTION INTRAVENOUS AS NEEDED
Status: DISCONTINUED | OUTPATIENT
Start: 2023-02-28 | End: 2023-02-28 | Stop reason: SURG

## 2023-02-28 RX ORDER — SODIUM CHLORIDE, SODIUM LACTATE, POTASSIUM CHLORIDE, CALCIUM CHLORIDE 600; 310; 30; 20 MG/100ML; MG/100ML; MG/100ML; MG/100ML
9 INJECTION, SOLUTION INTRAVENOUS CONTINUOUS PRN
Status: DISCONTINUED | OUTPATIENT
Start: 2023-02-28 | End: 2023-02-28 | Stop reason: HOSPADM

## 2023-02-28 RX ORDER — ONDANSETRON 2 MG/ML
INJECTION INTRAMUSCULAR; INTRAVENOUS AS NEEDED
Status: DISCONTINUED | OUTPATIENT
Start: 2023-02-28 | End: 2023-02-28 | Stop reason: SURG

## 2023-02-28 RX ORDER — PROMETHAZINE HYDROCHLORIDE 25 MG/1
25 SUPPOSITORY RECTAL ONCE AS NEEDED
Status: DISCONTINUED | OUTPATIENT
Start: 2023-02-28 | End: 2023-02-28 | Stop reason: HOSPADM

## 2023-02-28 RX ORDER — MAGNESIUM HYDROXIDE 1200 MG/15ML
LIQUID ORAL AS NEEDED
Status: DISCONTINUED | OUTPATIENT
Start: 2023-02-28 | End: 2023-02-28 | Stop reason: HOSPADM

## 2023-02-28 RX ORDER — NALOXONE HCL 0.4 MG/ML
0.4 VIAL (ML) INJECTION AS NEEDED
Status: DISCONTINUED | OUTPATIENT
Start: 2023-02-28 | End: 2023-02-28 | Stop reason: HOSPADM

## 2023-02-28 RX ORDER — SCOLOPAMINE TRANSDERMAL SYSTEM 1 MG/1
1 PATCH, EXTENDED RELEASE TRANSDERMAL CONTINUOUS
Status: DISCONTINUED | OUTPATIENT
Start: 2023-02-28 | End: 2023-02-28 | Stop reason: HOSPADM

## 2023-02-28 RX ORDER — IPRATROPIUM BROMIDE AND ALBUTEROL SULFATE 2.5; .5 MG/3ML; MG/3ML
3 SOLUTION RESPIRATORY (INHALATION) ONCE AS NEEDED
Status: DISCONTINUED | OUTPATIENT
Start: 2023-02-28 | End: 2023-02-28 | Stop reason: HOSPADM

## 2023-02-28 RX ORDER — SODIUM CHLORIDE, SODIUM LACTATE, POTASSIUM CHLORIDE, CALCIUM CHLORIDE 600; 310; 30; 20 MG/100ML; MG/100ML; MG/100ML; MG/100ML
INJECTION, SOLUTION INTRAVENOUS CONTINUOUS PRN
Status: DISCONTINUED | OUTPATIENT
Start: 2023-02-28 | End: 2023-02-28 | Stop reason: SURG

## 2023-02-28 RX ORDER — SODIUM CHLORIDE 0.9 % (FLUSH) 0.9 %
10 SYRINGE (ML) INJECTION EVERY 12 HOURS SCHEDULED
Status: DISCONTINUED | OUTPATIENT
Start: 2023-02-28 | End: 2023-02-28 | Stop reason: HOSPADM

## 2023-02-28 RX ORDER — DEXMEDETOMIDINE HYDROCHLORIDE 4 UG/ML
INJECTION, SOLUTION INTRAVENOUS CONTINUOUS PRN
Status: DISCONTINUED | OUTPATIENT
Start: 2023-02-28 | End: 2023-02-28

## 2023-02-28 RX ORDER — ONDANSETRON 2 MG/ML
4 INJECTION INTRAMUSCULAR; INTRAVENOUS ONCE AS NEEDED
Status: DISCONTINUED | OUTPATIENT
Start: 2023-02-28 | End: 2023-02-28 | Stop reason: HOSPADM

## 2023-02-28 RX ORDER — DROPERIDOL 2.5 MG/ML
0.62 INJECTION, SOLUTION INTRAMUSCULAR; INTRAVENOUS ONCE AS NEEDED
Status: DISCONTINUED | OUTPATIENT
Start: 2023-02-28 | End: 2023-02-28 | Stop reason: HOSPADM

## 2023-02-28 RX ORDER — HYDRALAZINE HYDROCHLORIDE 20 MG/ML
5 INJECTION INTRAMUSCULAR; INTRAVENOUS
Status: DISCONTINUED | OUTPATIENT
Start: 2023-02-28 | End: 2023-02-28 | Stop reason: HOSPADM

## 2023-02-28 RX ORDER — PROMETHAZINE HYDROCHLORIDE 25 MG/1
25 TABLET ORAL ONCE AS NEEDED
Status: DISCONTINUED | OUTPATIENT
Start: 2023-02-28 | End: 2023-02-28 | Stop reason: HOSPADM

## 2023-02-28 RX ORDER — LIDOCAINE HYDROCHLORIDE 10 MG/ML
0.5 INJECTION, SOLUTION EPIDURAL; INFILTRATION; INTRACAUDAL; PERINEURAL ONCE AS NEEDED
Status: COMPLETED | OUTPATIENT
Start: 2023-02-28 | End: 2023-02-28

## 2023-02-28 RX ORDER — BUPIVACAINE HYDROCHLORIDE AND EPINEPHRINE 5; 5 MG/ML; UG/ML
INJECTION, SOLUTION EPIDURAL; INTRACAUDAL; PERINEURAL AS NEEDED
Status: DISCONTINUED | OUTPATIENT
Start: 2023-02-28 | End: 2023-02-28 | Stop reason: HOSPADM

## 2023-02-28 RX ORDER — LABETALOL HYDROCHLORIDE 5 MG/ML
5 INJECTION, SOLUTION INTRAVENOUS
Status: DISCONTINUED | OUTPATIENT
Start: 2023-02-28 | End: 2023-02-28 | Stop reason: HOSPADM

## 2023-02-28 RX ORDER — OXYCODONE HYDROCHLORIDE 5 MG/1
5 TABLET ORAL EVERY 6 HOURS PRN
Qty: 10 TABLET | Refills: 0 | Status: SHIPPED | OUTPATIENT
Start: 2023-02-28 | End: 2023-03-20

## 2023-02-28 RX ORDER — ROCURONIUM BROMIDE 10 MG/ML
INJECTION, SOLUTION INTRAVENOUS AS NEEDED
Status: DISCONTINUED | OUTPATIENT
Start: 2023-02-28 | End: 2023-02-28 | Stop reason: SURG

## 2023-02-28 RX ORDER — FENTANYL CITRATE 50 UG/ML
50 INJECTION, SOLUTION INTRAMUSCULAR; INTRAVENOUS
Status: DISCONTINUED | OUTPATIENT
Start: 2023-02-28 | End: 2023-02-28 | Stop reason: HOSPADM

## 2023-02-28 RX ORDER — SODIUM CHLORIDE 0.9 % (FLUSH) 0.9 %
3-10 SYRINGE (ML) INJECTION AS NEEDED
Status: DISCONTINUED | OUTPATIENT
Start: 2023-02-28 | End: 2023-02-28 | Stop reason: HOSPADM

## 2023-02-28 RX ORDER — TRANEXAMIC ACID 10 MG/ML
1000 INJECTION, SOLUTION INTRAVENOUS ONCE
Status: COMPLETED | OUTPATIENT
Start: 2023-02-28 | End: 2023-02-28

## 2023-02-28 RX ORDER — INSULIN LISPRO 100 [IU]/ML
INJECTION, SOLUTION INTRAVENOUS; SUBCUTANEOUS
Status: COMPLETED
Start: 2023-02-28 | End: 2023-02-28

## 2023-02-28 RX ORDER — FENTANYL CITRATE 50 UG/ML
INJECTION, SOLUTION INTRAMUSCULAR; INTRAVENOUS
Status: COMPLETED
Start: 2023-02-28 | End: 2023-02-28

## 2023-02-28 RX ORDER — FAMOTIDINE 20 MG/1
20 TABLET, FILM COATED ORAL
Status: COMPLETED | OUTPATIENT
Start: 2023-02-28 | End: 2023-02-28

## 2023-02-28 RX ORDER — DROPERIDOL 2.5 MG/ML
0.62 INJECTION, SOLUTION INTRAMUSCULAR; INTRAVENOUS
Status: DISCONTINUED | OUTPATIENT
Start: 2023-02-28 | End: 2023-02-28 | Stop reason: HOSPADM

## 2023-02-28 RX ORDER — FENTANYL CITRATE 50 UG/ML
INJECTION, SOLUTION INTRAMUSCULAR; INTRAVENOUS AS NEEDED
Status: DISCONTINUED | OUTPATIENT
Start: 2023-02-28 | End: 2023-02-28 | Stop reason: SURG

## 2023-02-28 RX ORDER — CEFAZOLIN SODIUM 2 G/100ML
2 INJECTION, SOLUTION INTRAVENOUS ONCE
Status: COMPLETED | OUTPATIENT
Start: 2023-02-28 | End: 2023-02-28

## 2023-02-28 RX ORDER — DEXAMETHASONE SODIUM PHOSPHATE 4 MG/ML
INJECTION, SOLUTION INTRA-ARTICULAR; INTRALESIONAL; INTRAMUSCULAR; INTRAVENOUS; SOFT TISSUE AS NEEDED
Status: DISCONTINUED | OUTPATIENT
Start: 2023-02-28 | End: 2023-02-28 | Stop reason: SURG

## 2023-02-28 RX ORDER — PROPOFOL 10 MG/ML
VIAL (ML) INTRAVENOUS AS NEEDED
Status: DISCONTINUED | OUTPATIENT
Start: 2023-02-28 | End: 2023-02-28 | Stop reason: SURG

## 2023-02-28 RX ORDER — MIDAZOLAM HYDROCHLORIDE 1 MG/ML
1 INJECTION INTRAMUSCULAR; INTRAVENOUS
Status: DISCONTINUED | OUTPATIENT
Start: 2023-02-28 | End: 2023-02-28 | Stop reason: HOSPADM

## 2023-02-28 RX ORDER — SODIUM CHLORIDE 0.9 % (FLUSH) 0.9 %
3 SYRINGE (ML) INJECTION EVERY 12 HOURS SCHEDULED
Status: DISCONTINUED | OUTPATIENT
Start: 2023-02-28 | End: 2023-02-28 | Stop reason: HOSPADM

## 2023-02-28 RX ORDER — ENOXAPARIN SODIUM 100 MG/ML
40 INJECTION SUBCUTANEOUS ONCE
Status: COMPLETED | OUTPATIENT
Start: 2023-02-28 | End: 2023-02-28

## 2023-02-28 RX ORDER — LIDOCAINE HYDROCHLORIDE 10 MG/ML
INJECTION, SOLUTION EPIDURAL; INFILTRATION; INTRACAUDAL; PERINEURAL AS NEEDED
Status: DISCONTINUED | OUTPATIENT
Start: 2023-02-28 | End: 2023-02-28 | Stop reason: SURG

## 2023-02-28 RX ADMIN — ROCURONIUM BROMIDE 20 MG: 10 INJECTION INTRAVENOUS at 08:26

## 2023-02-28 RX ADMIN — DEXMEDETOMIDINE HYDROCHLORIDE 12 MCG: 100 INJECTION, SOLUTION INTRAVENOUS at 08:54

## 2023-02-28 RX ADMIN — HYDROMORPHONE HYDROCHLORIDE 0.5 MG: 1 INJECTION, SOLUTION INTRAMUSCULAR; INTRAVENOUS; SUBCUTANEOUS at 10:18

## 2023-02-28 RX ADMIN — SODIUM CHLORIDE, POTASSIUM CHLORIDE, SODIUM LACTATE AND CALCIUM CHLORIDE: 600; 310; 30; 20 INJECTION, SOLUTION INTRAVENOUS at 08:50

## 2023-02-28 RX ADMIN — DEXMEDETOMIDINE HYDROCHLORIDE 8 MCG: 100 INJECTION, SOLUTION INTRAVENOUS at 07:59

## 2023-02-28 RX ADMIN — DEXMEDETOMIDINE HYDROCHLORIDE 8 MCG: 100 INJECTION, SOLUTION INTRAVENOUS at 07:33

## 2023-02-28 RX ADMIN — CEFAZOLIN SODIUM 2 G: 2 INJECTION, SOLUTION INTRAVENOUS at 07:42

## 2023-02-28 RX ADMIN — SODIUM CHLORIDE, POTASSIUM CHLORIDE, SODIUM LACTATE AND CALCIUM CHLORIDE: 600; 310; 30; 20 INJECTION, SOLUTION INTRAVENOUS at 07:32

## 2023-02-28 RX ADMIN — DEXAMETHASONE SODIUM PHOSPHATE 8 MG: 4 INJECTION, SOLUTION INTRAMUSCULAR; INTRAVENOUS at 07:44

## 2023-02-28 RX ADMIN — FENTANYL CITRATE 50 MCG: 50 INJECTION, SOLUTION INTRAMUSCULAR; INTRAVENOUS at 09:37

## 2023-02-28 RX ADMIN — PROPOFOL 200 MG: 10 INJECTION, EMULSION INTRAVENOUS at 07:36

## 2023-02-28 RX ADMIN — LIDOCAINE HYDROCHLORIDE 50 MG: 10 INJECTION, SOLUTION EPIDURAL; INFILTRATION; INTRACAUDAL; PERINEURAL at 07:36

## 2023-02-28 RX ADMIN — Medication 0.5 MG: at 10:00

## 2023-02-28 RX ADMIN — TRANEXAMIC ACID 1000 MG: 10 INJECTION, SOLUTION INTRAVENOUS at 09:14

## 2023-02-28 RX ADMIN — Medication 0.5 MG: at 10:18

## 2023-02-28 RX ADMIN — SUGAMMADEX 200 MG: 100 INJECTION, SOLUTION INTRAVENOUS at 09:14

## 2023-02-28 RX ADMIN — Medication 100 MCG: at 07:48

## 2023-02-28 RX ADMIN — DEXMEDETOMIDINE HYDROCHLORIDE 4 MCG: 100 INJECTION, SOLUTION INTRAVENOUS at 09:27

## 2023-02-28 RX ADMIN — INSULIN LISPRO 5 UNITS: 100 INJECTION, SOLUTION INTRAVENOUS; SUBCUTANEOUS at 09:53

## 2023-02-28 RX ADMIN — ROCURONIUM BROMIDE 10 MG: 10 INJECTION INTRAVENOUS at 08:08

## 2023-02-28 RX ADMIN — FAMOTIDINE 20 MG: 20 TABLET ORAL at 07:18

## 2023-02-28 RX ADMIN — ROCURONIUM BROMIDE 40 MG: 10 INJECTION INTRAVENOUS at 07:36

## 2023-02-28 RX ADMIN — PROPOFOL 25 MG: 10 INJECTION, EMULSION INTRAVENOUS at 09:16

## 2023-02-28 RX ADMIN — ONDANSETRON 4 MG: 2 INJECTION INTRAMUSCULAR; INTRAVENOUS at 09:08

## 2023-02-28 RX ADMIN — SODIUM CHLORIDE 500 ML: 9 INJECTION, SOLUTION INTRAVENOUS at 06:55

## 2023-02-28 RX ADMIN — PROPOFOL 25 MCG/KG/MIN: 10 INJECTION, EMULSION INTRAVENOUS at 07:38

## 2023-02-28 RX ADMIN — LIDOCAINE HYDROCHLORIDE 0.5 ML: 10 INJECTION, SOLUTION EPIDURAL; INFILTRATION; INTRACAUDAL; PERINEURAL at 06:55

## 2023-02-28 RX ADMIN — ENOXAPARIN SODIUM 40 MG: 40 INJECTION SUBCUTANEOUS at 07:18

## 2023-02-28 RX ADMIN — HYDROMORPHONE HYDROCHLORIDE 0.5 MG: 1 INJECTION, SOLUTION INTRAMUSCULAR; INTRAVENOUS; SUBCUTANEOUS at 10:00

## 2023-02-28 RX ADMIN — FENTANYL CITRATE 50 MCG: 50 INJECTION, SOLUTION INTRAMUSCULAR; INTRAVENOUS at 07:36

## 2023-02-28 RX ADMIN — FENTANYL CITRATE 50 MCG: 50 INJECTION, SOLUTION INTRAMUSCULAR; INTRAVENOUS at 07:59

## 2023-02-28 RX ADMIN — SCOPALAMINE 1 PATCH: 1 PATCH, EXTENDED RELEASE TRANSDERMAL at 07:18

## 2023-02-28 NOTE — ANESTHESIA POSTPROCEDURE EVALUATION
Patient: Shelley Mills RobleroGarcia    Procedure Summary     Date: 02/28/23 Room / Location:  [facility] / [facility]    Anesthesia Start: 0732 Anesthesia Stop: 0933    Procedure: LAPAROSCOPIC HIATAL HERNIA REPAIR WITH MESH WITH DAVINCI ROBOT (Abdomen) Diagnosis:       Gastroesophageal reflux disease with esophagitis, unspecified whether hemorrhage      Hiatal hernia      (Gastroesophageal reflux disease with esophagitis, unspecified whether hemorrhage [K21.00])      (Hiatal hernia [K44.9])    Surgeons: Mila Whatley MD Provider: Kwame Collins MD    Anesthesia Type: general ASA Status: 2          Anesthesia Type: general    Vitals  Vitals Value Taken Time   BP     Temp     Pulse 103 02/28/23 0932   Resp     SpO2 99 % 02/28/23 0932   Vitals shown include unvalidated device data.      bp 126/75    Post Anesthesia Care and Evaluation    Patient location during evaluation: PACU  Patient participation: complete - patient participated  Level of consciousness: awake and alert  Pain management: adequate    Airway patency: patent  Anesthetic complications: No anesthetic complications  PONV Status: none  Cardiovascular status: hemodynamically stable and acceptable  Respiratory status: nonlabored ventilation, acceptable and nasal cannula  Hydration status: acceptable

## 2023-02-28 NOTE — ANESTHESIA PREPROCEDURE EVALUATION
Anesthesia Evaluation     Patient summary reviewed and Nursing notes reviewed   no history of anesthetic complications:  NPO Solid Status: > 8 hours  NPO Liquid Status: > 2 hours           Airway   Mallampati: I  TM distance: >3 FB  Neck ROM: full  No difficulty expected  Dental - normal exam     Pulmonary     breath sounds clear to auscultation  (+) a smoker Former Abstained day of surgery, shortness of breath,   Cardiovascular   Exercise tolerance: excellent (>7 METS)    Rhythm: regular  Rate: normal    (+) hyperlipidemia,       Neuro/Psych  (+) psychiatric history Anxiety,    GI/Hepatic/Renal/Endo    (+) morbid obesity, hiatal hernia, GERD poorly controlled,  liver disease fatty liver disease, diabetes mellitus type 2 well controlled,   (-)  obesity    Musculoskeletal     Abdominal   (+) obese,     Abdomen: soft.   Substance History      OB/GYN          Other   arthritis,                      Anesthesia Plan    ASA 2     general     intravenous induction     Anesthetic plan, risks, benefits, and alternatives have been provided, discussed and informed consent has been obtained with: patient.    Plan discussed with CRNA.        CODE STATUS:

## 2023-03-09 ENCOUNTER — TELEPHONE (OUTPATIENT)
Dept: BARIATRICS/WEIGHT MGMT | Facility: CLINIC | Age: 33
End: 2023-03-09
Payer: COMMERCIAL

## 2023-03-09 NOTE — TELEPHONE ENCOUNTER
"Pt called, left  stating that for the past few days, is POD #10 from [facility] she has not been able to keep down food and water. She stated that she gets really nauseous, feels extremely full, and vomits white foam. She also stated that she felt like she was \"drowning\" on her own saliva, she has intermitted pain under the breast after trying to swallow food and liquids.   I did transfer to  to schedule OV/VV ASAP. Thanks!   "

## 2023-03-10 ENCOUNTER — INFUSION (OUTPATIENT)
Dept: ONCOLOGY | Facility: HOSPITAL | Age: 33
End: 2023-03-10
Payer: COMMERCIAL

## 2023-03-10 ENCOUNTER — OFFICE VISIT (OUTPATIENT)
Dept: BARIATRICS/WEIGHT MGMT | Facility: CLINIC | Age: 33
End: 2023-03-10
Payer: COMMERCIAL

## 2023-03-10 ENCOUNTER — PREP FOR SURGERY (OUTPATIENT)
Dept: OTHER | Facility: HOSPITAL | Age: 33
End: 2023-03-10
Payer: COMMERCIAL

## 2023-03-10 VITALS
HEIGHT: 65 IN | WEIGHT: 167.5 LBS | OXYGEN SATURATION: 98 % | SYSTOLIC BLOOD PRESSURE: 122 MMHG | DIASTOLIC BLOOD PRESSURE: 78 MMHG | HEART RATE: 133 BPM | RESPIRATION RATE: 16 BRPM | TEMPERATURE: 97.5 F | BODY MASS INDEX: 27.91 KG/M2

## 2023-03-10 VITALS
HEART RATE: 105 BPM | SYSTOLIC BLOOD PRESSURE: 123 MMHG | TEMPERATURE: 97.8 F | DIASTOLIC BLOOD PRESSURE: 69 MMHG | RESPIRATION RATE: 16 BRPM

## 2023-03-10 DIAGNOSIS — R11.2 NAUSEA AND VOMITING, UNSPECIFIED VOMITING TYPE: ICD-10-CM

## 2023-03-10 DIAGNOSIS — R13.10 DYSPHAGIA, UNSPECIFIED TYPE: Primary | ICD-10-CM

## 2023-03-10 DIAGNOSIS — R11.10 VOMITING: ICD-10-CM

## 2023-03-10 DIAGNOSIS — E86.0 DEHYDRATION: Primary | ICD-10-CM

## 2023-03-10 DIAGNOSIS — R13.10 DYSPHAGIA: ICD-10-CM

## 2023-03-10 PROCEDURE — 96361 HYDRATE IV INFUSION ADD-ON: CPT

## 2023-03-10 PROCEDURE — 1160F RVW MEDS BY RX/DR IN RCRD: CPT | Performed by: PHYSICIAN ASSISTANT

## 2023-03-10 PROCEDURE — 1159F MED LIST DOCD IN RCRD: CPT | Performed by: PHYSICIAN ASSISTANT

## 2023-03-10 PROCEDURE — 99024 POSTOP FOLLOW-UP VISIT: CPT | Performed by: PHYSICIAN ASSISTANT

## 2023-03-10 PROCEDURE — 96360 HYDRATION IV INFUSION INIT: CPT

## 2023-03-10 RX ORDER — SODIUM CHLORIDE 0.9 % (FLUSH) 0.9 %
3-10 SYRINGE (ML) INJECTION AS NEEDED
OUTPATIENT
Start: 2023-03-10

## 2023-03-10 RX ORDER — SODIUM CHLORIDE 9 MG/ML
150 INJECTION, SOLUTION INTRAVENOUS CONTINUOUS
OUTPATIENT
Start: 2023-03-10

## 2023-03-10 RX ORDER — SODIUM CHLORIDE 9 MG/ML
1000 INJECTION, SOLUTION INTRAVENOUS ONCE
Status: CANCELLED | OUTPATIENT
Start: 2023-03-10

## 2023-03-10 RX ORDER — SODIUM CHLORIDE 0.9 % (FLUSH) 0.9 %
3 SYRINGE (ML) INJECTION EVERY 12 HOURS SCHEDULED
OUTPATIENT
Start: 2023-03-10

## 2023-03-10 RX ORDER — SODIUM CHLORIDE 9 MG/ML
2000 INJECTION, SOLUTION INTRAVENOUS ONCE
Status: COMPLETED | OUTPATIENT
Start: 2023-03-10 | End: 2023-03-10

## 2023-03-10 RX ORDER — SODIUM CHLORIDE 9 MG/ML
40 INJECTION, SOLUTION INTRAVENOUS AS NEEDED
OUTPATIENT
Start: 2023-03-10

## 2023-03-10 RX ADMIN — SODIUM CHLORIDE 2000 ML: 9 INJECTION, SOLUTION INTRAVENOUS at 13:36

## 2023-03-10 NOTE — PROGRESS NOTES
"[facility]  [address]  [address]  [facility] [id number] [phone]      Patient Name:  Shelley Longoria  :  1990      Date of Visit: 03/10/2023      Reason for Visit:  POD #10    HPI:  Shelley Longoria is a 33 y.o. female s/p LSG/HHR 19 w/ Dr. Whatley (w/ postop hemorrhage, managed conservatively) and now s/p robotic lap recurrent HHR w/ Phasix ST mesh 23 PNQ     Discharged on POD#0. Presents today for early follow-up. Called office yesterday with complaints of dysphagia, nausea, and vomiting white film.  Arranged for an office visit today.  She notes she is having issues swallowing any liquid or semisolid food.  Has been unable to advance her diet.  It has taken her 4 hours to drink 3 ounces of her protein shake.  She is quite often vomiting \"white foam\".  She is only able to drink about 12 ounces of water a day.  She denies dizziness or weakness, but does note her urine is very dark.  She denies any abdominal pain, shortness of breath, cough, fevers.        Past Medical History:   Diagnosis Date   • Anxiety    • Chronic joint pain     denies NSAIDS/steroids   • COVID-19     2022   • DM2 (diabetes mellitus, type 2) ([facility])     w/ hx gestational diabetes , dx May 2017, started on insulin when initially dx, lost weight w/ Adipex, stopped insulin 6 months after dx, but now w/ weight regain, back on insulin.  Managed by PCP   • Dyspepsia    • Dyspnea on exertion    • Fatigue    • Fatty liver     w/ abnormal LFTs, US 2018   • GERD (gastroesophageal reflux disease) 2023   • Gestational diabetes    • H. pylori infection     UBT (+) 19 - PrevPak RX; repeat HPSA (+) 2019 - Pylera RX; repeat UBT (holding PPI) (+) - referred to GI, RX Levaquin/Flagyl/Pepto RX  - HPSA negative 2023   • Hiatal hernia    • History of transfusion 2019    [facility]- PT UNSURE HOW MANY UNITS RECEIVED, NO REACTIONS   • Mixed hyperlipidemia " 10/15/2019   • Obesity (BMI 30-39.9)    • Pap smear for cervical cancer screening 2017   • Wears eyeglasses      Past Surgical History:   Procedure Laterality Date   • CERVICAL CONE BIOPSY  2004    benign   •  SECTION  /   • ENDOSCOPY N/A 2019    Procedure: ESOPHAGOGASTRODUODENOSCOPY;  Surgeon: Mila Whatley MD;  Location:  [facility];  Service: Bariatric   • GASTRIC SLEEVE LAPAROSCOPIC N/A 2019    Procedure: GASTRIC SLEEVE LAPAROSCOPIC;  Surgeon: Mila Whatley MD;  Location:  [facility];  Service: Bariatric   • HIATAL HERNIA REPAIR N/A 2019    Procedure: HIATAL HERNIA REPAIR LAPAROSCOPIC;  Surgeon: Mila Whatley MD;  Location:  [facility];  Service: Bariatric   • HIATAL HERNIA REPAIR N/A 2023    Procedure: LAPAROSCOPIC HIATAL HERNIA REPAIR WITH MESH WITH DAVINCI ROBOT;  Surgeon: Mila Whatley MD;  Location:  [facility];  Service: Robotics - DaVinci;  Laterality: N/A;   • UPPER GASTROINTESTINAL ENDOSCOPY  2019    Dr VIVIENNE Whatley   • UPPER GASTROINTESTINAL ENDOSCOPY  2022    Dr Aldrich, positive H pylori     No outpatient medications have been marked as taking for the 3/10/23 encounter (Office Visit) with Isabella Still PA.     No Known Allergies    Social History     Socioeconomic History   • Marital status:    Tobacco Use   • Smoking status: Former     Packs/day: 2.00     Years: 2.00     Pack years: 4.00     Types: Cigarettes     Quit date: 2012     Years since quitting: 10.6   • Smokeless tobacco: Never   Vaping Use   • Vaping Use: Never used   Substance and Sexual Activity   • Alcohol use: No     Comment: 1-2 TIMES PER MONTH, WINE   • Drug use: No   • Sexual activity: Defer     Social History     Social History Narrative    Living in [place], [place] w/  and children.   @ [employer] in [facility] [employer].         /78 (BP Location: Left arm, Patient Position: Sitting)   Pulse (!) 133   " Temp 97.5 °F (36.4 °C)   Resp 16   Ht 165.1 cm (65\")   Wt 76 kg (167 lb 8 oz)   SpO2 98%   BMI 27.87 kg/m²   Physical Exam  Constitutional:       Appearance: She is obese.   HENT:      Head: Normocephalic and atraumatic.   Cardiovascular:      Rate and Rhythm: Regular rhythm. Tachycardia present.   Pulmonary:      Effort: Pulmonary effort is normal.      Breath sounds: Normal breath sounds.   Abdominal:      General: Bowel sounds are normal. There is no distension.      Palpations: Abdomen is soft. There is no mass.      Tenderness: There is no abdominal tenderness.      Comments: Lap incisions healing well.  No erythema, induration, fluctuance, drainage   Skin:     General: Skin is warm and dry.   Neurological:      General: No focal deficit present.      Mental Status: She is alert and oriented to person, place, and time.   Psychiatric:         Mood and Affect: Mood normal.         Behavior: Behavior normal.           Assessment:   POD #10    BMI is >= 25 and <30. (Overweight) The following options were offered after discussion;: exercise counseling/recommendations and nutrition counseling/recommendations      Plan: Will arrange for IV fluids today. Continue with liquid diet.  Plan for EGD on Monday with Dr. Whatley. Call with issues/concerns.    The risks and benefits of the upper endoscopy were discussed with the patient in detail and all questions were answered.  Possibility of perforation, bleeding, aspiration, and anesthesia reaction were reviewed.  Patient agrees to proceed.    "

## 2023-03-20 ENCOUNTER — OFFICE VISIT (OUTPATIENT)
Dept: BARIATRICS/WEIGHT MGMT | Facility: CLINIC | Age: 33
End: 2023-03-20
Payer: COMMERCIAL

## 2023-03-20 VITALS
SYSTOLIC BLOOD PRESSURE: 124 MMHG | HEIGHT: 65 IN | DIASTOLIC BLOOD PRESSURE: 60 MMHG | OXYGEN SATURATION: 98 % | HEART RATE: 104 BPM | WEIGHT: 168.8 LBS | BODY MASS INDEX: 28.12 KG/M2

## 2023-03-20 DIAGNOSIS — R61 HYPERHIDROSIS: ICD-10-CM

## 2023-03-20 DIAGNOSIS — E66.3 OVERWEIGHT (BMI 25.0-29.9): Primary | ICD-10-CM

## 2023-03-20 DIAGNOSIS — K21.00 GASTROESOPHAGEAL REFLUX DISEASE WITH ESOPHAGITIS, UNSPECIFIED WHETHER HEMORRHAGE: ICD-10-CM

## 2023-03-20 PROCEDURE — 99214 OFFICE O/P EST MOD 30 MIN: CPT | Performed by: NURSE PRACTITIONER

## 2023-03-20 PROCEDURE — 1160F RVW MEDS BY RX/DR IN RCRD: CPT | Performed by: NURSE PRACTITIONER

## 2023-03-20 PROCEDURE — 1159F MED LIST DOCD IN RCRD: CPT | Performed by: NURSE PRACTITIONER

## 2023-03-20 RX ORDER — TOPIRAMATE 25 MG/1
TABLET ORAL
Qty: 60 TABLET | Refills: 2 | Status: SHIPPED | OUTPATIENT
Start: 2023-03-20

## 2023-03-20 RX ORDER — PHENTERMINE HYDROCHLORIDE 37.5 MG/1
TABLET ORAL
Qty: 15 TABLET | Refills: 0 | Status: SHIPPED | OUTPATIENT
Start: 2023-03-20

## 2023-03-20 NOTE — ASSESSMENT & PLAN NOTE
Worse with weight gain over the last few months. Previously treated with betamethasone. Will refill today, use PRN.

## 2023-03-20 NOTE — ASSESSMENT & PLAN NOTE
Patient's (Body mass index is 28.09 kg/m².) indicates that they are overweight with health conditions that include dyslipidemias, GERD and fatty liver, pre-diabetes, joint pain . Weight is improving with treatment. BMI is is above average; BMI management plan is completed. We discussed low calorie, low carb based diet program, portion control, increasing exercise, joining a fitness center or start home based exercise program, pharmacologic options including phentermine/topiramate and an yaima-based approach such as Acision Pal or Lose It.    I have instructed the patient to continue with pursuit of medical weight loss as a part of this program. Patient does meet criteria for use of anorectics at this time as BMI > 27 with coexisting, hypercholesterolemia, impaired fasting glucose and is not at treatment goal.     Continue nutritional focus and work towards new exercise FITT goal of: 2-2-4-2 as tolerated. Still limited to no lifting >10lb since hernia repair.   The current plan for this month includes: continue to work on lifestyle behavioral changes and continue nutrition focus. Goal 6-8lb.

## 2023-03-20 NOTE — PROGRESS NOTES
[facility]  [address]  [place] [address]     Office Note      Date: 2023  Patient Name: Shelley Longoria  MRN: [id number]  : 1990  Subjective  Subjective     Chief Complaint  Obesity Management follow-up          Shelley Longoria presents to [facility] for obesity management. female s/p LSG/HHR 19 PQ, Presurgery weight:  218 pounds. stefania: 165lb, goal weight:  155 lbs. Had HHR with Dr. Whatley since last visit. Had tremendous gas pain. Couldn't take medications for several days due to pain with swallowing. Patient is satisfied with weight loss progress. Appetite is moderately controlled. Reports no side effects of prescribed medications today. Did not start contraceptive, was worried it would interact with the topamax. The patient is taking multivitamin and is taking fish oil.  The patient is not using a food journal.    The patient is exercising, just walking for now since her hernia repair. Was going to the [facility] [employer] prior to that. She has a hyperhidrosis rash on her finger that she has used betamethasone ointment for in the past, requests a refill of that today.    Review of Systems   Constitutional: Negative for appetite change and fatigue.   Eyes: Negative for blurred vision, double vision and visual disturbance.   Cardiovascular: Negative for chest pain and palpitations.   Gastrointestinal: Negative for abdominal pain, constipation, diarrhea, nausea, vomiting and GERD.   Endocrine: Negative for polydipsia, polyphagia and polyuria.   Musculoskeletal: Negative for arthralgias, back pain and myalgias.   Neurological: Negative for dizziness, tremors, light-headedness, headache and memory problem.        Parasthesias negative   Psychiatric/Behavioral: Negative for sleep disturbance, depressed mood and stress. The patient is not nervous/anxious.        Objective   Start weight: 177 pounds.    Total Loss  "lb/%Loss of beginning body weight (BBW): -8.2lb/-4.63%  Change in weight since last visit: -13.1    Body mass index is 28.09 kg/m².   Body composition analysis completed and showed:   %body fat: 37.2  Measurements (in inches)  Waist: 35.5    Vital Signs:   /60   Pulse 104   Ht 165.1 cm (65\")   Wt 76.6 kg (168 lb 12.8 oz)   SpO2 98%   BMI 28.09 kg/m²     Physical Exam   General appears stated age and normal appearance   HEENT PERRLA, EOM intact and conjunctivae normal   Chest/lungs Normal rate, Regular rhythm and Breathing is unlabored   Extremities without edema   Neuro Good historian and No focal deficit   Skin Warm, dry, intact   Psych normal behavior, normal thought content and normal concentration     Result Review :                Assessment / Plan        Diagnoses and all orders for this visit:    1. Overweight (BMI 25.0-29.9) (Primary)  Assessment & Plan:  Patient's (Body mass index is 28.09 kg/m².) indicates that they are overweight with health conditions that include dyslipidemias, GERD and fatty liver, pre-diabetes, joint pain . Weight is improving with treatment. BMI is is above average; BMI management plan is completed. We discussed low calorie, low carb based diet program, portion control, increasing exercise, joining a fitness center or start home based exercise program, pharmacologic options including phentermine/topiramate and an yaima-based approach such as Infinite.ly Pal or Lose It.    I have instructed the patient to continue with pursuit of medical weight loss as a part of this program. Patient does meet criteria for use of anorectics at this time as BMI > 27 with coexisting, hypercholesterolemia, impaired fasting glucose and is not at treatment goal.     Continue nutritional focus and work towards new exercise FITT goal of: 2-2-4-2 as tolerated. Still limited to no lifting >10lb since hernia repair.   The current plan for this month includes: continue to work on lifestyle behavioral " changes and continue nutrition focus. Goal 6-8lb.        Orders:  -     phentermine (ADIPEX-P) 37.5 MG tablet; Take 1/2 tablet by mouth daily at 11am.  Dispense: 15 tablet; Refill: 0  -     topiramate (Topamax) 25 MG tablet; Take two tablets by mouth daily at bedtime.  Dispense: 60 tablet; Refill: 2    2. Gastroesophageal reflux disease with esophagitis, unspecified whether hemorrhage  Assessment & Plan:  Improving since hernia repair. Continue with weight reduction.       3. Hyperhidrosis  Assessment & Plan:  Worse with weight gain over the last few months. Previously treated with betamethasone. Will refill today, use PRN.     Orders:  -     betamethasone valerate (VALISONE) 0.1 % ointment; Apply 1 application topically to the appropriate area as directed 2 (Two) Times a Day.  Dispense: 45 g; Refill: 2      I spent 30 minutes caring for Shelley on this date of service. This time includes time spent by me in the following activities:preparing for the visit, performing a medically appropriate examination and/or evaluation , counseling and educating the patient/family/caregiver, ordering medications, tests, or procedures and documenting information in the medical record  Follow Up   Return in about 4 weeks (around 4/17/2023) for Next scheduled follow up.  Patient was given instructions and counseling regarding her condition or for health maintenance advice. Please see specific information pulled into the AVS if appropriate.     Courtney Lew, APRN  03/20/2023

## 2023-04-14 ENCOUNTER — PATIENT MESSAGE (OUTPATIENT)
Dept: BARIATRICS/WEIGHT MGMT | Facility: CLINIC | Age: 33
End: 2023-04-14
Payer: COMMERCIAL

## 2023-04-14 DIAGNOSIS — E66.3 OVERWEIGHT (BMI 25.0-29.9): ICD-10-CM

## 2023-04-17 RX ORDER — PHENTERMINE HYDROCHLORIDE 37.5 MG/1
TABLET ORAL
Qty: 30 TABLET | Refills: 0 | Status: SHIPPED | OUTPATIENT
Start: 2023-04-17

## 2023-04-17 NOTE — TELEPHONE ENCOUNTER
From: Shelley Longoria  To: Courtney SHARDA Lew  Sent: 4/14/2023 10:41 AM EDT  Subject: Prescription Phentermine     I have yet to  my prescription of the 15 tablets or 30 day supply of the phentermine that you sent in when I was last in the office as I am just finishing up the 60 day supply you first gave me. I am leaving for [place] on May 14 so I was wondering if you could reverse the 30 day supply and make it a 60 day supply so I will have enough with me while I’m out. I have an appointment on May 1st but if I  the 30 day supply today the pharmacy won’t refill them again until May 15th and by then I will already be out of the country.   
Moderna dose 1 and 2

## 2023-05-01 ENCOUNTER — OFFICE VISIT (OUTPATIENT)
Dept: BARIATRICS/WEIGHT MGMT | Facility: CLINIC | Age: 33
End: 2023-05-01
Payer: COMMERCIAL

## 2023-05-01 VITALS
HEART RATE: 102 BPM | HEIGHT: 66 IN | DIASTOLIC BLOOD PRESSURE: 78 MMHG | BODY MASS INDEX: 26.2 KG/M2 | SYSTOLIC BLOOD PRESSURE: 122 MMHG | WEIGHT: 163 LBS | OXYGEN SATURATION: 92 %

## 2023-05-01 DIAGNOSIS — R73.09 ABNORMAL GLUCOSE: ICD-10-CM

## 2023-05-01 DIAGNOSIS — Z51.81 ENCOUNTER FOR THERAPEUTIC DRUG LEVEL MONITORING: ICD-10-CM

## 2023-05-01 DIAGNOSIS — E66.3 OVERWEIGHT (BMI 25.0-29.9): Primary | ICD-10-CM

## 2023-05-01 NOTE — ASSESSMENT & PLAN NOTE
Patient's (Body mass index is 26.31 kg/m².) indicates that they are overweight with health conditions that include dyslipidemias and pre-diabetes . Weight is improving with treatment. BMI is is above average; BMI management plan is completed. We discussed low calorie, low carb based diet program, portion control, increasing exercise, joining a fitness center or start home based exercise program, pharmacologic options including phentermine/topamax and an yaima-based approach such as Allocab Pal or Lose It.    I have instructed the patient to continue with pursuit of medical weight loss as a part of this program. Patient does meet criteria for use of anorectics at this time as BMI >25 with , hyperlipidemia and is not at treatment goal.     Continue nutritional focus and work towards new exercise FITT goal of: 2-2-4-2.   The current plan for this month includes: continue current exercise efforts, weight loss goal 4-6lbs this month, continue to work on lifestyle behavioral changes and continue nutrition focus. Travling to [place] for 12 days, I provided a letter for her in case she needs it [place].    Treatment goal 155lb.     Continue sympathomimetic (medication refilled).  This patient 1) has no evidence of serious cardiovascular disease; 2) does not have serious psychiatric disease or a history of substance abuse; 3) has been informed about weight loss medications that are FDA approved for long-term use and told that these have been all documented to be safe and effective whereas phentermine has not; 4) does not demonstrate a clinically significant increase in pulse or blood pressure when taking phentermine; and 5) demonstrate significant weight loss while using the medication.  Patient understands that all antiobesity medications are contraindicated in pregnancy.  Patient denies a history of glaucoma.  Patient understands that long-term use of phentermine is considered off label  "use of this medication, however, that the endocrine Society and recent research supports that long-term use of phentermine does not appear to have detrimental health effects when used and the appropriate patient.  In addition, a 2019 study published in an obesity journal on [id number] patient's concluded that \"recommendations to limit phentermine to less than 3 months do not align with current concept of pharmacologic treatment of obesity\", and that \"long-term phentermine users experience greater weight loss without apparent increases in cardiovascular risk\".    We reviewed potential side effects including insomnia, dry mouth, increased heart rate and blood pressure, increased anxiety.  We reviewed reducing caffeine consumption while taking phentermine.  especially if the patient is experience side effects.  The potential risk and benefits of phentermine have been reviewed with the patient, and alternative treatment options were discussed.  All questions were answered, and the patient wishes to move forward with this medication.        "

## 2023-05-01 NOTE — LETTER
May 1, 2023    Shelley Mills Von  [address] [place] [address]      Shelley is under my care for weight management. She is taking a controlled medication, see below for the prescription details:  Phentermine 37.5mg Take 0.5 tablet daily. This is a long term medication, therapy should not be discontinued. Please allow her to bring this medication to avoid gaps in her treatment.     Thank you,        VALENCIA Rosario

## 2023-05-01 NOTE — ASSESSMENT & PLAN NOTE
Stable. Continue metformin. Low carb diet, regular physical activity, and weight reduction of 10-15%.   Cautioned about carbs in juicing, eating the actual fruit with fiber provides more benefits.

## 2023-05-01 NOTE — PROGRESS NOTES
[facility]  [address]  [place] [address]     Office Note      Date: 2023  Patient Name: Shelley Longoria  MRN: [id number]  : 1990  Subjective  Subjective     Chief Complaint  Obesity Management follow-up          Shelley Longoria presents to [facility] for obesity management. female s/p LSG/HHR 19 PQ, Presurgery weight:  218 pounds. stefania: 165lb, goal weight: 155 lbs.  Patient is satisfied with weight loss progress. Appetite is poorly controlled. Reports no side effects of prescribed medications today. The patient is taking multivitamin and is taking fish oil.  The patient is not using a food journal.    B: coffee with collagen protein peptides), 16 oz juicing every day (beets, carrots, 2 stalks of celery, 1/4 apple)- 40 carbs and protein shake  L (12): chicken or tuna packs on tostada  S: smart pop, goldfish, fruit snacks, yogurt  D: chinese (chicken and broccoli off buffet) and 1 fried bananas and drank water  [place]  The patient is exercising with a FITT score of:    Frequency Intensity Time Strength Training   []   0, none []   0 []   0 []   0   []   1 (1-2x/week) []   1 (light) []   1 (<10 min) []   1 (1x/week)   [x]   2 (3-5x/week) []   2 (moderate) []   2 (10-20 min) [x]   2 (2x/week)   []   3 (daily) [x]   3 (moderately hard)  []   4 (very hard) [x]   3 (20-30 min)  []   4 (>30 min) []   3 (3-4x/week)     Review of Systems   Constitutional: Positive for appetite change. Negative for fatigue.   Eyes: Negative for blurred vision, double vision and visual disturbance.   Cardiovascular: Negative for chest pain and palpitations.   Gastrointestinal: Positive for constipation. Negative for abdominal pain, diarrhea, nausea, vomiting and GERD.   Endocrine: Negative for polydipsia, polyphagia and polyuria.   Musculoskeletal: Negative for arthralgias, back pain and myalgias.  "  Neurological: Negative for dizziness, tremors, light-headedness, headache and memory problem.        Parasthesias negative   Psychiatric/Behavioral: Negative for sleep disturbance, depressed mood and stress. The patient is not nervous/anxious.      Objective   Start weight: 177 pounds.    Total Loss lb/%Loss of beginning body weight (BBW): -14lb/-7.91%  Change in weight since last visit: -5.5lb    Body mass index is 26.31 kg/m².   Body composition analysis completed and showed:   %body fat: 34.2  Measurements (in inches)  Waist: 36    Vital Signs:   /78   Pulse 102   Ht 167.6 cm (66\")   Wt 73.9 kg (163 lb)   SpO2 92%   BMI 26.31 kg/m²     Physical Exam   General appears stated age and normal appearance   HEENT PERRLA, EOM intact and conjunctivae normal   Chest/lungs Normal rate, Regular rhythm and Breathing is unlabored   Extremities without edema   Neuro Good historian and No focal deficit   Skin Warm, dry, intact   Psych normal behavior, normal thought content and normal concentration     Result Review :                Assessment / Plan        Diagnoses and all orders for this visit:    1. Overweight (BMI 25.0-29.9) (Primary)  Assessment & Plan:  Patient's (Body mass index is 26.31 kg/m².) indicates that they are overweight with health conditions that include dyslipidemias and pre-diabetes . Weight is improving with treatment. BMI is is above average; BMI management plan is completed. We discussed low calorie, low carb based diet program, portion control, increasing exercise, joining a fitness center or start home based exercise program, pharmacologic options including phentermine/topamax and an yaima-based approach such as Ingresse Pal or Lose It.    I have instructed the patient to continue with pursuit of medical weight loss as a part of this program. Patient does meet criteria for use of anorectics at this time as BMI >25 with , hyperlipidemia and is not at treatment goal.     Continue nutritional " "focus and work towards new exercise FITT goal of: 2-2-4-2.   The current plan for this month includes: continue current exercise efforts, weight loss goal 4-6lbs this month, continue to work on lifestyle behavioral changes and continue nutrition focus. Travling to [place] for 12 days, I provided a letter for her in case she needs it [place].    Treatment goal 155lb.     Continue sympathomimetic (medication refilled).  This patient 1) has no evidence of serious cardiovascular disease; 2) does not have serious psychiatric disease or a history of substance abuse; 3) has been informed about weight loss medications that are FDA approved for long-term use and told that these have been all documented to be safe and effective whereas phentermine has not; 4) does not demonstrate a clinically significant increase in pulse or blood pressure when taking phentermine; and 5) demonstrate significant weight loss while using the medication.  Patient understands that all antiobesity medications are contraindicated in pregnancy.  Patient denies a history of glaucoma.  Patient understands that long-term use of phentermine is considered off label use of this medication, however, that the endocrine Society and recent research supports that long-term use of phentermine does not appear to have detrimental health effects when used and the appropriate patient.  In addition, a 2019 study published in an obesity journal on [id number] patient's concluded that \"recommendations to limit phentermine to less than 3 months do not align with current concept of pharmacologic treatment of obesity\", and that \"long-term phentermine users experience greater weight loss without apparent increases in cardiovascular risk\".    We reviewed potential side effects including insomnia, dry mouth, increased heart rate and blood pressure, increased anxiety.  We reviewed reducing caffeine consumption while taking phentermine.  especially " if the patient is experience side effects.  The potential risk and benefits of phentermine have been reviewed with the patient, and alternative treatment options were discussed.  All questions were answered, and the patient wishes to move forward with this medication.            2. Abnormal glucose  Assessment & Plan:  Stable. Continue metformin. Low carb diet, regular physical activity, and weight reduction of 10-15%.   Cautioned about carbs in juicing, eating the actual fruit with fiber provides more benefits.         3. Encounter for therapeutic drug level monitoring  -     Urine Drug Screen - Urine, Clean Catch      I spent 30 minutes caring for Shelley on this date of service. This time includes time spent by me in the following activities:preparing for the visit, performing a medically appropriate examination and/or evaluation , counseling and educating the patient/family/caregiver, ordering medications, tests, or procedures and documenting information in the medical record  Follow Up   Return in about 6 weeks (around 6/12/2023) for Next scheduled follow up.  Patient was given instructions and counseling regarding her condition or for health maintenance advice. Please see specific information pulled into the AVS if appropriate.     Courtney Lew, APRN  05/01/2023

## 2023-05-03 LAB
AMPHETAMINES UR QL SCN: NEGATIVE NG/ML
BARBITURATES UR QL SCN: NEGATIVE NG/ML
BENZODIAZ UR QL SCN: NEGATIVE NG/ML
BZE UR QL SCN: NEGATIVE NG/ML
CANNABINOIDS UR QL SCN: NEGATIVE NG/ML
CREAT UR-MCNC: 186.3 MG/DL (ref 20–300)
LABORATORY COMMENT REPORT: NORMAL
METHADONE UR QL SCN: NEGATIVE NG/ML
OPIATES UR QL SCN: NEGATIVE NG/ML
OXYCODONE+OXYMORPHONE UR QL SCN: NEGATIVE NG/ML
PCP UR QL: NEGATIVE NG/ML
PH UR: 8.2 [PH] (ref 4.5–8.9)
PROPOXYPH UR QL SCN: NEGATIVE NG/ML

## 2023-08-18 DIAGNOSIS — R73.09 ABNORMAL GLUCOSE: ICD-10-CM

## 2023-08-21 RX ORDER — METFORMIN HYDROCHLORIDE 500 MG/1
TABLET, EXTENDED RELEASE ORAL
Qty: 90 TABLET | Refills: 2 | Status: SHIPPED | OUTPATIENT
Start: 2023-08-21

## 2023-08-31 ENCOUNTER — OFFICE VISIT (OUTPATIENT)
Dept: BARIATRICS/WEIGHT MGMT | Facility: CLINIC | Age: 33
End: 2023-08-31
Payer: COMMERCIAL

## 2023-08-31 VITALS
SYSTOLIC BLOOD PRESSURE: 124 MMHG | HEIGHT: 66 IN | WEIGHT: 160.4 LBS | DIASTOLIC BLOOD PRESSURE: 68 MMHG | BODY MASS INDEX: 25.78 KG/M2 | OXYGEN SATURATION: 100 % | HEART RATE: 111 BPM

## 2023-08-31 DIAGNOSIS — R73.09 ABNORMAL GLUCOSE: ICD-10-CM

## 2023-08-31 DIAGNOSIS — E66.3 OVERWEIGHT (BMI 25.0-29.9): Primary | ICD-10-CM

## 2023-08-31 DIAGNOSIS — E78.2 MIXED HYPERLIPIDEMIA: ICD-10-CM

## 2023-08-31 RX ORDER — PHENTERMINE HYDROCHLORIDE 37.5 MG/1
TABLET ORAL
Qty: 30 TABLET | Refills: 0 | Status: SHIPPED | OUTPATIENT
Start: 2023-08-31

## 2023-08-31 RX ORDER — LANCETS 28 GAUGE
EACH MISCELLANEOUS
Qty: 100 EACH | Refills: 2 | Status: SHIPPED | OUTPATIENT
Start: 2023-08-31

## 2023-08-31 RX ORDER — BLOOD-GLUCOSE METER
KIT MISCELLANEOUS
Qty: 100 EACH | Refills: 12 | Status: SHIPPED | OUTPATIENT
Start: 2023-08-31

## 2023-08-31 RX ORDER — TOPIRAMATE 25 MG/1
TABLET ORAL
Qty: 60 TABLET | Refills: 2 | Status: SHIPPED | OUTPATIENT
Start: 2023-08-31

## 2023-08-31 NOTE — PROGRESS NOTES
[facility]  [address]  [place] [address]     Office Note      Date: 2023  Patient Name: Shelley Longoria  MRN: [id number]  : 1990  Subjective  Subjective     Chief Complaint  Obesity Management follow-up          Shelley Longoria presents to [facility] for obesity management. female s/p LSG/HHR 19 PQ, Presurgery weight:  218 pounds. stefania: 165lb, goal weight:  155 lbs.  Patient is unsure with weight loss progress. Was 165lb last week, started watching diet closer and increased water. Appetite is poorly controlled. Reports no side effects of prescribed medications today. The patient is taking multivitamin and is taking fish oil. The patient is not using a food journal.    24 hour recall:  B: protein shake  S: egg bites   L: skipping or beef jerky   S: almonds or protein chips (quest or atkins)  D: lentil soup, ribeye, annel salad with ham, onion, cucumber, tomatoes and egg with lemon  S: grapes  Needs to drink more water.   The patient is not exercising since she started a new job, hasn't worked it into her routine yet.     Review of Systems   Constitutional:  Negative for appetite change and fatigue.   Eyes:  Negative for blurred vision, double vision and visual disturbance.   Cardiovascular:  Negative for chest pain and palpitations.   Gastrointestinal:  Negative for abdominal pain, constipation, diarrhea, nausea, vomiting and GERD.   Endocrine: Negative for polydipsia, polyphagia and polyuria.   Musculoskeletal:  Negative for arthralgias, back pain and myalgias.   Neurological:  Negative for dizziness, tremors, light-headedness, headache and memory problem.        Parasthesias negative   Psychiatric/Behavioral:  Negative for sleep disturbance, depressed mood and stress. The patient is not nervous/anxious.      Objective   Start weight: 177 pounds.    Total Loss lb/%Loss of beginning body weight  "(BBW): -17lb/-9.60%  Change in weight since last visit: +5lb    Body mass index is 25.89 kg/mý.   Body composition analysis completed and showed:   Body Fat %: 32.1    Measurements (in inches)  Waist Circumference: 34      Vital Signs:   /68   Pulse 111   Ht 167.6 cm (66\")   Wt 72.8 kg (160 lb 6.4 oz)   SpO2 100%   BMI 25.89 kg/mý     Physical Exam   General appears stated age and normal appearance   HEENT PERRLA, EOM intact, and conjunctivae normal   Chest/lungs Normal rate, Regular rhythm, and Breathing is unlabored   Extremities without edema   Neuro Good historian and No focal deficit   Skin Warm, dry, intact   Psych normal behavior, normal thought content, and normal concentration     Result Review :                Assessment / Plan        Diagnoses and all orders for this visit:    1. Overweight (BMI 25.0-29.9) (Primary)  Assessment & Plan:  Patient's (Body mass index is 25.89 kg/mý.) indicates that they are overweight with health conditions that include dyslipidemias, GERD, and fatty liver, joint pains  . Weight is worsening. BMI is is above average; BMI management plan is completed. We discussed low calorie, low carb based diet program, portion control, and increasing exercise.     I have instructed the patient to continue with pursuit of medical weight loss as a part of this program. Patient does meet criteria for use of anorectics at this time as BMI >25 with , hyperlipidemia, impaired fasting glucose, and is not at treatment goal.     Continue nutritional focus and work towards new exercise FITT goal of: 2-2-4-2.  The current plan for this month includes:   - Adjust exercise as discussed  - Weight loss goal 4-6lbs this month  - Continue to work on lifestyle behavioral changes  - Restart phentermine PRN due to weight gain, understands I am restricted from prescribing it once % fat <30.   - Treatment goal 155lb.       Orders:  -     topiramate (Topamax) 25 MG tablet; Take two tablets by mouth " daily at bedtime.  Dispense: 60 tablet; Refill: 2  -     phentermine (ADIPEX-P) 37.5 MG tablet; Take 1/2 tablet by mouth daily at 11am.  Dispense: 30 tablet; Refill: 0  -     Comprehensive Metabolic Panel  -     Lipid Panel    2. Abnormal glucose  Assessment & Plan:  Repeat labwork    Orders:  -     glucose blood (FREESTYLE LITE) test strip; Use as instructed  Dispense: 100 each; Refill: 12  -     Lancets (freestyle) lancets; Check blood sugar three times daily PRN.  Dispense: 100 each; Refill: 2  -     Hemoglobin A1c    3. Mixed hyperlipidemia  Assessment & Plan:  Repeat labwork today.     Orders:  -     Lipid Panel          Follow Up   Return in about 1 month (around 9/30/2023) for Next scheduled follow up.  Patient was given instructions and counseling regarding her condition or for health maintenance advice. Please see specific information pulled into the AVS if appropriate.     Courtney Lew, APRN  08/31/2023

## 2023-08-31 NOTE — ASSESSMENT & PLAN NOTE
Patient's (Body mass index is 25.89 kg/mý.) indicates that they are overweight with health conditions that include dyslipidemias, GERD, and fatty liver, joint pains  . Weight is worsening. BMI is is above average; BMI management plan is completed. We discussed low calorie, low carb based diet program, portion control, and increasing exercise.     I have instructed the patient to continue with pursuit of medical weight loss as a part of this program. Patient does meet criteria for use of anorectics at this time as BMI >25 with , hyperlipidemia, impaired fasting glucose, and is not at treatment goal.     Continue nutritional focus and work towards new exercise FITT goal of: 2-2-4-2.  The current plan for this month includes:   - Adjust exercise as discussed  - Weight loss goal 4-6lbs this month  - Continue to work on lifestyle behavioral changes  - Restart phentermine PRN due to weight gain, understands I am restricted from prescribing it once % fat <30.   - Treatment goal 155lb.

## 2023-09-01 LAB
ALBUMIN SERPL-MCNC: 4.2 G/DL (ref 3.5–5.2)
ALBUMIN/GLOB SERPL: 1.6 G/DL
ALP SERPL-CCNC: 67 U/L (ref 39–117)
ALT SERPL-CCNC: 14 U/L (ref 1–33)
AST SERPL-CCNC: 11 U/L (ref 1–32)
BILIRUB SERPL-MCNC: 0.3 MG/DL (ref 0–1.2)
BUN SERPL-MCNC: 17 MG/DL (ref 6–20)
BUN/CREAT SERPL: 21.8 (ref 7–25)
CALCIUM SERPL-MCNC: 9.4 MG/DL (ref 8.6–10.5)
CHLORIDE SERPL-SCNC: 106 MMOL/L (ref 98–107)
CHOLEST SERPL-MCNC: 197 MG/DL (ref 0–200)
CO2 SERPL-SCNC: 23.7 MMOL/L (ref 22–29)
CREAT SERPL-MCNC: 0.78 MG/DL (ref 0.57–1)
EGFRCR SERPLBLD CKD-EPI 2021: 102.4 ML/MIN/1.73
GLOBULIN SER CALC-MCNC: 2.7 GM/DL
GLUCOSE SERPL-MCNC: 87 MG/DL (ref 65–99)
HBA1C MFR BLD: 6.1 % (ref 4.8–5.6)
HDLC SERPL-MCNC: 72 MG/DL (ref 40–60)
LDLC SERPL CALC-MCNC: 108 MG/DL (ref 0–100)
POTASSIUM SERPL-SCNC: 4.5 MMOL/L (ref 3.5–5.2)
PROT SERPL-MCNC: 6.9 G/DL (ref 6–8.5)
SODIUM SERPL-SCNC: 139 MMOL/L (ref 136–145)
TRIGL SERPL-MCNC: 97 MG/DL (ref 0–150)
VLDLC SERPL CALC-MCNC: 17 MG/DL (ref 5–40)

## 2023-09-03 NOTE — ASSESSMENT & PLAN NOTE
Low carb diet, regular physical activity, and weight reduction of 10-15%. Continue metformin. Repeat A1c next month.     
Patient's (Body mass index is 29.29 kg/m².) indicates that they are overweight with health conditions that include dyslipidemias and pre-diabetes, fatty liver, joint pain . Weight is improving with treatment. BMI is is above average; BMI management plan is completed. We discussed low calorie, low carb based diet program, portion control, increasing exercise, joining a fitness center or start home based exercise program, pharmacologic options including metformin and an yaima-based approach such as [facility] Pal or Lose It.    I have instructed the patient to continue with pursuit of medical weight loss as a part of this program. Patient does meet criteria for use of anorectics at this time as BMI >27 and is not at treatment goal.     Continue nutritional focus and work towards new exercise FITT goal of: 2-2-4-2. Joining the [facility].  The current plan for this month includes: adjust exercise as discussed and continue to work on lifestyle behavioral changes. Continue to work on food journal. Would like to start phentermine when she stops breastfeeding next month. Will get UDS before starting. Also need to repeat A1c.      
No

## 2023-11-30 ENCOUNTER — PRIOR AUTHORIZATION (OUTPATIENT)
Dept: BARIATRICS/WEIGHT MGMT | Facility: CLINIC | Age: 33
End: 2023-11-30
Payer: COMMERCIAL

## 2023-11-30 ENCOUNTER — TELEMEDICINE (OUTPATIENT)
Dept: BARIATRICS/WEIGHT MGMT | Facility: CLINIC | Age: 33
End: 2023-11-30
Payer: COMMERCIAL

## 2023-11-30 VITALS — HEIGHT: 66 IN | BODY MASS INDEX: 26.2 KG/M2 | WEIGHT: 163 LBS

## 2023-11-30 DIAGNOSIS — E66.3 OVERWEIGHT (BMI 25.0-29.9): Primary | ICD-10-CM

## 2023-11-30 DIAGNOSIS — R53.83 FATIGUE, UNSPECIFIED TYPE: ICD-10-CM

## 2023-11-30 DIAGNOSIS — Z98.84 H/O BARIATRIC SURGERY: Primary | ICD-10-CM

## 2023-11-30 DIAGNOSIS — K21.00 GASTROESOPHAGEAL REFLUX DISEASE WITH ESOPHAGITIS, UNSPECIFIED WHETHER HEMORRHAGE: ICD-10-CM

## 2023-11-30 DIAGNOSIS — R79.0 ABNORMAL BLOOD LEVEL OF IRON: ICD-10-CM

## 2023-11-30 DIAGNOSIS — E55.9 VITAMIN D DEFICIENCY: ICD-10-CM

## 2023-11-30 RX ORDER — OMEPRAZOLE 40 MG/1
40 CAPSULE, DELAYED RELEASE ORAL 2 TIMES DAILY
Qty: 60 CAPSULE | Refills: 5 | Status: SHIPPED | OUTPATIENT
Start: 2023-11-30

## 2023-11-30 RX ORDER — OMEPRAZOLE 40 MG/1
40 CAPSULE, DELAYED RELEASE ORAL 2 TIMES DAILY
COMMUNITY
End: 2023-11-30

## 2023-11-30 NOTE — PROGRESS NOTES
[facility]  [address]  [address]  [facility] [id number] [phone]        Patient Name:  Shelley Longoria  :  1990      Date of Visit: 2023      Reason for Visit:   Annual eval- 4 years postop       HPI: Shelley Longoria is a 33 y.o. female s/p LSG/HHR 19 w/ Dr. Whatley (w/ postop hemorrhage, managed conservatively) and now s/p robotic lap recurrent HHR w/ Phasix ST mesh 23 PNQ      LOV: POD#10 lap recurrent HHR w/ mesh. Was having issues with dysphagia and intolerant to liquids. Arranged for EGD, but patient did not show.    Still has reflux on a daily basis. Mostly symptoms are controlled with BID PPI. Using Tums for breakthrough. Spicy foods make it worse. No issues/concerns. Denies dysphagia, nausea, vomiting, abdominal pain, hair loss, memory loss, vision changes, and numbness/tingling.  Getting 75-100g prot/day.  Drinking 64 fluid oz/day.  No recent bariatric labs. Taking B12 and Patches: MVI .  On Omeprazole .  Physical activity: getting routine exercise.     Presurgery weight: 218 pounds.  Today's weight is 73.9 kg (163 lb) pounds, today's  Body mass index is 26.31 kg/m²., and weight loss since surgery is 55 pounds.      Past Medical History:   Diagnosis Date    Anxiety     Chronic joint pain     denies NSAIDS/steroids    COVID-19     2022    DM2 (diabetes mellitus, type 2)     w/ hx gestational diabetes , dx May 2017, started on insulin when initially dx, lost weight w/ Adipex, stopped insulin 6 months after dx, but now w/ weight regain, back on insulin.  Managed by PCP    Dyspepsia     Dyspnea on exertion     Fatigue     Fatty liver     w/ abnormal LFTs, US 2018    GERD (gastroesophageal reflux disease) 2023    Gestational diabetes     H. pylori infection     UBT (+) 19 - PrevPak RX; repeat HPSA (+) 2019 - Pylera RX; repeat UBT (holding PPI) (+) - referred to GI, RX Levaquin/Flagyl/Pepto RX   - HPSA negative 2023    Hiatal hernia     History of transfusion     [facility]- PT UNSURE HOW MANY UNITS RECEIVED, NO REACTIONS    Mixed hyperlipidemia 10/15/2019    Obesity (BMI 30-39.9)     Pap smear for cervical cancer screening 2017    Wears eyeglasses      Past Surgical History:   Procedure Laterality Date    CERVICAL CONE BIOPSY  2004    benign     SECTION  /    ENDOSCOPY N/A 2019    Procedure: ESOPHAGOGASTRODUODENOSCOPY;  Surgeon: Mila Whatley MD;  Location:  [facility];  Service: Bariatric    GASTRIC SLEEVE LAPAROSCOPIC N/A 2019    Procedure: GASTRIC SLEEVE LAPAROSCOPIC;  Surgeon: Mila Whatley MD;  Location:  [facility];  Service: Bariatric    HIATAL HERNIA REPAIR N/A 2019    Procedure: HIATAL HERNIA REPAIR LAPAROSCOPIC;  Surgeon: Mila Whatley MD;  Location:  [facility];  Service: Bariatric    HIATAL HERNIA REPAIR N/A 2023    Procedure: LAPAROSCOPIC HIATAL HERNIA REPAIR WITH MESH WITH DAVINCI ROBOT;  Surgeon: Mila Whatley MD;  Location:  [facility];  Service: Robotics - DaVinci;  Laterality: N/A;    UPPER GASTROINTESTINAL ENDOSCOPY  2019    Dr VIVIENNE Whatley    UPPER GASTROINTESTINAL ENDOSCOPY  2022    Dr Aldrich, positive H pylori     Outpatient Medications Marked as Taking for the 23 encounter (Telemedicine) with Isabella Still PA   Medication Sig Dispense Refill    betamethasone valerate (VALISONE) 0.1 % ointment Apply 1 application topically to the appropriate area as directed 2 (Two) Times a Day. 45 g 2    Biotin 31749 MCG tablet Take  by mouth.      Blood Glucose Monitoring Suppl (FreeStyle Lite) device 1 dose Daily. 1 each 0    Calcium Carbonate-Vit D-Min (CALCIUM 1200 PO) Take  by mouth.      Cyanocobalamin (B-12 PO) Place 1 tablet under the tongue Daily. Patches      glucose blood (FREESTYLE LITE) test strip Use as instructed 100 each 12    Lancets (freestyle) lancets Check blood sugar three times  "daily PRN. 100 each 2    metFORMIN ER (GLUCOPHAGE-XR) 500 MG 24 hr tablet TAKE ONE TABLET BY MOUTH DAILY WITH BREAKFAST AND TWO TABLETS BY MOUTH WITH DINNER OR AT BEDTIME 90 tablet 2    Multiple Vitamins-Minerals (MULTIVITAL-M) tablet Take  by mouth.      nystatin (MYCOSTATIN) 190420 UNIT/GM cream Apply 1 application topically to the appropriate area as directed 2 (Two) Times a Day. 30 g 0    nystatin-triamcinolone (MYCOLOG) 347892-4.1 UNIT/GM-% ointment Apply 1 application  topically to the appropriate area as directed 2 (Two) Times a Day. 15 g 0    Omega-3 Fatty Acids (fish oil) 1000 MG capsule capsule Take 1 capsule by mouth Daily With Breakfast.      phentermine (ADIPEX-P) 37.5 MG tablet Take 1/2 tablet by mouth daily at 11am. 30 tablet 0    topiramate (Topamax) 25 MG tablet Take two tablets by mouth daily at bedtime. 60 tablet 2    valACYclovir (VALTREX) 1000 MG tablet Take 1 tablet by mouth Daily.      vitamin C (ASCORBIC ACID) 250 MG tablet Take 4 tablets by mouth Daily.      vitamin D3 125 MCG (5000 UT) capsule capsule Take 1 capsule by mouth Daily.      [DISCONTINUED] omeprazole (priLOSEC) 40 MG capsule Take 1 capsule by mouth 2 (Two) Times a Day.         No Known Allergies    Social History     Socioeconomic History    Marital status:    Tobacco Use    Smoking status: Former     Packs/day: 2.00     Years: 2.00     Additional pack years: 0.00     Total pack years: 4.00     Types: Cigarettes     Quit date: 2012     Years since quittin.4    Smokeless tobacco: Never   Vaping Use    Vaping Use: Never used   Substance and Sexual Activity    Alcohol use: No     Comment: 1-2 TIMES PER MONTH, WINE    Drug use: No    Sexual activity: Defer     Social History     Social History Narrative    Living in [place], [place] w/  and children.   @ [employer] in [facility].         Ht 167.6 cm (66\")   Wt 73.9 kg (163 lb)   BMI 26.31 kg/m²     Physical Exam  Constitutional:       " General: She is not in acute distress.     Appearance: Normal appearance.   Pulmonary:      Effort: Pulmonary effort is normal.   Neurological:      Mental Status: She is alert and oriented to person, place, and time.   Psychiatric:         Mood and Affect: Mood normal.         Behavior: Behavior normal.         Thought Content: Thought content normal.         Judgment: Judgment normal.           Assessment:  4 years s/p LSG/HHR 11/26/19 w/ Dr. Whatley (w/ postop hemorrhage, managed conservatively) and now s/p robotic lap recurrent HHR w/ Phasix ST mesh 2/28/23 PNQ      ICD-10-CM ICD-9-CM   1. Overweight (BMI 25.0-29.9)  E66.3 278.02   2. Vitamin D deficiency  E55.9 268.9   3. Abnormal blood level of iron  R79.0 790.6   4. Fatigue, unspecified type  R53.83 780.79   5. Gastroesophageal reflux disease with esophagitis, unspecified whether hemorrhage  K21.00 530.11          BMI is >= 25 and <30. (Overweight) The following options were offered after discussion;: exercise counseling/recommendations and nutrition counseling/recommendations      Plan:  Doing ok. Continue w/ good food choices and healthy habits.  Continue protein >70g/day.  Continue routine physical activity.  Routine bariatric labs ordered.  Continue vitamins w/ adjustments pending lab results.  Call w/ problems/concerns.     Currently taking Omeprazole OTC and only taking 20mg BID with regular breakthrough. Will rx Omeprazole 40mg BID     Referral placed to  Plastics.     The patient was instructed to follow up in one year, sooner if needed.

## 2023-12-07 ENCOUNTER — OFFICE VISIT (OUTPATIENT)
Dept: BARIATRICS/WEIGHT MGMT | Facility: CLINIC | Age: 33
End: 2023-12-07
Payer: COMMERCIAL

## 2023-12-07 VITALS
WEIGHT: 163 LBS | BODY MASS INDEX: 26.2 KG/M2 | HEIGHT: 66 IN | SYSTOLIC BLOOD PRESSURE: 122 MMHG | RESPIRATION RATE: 16 BRPM | OXYGEN SATURATION: 99 % | DIASTOLIC BLOOD PRESSURE: 76 MMHG | HEART RATE: 99 BPM

## 2023-12-07 DIAGNOSIS — E55.9 VITAMIN D DEFICIENCY: ICD-10-CM

## 2023-12-07 DIAGNOSIS — E66.3 OVERWEIGHT (BMI 25.0-29.9): Primary | ICD-10-CM

## 2023-12-07 DIAGNOSIS — R73.09 ABNORMAL GLUCOSE: ICD-10-CM

## 2023-12-07 DIAGNOSIS — R53.83 FATIGUE, UNSPECIFIED TYPE: ICD-10-CM

## 2023-12-07 DIAGNOSIS — R79.0 ABNORMAL BLOOD LEVEL OF IRON: ICD-10-CM

## 2023-12-07 DIAGNOSIS — E66.3 OVERWEIGHT (BMI 25.0-29.9): ICD-10-CM

## 2023-12-07 RX ORDER — TOPIRAMATE 25 MG/1
TABLET ORAL
Qty: 60 TABLET | Refills: 2 | Status: SHIPPED | OUTPATIENT
Start: 2023-12-07

## 2023-12-07 RX ORDER — PHENTERMINE HYDROCHLORIDE 37.5 MG/1
TABLET ORAL
Qty: 37 TABLET | Refills: 0 | Status: CANCELLED | OUTPATIENT
Start: 2023-12-07

## 2023-12-07 RX ORDER — PHENTERMINE HYDROCHLORIDE 37.5 MG/1
TABLET ORAL
Qty: 37 TABLET | Refills: 0 | Status: SHIPPED | OUTPATIENT
Start: 2023-12-07 | End: 2023-12-11 | Stop reason: SDUPTHER

## 2023-12-07 RX ORDER — PHENTERMINE HYDROCHLORIDE 37.5 MG/1
TABLET ORAL
Qty: 37 TABLET | Refills: 0 | Status: SHIPPED | OUTPATIENT
Start: 2023-12-07 | End: 2023-12-07 | Stop reason: SDUPTHER

## 2023-12-07 NOTE — PROGRESS NOTES
[facility]  [address]  [place] [address]     Office Note      Date: 2023  Patient Name: Shelley Longoria  MRN: [id number]  : 1990  Subjective  Subjective     Chief Complaint  Obesity Management follow-up          Shelley Longoria presents to [facility] for obesity management. Presurgery weight:  218 pounds. stefania: 165lb, goal weight:  155 lbs.  Patient is unsatisfied with weight loss progress. Appetite is poorly controlled, states she is hungry all the time. She increased phentermine to 1/2 tab daily for a few days and that didn't really help so she increased to 1 whole tablet daily for the last 2 weeks and that has helped. Has not been as diligent with protein intake, no longer doing shakes or egg bites. Having a few sweets like little georges blue trees. She took a pregnancy test which was negative. LMP 2023. Reports no side effects of prescribed medications today. The patient is taking multivitamin and is taking fish oil. The patient is not using a food journal.    The patient is exercising some.  She has been less active, let her gym membership  in October. FITT score:    Frequency Intensity Time Strength Training   []   0, none []   0 []   0 []   0   [x]   1 (1-2x/week) [x]   1 (light) []   1 (<10 min) []   1 (1x/week)   []   2 (3-5x/week) []   2 (moderate) []   2 (10-20 min) [x]   2 (2x/week)   []   3 (daily) []   3 (moderately hard)  []   4 (very hard) []   3 (20-30 min)  [x]   4 (>30 min) []   3 (3-4x/week)       Review of Systems   Constitutional:  Positive for appetite change. Negative for fatigue.   Eyes:  Negative for blurred vision, double vision and visual disturbance.   Respiratory:  Positive for shortness of breath (with weight gain).    Cardiovascular:  Negative for chest pain and palpitations.   Gastrointestinal:  Negative for abdominal pain, constipation, diarrhea,  nausea, vomiting and GERD.   Endocrine: Positive for polyphagia. Negative for polydipsia and polyuria.   Musculoskeletal:  Negative for arthralgias, back pain and myalgias.   Neurological:  Negative for dizziness, tremors, light-headedness, headache and memory problem.        Parasthesias negative   Psychiatric/Behavioral:  Negative for sleep disturbance, depressed mood and stress. The patient is not nervous/anxious.    All other systems reviewed and are negative.        Current Outpatient Medications:     betamethasone valerate (VALISONE) 0.1 % ointment, Apply 1 application topically to the appropriate area as directed 2 (Two) Times a Day., Disp: 45 g, Rfl: 2    Biotin 37399 MCG tablet, Take  by mouth., Disp: , Rfl:     Blood Glucose Monitoring Suppl (FreeStyle Lite) device, 1 dose Daily., Disp: 1 each, Rfl: 0    Calcium Carbonate-Vit D-Min (CALCIUM 1200 PO), Take  by mouth., Disp: , Rfl:     Cyanocobalamin (B-12 PO), Place 1 tablet under the tongue Daily. Patches, Disp: , Rfl:     glucose blood (FREESTYLE LITE) test strip, Use as instructed, Disp: 100 each, Rfl: 12    Lancets (freestyle) lancets, Check blood sugar three times daily PRN., Disp: 100 each, Rfl: 2    metFORMIN ER (GLUCOPHAGE-XR) 500 MG 24 hr tablet, TAKE ONE TABLET BY MOUTH DAILY WITH BREAKFAST AND TWO TABLETS BY MOUTH WITH DINNER OR AT BEDTIME, Disp: 90 tablet, Rfl: 2    Multiple Vitamins-Minerals (MULTIVITAL-M) tablet, Take  by mouth., Disp: , Rfl:     nystatin (MYCOSTATIN) 986024 UNIT/GM cream, Apply 1 application topically to the appropriate area as directed 2 (Two) Times a Day., Disp: 30 g, Rfl: 0    nystatin-triamcinolone (MYCOLOG) 510516-0.1 UNIT/GM-% ointment, Apply 1 application  topically to the appropriate area as directed 2 (Two) Times a Day., Disp: 15 g, Rfl: 0    Omega-3 Fatty Acids (fish oil) 1000 MG capsule capsule, Take 1 capsule by mouth Daily With Breakfast., Disp: , Rfl:     omeprazole (priLOSEC) 40 MG capsule, Take 1 capsule by  "mouth 2 (Two) Times a Day., Disp: 60 capsule, Rfl: 5    phentermine (ADIPEX-P) 37.5 MG tablet, Take 1/2 tablet by mouth daily at 11am., Disp: 37 tablet, Rfl: 0    topiramate (Topamax) 25 MG tablet, Take two tablets by mouth daily at bedtime., Disp: 60 tablet, Rfl: 2    valACYclovir (VALTREX) 1000 MG tablet, Take 1 tablet by mouth Daily., Disp: , Rfl:     vitamin C (ASCORBIC ACID) 250 MG tablet, Take 4 tablets by mouth Daily., Disp: , Rfl:     vitamin D3 125 MCG (5000 UT) capsule capsule, Take 1 capsule by mouth Daily., Disp: , Rfl:     Objective   Start weight: 177 pounds.    Total Loss lb/%Loss of beginning body weight (BBW): -14 lb/-7.91%  Change in weight since last visit: +2.6    Body mass index is 26.31 kg/m².   Body composition analysis completed and showed:   Body Fat %: 34.5    Measurements (in inches)  Waist Circumference: 34.5      Vital Signs:   /76 (BP Location: Left arm, Patient Position: Sitting)   Pulse 99   Resp 16   Ht 167.6 cm (66\")   Wt 73.9 kg (163 lb)   SpO2 99%   BMI 26.31 kg/m²     Physical Exam   General appears stated age and normal appearance   HEENT PERRLA, EOM intact, and conjunctivae normal   Chest/lungs Tachycardia, Regular rhythm, and Breathing is unlabored   Extremities without edema   Neuro Good historian and No focal deficit   Skin Warm, dry, intact   Psych normal behavior, normal thought content, and normal concentration     Result Review :   The following data was reviewed by: VALENCIA Rosario on 12/07/2023:  Office Visit on 08/31/2023   Component Date Value Ref Range Status    Hemoglobin A1C 08/31/2023 6.10 (H)  4.80 - 5.60 % Final    Comment: Hemoglobin A1C Ranges:  Increased Risk for Diabetes  5.7% to 6.4%  Diabetes                     >= 6.5%  Diabetic Goal                < 7.0%      Glucose 08/31/2023 87  65 - 99 mg/dL Final    BUN 08/31/2023 17  6 - 20 mg/dL Final    Creatinine 08/31/2023 0.78  0.57 - 1.00 mg/dL Final    EGFR Result 08/31/2023 102.4  >60.0 " mL/min/1.73 Final    Comment: GFR Normal >60  Chronic Kidney Disease <60  Kidney Failure <15      BUN/Creatinine Ratio 08/31/2023 21.8  7.0 - 25.0 Final    Sodium 08/31/2023 139  136 - 145 mmol/L Final    Potassium 08/31/2023 4.5  3.5 - 5.2 mmol/L Final    Chloride 08/31/2023 106  98 - 107 mmol/L Final    Total CO2 08/31/2023 23.7  22.0 - 29.0 mmol/L Final    Calcium 08/31/2023 9.4  8.6 - 10.5 mg/dL Final    Total Protein 08/31/2023 6.9  6.0 - 8.5 g/dL Final    Albumin 08/31/2023 4.2  3.5 - 5.2 g/dL Final    Globulin 08/31/2023 2.7  gm/dL Final    A/G Ratio 08/31/2023 1.6  g/dL Final    Total Bilirubin 08/31/2023 0.3  0.0 - 1.2 mg/dL Final    Alkaline Phosphatase 08/31/2023 67  39 - 117 U/L Final    AST (SGOT) 08/31/2023 11  1 - 32 U/L Final    ALT (SGPT) 08/31/2023 14  1 - 33 U/L Final    Total Cholesterol 08/31/2023 197  0 - 200 mg/dL Final    Comment: Cholesterol Reference Ranges  (U.S. Department of Health and Human Services ATP III  Classifications)  Desirable          <200 mg/dL  Borderline High    200-239 mg/dL  High Risk          >240 mg/dL  Triglyceride Reference Ranges  (U.S. Department of Health and Human Services ATP III  Classifications)  Normal           <150 mg/dL  Borderline High  150-199 mg/dL  High             200-499 mg/dL  Very High        >500 mg/dL  HDL Reference Ranges  (U.S. Department of Health and Human Services ATP III  Classifications)  Low     <40 mg/dl (major risk factor for CHD)  High    >60 mg/dl ('negative' risk factor for CHD)  LDL Reference Ranges  (U.S. Department of Health and Human Services ATP III  Classifications)  Optimal          <100 mg/dL  Near Optimal     100-129 mg/dL  Borderline High  130-159 mg/dL  High             160-189 mg/dL  Very High        >189 mg/dL      Triglycerides 08/31/2023 97  0 - 150 mg/dL Final    HDL Cholesterol 08/31/2023 72 (H)  40 - 60 mg/dL Final    VLDL Cholesterol Inderjit 08/31/2023 17  5 - 40 mg/dL Final    LDL Chol Calc ([facility]) 08/31/2023 108 (H)  0  - 100 mg/dL Final                  Assessment / Plan        Diagnoses and all orders for this visit:    1. Overweight (BMI 25.0-29.9) (Primary)  Assessment & Plan:  Patient's (Body mass index is 26.31 kg/m².) indicates that they are overweight with health conditions that include dyslipidemias, GERD, and prediabetes, fatty liver  . Weight is worsening. BMI is is above average; BMI management plan is completed. We discussed low calorie, low carb based diet program, portion control, increasing exercise, joining a fitness center or start home based exercise program, pharmacologic options including phentermine/topamax, and an yaima-based approach such as [employer] Pal or Lose It.     I have instructed the patient to continue with pursuit of medical weight loss as a part of this program. Patient does meet criteria for use of anorectics at this time as BMI >25 with , impaired fasting glucose, and is not at treatment goal.     The current plan for this month includes:   - bring back exercise effort- either rejoin gym or start home program like [employer]  - Get restarted on high protein, low carb diet, avoid refined sugars which increase appetite and cravings.   - Restart food journal   - Continue phentermine one tablet daily, will attempt taper again once weight is back to goal and appetite is controlled.   - Treatment goal 155lb.      Orders:  -     phentermine (ADIPEX-P) 37.5 MG tablet; Take 1/2 tablet by mouth daily at 11am.  Dispense: 37 tablet; Refill: 0  -     topiramate (Topamax) 25 MG tablet; Take two tablets by mouth daily at bedtime.  Dispense: 60 tablet; Refill: 2    2. Abnormal glucose  Assessment & Plan:  Unchanged. A1c 6.1. Denies hypoglycemia. Continue low carb diet, increase regular physical activity, and maintain weight reduction of 10-15%. Continue metformin.               Follow Up   Return in about 5 weeks (around 1/11/2024) for Next scheduled follow up.  Patient was given instructions and counseling  regarding her condition or for health maintenance advice. Please see specific information pulled into the AVS if appropriate.     Courtney Lew, VALENCIA  12/07/2023

## 2023-12-07 NOTE — TELEPHONE ENCOUNTER
Rx Refill Note  Requested Prescriptions     Pending Prescriptions Disp Refills    phentermine (ADIPEX-P) 37.5 MG tablet 37 tablet 0     Sig: Take 1/2 tablet by mouth twice daily      Last office visit with prescribing clinician: 12/7/2023   Last telemedicine visit with prescribing clinician: 11/30/2023   Next office visit with prescribing clinician: 1/23/2024                         Would you like a call back once the refill request has been completed: [] Yes [] No    If the office needs to give you a call back, can they leave a voicemail: [] Yes [] No    Kyle Aldrich MA  12/07/23, 15:21 EST

## 2023-12-07 NOTE — TELEPHONE ENCOUNTER
[facility] pharmacy called questioning the qty and directions on patients Phentermine.  The directions state take 1/2 tablet by mouth daily at 11 am  QTY is 37 to last 5 weeks.  37 tablets will last 74 days which is more than 5 weeks.  Pharmacy is requesting clarification and a new script if the qty or directions are incorrect. Please advise, thank you.

## 2023-12-07 NOTE — ASSESSMENT & PLAN NOTE
Unchanged. A1c 6.1. Denies hypoglycemia. Continue low carb diet, increase regular physical activity, and maintain weight reduction of 10-15%. Continue metformin.

## 2023-12-07 NOTE — ASSESSMENT & PLAN NOTE
Patient's (Body mass index is 26.31 kg/m².) indicates that they are overweight with health conditions that include dyslipidemias, GERD, and prediabetes, fatty liver  . Weight is worsening. BMI is is above average; BMI management plan is completed. We discussed low calorie, low carb based diet program, portion control, increasing exercise, joining a fitness center or start home based exercise program, pharmacologic options including phentermine/topamax, and an yaima-based approach such as [employer] Pal or Lose It.     I have instructed the patient to continue with pursuit of medical weight loss as a part of this program. Patient does meet criteria for use of anorectics at this time as BMI >25 with , impaired fasting glucose, and is not at treatment goal.     The current plan for this month includes:   - bring back exercise effort- either rejoin gym or start home program like [employer]  - Get restarted on high protein, low carb diet, avoid refined sugars which increase appetite and cravings.   - Restart food journal   - Continue phentermine one tablet daily, will attempt taper again once weight is back to goal and appetite is controlled.   - Treatment goal 155lb.

## 2023-12-11 DIAGNOSIS — E66.3 OVERWEIGHT (BMI 25.0-29.9): ICD-10-CM

## 2023-12-11 RX ORDER — PHENTERMINE HYDROCHLORIDE 37.5 MG/1
18.75 TABLET ORAL 2 TIMES DAILY
Qty: 37 TABLET | Refills: 0 | Status: SHIPPED | OUTPATIENT
Start: 2023-12-11 | End: 2023-12-12 | Stop reason: SDUPTHER

## 2023-12-11 NOTE — TELEPHONE ENCOUNTER
Rx Refill Note  Requested Prescriptions     Pending Prescriptions Disp Refills    phentermine (ADIPEX-P) 37.5 MG tablet 37 tablet 0     Sig: Take 1/2 tablet by mouth twice daily      Last office visit with prescribing clinician: 12/7/2023   Last telemedicine visit with prescribing clinician: Visit date not found   Next office visit with prescribing clinician: 1/23/2024                         Would you like a call back once the refill request has been completed: [] Yes [] No    If the office needs to give you a call back, can they leave a voicemail: [] Yes [] No    Kyle Aldrich MA  12/11/23, 14:35 EST

## 2023-12-12 DIAGNOSIS — E66.3 OVERWEIGHT (BMI 25.0-29.9): ICD-10-CM

## 2023-12-12 RX ORDER — PHENTERMINE HYDROCHLORIDE 37.5 MG/1
18.75 TABLET ORAL 2 TIMES DAILY
Qty: 37 TABLET | Refills: 0 | Status: SHIPPED | OUTPATIENT
Start: 2023-12-12

## 2023-12-12 NOTE — TELEPHONE ENCOUNTER
Rx Refill Note  Requested Prescriptions     Pending Prescriptions Disp Refills    phentermine (ADIPEX-P) 37.5 MG tablet 37 tablet 0     Sig: Take 0.5 tablet by mouth 2 (Two) Times a Day.      Last office visit with prescribing clinician: 12/7/2023   Last telemedicine visit with prescribing clinician: Visit date not found   Next office visit with prescribing clinician: 1/23/2024                         Would you like a call back once the refill request has been completed: [] Yes [] No    If the office needs to give you a call back, can they leave a voicemail: [] Yes [] No    Kyle Aldrich MA  12/12/23, 10:54 EST

## 2023-12-13 LAB
25(OH)D3+25(OH)D2 SERPL-MCNC: 38.5 NG/ML (ref 30–100)
ALBUMIN SERPL-MCNC: 4.3 G/DL (ref 3.9–4.9)
ALBUMIN/GLOB SERPL: 1.4 {RATIO} (ref 1.2–2.2)
ALP SERPL-CCNC: 77 IU/L (ref 44–121)
ALT SERPL-CCNC: 16 IU/L (ref 0–32)
AST SERPL-CCNC: 11 IU/L (ref 0–40)
BASOPHILS # BLD AUTO: 0 X10E3/UL (ref 0–0.2)
BASOPHILS NFR BLD AUTO: 1 %
BILIRUB SERPL-MCNC: 0.4 MG/DL (ref 0–1.2)
BUN SERPL-MCNC: 8 MG/DL (ref 6–20)
BUN/CREAT SERPL: 11 (ref 9–23)
CALCIUM SERPL-MCNC: 9.3 MG/DL (ref 8.7–10.2)
CHLORIDE SERPL-SCNC: 107 MMOL/L (ref 96–106)
CO2 SERPL-SCNC: 20 MMOL/L (ref 20–29)
CREAT SERPL-MCNC: 0.71 MG/DL (ref 0.57–1)
EGFRCR SERPLBLD CKD-EPI 2021: 114 ML/MIN/1.73
EOSINOPHIL # BLD AUTO: 0.2 X10E3/UL (ref 0–0.4)
EOSINOPHIL NFR BLD AUTO: 2 %
ERYTHROCYTE [DISTWIDTH] IN BLOOD BY AUTOMATED COUNT: 12.8 % (ref 11.7–15.4)
FERRITIN SERPL-MCNC: 25 NG/ML (ref 15–150)
FOLATE SERPL-MCNC: 12.9 NG/ML
GLOBULIN SER CALC-MCNC: 3 G/DL (ref 1.5–4.5)
GLUCOSE SERPL-MCNC: 107 MG/DL (ref 70–99)
HCT VFR BLD AUTO: 41.6 % (ref 34–46.6)
HGB BLD-MCNC: 14.2 G/DL (ref 11.1–15.9)
IMM GRANULOCYTES # BLD AUTO: 0 X10E3/UL (ref 0–0.1)
IMM GRANULOCYTES NFR BLD AUTO: 0 %
IRON SERPL-MCNC: 59 UG/DL (ref 27–159)
LYMPHOCYTES # BLD AUTO: 1.9 X10E3/UL (ref 0.7–3.1)
LYMPHOCYTES NFR BLD AUTO: 26 %
MCH RBC QN AUTO: 30.5 PG (ref 26.6–33)
MCHC RBC AUTO-ENTMCNC: 34.1 G/DL (ref 31.5–35.7)
MCV RBC AUTO: 89 FL (ref 79–97)
METHYLMALONATE SERPL-SCNC: 83 NMOL/L (ref 0–378)
MONOCYTES # BLD AUTO: 0.3 X10E3/UL (ref 0.1–0.9)
MONOCYTES NFR BLD AUTO: 4 %
NEUTROPHILS # BLD AUTO: 4.9 X10E3/UL (ref 1.4–7)
NEUTROPHILS NFR BLD AUTO: 67 %
PLATELET # BLD AUTO: 198 X10E3/UL (ref 150–450)
POTASSIUM SERPL-SCNC: 5 MMOL/L (ref 3.5–5.2)
PREALB SERPL-MCNC: 24 MG/DL (ref 14–35)
PROT SERPL-MCNC: 7.3 G/DL (ref 6–8.5)
RBC # BLD AUTO: 4.66 X10E6/UL (ref 3.77–5.28)
SODIUM SERPL-SCNC: 141 MMOL/L (ref 134–144)
VIT B1 BLD-SCNC: 85.9 NMOL/L (ref 66.5–200)
WBC # BLD AUTO: 7.3 X10E3/UL (ref 3.4–10.8)

## 2024-01-23 ENCOUNTER — OFFICE VISIT (OUTPATIENT)
Dept: BARIATRICS/WEIGHT MGMT | Facility: CLINIC | Age: 34
End: 2024-01-23
Payer: COMMERCIAL

## 2024-01-23 VITALS
WEIGHT: 166.8 LBS | SYSTOLIC BLOOD PRESSURE: 110 MMHG | HEART RATE: 98 BPM | OXYGEN SATURATION: 97 % | BODY MASS INDEX: 26.81 KG/M2 | HEIGHT: 66 IN | DIASTOLIC BLOOD PRESSURE: 70 MMHG

## 2024-01-23 DIAGNOSIS — R73.09 ABNORMAL GLUCOSE: ICD-10-CM

## 2024-01-23 DIAGNOSIS — E66.3 OVERWEIGHT (BMI 25.0-29.9): Primary | ICD-10-CM

## 2024-01-23 PROCEDURE — 99214 OFFICE O/P EST MOD 30 MIN: CPT | Performed by: NURSE PRACTITIONER

## 2024-01-23 PROCEDURE — 1159F MED LIST DOCD IN RCRD: CPT | Performed by: NURSE PRACTITIONER

## 2024-01-23 PROCEDURE — 1160F RVW MEDS BY RX/DR IN RCRD: CPT | Performed by: NURSE PRACTITIONER

## 2024-01-23 RX ORDER — PHENTERMINE HYDROCHLORIDE 37.5 MG/1
18.75 TABLET ORAL 2 TIMES DAILY
Qty: 30 TABLET | Refills: 0 | Status: SHIPPED | OUTPATIENT
Start: 2024-01-23

## 2024-01-23 NOTE — ASSESSMENT & PLAN NOTE
Denies hypoglycemia. Continue low carb diet, regular physical activity, and weight reduction of 10-15%. Continue metformin.

## 2024-01-23 NOTE — ASSESSMENT & PLAN NOTE
Patient's (Body mass index is 26.92 kg/m².) indicates that they are overweight with health conditions that include dyslipidemias, GERD, and IR, fatty liver, insulin resistance  . Weight is worsening. BMI is is above average; BMI management plan is completed. We discussed low calorie, low carb based diet program, portion control, increasing exercise, joining a fitness center or start home based exercise program, and pharmacologic options including phentermine/topamax, metformin .    I have instructed the patient to continue with pursuit of medical weight loss as a part of this program. Patient does meet criteria for use of anorectics at this time as BMI >25 with , impaired fasting glucose, and is not at treatment goal.     The current plan for this month includes:   - Add resistance training (bands at home, joined [employer], will be training for 5K twice a week with her daughter)  - Get back on low carb, high protein diet  - Restart phentermine- was confiscated at [employer] control when traveling to [place].   - Plan to taper phentermine when closer to goal weight of 155lb    Continue sympathomimetic (medication refilled).  This patient 1) has no evidence of serious cardiovascular disease; 2) does not have serious psychiatric disease or a history of substance abuse; 3) has been informed about weight loss medications that are FDA approved for long-term use and told that these have been all documented to be safe and effective whereas phentermine has not; 4) does not demonstrate a clinically significant increase in pulse or blood pressure when taking phentermine; and 5) demonstrate significant weight loss while using the medication.  Patient understands that all antiobesity medications are contraindicated in pregnancy.  Patient denies a history of glaucoma.  Patient understands that long-term use of phentermine is considered off label use of this medication, however, that the endocrine Society and recent research supports that  "long-term use of phentermine does not appear to have detrimental health effects when used and the appropriate patient.  In addition, a 2019 study published in an obesity journal on [id number] patient's concluded that \"recommendations to limit phentermine to less than 3 months do not align with current concept of pharmacologic treatment of obesity\", and that \"long-term phentermine users experience greater weight loss without apparent increases in cardiovascular risk\".    We reviewed potential side effects including insomnia, dry mouth, increased heart rate and blood pressure, increased anxiety.  We reviewed reducing caffeine consumption while taking phentermine.  especially if the patient is experience side effects.  The potential risk and benefits of phentermine have been reviewed with the patient, and alternative treatment options were discussed.  All questions were answered, and the patient wishes to move forward with this medication.  .      "

## 2024-01-23 NOTE — PROGRESS NOTES
[facility]  [address]  [place] [address]     Office Note      Date: 2024  Patient Name: Shelley Longoria  MRN: [id number]  : 1990  Subjective  Subjective     Chief Complaint  Obesity Management follow-up          Shelley Longoria presents to [facility] for obesity management. s/p LSG/HHR 19 PQ, Presurgery weight:  218 pounds. stefania: 165lb, goal weight: 155 lbs.  Patient is unsatisfied with weight loss progress. Appetite is poorly controlled. [place]. She ate more carbs than usual while there because the meat was different and undesirable to her. Reports no side effects of prescribed medications today. The patient is taking multivitamin and is taking fish oil. The patient is not using a food journal.   The patient is exercising with a FITT score of:    Frequency Intensity Time Strength Training   []   0, none []   0 []   0 []   0   [x]   1 (1-2x/week) []   1 (light) []   1 (<10 min) []   1 (1x/week)   []   2 (3-5x/week) [x]   2 (moderate) []   2 (10-20 min) [x]   2 (2x/week)   []   3 (daily) []   3 (moderately hard)  []   4 (very hard) [x]   3 (20-30 min)  []   4 (>30 min) []   3 (3-4x/week)       Review of Systems   Constitutional:  Positive for appetite change and fatigue.   Eyes:  Negative for visual disturbance.   Cardiovascular:  Negative for chest pain and palpitations.   Gastrointestinal:  Negative for constipation and indigestion.   Neurological:  Negative for light-headedness.         Current Outpatient Medications:     betamethasone valerate (VALISONE) 0.1 % ointment, Apply 1 application topically to the appropriate area as directed 2 (Two) Times a Day., Disp: 45 g, Rfl: 2    Biotin 14966 MCG tablet, Take  by mouth., Disp: , Rfl:     Blood Glucose Monitoring Suppl (FreeStyle Lite) device, 1 dose Daily., Disp: 1 each, Rfl: 0     Calcium Carbonate-Vit D-Min (CALCIUM 1200 PO), Take  by mouth., Disp: , Rfl:     Cyanocobalamin (B-12 PO), Place 1 tablet under the tongue Daily. Patches, Disp: , Rfl:     glucose blood (FREESTYLE LITE) test strip, Use as instructed, Disp: 100 each, Rfl: 12    Lancets (freestyle) lancets, Check blood sugar three times daily PRN., Disp: 100 each, Rfl: 2    metFORMIN ER (GLUCOPHAGE-XR) 500 MG 24 hr tablet, TAKE ONE TABLET BY MOUTH DAILY WITH BREAKFAST AND TWO TABLETS BY MOUTH WITH DINNER OR AT BEDTIME, Disp: 90 tablet, Rfl: 2    Multiple Vitamins-Minerals (MULTIVITAL-M) tablet, Take  by mouth., Disp: , Rfl:     nystatin (MYCOSTATIN) 486279 UNIT/GM cream, Apply 1 application topically to the appropriate area as directed 2 (Two) Times a Day., Disp: 30 g, Rfl: 0    nystatin-triamcinolone (MYCOLOG) 887626-9.1 UNIT/GM-% ointment, Apply 1 application  topically to the appropriate area as directed 2 (Two) Times a Day., Disp: 15 g, Rfl: 0    Omega-3 Fatty Acids (fish oil) 1000 MG capsule capsule, Take 1 capsule by mouth Daily With Breakfast., Disp: , Rfl:     omeprazole (priLOSEC) 40 MG capsule, Take 1 capsule by mouth 2 (Two) Times a Day., Disp: 60 capsule, Rfl: 5    phentermine (ADIPEX-P) 37.5 MG tablet, Take 0.5 tablet by mouth 2 (Two) Times a Day., Disp: 30 tablet, Rfl: 0    topiramate (Topamax) 25 MG tablet, Take two tablets by mouth daily at bedtime., Disp: 60 tablet, Rfl: 2    valACYclovir (VALTREX) 1000 MG tablet, Take 1 tablet by mouth Daily., Disp: , Rfl:     vitamin C (ASCORBIC ACID) 250 MG tablet, Take 4 tablets by mouth Daily., Disp: , Rfl:     vitamin D3 125 MCG (5000 UT) capsule capsule, Take 1 capsule by mouth Daily., Disp: , Rfl:     Objective   Start weight: 218 pounds.    Total Loss lb/%Loss of beginning body weight (BBW): -51.2lb/-23.49%  Change in weight since last visit: +3.8    Body mass index is 26.92 kg/m².   Body composition analysis completed and showed:   Body Fat %: 33.4    Measurements (in  "inches)  Waist Circumference: 35.5      Vital Signs:   /70 (BP Location: Left arm, Patient Position: Sitting)   Pulse 98   Ht 167.6 cm (66\")   Wt 75.7 kg (166 lb 12.8 oz)   SpO2 97%   BMI 26.92 kg/m²     Physical Exam   General appears stated age and normal appearance   HEENT PERRLA, EOM intact, and conjunctivae normal   Chest/lungs Normal rate, Regular rhythm, and Breathing is unlabored   Extremities without edema   Neuro Good historian and No focal deficit   Skin Warm, dry, intact   Psych normal behavior, normal thought content, and normal concentration     Result Review :                Assessment / Plan        Diagnoses and all orders for this visit:    1. Overweight (BMI 25.0-29.9) (Primary)  Assessment & Plan:  Patient's (Body mass index is 26.92 kg/m².) indicates that they are overweight with health conditions that include dyslipidemias, GERD, and IR, fatty liver, insulin resistance  . Weight is worsening. BMI is is above average; BMI management plan is completed. We discussed low calorie, low carb based diet program, portion control, increasing exercise, joining a fitness center or start home based exercise program, and pharmacologic options including phentermine/topamax, metformin .    I have instructed the patient to continue with pursuit of medical weight loss as a part of this program. Patient does meet criteria for use of anorectics at this time as BMI >25 with , impaired fasting glucose, and is not at treatment goal.     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   - Plan to taper phentermine when closer to goal weight of 155lb    Continue sympathomimetic (medication refilled).  This patient 1) has no evidence of serious cardiovascular disease; 2) does not " "have serious psychiatric disease or a history of substance abuse; 3) has been informed about weight loss medications that are FDA approved for long-term use and told that these have been all documented to be safe and effective whereas phentermine has not; 4) does not demonstrate a clinically significant increase in pulse or blood pressure when taking phentermine; and 5) demonstrate significant weight loss while using the medication.  Patient understands that all antiobesity medications are contraindicated in pregnancy.  Patient denies a history of glaucoma.  Patient understands that long-term use of phentermine is considered off label use of this medication, however, that the endocrine Society and recent research supports that long-term use of phentermine does not appear to have detrimental health effects when used and the appropriate patient.  In addition, a 2019 study published in an obesity journal on [id number] patient's concluded that \"recommendations to limit phentermine to less than 3 months do not align with current concept of pharmacologic treatment of obesity\", and that \"long-term phentermine users experience greater weight loss without apparent increases in cardiovascular risk\".    We reviewed potential side effects including insomnia, dry mouth, increased heart rate and blood pressure, increased anxiety.  We reviewed reducing caffeine consumption while taking phentermine.  especially if the patient is experience side effects.  The potential risk and benefits of phentermine have been reviewed with the patient, and alternative treatment options were discussed.  All questions were answered, and the patient wishes to move forward with this medication.  .        Orders:  -     phentermine (ADIPEX-P) 37.5 MG tablet; Take 0.5 tablet by mouth 2 (Two) Times a Day.  Dispense: 30 tablet; Refill: 0    2. Abnormal glucose  Assessment & Plan:  Denies hypoglycemia. Continue low carb diet, regular physical activity, and " weight reduction of 10-15%. Continue metformin.               Follow Up   Return in about 1 month (around 2/23/2024) for Next scheduled follow up.  Patient was given instructions and counseling regarding her condition or for health maintenance advice. Please see specific information pulled into the AVS if appropriate.     Courtney Lew, VALENCIA  01/23/2024

## 2024-02-19 ENCOUNTER — TELEPHONE (OUTPATIENT)
Dept: BARIATRICS/WEIGHT MGMT | Facility: CLINIC | Age: 34
End: 2024-02-19
Payer: COMMERCIAL

## 2024-02-19 NOTE — TELEPHONE ENCOUNTER
Patient called stating she has seen Dr Fuentes @ [facility] and he is willing to do the skin removal from patients thighs and groin area.  He just needs a referral stating that with treatment the rash has not improved so insurance will cover the surgery.

## 2024-03-05 ENCOUNTER — TELEMEDICINE (OUTPATIENT)
Dept: BARIATRICS/WEIGHT MGMT | Facility: CLINIC | Age: 34
End: 2024-03-05
Payer: COMMERCIAL

## 2024-03-05 VITALS
BODY MASS INDEX: 26.52 KG/M2 | HEART RATE: 98 BPM | WEIGHT: 165 LBS | HEIGHT: 66 IN | DIASTOLIC BLOOD PRESSURE: 70 MMHG | SYSTOLIC BLOOD PRESSURE: 120 MMHG

## 2024-03-05 DIAGNOSIS — R73.09 ABNORMAL GLUCOSE: ICD-10-CM

## 2024-03-05 DIAGNOSIS — E66.3 OVERWEIGHT (BMI 25.0-29.9): Primary | ICD-10-CM

## 2024-03-05 PROCEDURE — 1160F RVW MEDS BY RX/DR IN RCRD: CPT | Performed by: NURSE PRACTITIONER

## 2024-03-05 PROCEDURE — 99214 OFFICE O/P EST MOD 30 MIN: CPT | Performed by: NURSE PRACTITIONER

## 2024-03-05 PROCEDURE — 1159F MED LIST DOCD IN RCRD: CPT | Performed by: NURSE PRACTITIONER

## 2024-03-05 RX ORDER — PHENTERMINE HYDROCHLORIDE 37.5 MG/1
18.75 TABLET ORAL 2 TIMES DAILY
Qty: 55 TABLET | Refills: 0 | Status: SHIPPED | OUTPATIENT
Start: 2024-03-05

## 2024-03-05 RX ORDER — METFORMIN HYDROCHLORIDE 500 MG/1
TABLET, EXTENDED RELEASE ORAL
Qty: 90 TABLET | Refills: 2 | Status: SHIPPED | OUTPATIENT
Start: 2024-03-05

## 2024-03-05 NOTE — PROGRESS NOTES
"     Office Note      Date: 2024  Patient Name: Shelley Longoria  MRN: [id number]  : 1990    This service was conducted via [employer].  Patient is located at her work address.  Provider is located at her work address. The use of a video visit has been reviewed with the patient and informed consent has been obtained.  Subjective  Subjective     Chief Complaint  Obesity Management follow-up    Subjective          Shelley Longoria presents to [facility] via telehealth for obesity management. female s/p LSG/HHR 19 PQ, Presurgery weight:  218 pounds. stefania: 165lb, goal weight:  155 lbs.    Patient is satisfied with weight loss progress. Appetite is moderately controlled. Better since she increased protein last week. Reports no side effects of prescribed medications today. She is not taking metformin, didn't think she needed it anymore since her blood sugar is better. Didn't pick phentermine up at the pharmacy in time so she has also not taken it. Only taking topamax. Continues to be mindful of her calories and protein.   B: protein coffee  L: avocado toast with willson and egg  D: protein with small carb  The patient is exercising- going to the gym almost every day this week. She is going with her daughter. Starts with cardio- walk or jog then rotates muscle groups each day. Using weigh better yaima.     Review of Systems   Constitutional:  Positive for appetite change. Negative for fatigue.   Eyes:  Negative for visual disturbance.   Cardiovascular:  Negative for chest pain and palpitations.   Gastrointestinal:  Negative for constipation and indigestion.   Neurological:  Negative for light-headedness.         Objective   Start weight: 177 pounds.    Total weight loss: -12 pounds/-6.7%  Change in weight since last visit: -1lb    Body mass index is 26.63 kg/m².   Measurements (in inches)     /70   Pulse 98   Ht 167.6 cm (66\")   Wt 74.8 kg (165 lb)  "  BMI 26.63 kg/m²       Result Review :                 Assessment and Plan    Diagnoses and all orders for this visit:    1. Overweight (BMI 25.0-29.9) (Primary)  Assessment & Plan:  Patient's (Body mass index is 26.63 kg/m².) indicates that they are overweight with health conditions that include dyslipidemias, GERD, and fatty liver, insulin resistance, joint pain  . Weight is improving with treatment. BMI is is above average; BMI management plan is completed. We discussed low calorie, low carb based diet program, portion control, increasing exercise, pharmacologic options including phentermine/topamax, metformin, and an yaima-based approach such as [employer] Pal or Lose It.     I have instructed the patient to continue with pursuit of medical weight loss as a part of this program. Patient does meet criteria for use of anorectics at this time as BMI >25 with , hyperlipidemia, impaired fasting glucose, and is not at treatment goal.     The current plan for this month includes:   - Continue current exercise efforts  - Continue to prioritize protein, fiber, and hydration.   - Restart phentermine.  - Restart metformin, discussed it is beneficial for weight reduction and blood sugar control.   - treatment goal 155lb.        Orders:  -     phentermine (ADIPEX-P) 37.5 MG tablet; Take 0.5 tablet by mouth 2 (Two) Times a Day.  Dispense: 55 tablet; Refill: 0    2. Abnormal glucose  Assessment & Plan:  Denies hypoglycemia. Continue low carb diet, regular physical activity, and weight reduction of 10-15%. Restart metformin.       Orders:  -     metFORMIN ER (GLUCOPHAGE-XR) 500 MG 24 hr tablet; TAKE ONE TABLET BY MOUTH DAILY WITH BREAKFAST AND TWO TABLETS BY MOUTH WITH DINNER OR AT BEDTIME  Dispense: 90 tablet; Refill: 2          Follow Up   Return in about 2 months (around 5/5/2024) for Next scheduled follow up.  Patient was given instructions and counseling regarding her condition or for health maintenance advice. Please see  specific information pulled into the AVS if appropriate.     VALENCIA Ivey

## 2024-03-05 NOTE — ASSESSMENT & PLAN NOTE
Denies hypoglycemia. Continue low carb diet, regular physical activity, and weight reduction of 10-15%. Restart metformin.

## 2024-03-05 NOTE — ASSESSMENT & PLAN NOTE
Patient's (Body mass index is 26.63 kg/m².) indicates that they are overweight with health conditions that include dyslipidemias, GERD, and fatty liver, insulin resistance, joint pain  . Weight is improving with treatment. BMI is is above average; BMI management plan is completed. We discussed low calorie, low carb based diet program, portion control, increasing exercise, pharmacologic options including phentermine/topamax, metformin, and an yaima-based approach such as [employer] Pal or Lose It.     I have instructed the patient to continue with pursuit of medical weight loss as a part of this program. Patient does meet criteria for use of anorectics at this time as BMI >25 with , hyperlipidemia, impaired fasting glucose, and is not at treatment goal.     The current plan for this month includes:   - Continue current exercise efforts  - Continue to prioritize protein, fiber, and hydration.   - Restart phentermine.  - Restart metformin, discussed it is beneficial for weight reduction and blood sugar control.   - treatment goal 155lb.

## 2024-03-18 ENCOUNTER — TELEPHONE (OUTPATIENT)
Dept: BARIATRICS/WEIGHT MGMT | Facility: CLINIC | Age: 34
End: 2024-03-18
Payer: COMMERCIAL

## 2024-03-18 DIAGNOSIS — Z30.9 ENCOUNTER FOR CONTRACEPTIVE MANAGEMENT, UNSPECIFIED TYPE: Primary | ICD-10-CM

## 2024-03-18 RX ORDER — NORETHINDRONE ACETATE AND ETHINYL ESTRADIOL 1MG-20(21)
1 KIT ORAL DAILY
Qty: 28 TABLET | Refills: 12 | Status: SHIPPED | OUTPATIENT
Start: 2024-03-18 | End: 2025-03-18

## 2024-03-18 NOTE — TELEPHONE ENCOUNTER
Patient called and states that she is completely out of her birth control (Junel FE) and needs a refill sent to [facility] on [address].  She took her last one on Wednesday.

## 2024-03-20 ENCOUNTER — PATIENT MESSAGE (OUTPATIENT)
Dept: BARIATRICS/WEIGHT MGMT | Facility: CLINIC | Age: 34
End: 2024-03-20
Payer: COMMERCIAL

## 2024-03-21 ENCOUNTER — LAB (OUTPATIENT)
Dept: INTERNAL MEDICINE | Facility: CLINIC | Age: 34
End: 2024-03-21
Payer: COMMERCIAL

## 2024-03-21 ENCOUNTER — OFFICE VISIT (OUTPATIENT)
Dept: INTERNAL MEDICINE | Facility: CLINIC | Age: 34
End: 2024-03-21
Payer: COMMERCIAL

## 2024-03-21 VITALS
RESPIRATION RATE: 18 BRPM | OXYGEN SATURATION: 100 % | HEART RATE: 86 BPM | TEMPERATURE: 98 F | BODY MASS INDEX: 27 KG/M2 | HEIGHT: 66 IN | WEIGHT: 168 LBS | SYSTOLIC BLOOD PRESSURE: 108 MMHG | DIASTOLIC BLOOD PRESSURE: 72 MMHG

## 2024-03-21 DIAGNOSIS — B35.1 FUNGAL INFECTION OF TOENAIL: ICD-10-CM

## 2024-03-21 DIAGNOSIS — R53.83 FATIGUE, UNSPECIFIED TYPE: Primary | ICD-10-CM

## 2024-03-21 DIAGNOSIS — R53.83 FATIGUE, UNSPECIFIED TYPE: ICD-10-CM

## 2024-03-21 LAB
ALBUMIN SERPL-MCNC: 4.4 G/DL (ref 3.5–5.2)
ALBUMIN/GLOB SERPL: 1.5 G/DL
ALP SERPL-CCNC: 75 U/L (ref 39–117)
ALT SERPL W P-5'-P-CCNC: 29 U/L (ref 1–33)
ANION GAP SERPL CALCULATED.3IONS-SCNC: 11 MMOL/L (ref 5–15)
AST SERPL-CCNC: 12 U/L (ref 1–32)
BASOPHILS # BLD AUTO: 0.05 10*3/MM3 (ref 0–0.2)
BASOPHILS NFR BLD AUTO: 0.5 % (ref 0–1.5)
BILIRUB SERPL-MCNC: 0.3 MG/DL (ref 0–1.2)
BUN SERPL-MCNC: 13 MG/DL (ref 6–20)
BUN/CREAT SERPL: 16 (ref 7–25)
CALCIUM SPEC-SCNC: 9.7 MG/DL (ref 8.6–10.5)
CHLORIDE SERPL-SCNC: 102 MMOL/L (ref 98–107)
CO2 SERPL-SCNC: 24 MMOL/L (ref 22–29)
CREAT SERPL-MCNC: 0.81 MG/DL (ref 0.57–1)
DEPRECATED RDW RBC AUTO: 43.9 FL (ref 37–54)
EGFRCR SERPLBLD CKD-EPI 2021: 97.2 ML/MIN/1.73
EOSINOPHIL # BLD AUTO: 0.17 10*3/MM3 (ref 0–0.4)
EOSINOPHIL NFR BLD AUTO: 1.7 % (ref 0.3–6.2)
ERYTHROCYTE [DISTWIDTH] IN BLOOD BY AUTOMATED COUNT: 13.4 % (ref 12.3–15.4)
GLOBULIN UR ELPH-MCNC: 3 GM/DL
GLUCOSE SERPL-MCNC: 116 MG/DL (ref 65–99)
HBA1C MFR BLD: 6.7 % (ref 4.8–5.6)
HCT VFR BLD AUTO: 43.4 % (ref 34–46.6)
HGB BLD-MCNC: 14.5 G/DL (ref 12–15.9)
IMM GRANULOCYTES # BLD AUTO: 0.03 10*3/MM3 (ref 0–0.05)
IMM GRANULOCYTES NFR BLD AUTO: 0.3 % (ref 0–0.5)
LYMPHOCYTES # BLD AUTO: 3.25 10*3/MM3 (ref 0.7–3.1)
LYMPHOCYTES NFR BLD AUTO: 31.7 % (ref 19.6–45.3)
MCH RBC QN AUTO: 29.9 PG (ref 26.6–33)
MCHC RBC AUTO-ENTMCNC: 33.4 G/DL (ref 31.5–35.7)
MCV RBC AUTO: 89.5 FL (ref 79–97)
MONOCYTES # BLD AUTO: 0.37 10*3/MM3 (ref 0.1–0.9)
MONOCYTES NFR BLD AUTO: 3.6 % (ref 5–12)
NEUTROPHILS NFR BLD AUTO: 6.37 10*3/MM3 (ref 1.7–7)
NEUTROPHILS NFR BLD AUTO: 62.2 % (ref 42.7–76)
NRBC BLD AUTO-RTO: 0 /100 WBC (ref 0–0.2)
PLATELET # BLD AUTO: 253 10*3/MM3 (ref 140–450)
PMV BLD AUTO: 11.3 FL (ref 6–12)
POTASSIUM SERPL-SCNC: 3.8 MMOL/L (ref 3.5–5.2)
PROT SERPL-MCNC: 7.4 G/DL (ref 6–8.5)
RBC # BLD AUTO: 4.85 10*6/MM3 (ref 3.77–5.28)
SODIUM SERPL-SCNC: 137 MMOL/L (ref 136–145)
TSH SERPL DL<=0.05 MIU/L-ACNC: 1.26 UIU/ML (ref 0.27–4.2)
WBC NRBC COR # BLD AUTO: 10.24 10*3/MM3 (ref 3.4–10.8)

## 2024-03-21 PROCEDURE — 83036 HEMOGLOBIN GLYCOSYLATED A1C: CPT

## 2024-03-21 PROCEDURE — 84443 ASSAY THYROID STIM HORMONE: CPT | Performed by: PHYSICIAN ASSISTANT

## 2024-03-21 PROCEDURE — 99214 OFFICE O/P EST MOD 30 MIN: CPT

## 2024-03-21 PROCEDURE — 80053 COMPREHEN METABOLIC PANEL: CPT | Performed by: PHYSICIAN ASSISTANT

## 2024-03-21 PROCEDURE — 1160F RVW MEDS BY RX/DR IN RCRD: CPT

## 2024-03-21 PROCEDURE — 36415 COLL VENOUS BLD VENIPUNCTURE: CPT

## 2024-03-21 PROCEDURE — 85025 COMPLETE CBC W/AUTO DIFF WBC: CPT | Performed by: PHYSICIAN ASSISTANT

## 2024-03-21 PROCEDURE — 82306 VITAMIN D 25 HYDROXY: CPT

## 2024-03-21 PROCEDURE — 1159F MED LIST DOCD IN RCRD: CPT

## 2024-03-21 NOTE — PROGRESS NOTES
Office Note     Name: Shelley Longoria    : 1990     MRN: [id number]     Chief Complaint  Nail Problem    Subjective     History of Present Illness:  Shelley Longoria is a 34 y.o. female who presents today for right toenail britlleness and fungal infection. Reports abnormal toenial to right great toe and 5th digit on right foot for about 1 year. She said she has been using OTC topical antifungals on her great toenail but it has not helped. She denies pain, erythema or drainage. There is just very brittle, dry nails    Also c/o feeling fatigued and dehydrated. States she is a bariatric patient so she knows to stay hydrate and drink adequate amount of water but states she feels like she can't quinch her thirst, feels very dry and fatigued. She spoke with her bariatrics dr who ordered some lab work to evaluate hydration status.     Review of Systems    Past Medical History:   Diagnosis Date    Anxiety     Chronic joint pain     denies NSAIDS/steroids    COVID-19     ,     DM2 (diabetes mellitus, type 2)     w/ hx gestational diabetes , dx May 2017, started on insulin when initially dx, lost weight w/ Adipex, stopped insulin 6 months after dx, but now w/ weight regain, back on insulin.  Managed by PCP    Dyspepsia     Dyspnea on exertion     Fatigue     Fatty liver     w/ abnormal LFTs, US 2018    GERD (gastroesophageal reflux disease) 2023    Gestational diabetes     H. pylori infection     UBT (+) 19 - PrevPak RX; repeat HPSA (+) 2019 - Pylera RX; repeat UBT (holding PPI) (+) - referred to GI, RX Levaquin/Flagyl/Pepto RX  - HPSA negative 2023    Hiatal hernia     History of transfusion     [facility]- PT UNSURE HOW MANY UNITS RECEIVED, NO REACTIONS    Mixed hyperlipidemia 10/15/2019    Obesity (BMI 30-39.9)     Pap smear for cervical cancer screening 2017    Wears eyeglasses      Past Surgical History:   Procedure Laterality Date    CERVICAL CONE BIOPSY   2004    benign     SECTION  /    ENDOSCOPY N/A 2019    Procedure: ESOPHAGOGASTRODUODENOSCOPY;  Surgeon: Mila Whatley MD;  Location:  [facility];  Service: Bariatric    GASTRIC SLEEVE LAPAROSCOPIC N/A 2019    Procedure: GASTRIC SLEEVE LAPAROSCOPIC;  Surgeon: Mila Whatley MD;  Location: [facility];  Service: Bariatric    HIATAL HERNIA REPAIR N/A 2019    Procedure: HIATAL HERNIA REPAIR LAPAROSCOPIC;  Surgeon: Mila Whatley MD;  Location: [facility];  Service: Bariatric    HIATAL HERNIA REPAIR N/A 2023    Procedure: LAPAROSCOPIC HIATAL HERNIA REPAIR WITH MESH WITH DAVINCI ROBOT;  Surgeon: Mila Whatley MD;  Location:  [facility];  Service: Robotics - DaVinci;  Laterality: N/A;    UPPER GASTROINTESTINAL ENDOSCOPY  2019    Dr VIVIENNE Whatley    UPPER GASTROINTESTINAL ENDOSCOPY  2022    Dr Aldrich, positive H pylori       Current Outpatient Medications:     betamethasone valerate (VALISONE) 0.1 % ointment, Apply 1 application topically to the appropriate area as directed 2 (Two) Times a Day., Disp: 45 g, Rfl: 2    Biotin 17144 MCG tablet, Take  by mouth., Disp: , Rfl:     Blood Glucose Monitoring Suppl (FreeStyle Lite) device, 1 dose Daily., Disp: 1 each, Rfl: 0    Calcium Carbonate-Vit D-Min (CALCIUM 1200 PO), Take  by mouth., Disp: , Rfl:     Cyanocobalamin (B-12 PO), Place 1 tablet under the tongue Daily. Patches, Disp: , Rfl:     glucose blood (FREESTYLE LITE) test strip, Use as instructed, Disp: 100 each, Rfl: 12    Lancets (freestyle) lancets, Check blood sugar three times daily PRN., Disp: 100 each, Rfl: 2    metFORMIN ER (GLUCOPHAGE-XR) 500 MG 24 hr tablet, TAKE ONE TABLET BY MOUTH DAILY WITH BREAKFAST AND TWO TABLETS BY MOUTH WITH DINNER OR AT BEDTIME, Disp: 90 tablet, Rfl: 2    Multiple Vitamins-Minerals (MULTIVITAL-M) tablet, Take  by mouth., Disp: , Rfl:     norethindrone-ethinyl estradiol FE (Junel ) 1-20 MG-MCG per  "tablet, Take 1 tablet by mouth Daily., Disp: 28 tablet, Rfl: 12    Omega-3 Fatty Acids (fish oil) 1000 MG capsule capsule, Take 1 capsule by mouth Daily With Breakfast., Disp: , Rfl:     omeprazole (priLOSEC) 40 MG capsule, Take 1 capsule by mouth 2 (Two) Times a Day., Disp: 60 capsule, Rfl: 5    phentermine (ADIPEX-P) 37.5 MG tablet, Take 0.5 tablet by mouth 2 (Two) Times a Day., Disp: 55 tablet, Rfl: 0    topiramate (Topamax) 25 MG tablet, Take two tablets by mouth daily at bedtime., Disp: 60 tablet, Rfl: 2    valACYclovir (VALTREX) 1000 MG tablet, Take 1 tablet by mouth Daily., Disp: , Rfl:     vitamin C (ASCORBIC ACID) 250 MG tablet, Take 4 tablets by mouth Daily., Disp: , Rfl:     vitamin D3 125 MCG (5000 UT) capsule capsule, Take 1 capsule by mouth Daily., Disp: , Rfl:     Efinaconazole 10 % solution, Apply 1 Application topically Every Night., Disp: 8 mL, Rfl: 0  No Known Allergies    Objective     Vital Signs  /72 (BP Location: Right arm, Patient Position: Sitting, Cuff Size: Adult)   Pulse 86   Temp 98 °F (36.7 °C) (Infrared)   Resp 18   Ht 167.6 cm (66\")   Wt 76.2 kg (168 lb)   SpO2 100%   BMI 27.12 kg/m²   Estimated body mass index is 27.12 kg/m² as calculated from the following:    Height as of this encounter: 167.6 cm (66\").    Weight as of this encounter: 76.2 kg (168 lb).            Physical Exam  Vitals reviewed.   Constitutional:       Appearance: Normal appearance. She is not ill-appearing.   HENT:      Head: Normocephalic and atraumatic.      Right Ear: Tympanic membrane, ear canal and external ear normal. There is no impacted cerumen.      Left Ear: Tympanic membrane, ear canal and external ear normal. There is no impacted cerumen.      Nose: Nose normal. No congestion or rhinorrhea.      Mouth/Throat:      Mouth: Mucous membranes are moist.      Pharynx: Oropharynx is clear. Uvula midline. No oropharyngeal exudate or posterior oropharyngeal erythema.      Tonsils: No tonsillar " exudate or tonsillar abscesses. 0 on the right. 0 on the left.   Eyes:      Extraocular Movements: Extraocular movements intact.      Conjunctiva/sclera: Conjunctivae normal.      Pupils: Pupils are equal, round, and reactive to light.   Neck:      Thyroid: No thyroid mass, thyromegaly or thyroid tenderness.   Cardiovascular:      Rate and Rhythm: Normal rate and regular rhythm.      Pulses: Normal pulses.           Radial pulses are 2+ on the right side and 2+ on the left side.      Heart sounds: Normal heart sounds, S1 normal and S2 normal. No murmur heard.  Pulmonary:      Effort: Pulmonary effort is normal. No tachypnea.      Breath sounds: Normal breath sounds and air entry. No decreased breath sounds, wheezing or rhonchi.   Abdominal:      General: Abdomen is flat. Bowel sounds are normal.      Palpations: Abdomen is soft.      Tenderness: There is no abdominal tenderness. There is no guarding or rebound.   Musculoskeletal:      Right lower leg: No edema.      Left lower leg: No edema.   Feet:      Right foot:      Skin integrity: Skin integrity normal.      Toenail Condition: Fungal disease present.     Left foot:      Toenail Condition: Left toenails are normal.      Comments: Fungal disease to great toe on R foot  Lymphadenopathy:      Cervical: No cervical adenopathy.   Skin:     General: Skin is warm and dry.      Capillary Refill: Capillary refill takes less than 2 seconds.   Neurological:      General: No focal deficit present.      Mental Status: She is alert and oriented to person, place, and time. Mental status is at baseline.      Sensory: Sensation is intact.      Motor: Motor function is intact.      Coordination: Coordination is intact.      Gait: Gait is intact.   Psychiatric:         Attention and Perception: Attention and perception normal.         Mood and Affect: Mood and affect normal.         Speech: Speech normal.         Behavior: Behavior normal. Behavior is cooperative.         Thought  Content: Thought content normal.         Cognition and Memory: Cognition and memory normal.         Judgment: Judgment normal.               Assessment and Plan     Diagnoses and all orders for this visit:    1. Fatigue, unspecified type (Primary)  -     TSH Rfx On Abnormal To Free T4; Future  -     Hemoglobin A1c; Future  -     Vitamin D 25 hydroxy; Future    2. Fungal infection of toenail  -     Efinaconazole 10 % solution; Apply 1 Application topically Every Night.  Dispense: 8 mL; Refill: 0    Will order additional labs to what bariatrics ordered  Antifungal sent to pharmacy    If a referral was made please contact our office if you have not heard about an appointment in the next 2 weeks.   If labs or images are ordered we will contact you with the results within the next week.  If you have not heard from us after a week please call our office to inquire about the results.      Johanne Carter, APRN

## 2024-03-22 ENCOUNTER — PRIOR AUTHORIZATION (OUTPATIENT)
Dept: INTERNAL MEDICINE | Facility: CLINIC | Age: 34
End: 2024-03-22
Payer: COMMERCIAL

## 2024-03-22 LAB — 25(OH)D3 SERPL-MCNC: 36.2 NG/ML (ref 30–100)

## 2024-03-26 ENCOUNTER — TELEPHONE (OUTPATIENT)
Dept: INTERNAL MEDICINE | Facility: CLINIC | Age: 34
End: 2024-03-26
Payer: COMMERCIAL

## 2024-03-26 NOTE — TELEPHONE ENCOUNTER
Pharmacy needs to know how many drops and to how many toes this medication goes on for the directions.

## 2024-03-26 NOTE — TELEPHONE ENCOUNTER
PA has been approved from 3/22/24- 3/21/25.  Pharmacy has been notified by voicemail and patient has been notified.

## 2024-03-26 NOTE — TELEPHONE ENCOUNTER
Pharmacy is needing to know further information regarding efinaconazole for insurance prior auth.  Best call back number: [phone]

## 2024-03-27 NOTE — TELEPHONE ENCOUNTER
2018         RE: Zahira Lazo  [address] [facility] [address]        Dear Colleague,    Thank you for referring your patient, Zahira Lazo, to the [facility]. Please see a copy of my visit note below.    Pediatric Endocrinology Follow-up Consultation    Patient: Zahira Lazo MRN# [id number]   YOB: 2014 Age: 4 year old   Date of Visit: 2018    Dear Dr. Mercy Bryant:    I had the pleasure of seeing your patient, Zahira Lazo in the Pediatric Endocrinology Clinic, [facility], on 2018 for a follow-up consultation of congenital hypothyroidism.           Problem list:     Patient Active Problem List    Diagnosis Date Noted     Alternate vaccine schedule per parent request 2015     Priority: Medium     Chronic constipation 2015     Priority: Medium     Growth deceleration 2014     Priority: Medium     Rectal bleeding 2014     Priority: Medium     Hypothyroidism, congenital 2013     Priority: Medium            HPI:   Zahira is a 4  year old 0  month old female who is accompanied to clinic today by her mother and younger sister in follow up of congenital hypothyroidism.  Zahira was last seen in our clinic on 2017.     Previous history is reviewed and as follows:  Zahira had a slightly elevated TSH level of 39 (normal range 37-59.9) on her  screen. This was repeated by Dr. Bryant on 13 with another elevated TSH level of 7.03 with normal free T4 level of 1.92. Repeat TSH on 2013 again showed an elevated TSH level of 7.55 with normal free T4 level of 1.87. Based on continued elevation of her TSH levels, Zahira was referred to our clinic and evaluated for initial consultation on 2013 and was recommended to initiate thyroid hormone replacement starting at 25 mcg. Repeat thyroid function studies were performed on 13 with a mildly elevated free T4 level  Apply 1 drop to affected toe(s)      of 2.4 and a low TSH level of 0.12. Levothyroxine dose was then decreased to 12.5 mcg.  In April 2016 Zahira's levothyroxine dose was increased to 25 mcg based on mildly elevated TSH value.  Due to need to increase levothyroxine, attempt to wean Zahira off levothyroxine was not performed at the age of 2.      Present history:  Zahira continues on levothyroxine 25 mcg daily.  This is now given in the mornings without issue.  She continues to do very well developmentally.  Zahira will be starting  in the fall.  She has normal energy and no concerns with sleep.  She continues to have occasional issues with constipation treated with either diet modifications.  Zahira has remained generally healthy.  She has glasses as one eye has 20/400 vision and the other eye has 20/40 vision.     I have reviewed the available past laboratory evaluations, imaging studies, and medical records available to me at this visit. I have reviewed the Zahira's growth chart.   History was obtained from patient's mother and electronic health record.          Social History:     Social History     Social History Narrative    Zahira lives with her mother, father, and younger sister Ifeoma born 4/2016.         Social history was reviewed as as above.  Mom is a teacher and [place].  Zahira will be attending  in the fall 2018.           Family History:     Family History   Problem Relation Age of Onset     Thyroid Disease No family hx of      Other - See Comments Mother      5 feet 2 inches     Other - See Comments Father      6 feet       Family history was reviewed and is unchanged. Refer to the initial note.    No family history of celiac disease, gluten sensitivity, or other absorptive disorders.           Allergies:   No Known Allergies          Medications:     Current Outpatient Prescriptions   Medication Sig Dispense Refill     levothyroxine (SYNTHROID/LEVOTHROID) 25 MCG tablet Take  "1 tablet (25 mcg) by mouth daily 90 tablet 3             Review of Systems:   Gen: Negative  Eye: Negative  ENT: Negative  Pulmonary:  Negative  Cardio: Negative  Gastrointestinal: Negative   Hematologic: Negative  Genitourinary: Negative  Musculoskeletal: Negative  Psychiatric: Negative  Neurologic: Negative  Skin: Negative  Endocrine: see HPI.            Physical Exam:   Height 3' 4.91\" (103.9 cm), weight 34 lb 6.3 oz (15.6 kg).  No blood pressure reading on file for this encounter.  Height: 103.9 cm   20 %ile (Z= -0.82) based on CDC 2-20 Years stature-for-age data using vitals from 6/22/2018.  Weight: 15.6 kg (actual weight), 14 %ile (Z= -1.10) based on CDC 2-20 Years weight-for-age data using vitals from 6/22/2018.  BMI: Body mass index is 14.45 kg/(m^2). 27 %ile (Z= -0.61) based on CDC 2-20 Years BMI-for-age data using vitals from 6/22/2018.        Constitutional: awake, alert, cooperative, no apparent distress  Eyes: Lids and lashes normal, sclera clear, conjunctiva normal  ENT: Normocephalic, without obvious abnormality, external ears without lesions  Neck: Supple, symmetrical, trachea midline, thyroid symmetric, not enlarged and no tenderness  Hematologic / Lymphatic: no cervical lymphadenopathy  Lungs: No increased work of breathing, clear to auscultation bilaterally with good air entry.  Cardiovascular: Regular rate and rhythm, no murmurs.  Abdomen: No scars,  soft, non-distended, non-tender, no masses palpated, no hepatosplenomegaly  Genitourinary: Austen 1  Musculoskeletal: There is no redness, warmth, or swelling of the joints.    Neurologic: Awake, alert, appropriate for age.  Neuropsychiatric: normal  Skin: no lesions        Laboratory results:       Results for orders placed or performed in visit on 06/22/18   TSH   Result Value Ref Range    TSH 5.98 (H) 0.40 - 4.00 mU/L   T4 free   Result Value Ref Range    T4 Free 1.08 0.76 - 1.46 ng/dL              Assessment and Plan:     Zahira is a 4  year " old 0  month old female with congenital hypothyroidism.      Thyroid labs obtained today show elevation in her TSH with normal Free T4.  Based on results, I recommend an increase in levothyroxine dosage to 37.5 mcg (1.5 tabs) on M, W, Fridays and remainder of the week continue on 25 mcg.  As we are performing a dose change, follow-up thyroid labs are recommended in 4-6 weeks with a lab only appointment.    Message was left on mother's voicemail with above plan and recommendations 6/22/2018.        Please refer to patient instructions for remainder of plan.         Orders Placed This Encounter   Procedures     TSH     T4 free     Patient Instructions   Thank you for choosing [facility]. It was a pleasure to see you for your office visit today.     To reach our Specialty Clinic: [phone]  To reach our Imaging scheduler: [phone]      If you had any blood work, imaging or other tests:  Normal test results will be mailed to your home address in a letter  Abnormal results will be communicated to you via phone call/letter  Please allow up to 1-2 weeks for processing/interpretation of most lab work  If you have questions or concerns call our clinic at [phone]    1.  Thyroid labs today.  I will be in contact with you when results are in and update pharmacy with refills on levothyroxine.   2.  We reviewed growth charts today in clinic and today Zahira was measured at 40.9 inches in comparison to 39.9 inches at our last visit in December.  Zahira has shown very slight slowing of growth over time but not anything that causes me to be concerned at this time.   3.  Weight gain has been normal.  4.  Follow up in 6 months is recommended.     Thank you for allowing me to participate in the care of your patient.  Please do not hesitate to call with questions or concerns.    Sincerely,    GRACIE Reich, CNP  Pediatric Endocrinology  [facility]  [facility]  [place]  [phone]      CC  Patient Care Team:  Mercy Bryant MD as PCP - General (Family Practice)                Child-Family Life Procedural Support    Data: Zahira Lazo was referred by Physician to this Child-Family  for procedural support during blood draw.  Patient is very familiar with this procedure.  Difficult aspects of procedure include pain and holding still.  Patient was accompanied by mother and sibling/s in lab draw room for procedure.  Patient was provided developmentally appropriate preparation/teaching by Child-Family  and Parent via verbal descriptions.    Intervention: This Child-Family  provided redirection, ONE VOICE and visual block in lab draw room.    Assessment: At the start of the procedure patient appeared scared, fidgeting and clutching inner arms.  Patient was able to cooperate with demands of procedure.  Challenges patient had with procedure included fear of poke.  Overall, patient coped well with procedure but needed reminders to hold still.    Plan: This Child-Family  will continue to follow/support patient during hospitalization/future clinic visits.     RAFI Turner  Child Family         Again, thank you for allowing me to participate in the care of your patient.        Sincerely,        GRACIE Webber CNP

## 2024-04-22 ENCOUNTER — PATIENT MESSAGE (OUTPATIENT)
Dept: INTERNAL MEDICINE | Facility: CLINIC | Age: 34
End: 2024-04-22
Payer: COMMERCIAL

## 2024-04-22 NOTE — TELEPHONE ENCOUNTER
From: Shelley Longoria  To: Johanne Carter  Sent: 4/22/2024 11:55 AM EDT  Subject: Fungal infection     The topical solution prescribed is not doing anything for the fungus. I thought it was getting better but the new growth has since turned yellow and is darkening.

## 2024-04-26 ENCOUNTER — OFFICE VISIT (OUTPATIENT)
Dept: INTERNAL MEDICINE | Facility: CLINIC | Age: 34
End: 2024-04-26
Payer: COMMERCIAL

## 2024-04-26 VITALS
HEART RATE: 108 BPM | TEMPERATURE: 98.4 F | WEIGHT: 167 LBS | OXYGEN SATURATION: 99 % | DIASTOLIC BLOOD PRESSURE: 76 MMHG | SYSTOLIC BLOOD PRESSURE: 114 MMHG | BODY MASS INDEX: 26.84 KG/M2 | HEIGHT: 66 IN

## 2024-04-26 DIAGNOSIS — R61 HYPERHIDROSIS: ICD-10-CM

## 2024-04-26 DIAGNOSIS — B35.1 FUNGAL INFECTION OF TOENAIL: ICD-10-CM

## 2024-04-26 DIAGNOSIS — F32.2 CURRENT SEVERE EPISODE OF MAJOR DEPRESSIVE DISORDER WITHOUT PSYCHOTIC FEATURES WITHOUT PRIOR EPISODE: Primary | ICD-10-CM

## 2024-04-26 PROCEDURE — 3044F HG A1C LEVEL LT 7.0%: CPT | Performed by: FAMILY MEDICINE

## 2024-04-26 PROCEDURE — 99213 OFFICE O/P EST LOW 20 MIN: CPT | Performed by: FAMILY MEDICINE

## 2024-04-26 PROCEDURE — 1159F MED LIST DOCD IN RCRD: CPT | Performed by: FAMILY MEDICINE

## 2024-04-26 PROCEDURE — 1160F RVW MEDS BY RX/DR IN RCRD: CPT | Performed by: FAMILY MEDICINE

## 2024-04-26 RX ORDER — ESCITALOPRAM OXALATE 10 MG/1
10 TABLET ORAL DAILY
Qty: 30 TABLET | Refills: 0 | Status: SHIPPED | OUTPATIENT
Start: 2024-04-26
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
## 2024-05-02 ENCOUNTER — TELEMEDICINE (OUTPATIENT)
Dept: BARIATRICS/WEIGHT MGMT | Facility: CLINIC | Age: 34
End: 2024-05-02
Payer: COMMERCIAL

## 2024-05-02 VITALS
DIASTOLIC BLOOD PRESSURE: 80 MMHG | BODY MASS INDEX: 26.52 KG/M2 | WEIGHT: 165 LBS | SYSTOLIC BLOOD PRESSURE: 118 MMHG | HEIGHT: 66 IN

## 2024-05-02 DIAGNOSIS — E66.3 OVERWEIGHT (BMI 25.0-29.9): Primary | ICD-10-CM

## 2024-05-02 DIAGNOSIS — F32.4 MAJOR DEPRESSIVE DISORDER WITH SINGLE EPISODE, IN PARTIAL REMISSION: ICD-10-CM

## 2024-05-02 RX ORDER — PHENTERMINE HYDROCHLORIDE 37.5 MG/1
18.75 TABLET ORAL 2 TIMES DAILY
Qty: 30 TABLET | Refills: 0 | Status: SHIPPED | OUTPATIENT
Start: 2024-05-02

## 2024-05-02 NOTE — PROGRESS NOTES
"T      Office Note      Date: 2024  Patient Name: Shelley Longoria  MRN: [id number]  : 1990    This service was conducted via [employer].  Patient is located at her work address.  Provider is located at her work address. The use of a video visit has been reviewed with the patient and informed consent has been obtained.  Subjective  Subjective     Chief Complaint  Obesity Management follow-up    Subjective          Shelley Longoria presents to [facility] via telehealth for obesity management. female s/p LSG/HHR 19 PQ, Presurgery weight:  218 pounds. stefania: 165lb, goal weight:  155 lbs.    Patient is satisfied with weight loss progress. Appetite is well controlled. Missed several doses of phentermine when she went out of town. Reports no side effects of prescribed medications today. Started lexapro Friday and it is helping with her mood. Has noticed she is more hungry. Was drinking protein coffees only for a few days and that helped. She is not keeping a food journal at this time.   24 hour recall (\"bad day\"):   B: protein shake  L: none  D: 1/2 slice pizza    Today:  B: none  L: protein shake, oikos triple zero  S: quest protein chips  D: grilled chicken nuggets from [employer]  She is drinking adequate water    The patient is exercising, walking with her daughter, cheer drills with her daughter every day.    Review of Systems      Objective   Start weight MWM: 177 pounds.    Total weight loss: -12 pounds/-6.7%  Change in weight since last visit: none    Body mass index is 26.63 kg/m².   Measurements (in inches)     /80   Ht 167.6 cm (66\")   Wt 74.8 kg (165 lb)   BMI 26.63 kg/m²       Result Review :                 Assessment and Plan    Diagnoses and all orders for this visit:    1. Overweight (BMI 25.0-29.9) (Primary)  Overview:  Co-morbidity: dyslipidemias, GERD and fatty liver, insulin resistance, joint pain.    Assessment & " Plan:  Patient's (Body mass index is 26.63 kg/m².) indicates that they are overweight with health conditions that include dyslipidemias and prediabetes  . Weight is improving with treatment. BMI is is above average; BMI management plan is completed. We discussed low calorie, low carb based diet program, portion control, increasing exercise, pharmacologic options including phentermine/topamax, metformin, and an yaima-based approach such as [employer] Pal or Lose It.     I have instructed the patient to continue with pursuit of medical weight loss as a part of this program. Patient does meet criteria for use of anorectics at this time as BMI >25 with , hyperlipidemia, impaired fasting glucose, and is not at treatment goal.     The current plan for this month includes:   - Continue current exercise efforts  - Continue to work towards 4 servings of protein daily  - Continue current medications  - Treatment goal 155lb    Continue sympathomimetic (medication refilled).  This patient 1) has no evidence of serious cardiovascular disease; 2) does not have serious psychiatric disease or a history of substance abuse; 3) has been informed about weight loss medications that are FDA approved for long-term use and told that these have been all documented to be safe and effective whereas phentermine has not; 4) does not demonstrate a clinically significant increase in pulse or blood pressure when taking phentermine; and 5) demonstrates significant weight loss while using the medication.  Patient understands that all antiobesity medications are contraindicated in pregnancy.  Patient denies a history of glaucoma.  Patient understands that long-term use of phentermine is considered off label use of this medication, however, that the endocrine Society and recent research supports that long-term use of phentermine does not appear to have detrimental health effects when used on the appropriate patient.  In addition, a 2019 study published  "in an obesity journal on 13,972 patient's concluded that \"recommendations to limit phentermine to less than 3 months do not align with current concept of pharmacologic treatment of obesity\", and that \"long-term phentermine users experience greater weight loss without apparent increases in cardiovascular risk\".    We reviewed potential side effects including insomnia, dry mouth, increased heart rate and blood pressure, increased anxiety.  We reviewed reducing caffeine consumption while taking phentermine.  especially if the patient experiences side effects.  The potential risk and benefits of phentermine have been reviewed with the patient, and alternative treatment options were discussed.  All questions were answered, and the patient wishes to move forward with this medication.      Orders:  -     phentermine (ADIPEX-P) 37.5 MG tablet; Take 0.5 tablet by mouth 2 (Two) Times a Day.  Dispense: 30 tablet; Refill: 0    2. Major depressive disorder with single episode, in partial remission  Assessment & Plan:  Patient's depression is a single episode that is mild without psychosis. Depression is in partial remission and improving with treatment- started lexapro Friday and can already tell a difference. Thinks she may have increased hunger but it is manageable.     Plan:   Continue current medication therapy     Followup at the next regular appointment.             Follow Up   Return in about 5 weeks (around 6/6/2024) for Next scheduled follow up.  Patient was given instructions and counseling regarding her condition or for health maintenance advice. Please see specific information pulled into the AVS if appropriate.     VALENCIA Ivey    "

## 2024-05-02 NOTE — ASSESSMENT & PLAN NOTE
Patient's depression is a single episode that is mild without psychosis. Depression is in partial remission and improving with treatment- started lexapro Friday and can already tell a difference. Thinks she may have increased hunger but it is manageable.     Plan:   Continue current medication therapy     Followup at the next regular appointment.

## 2024-05-02 NOTE — ASSESSMENT & PLAN NOTE
Patient's (Body mass index is 26.63 kg/m².) indicates that they are overweight with health conditions that include dyslipidemias and prediabetes  . Weight is improving with treatment. BMI is is above average; BMI management plan is completed. We discussed low calorie, low carb based diet program, portion control, increasing exercise, pharmacologic options including phentermine/topamax, metformin, and an yaima-based approach such as [employer] Pal or Lose It.     I have instructed the patient to continue with pursuit of medical weight loss as a part of this program. Patient does meet criteria for use of anorectics at this time as BMI >25 with , hyperlipidemia, impaired fasting glucose, and is not at treatment goal.     The current plan for this month includes:   - Continue current exercise efforts  - Continue to work towards 4 servings of protein daily  - Continue current medications  - Treatment goal 155lb    Continue sympathomimetic (medication refilled).  This patient 1) has no evidence of serious cardiovascular disease; 2) does not have serious psychiatric disease or a history of substance abuse; 3) has been informed about weight loss medications that are FDA approved for long-term use and told that these have been all documented to be safe and effective whereas phentermine has not; 4) does not demonstrate a clinically significant increase in pulse or blood pressure when taking phentermine; and 5) demonstrates significant weight loss while using the medication.  Patient understands that all antiobesity medications are contraindicated in pregnancy.  Patient denies a history of glaucoma.  Patient understands that long-term use of phentermine is considered off label use of this medication, however, that the endocrine Society and recent research supports that long-term use of phentermine does not appear to have detrimental health effects when used on the appropriate patient.  In addition, a 2019 study published in an  "obesity journal on 13,972 patient's concluded that \"recommendations to limit phentermine to less than 3 months do not align with current concept of pharmacologic treatment of obesity\", and that \"long-term phentermine users experience greater weight loss without apparent increases in cardiovascular risk\".    We reviewed potential side effects including insomnia, dry mouth, increased heart rate and blood pressure, increased anxiety.  We reviewed reducing caffeine consumption while taking phentermine.  especially if the patient experiences side effects.  The potential risk and benefits of phentermine have been reviewed with the patient, and alternative treatment options were discussed.  All questions were answered, and the patient wishes to move forward with this medication.    "

## 2024-05-08 ENCOUNTER — TELEPHONE (OUTPATIENT)
Dept: INTERNAL MEDICINE | Facility: CLINIC | Age: 34
End: 2024-05-08
Payer: COMMERCIAL

## 2024-05-08 DIAGNOSIS — B35.1 FUNGAL INFECTION OF TOENAIL: ICD-10-CM

## 2024-05-10 ENCOUNTER — OFFICE VISIT (OUTPATIENT)
Dept: INTERNAL MEDICINE | Facility: CLINIC | Age: 34
End: 2024-05-10
Payer: COMMERCIAL

## 2024-05-10 VITALS
WEIGHT: 167 LBS | HEIGHT: 66 IN | SYSTOLIC BLOOD PRESSURE: 114 MMHG | DIASTOLIC BLOOD PRESSURE: 70 MMHG | TEMPERATURE: 96.9 F | BODY MASS INDEX: 26.84 KG/M2

## 2024-05-10 DIAGNOSIS — F32.1 CURRENT MODERATE EPISODE OF MAJOR DEPRESSIVE DISORDER WITHOUT PRIOR EPISODE: ICD-10-CM

## 2024-05-10 PROCEDURE — 99213 OFFICE O/P EST LOW 20 MIN: CPT | Performed by: FAMILY MEDICINE

## 2024-05-10 PROCEDURE — 3044F HG A1C LEVEL LT 7.0%: CPT | Performed by: FAMILY MEDICINE

## 2024-05-10 PROCEDURE — 1160F RVW MEDS BY RX/DR IN RCRD: CPT | Performed by: FAMILY MEDICINE

## 2024-05-10 PROCEDURE — 1159F MED LIST DOCD IN RCRD: CPT | Performed by: FAMILY MEDICINE

## 2024-05-10 PROCEDURE — 1126F AMNT PAIN NOTED NONE PRSNT: CPT | Performed by: FAMILY MEDICINE

## 2024-05-10 RX ORDER — ESCITALOPRAM OXALATE 20 MG/1
20 TABLET ORAL DAILY
Qty: 30 TABLET | Refills: 1 | Status: SHIPPED | OUTPATIENT
Start: 2024-05-10

## 2024-06-11 ENCOUNTER — OFFICE VISIT (OUTPATIENT)
Dept: BARIATRICS/WEIGHT MGMT | Facility: CLINIC | Age: 34
End: 2024-06-11
Payer: COMMERCIAL

## 2024-06-11 VITALS
BODY MASS INDEX: 26.39 KG/M2 | HEART RATE: 88 BPM | SYSTOLIC BLOOD PRESSURE: 116 MMHG | DIASTOLIC BLOOD PRESSURE: 64 MMHG | WEIGHT: 164.2 LBS | HEIGHT: 66 IN

## 2024-06-11 DIAGNOSIS — E66.3 OVERWEIGHT (BMI 25.0-29.9): Primary | ICD-10-CM

## 2024-06-11 DIAGNOSIS — R73.09 ABNORMAL GLUCOSE: ICD-10-CM

## 2024-06-11 DIAGNOSIS — Z30.9 ENCOUNTER FOR CONTRACEPTIVE MANAGEMENT, UNSPECIFIED TYPE: ICD-10-CM

## 2024-06-11 PROCEDURE — 1159F MED LIST DOCD IN RCRD: CPT | Performed by: NURSE PRACTITIONER

## 2024-06-11 PROCEDURE — 1160F RVW MEDS BY RX/DR IN RCRD: CPT | Performed by: NURSE PRACTITIONER

## 2024-06-11 PROCEDURE — 99214 OFFICE O/P EST MOD 30 MIN: CPT | Performed by: NURSE PRACTITIONER

## 2024-06-11 RX ORDER — TOPIRAMATE 25 MG/1
TABLET ORAL
Qty: 60 TABLET | Refills: 2 | Status: SHIPPED | OUTPATIENT
Start: 2024-06-11 | End: 2024-06-11

## 2024-06-11 RX ORDER — METFORMIN HYDROCHLORIDE 500 MG/1
TABLET, EXTENDED RELEASE ORAL
Qty: 90 TABLET | Refills: 2 | Status: SHIPPED | OUTPATIENT
Start: 2024-06-11

## 2024-06-11 RX ORDER — TOPIRAMATE 50 MG/1
50 TABLET, FILM COATED ORAL 2 TIMES DAILY
Qty: 60 TABLET | Refills: 2 | Status: SHIPPED | OUTPATIENT
Start: 2024-06-11

## 2024-06-11 RX ORDER — TOPIRAMATE 50 MG/1
50 TABLET, FILM COATED ORAL 2 TIMES DAILY
Qty: 60 TABLET | Refills: 2 | Status: CANCELLED | OUTPATIENT
Start: 2024-06-11

## 2024-06-11 RX ORDER — NORETHINDRONE ACETATE AND ETHINYL ESTRADIOL 1MG-20(21)
1 KIT ORAL DAILY
Qty: 28 TABLET | Refills: 12 | Status: SHIPPED | OUTPATIENT
Start: 2024-06-11 | End: 2025-06-11

## 2024-06-11 RX ORDER — PHENTERMINE HYDROCHLORIDE 37.5 MG/1
18.75 TABLET ORAL 2 TIMES DAILY
Qty: 60 TABLET | Refills: 0 | Status: SHIPPED | OUTPATIENT
Start: 2024-06-11

## 2024-06-11 NOTE — PROGRESS NOTES
[facility]  [address]  [place] [address]     Office Note      Date: 2024  Patient Name: Shelley Longoria  MRN: [id number]  : 1990  Subjective  Subjective     Chief Complaint  Obesity Management follow-up          Shelley Longoria presents to [facility] for obesity management. female s/p LSG/HHR 19 PQ, Presurgery weight:  218 pounds. stefania: 165lb, goal weight:  155 lbs.  Patient is unsatisfied with weight loss progress. Appetite is moderately controlled. Reports no side effects of prescribed medications today. The patient is taking multivitamin and is taking fish oil. The patient is not using a food journal, just restarted. She had a plastic surgery procedure on her inner thighs and it is not healing properly, she plans to see another surgeon. This has limited physical activity. The patient is not exercising.    Review of Systems   Constitutional:  Negative for appetite change and fatigue.   Eyes:  Negative for visual disturbance.   Cardiovascular:  Negative for chest pain and palpitations.   Gastrointestinal:  Negative for constipation and indigestion.   Neurological:  Negative for light-headedness.         Current Outpatient Medications:     betamethasone valerate (VALISONE) 0.1 % ointment, Apply 1 Application topically to the appropriate area as directed 2 (Two) Times a Day., Disp: 45 g, Rfl: 2    Biotin 67386 MCG tablet, Take  by mouth., Disp: , Rfl:     Blood Glucose Monitoring Suppl (FreeStyle Lite) device, 1 dose Daily., Disp: 1 each, Rfl: 0    Cyanocobalamin (B-12 PO), Place 1 tablet under the tongue Daily. Patches, Disp: , Rfl:     Efinaconazole 10 % solution, Apply 1 Application topically Every Night. 2 drops per toe x 4 toes, Disp: 8 mL, Rfl: 2    escitalopram (Lexapro) 20 MG tablet, Take 1 tablet by mouth Daily., Disp: 30 tablet, Rfl: 1    glucose blood (FREESTYLE LITE) test strip, Use as  "instructed, Disp: 100 each, Rfl: 12    Lancets (freestyle) lancets, Check blood sugar three times daily PRN., Disp: 100 each, Rfl: 2    metFORMIN ER (GLUCOPHAGE-XR) 500 MG 24 hr tablet, TAKE ONE TABLET BY MOUTH DAILY WITH BREAKFAST AND TWO TABLETS BY MOUTH WITH DINNER OR AT BEDTIME, Disp: 90 tablet, Rfl: 2    Multiple Vitamins-Minerals (MULTIVITAL-M) tablet, Take  by mouth. USES PATCH\, Disp: , Rfl:     norethindrone-ethinyl estradiol FE (Junel FE 1/20) 1-20 MG-MCG per tablet, Take 1 tablet by mouth Daily., Disp: 28 tablet, Rfl: 12    Omega-3 Fatty Acids (fish oil) 1000 MG capsule capsule, Take 1 capsule by mouth Daily With Breakfast., Disp: , Rfl:     omeprazole (priLOSEC) 40 MG capsule, Take 1 capsule by mouth 2 (Two) Times a Day., Disp: 60 capsule, Rfl: 5    phentermine (ADIPEX-P) 37.5 MG tablet, Take 0.5 tablet by mouth 2 (Two) Times a Day., Disp: 60 tablet, Rfl: 0    valACYclovir (VALTREX) 1000 MG tablet, Take 1 tablet by mouth Daily., Disp: , Rfl:     topiramate (Topamax) 50 MG tablet, Take 1 tablet by mouth 2 (Two) Times a Day., Disp: 60 tablet, Rfl: 2    Objective   Start weight: 177 pounds.    Total Loss lb/%Loss of beginning body weight (BBW): 12.8lb/-7.23%  Change in weight since last visit: -0.8    Body mass index is 26.5 kg/m².   Body composition analysis completed and showed:   Body Fat %: 33.8    Measurements (in inches)  Waist Circumference: 36      Vital Signs:   /64 (BP Location: Right arm, Patient Position: Sitting)   Pulse 88   Ht 167.6 cm (66\")   Wt 74.5 kg (164 lb 3.2 oz)   BMI 26.50 kg/m²     Physical Exam   General appears stated age and normal appearance   HEENT PERRLA, EOM intact, and conjunctivae normal   Chest/lungs Normal rate, Regular rhythm, and Breathing is unlabored   Extremities without edema   Neuro Good historian and No focal deficit   Skin Warm, dry, intact   Psych normal behavior, normal thought content, and normal concentration     Result Review :            "     Assessment / Plan        Diagnoses and all orders for this visit:    1. Overweight (BMI 25.0-29.9) (Primary)  Overview:  Co-morbidity: dyslipidemias, GERD and fatty liver, insulin resistance, joint pain.    Assessment & Plan:  Patient's (Body mass index is 26.5 kg/m².) indicates that they are overweight with health conditions that include dyslipidemias, GERD, and prediabetes, mood disorder  . Weight is improving with treatment. BMI is is above average; BMI management plan is completed. We discussed low calorie, low carb based diet program, portion control, increasing exercise, management of depression/anxiety/stress to control compensatory eating, pharmacologic options including phentermine/topamax, and an yaima-based approach such as MyFitness Pal or Lose It.     I have instructed the patient to continue with pursuit of medical weight loss as a part of this program. Patient does meet criteria for use of anorectics at this time as BMI >25 with , hyperlipidemia, impaired fasting glucose, and is not at treatment goal.     The current plan for this month includes:   - Continue to work on lifestyle behavioral changes  - Bring back consistent food journaling, 4 servings of protein daily  - Increase topiramate to 50mg bid. Continue phentermine.   - Treatment goal 55lb.    Continue sympathomimetic (medication refilled).  This patient 1) has no evidence of serious cardiovascular disease; 2) does not have serious psychiatric disease or a history of substance abuse; 3) has been informed about weight loss medications that are FDA approved for long-term use and told that these have been all documented to be safe and effective whereas phentermine has not; 4) does not demonstrate a clinically significant increase in pulse or blood pressure when taking phentermine; and 5) demonstrates significant weight loss while using the medication.  Patient understands that all antiobesity medications are contraindicated in pregnancy.   "Patient denies a history of glaucoma.  Patient understands that long-term use of phentermine is considered off label use of this medication, however, that the endocrine Society and recent research supports that long-term use of phentermine does not appear to have detrimental health effects when used on the appropriate patient.  In addition, a 2019 study published in an obesity journal on [id number] patient's concluded that \"recommendations to limit phentermine to less than 3 months do not align with current concept of pharmacologic treatment of obesity\", and that \"long-term phentermine users experience greater weight loss without apparent increases in cardiovascular risk\".    We reviewed potential side effects including insomnia, dry mouth, increased heart rate and blood pressure, increased anxiety.  We reviewed reducing caffeine consumption while taking phentermine.  especially if the patient experiences side effects.  The potential risk and benefits of phentermine have been reviewed with the patient, and alternative treatment options were discussed.  All questions were answered, and the patient wishes to move forward with this medication.        Orders:  -     phentermine (ADIPEX-P) 37.5 MG tablet; Take 0.5 tablet by mouth 2 (Two) Times a Day.  Dispense: 60 tablet; Refill: 0  -     Discontinue: topiramate (Topamax) 25 MG tablet; Take two tablets by mouth daily at bedtime.  Dispense: 60 tablet; Refill: 2  -     topiramate (Topamax) 50 MG tablet; Take 1 tablet by mouth 2 (Two) Times a Day.  Dispense: 60 tablet; Refill: 2    2. Abnormal glucose  Assessment & Plan:  Denies hypoglycemia. Continue low carb diet, regular physical activity, and weight reduction of 10-15%. Continue metformin.       Orders:  -     metFORMIN ER (GLUCOPHAGE-XR) 500 MG 24 hr tablet; TAKE ONE TABLET BY MOUTH DAILY WITH BREAKFAST AND TWO TABLETS BY MOUTH WITH DINNER OR AT BEDTIME  Dispense: 90 tablet; Refill: 2    3. Encounter for contraceptive " management, unspecified type  Assessment & Plan:  LMP 6/4/24, cycles are regular. Last routine visit with GYN was about 4 months ago.     Orders:  -     norethindrone-ethinyl estradiol FE (Junel FE 1/20) 1-20 MG-MCG per tablet; Take 1 tablet by mouth Daily.  Dispense: 28 tablet; Refill: 12          Follow Up   Return in about 2 months (around 8/11/2024) for Next scheduled follow up.  Patient was given instructions and counseling regarding her condition or for health maintenance advice. Please see specific information pulled into the AVS if appropriate.     Courtney Lew, APRN  06/11/2024

## 2024-06-11 NOTE — ASSESSMENT & PLAN NOTE
Patient's (Body mass index is 26.5 kg/m².) indicates that they are overweight with health conditions that include dyslipidemias, GERD, and prediabetes, mood disorder  . Weight is improving with treatment. BMI is is above average; BMI management plan is completed. We discussed low calorie, low carb based diet program, portion control, increasing exercise, management of depression/anxiety/stress to control compensatory eating, pharmacologic options including phentermine/topamax, and an yaima-based approach such as [employer] Pal or Lose It.     I have instructed the patient to continue with pursuit of medical weight loss as a part of this program. Patient does meet criteria for use of anorectics at this time as BMI >25 with , hyperlipidemia, impaired fasting glucose, and is not at treatment goal.     The current plan for this month includes:   - Continue to work on lifestyle behavioral changes  - Bring back consistent food journaling, 4 servings of protein daily  - Increase topiramate to 50mg bid. Continue phentermine.   - Treatment goal 55lb.    Continue sympathomimetic (medication refilled).  This patient 1) has no evidence of serious cardiovascular disease; 2) does not have serious psychiatric disease or a history of substance abuse; 3) has been informed about weight loss medications that are FDA approved for long-term use and told that these have been all documented to be safe and effective whereas phentermine has not; 4) does not demonstrate a clinically significant increase in pulse or blood pressure when taking phentermine; and 5) demonstrates significant weight loss while using the medication.  Patient understands that all antiobesity medications are contraindicated in pregnancy.  Patient denies a history of glaucoma.  Patient understands that long-term use of phentermine is considered off label use of this medication, however, that the endocrine Society and recent research supports that long-term use of  "phentermine does not appear to have detrimental health effects when used on the appropriate patient.  In addition, a 2019 study published in an obesity journal on [id number] patient's concluded that \"recommendations to limit phentermine to less than 3 months do not align with current concept of pharmacologic treatment of obesity\", and that \"long-term phentermine users experience greater weight loss without apparent increases in cardiovascular risk\".    We reviewed potential side effects including insomnia, dry mouth, increased heart rate and blood pressure, increased anxiety.  We reviewed reducing caffeine consumption while taking phentermine.  especially if the patient experiences side effects.  The potential risk and benefits of phentermine have been reviewed with the patient, and alternative treatment options were discussed.  All questions were answered, and the patient wishes to move forward with this medication.      "

## 2024-06-17 ENCOUNTER — OFFICE VISIT (OUTPATIENT)
Dept: INTERNAL MEDICINE | Facility: CLINIC | Age: 34
End: 2024-06-17
Payer: COMMERCIAL

## 2024-06-17 VITALS
WEIGHT: 167 LBS | DIASTOLIC BLOOD PRESSURE: 70 MMHG | TEMPERATURE: 98 F | HEART RATE: 110 BPM | BODY MASS INDEX: 26.84 KG/M2 | HEIGHT: 66 IN | SYSTOLIC BLOOD PRESSURE: 110 MMHG

## 2024-06-17 DIAGNOSIS — F32.1 CURRENT MODERATE EPISODE OF MAJOR DEPRESSIVE DISORDER WITHOUT PRIOR EPISODE: ICD-10-CM

## 2024-06-17 DIAGNOSIS — E11.65 TYPE 2 DIABETES MELLITUS WITH HYPERGLYCEMIA, WITHOUT LONG-TERM CURRENT USE OF INSULIN: Primary | ICD-10-CM

## 2024-06-17 LAB
ALBUMIN UR-MCNC: <1.2 MG/DL
CREAT UR-MCNC: 119 MG/DL
EXPIRATION DATE: ABNORMAL
HBA1C MFR BLD: 5.9 % (ref 4.5–5.7)
Lab: ABNORMAL
MICROALBUMIN/CREAT UR: NORMAL MG/G{CREAT}

## 2024-06-17 PROCEDURE — 1126F AMNT PAIN NOTED NONE PRSNT: CPT | Performed by: FAMILY MEDICINE

## 2024-06-17 PROCEDURE — 1159F MED LIST DOCD IN RCRD: CPT | Performed by: FAMILY MEDICINE

## 2024-06-17 PROCEDURE — 1160F RVW MEDS BY RX/DR IN RCRD: CPT | Performed by: FAMILY MEDICINE

## 2024-06-17 PROCEDURE — 3044F HG A1C LEVEL LT 7.0%: CPT | Performed by: FAMILY MEDICINE

## 2024-06-17 PROCEDURE — 99214 OFFICE O/P EST MOD 30 MIN: CPT | Performed by: FAMILY MEDICINE

## 2024-06-17 PROCEDURE — 82570 ASSAY OF URINE CREATININE: CPT | Performed by: FAMILY MEDICINE

## 2024-06-17 PROCEDURE — 82043 UR ALBUMIN QUANTITATIVE: CPT | Performed by: FAMILY MEDICINE

## 2024-06-17 PROCEDURE — 83036 HEMOGLOBIN GLYCOSYLATED A1C: CPT | Performed by: FAMILY MEDICINE

## 2024-06-17 RX ORDER — ESCITALOPRAM OXALATE 20 MG/1
20 TABLET ORAL DAILY
Qty: 30 TABLET | Refills: 2 | Status: SHIPPED | OUTPATIENT
Start: 2024-06-17

## 2024-06-17 NOTE — PROGRESS NOTES
"Jolene Longoria is a 34 y.o. female.     Chief Complaint   Patient presents with    Depression     Follow up       Visit Vitals  /70 (BP Location: Left arm, Patient Position: Sitting, Cuff Size: Adult)   Pulse 110   Temp 98 °F (36.7 °C)   Ht 167.6 cm (66\")   Wt 75.8 kg (167 lb)   LMP 06/04/2024 (Exact Date)   Breastfeeding No   BMI 26.95 kg/m²       Wt Readings from Last 3 Encounters:   06/17/24 75.8 kg (167 lb)   06/11/24 74.5 kg (164 lb 3.2 oz)   05/10/24 75.8 kg (167 lb)         Depression  Presents for follow-up visit. Patient is not experiencing: compulsions, confusion, decreased concentration, depressed mood, dry mouth, excessive worry, feelings of hopelessness, feelings of worthlessness, hypersomnia, hyperventilation, insomnia, irritability, muscle tension, nervousness/anxiety, obsessions, palpitations, panic, psychomotor agitation, psychomotor retardation, shortness of breath, suicidal ideas, suicidal planning, thoughts of death, weight gain, weight loss, chest pain, feeling of choking, dizziness, malaise/fatigue, nausea and difficulty controlling mood.Symptoms occur rarely.  The severity of symptoms is mild.  The quality of sleep is good. Her past medical history is significant for depression. Past treatments include SSRI. The treatment provides significant relief. Compliance with medications is %.      Pt has had bariatric surgery with improvement of blood sugar.  Pt is had thigh surgery to reduce loose skin and has been having problems with healing.  Pt state lexapro is helping her not be mad all the time.     The following portions of the patient's history were reviewed and updated as appropriate: allergies, current medications, past family history, past medical history, past social history, past surgical history, and problem list.    Past Medical History:   Diagnosis Date    Anxiety     Chronic joint pain     denies NSAIDS/steroids    COVID-19     2020, 2022    DM2 " (diabetes mellitus, type 2)     w/ hx gestational diabetes , dx May 2017, started on insulin when initially dx, lost weight w/ Adipex, stopped insulin 6 months after dx, but now w/ weight regain, back on insulin.  Managed by PCP    Dyspepsia     Dyspnea on exertion     Fatigue     Fatty liver     w/ abnormal LFTs, US 2018    GERD (gastroesophageal reflux disease) 2023    Gestational diabetes     H. pylori infection     UBT (+) 19 - PrevPak RX; repeat HPSA (+) 2019 - Pylera RX; repeat UBT (holding PPI) (+) - referred to GI, RX Levaquin/Flagyl/Pepto RX  - HPSA negative 2023    Hiatal hernia     History of transfusion     [facility]- PT UNSURE HOW MANY UNITS RECEIVED, NO REACTIONS    Mixed hyperlipidemia 10/15/2019    Obesity (BMI 30-39.9)     Pap smear for cervical cancer screening 2017    Wears eyeglasses       Past Surgical History:   Procedure Laterality Date    CERVICAL CONE BIOPSY  2004    benign     SECTION  /    ENDOSCOPY N/A 2019    Procedure: ESOPHAGOGASTRODUODENOSCOPY;  Surgeon: Mila Whatley MD;  Location:  [facility];  Service: Bariatric    EXCESSIVE THIGH / HIP / BUTTOCK / FLANK SKIN EXCISION Bilateral 05/15/2024        GASTRIC SLEEVE LAPAROSCOPIC N/A 2019    Procedure: GASTRIC SLEEVE LAPAROSCOPIC;  Surgeon: Mila Whatley MD;  Location:  [facility];  Service: Bariatric    HIATAL HERNIA REPAIR N/A 2019    Procedure: HIATAL HERNIA REPAIR LAPAROSCOPIC;  Surgeon: Mila Whatley MD;  Location:  [facility];  Service: Bariatric    HIATAL HERNIA REPAIR N/A 2023    Procedure: LAPAROSCOPIC HIATAL HERNIA REPAIR WITH MESH WITH DAVINCI ROBOT;  Surgeon: Mila Whatley MD;  Location:  [facility];  Service: Robotics - DaVinci;  Laterality: N/A;    UPPER GASTROINTESTINAL ENDOSCOPY  2019    Dr VIVIENNE Whatley    UPPER GASTROINTESTINAL ENDOSCOPY  2022    Dr Aldrich, positive H pylori      Family History   Problem  Relation Age of Onset    Stroke Mother     Heart disease Mother     Diabetes Mother     Obesity Mother     Hypertension Mother             Arthritis Mother     Diabetes Father     Hypertension Father     Heart disease Father     Arthritis Father       Social History     Socioeconomic History    Marital status:    Tobacco Use    Smoking status: Former     Current packs/day: 0.00     Average packs/day: 2.0 packs/day for 2.0 years (4.0 ttl pk-yrs)     Types: Cigarettes     Start date: 2010     Quit date: 2012     Years since quittin.9    Smokeless tobacco: Never   Vaping Use    Vaping status: Never Used   Substance and Sexual Activity    Alcohol use: No     Comment: 1-2 TIMES PER MONTH, WINE    Drug use: No    Sexual activity: Defer      Allergies   Allergen Reactions    Buspar [Buspirone] Other (See Comments)     Fatigue         Review of Systems   Constitutional:  Negative for irritability, malaise/fatigue, weight gain and weight loss.   Respiratory:  Negative for shortness of breath.    Cardiovascular:  Negative for chest pain and palpitations.   Gastrointestinal:  Negative for nausea.   Neurological:  Negative for dizziness.   Psychiatric/Behavioral:  Negative for confusion, decreased concentration and suicidal ideas. The patient is not nervous/anxious and does not have insomnia.        PHQ-9 Depression Screening  Little interest or pleasure in doing things? 1-->several days   Feeling down, depressed, or hopeless? 1-->several days   Trouble falling or staying asleep, or sleeping too much? 0-->not at all   Feeling tired or having little energy? 1-->several days   Poor appetite or overeating? 0-->not at all   Feeling bad about yourself - or that you are a failure or have let yourself or your family down? 0-->not at all   Trouble concentrating on things, such as reading the newspaper or watching television? 0-->not at all   Moving or speaking so slowly that other people could have noticed? Or  the opposite - being so fidgety or restless that you have been moving around a lot more than usual? 0-->not at all   Thoughts that you would be better off dead, or of hurting yourself in some way? 0-->not at all   PHQ-9 Total Score 3   If you checked off any problems, how difficult have these problems made it for you to do your work, take care of things at home, or get along with other people? somewhat difficult       Objective   Physical Exam  Vitals and nursing note reviewed.   Constitutional:       Appearance: She is well-developed.   HENT:      Head: Normocephalic.      Right Ear: External ear normal.      Left Ear: External ear normal.      Nose: Nose normal.   Eyes:      General: Lids are normal.      Conjunctiva/sclera: Conjunctivae normal.      Pupils: Pupils are equal, round, and reactive to light.   Neck:      Thyroid: No thyroid mass or thyromegaly.      Vascular: No carotid bruit.      Trachea: Trachea normal.   Cardiovascular:      Rate and Rhythm: Normal rate and regular rhythm.      Pulses:           Dorsalis pedis pulses are 2+ on the right side and 2+ on the left side.        Posterior tibial pulses are 2+ on the right side and 2+ on the left side.      Heart sounds: No murmur heard.  Pulmonary:      Effort: Pulmonary effort is normal. No respiratory distress.      Breath sounds: Normal breath sounds. No decreased breath sounds, wheezing, rhonchi or rales.   Chest:      Chest wall: No tenderness.   Abdominal:      General: Bowel sounds are normal.      Palpations: Abdomen is soft.      Tenderness: There is no abdominal tenderness.   Musculoskeletal:         General: Normal range of motion.      Cervical back: Normal range of motion and neck supple.      Right foot: Normal range of motion. No deformity, bunion, Charcot foot, foot drop or prominent metatarsal heads.      Left foot: Normal range of motion. No deformity, bunion, Charcot foot, foot drop or prominent metatarsal heads.         Feet:    Feet:      Right foot:      Protective Sensation: 10 sites tested.  10 sites sensed.      Skin integrity: Skin integrity normal. No ulcer, blister, skin breakdown, erythema, warmth, callus, dry skin or fissure.      Toenail Condition: Right toenails are normal.      Left foot:      Protective Sensation: 10 sites tested.  10 sites sensed.      Skin integrity: Skin integrity normal. No ulcer, blister, skin breakdown, erythema, warmth, callus, dry skin or fissure.      Toenail Condition: Left toenails are normal.      Comments: Star tattoo right foot dorsum  Diabetic Foot Exam Performed and Monofilament Test Performed     Skin:     General: Skin is warm and dry.   Neurological:      Mental Status: She is alert and oriented to person, place, and time.   Psychiatric:         Behavior: Behavior normal.         Assessment & Plan   Problems Addressed this Visit    None  Visit Diagnoses       Type 2 diabetes mellitus with hyperglycemia, without long-term current use of insulin    -  Primary    Relevant Orders    Microalbumin / Creatinine Urine Ratio - Urine, Clean Catch    POC Glycosylated Hemoglobin (Hb A1C) (Completed)    Current moderate episode of major depressive disorder without prior episode        Relevant Medications    escitalopram (Lexapro) 20 MG tablet          Diagnoses         Codes Comments    Type 2 diabetes mellitus with hyperglycemia, without long-term current use of insulin    -  Primary ICD-10-CM: E11.65  ICD-9-CM: 250.00, 790.29     Current moderate episode of major depressive disorder without prior episode     ICD-10-CM: F32.1  ICD-9-CM: 296.22                        Current Outpatient Medications:     betamethasone valerate (VALISONE) 0.1 % ointment, Apply 1 Application topically to the appropriate area as directed 2 (Two) Times a Day., Disp: 45 g, Rfl: 2    Biotin 76953 MCG tablet, Take  by mouth., Disp: , Rfl:     Blood Glucose Monitoring Suppl (FreeStyle Lite) device, 1 dose Daily., Disp: 1  each, Rfl: 0    Cyanocobalamin (B-12 PO), Place 1 tablet under the tongue Daily. Patches, Disp: , Rfl:     Efinaconazole 10 % solution, Apply 1 Application topically Every Night. 2 drops per toe x 4 toes, Disp: 8 mL, Rfl: 2    escitalopram (Lexapro) 20 MG tablet, Take 1 tablet by mouth Daily., Disp: 30 tablet, Rfl: 2    glucose blood (FREESTYLE LITE) test strip, Use as instructed, Disp: 100 each, Rfl: 12    Lancets (freestyle) lancets, Check blood sugar three times daily PRN., Disp: 100 each, Rfl: 2    metFORMIN ER (GLUCOPHAGE-XR) 500 MG 24 hr tablet, TAKE ONE TABLET BY MOUTH DAILY WITH BREAKFAST AND TWO TABLETS BY MOUTH WITH DINNER OR AT BEDTIME, Disp: 90 tablet, Rfl: 2    Multiple Vitamins-Minerals (MULTIVITAL-M) tablet, Take  by mouth. USES PATCH\, Disp: , Rfl:     norethindrone-ethinyl estradiol FE (Junel FE 1/20) 1-20 MG-MCG per tablet, Take 1 tablet by mouth Daily., Disp: 28 tablet, Rfl: 12    Omega-3 Fatty Acids (fish oil) 1000 MG capsule capsule, Take 1 capsule by mouth Daily With Breakfast., Disp: , Rfl:     omeprazole (priLOSEC) 40 MG capsule, Take 1 capsule by mouth 2 (Two) Times a Day., Disp: 60 capsule, Rfl: 5    phentermine (ADIPEX-P) 37.5 MG tablet, Take 0.5 tablet by mouth 2 (Two) Times a Day., Disp: 60 tablet, Rfl: 0    topiramate (Topamax) 50 MG tablet, Take 1 tablet by mouth 2 (Two) Times a Day., Disp: 60 tablet, Rfl: 2    valACYclovir (VALTREX) 1000 MG tablet, Take 1 tablet by mouth Daily., Disp: , Rfl:     Return in about 3 months (around 9/17/2024), or if symptoms worsen or fail to improve, for Recheck depression.    Hemoglobin A1C   Date Value Ref Range Status   06/17/2024 5.9 (A) 4.5 - 5.7 % Corrected   03/21/2024 6.70 (H) 4.80 - 5.60 % Final   08/31/2023 6.10 (H) 4.80 - 5.60 % Final     Comment:     Hemoglobin A1C Ranges:  Increased Risk for Diabetes  5.7% to 6.4%  Diabetes                     >= 6.5%  Diabetic Goal                < 7.0%     02/24/2023 6.20 (H) 4.80 - 5.60 % Final    02/17/2022 6.30 (H) 4.80 - 5.60 % Final     Comment:     Hemoglobin A1C Ranges:  Increased Risk for Diabetes  5.7% to 6.4%  Diabetes                     >= 6.5%  Diabetic Goal                < 7.0%     01/14/2020 6.0 % Final   11/24/2019 9.20 (H) 4.80 - 5.60 % Final

## 2024-08-02 DIAGNOSIS — L30.4 INTERTRIGO: Primary | ICD-10-CM

## 2024-08-05 ENCOUNTER — TELEPHONE (OUTPATIENT)
Dept: OBSTETRICS AND GYNECOLOGY | Facility: CLINIC | Age: 34
End: 2024-08-05
Payer: COMMERCIAL

## 2024-08-05 NOTE — TELEPHONE ENCOUNTER
HUB SENT MESSAGE FOR NEW OB. PER PT LMP IS UNKNOWN BUT POSSIBLE JUNE 5TH. FOUR POSITIVE HOME PREGNANCY TEST.     PLEASE ADVISE

## 2024-08-05 NOTE — TELEPHONE ENCOUNTER
LMP 6/5, states she did not have a period in July. She had her first + UPT Saturday. Reports regular monthly periods typically. Scheduled in 2 weeks with Dr. Marinelli

## 2024-08-12 ENCOUNTER — OFFICE VISIT (OUTPATIENT)
Dept: BARIATRICS/WEIGHT MGMT | Facility: CLINIC | Age: 34
End: 2024-08-12
Payer: COMMERCIAL

## 2024-08-12 VITALS
DIASTOLIC BLOOD PRESSURE: 62 MMHG | HEART RATE: 94 BPM | SYSTOLIC BLOOD PRESSURE: 110 MMHG | HEIGHT: 66 IN | WEIGHT: 172.8 LBS | BODY MASS INDEX: 27.77 KG/M2

## 2024-08-12 DIAGNOSIS — R73.09 ABNORMAL GLUCOSE: ICD-10-CM

## 2024-08-12 DIAGNOSIS — E66.3 OVERWEIGHT (BMI 25.0-29.9): Primary | ICD-10-CM

## 2024-08-12 PROCEDURE — 99214 OFFICE O/P EST MOD 30 MIN: CPT | Performed by: NURSE PRACTITIONER

## 2024-08-12 PROCEDURE — 1160F RVW MEDS BY RX/DR IN RCRD: CPT | Performed by: NURSE PRACTITIONER

## 2024-08-12 PROCEDURE — 1159F MED LIST DOCD IN RCRD: CPT | Performed by: NURSE PRACTITIONER

## 2024-08-12 NOTE — PROGRESS NOTES
[facility]  [address]  [place] [address]     Office Note      Date: 2024  Patient Name: Shelley Longoria  MRN: [id number]  : 1990  Subjective  Subjective     Chief Complaint  Obesity Management follow-up          Shelley Longoria presents to [facility] for obesity management. Presurgery weight:  218 pounds. stefania: 165lb, goal weight:  155 lbs.  Patient is unsatisfied with weight loss progress. She has had four positive home pregnancy tests. Stopped phentermine and topamax when she missed her period and took the first positive test. Unplanned pregnancy, she was prescribed an anti-biotic and didn't realize she needed to use back up contraception. Appetite is poorly controlled, craving sugar but staying away from it. The patient is taking multivitamin and is taking fish oil.  The patient is not using a food journal. She is checking her blood glucose daily, sometimes twice a day. It has been in the 200s at times. Has had to wait several weeks to establish care with OB/GYN but has an appointment Monday.    24 hour recall:  Eggs, avocado, toast, cheese, turkey breast, yogurt, chinese rice  The patient is exercising, finally released from all exercise restrictions last week (s/p plastic surgery inner thighs), with a FITT score of:    Frequency Intensity Time Strength Training   []   0, none []   0 []   0 [x]   0   [x]   1 (1-2x/week) [x]   1 (light) []   1 (<10 min) []   1 (1x/week)   []   2 (3-5x/week) []   2 (moderate) [x]   2 (10-20 min) []   2 (2x/week)   []   3 (daily) []   3 (moderately hard)  []   4 (very hard) []   3 (20-30 min)  []   4 (>30 min) []   3 (3-4x/week)           Review of Systems   Constitutional:  Negative for appetite change and fatigue.   Eyes:  Negative for visual disturbance.   Cardiovascular:  Negative for chest pain and palpitations.   Gastrointestinal:  Negative for constipation and  indigestion.   Neurological:  Negative for light-headedness.         Current Outpatient Medications:     betamethasone valerate (VALISONE) 0.1 % ointment, Apply 1 Application topically to the appropriate area as directed 2 (Two) Times a Day., Disp: 45 g, Rfl: 2    Biotin 13179 MCG tablet, Take  by mouth., Disp: , Rfl:     Blood Glucose Monitoring Suppl (FreeStyle Lite) device, 1 dose Daily., Disp: 1 each, Rfl: 0    Efinaconazole 10 % solution, Apply 1 Application topically Every Night. 2 drops per toe x 4 toes, Disp: 8 mL, Rfl: 2    escitalopram (Lexapro) 20 MG tablet, Take 1 tablet by mouth Daily., Disp: 30 tablet, Rfl: 2    glucose blood (FREESTYLE LITE) test strip, Use as instructed, Disp: 100 each, Rfl: 12    Lancets (freestyle) lancets, Check blood sugar three times daily PRN., Disp: 100 each, Rfl: 2    metFORMIN ER (GLUCOPHAGE-XR) 500 MG 24 hr tablet, TAKE ONE TABLET BY MOUTH DAILY WITH BREAKFAST AND TWO TABLETS BY MOUTH WITH DINNER OR AT BEDTIME, Disp: 90 tablet, Rfl: 2    Multiple Vitamins-Minerals (MULTIVITAL-M) tablet, Take  by mouth. USES PATCH\, Disp: , Rfl:     Omega-3 Fatty Acids (fish oil) 1000 MG capsule capsule, Take 1 capsule by mouth Daily With Breakfast., Disp: , Rfl:     omeprazole (priLOSEC) 40 MG capsule, Take 1 capsule by mouth 2 (Two) Times a Day., Disp: 60 capsule, Rfl: 5    valACYclovir (VALTREX) 1000 MG tablet, Take 1 tablet by mouth Daily., Disp: , Rfl:     Cyanocobalamin (B-12 PO), Place 1 tablet under the tongue Daily. Patches (Patient not taking: Reported on 8/12/2024), Disp: , Rfl:     norethindrone-ethinyl estradiol FE (Junel FE 1/20) 1-20 MG-MCG per tablet, Take 1 tablet by mouth Daily. (Patient not taking: Reported on 8/12/2024), Disp: 28 tablet, Rfl: 12    Objective   Start weight: 177 pounds.    Total Loss lb/%Loss of beginning body weight (BBW): -4.2lb/-2.37%  Change in weight since last visit: +8.5    Body mass index is 27.89 kg/m².   Body composition analysis completed and  "showed:   Body Fat %: 33.9    Measurements (in inches)  Waist Circumference: 40.5      Vital Signs:   /62 (BP Location: Left arm, Patient Position: Sitting)   Pulse 94   Ht 167.6 cm (66\")   Wt 78.4 kg (172 lb 12.8 oz)   BMI 27.89 kg/m²     No LMP recorded.    Physical Exam   General appears stated age and normal appearance   HEENT PERRLA, EOM intact, and conjunctivae normal   Chest/lungs Normal rate, Regular rhythm, and Breathing is unlabored   Extremities without edema   Neuro Good historian and No focal deficit   Skin Warm, dry, intact   Psych normal behavior, normal thought content, and normal concentration     Result Review :                Assessment / Plan        Diagnoses and all orders for this visit:    1. Overweight (BMI 25.0-29.9) (Primary)  Overview:  Co-morbidity: dyslipidemias, GERD and fatty liver, insulin resistance, joint pain.    Assessment & Plan:  Patient's (Body mass index is 27.89 kg/m².) indicates that they are overweight with health conditions that include dyslipidemias, GERD, and prediabetes  . Weight is worsening. BMI is above average; BMI management plan is completed. We discussed low calorie, low carb based diet program, portion control, increasing exercise, pharmacologic options including metformin, and an yaima-based approach such as [employer] Pal or Lose It.     I have instructed the patient to continue with pursuit of medical weight loss as a part of this program. Patient does not meet criteria for use of anorectics at this time as  she is pregnant .     The current plan for this month includes:   - Understands active weight loss is discouraged during pregnancy.   - Weight gain of 25-35lb is ok for her, she is at a normal weight.   - Discontinue all anti-obesity medications, continue metformin which is appropriate during pregnancy especially considering she has a history of gestational diabetes and is already having increased glucose with this pregnancy. At risk for birth " defects due to topamax use during pregnancy. Has discontinued now.  - Can resume visits with [facility] 6 weeks after delivery, will not be able to use medications other than metformin if she is breastfeeding (this is what she did previously and the accountability was very helpful).            2. Abnormal glucose  Assessment & Plan:  Worsening with pregnancy, will discuss with OB/GYN at her appointment next week. Advised to continue metformin.             Follow Up   Return in 8 months (on 4/12/2025).  Patient was given instructions and counseling regarding her condition or for health maintenance advice. Please see specific information pulled into the AVS if appropriate.     Courtney Lew, [facility]  08/12/2024

## 2024-08-12 NOTE — ASSESSMENT & PLAN NOTE
Patient's (Body mass index is 27.89 kg/m².) indicates that they are overweight with health conditions that include dyslipidemias, GERD, and prediabetes  . Weight is worsening. BMI is above average; BMI management plan is completed. We discussed low calorie, low carb based diet program, portion control, increasing exercise, pharmacologic options including metformin, and an yaima-based approach such as [employer] Pal or Lose It.     I have instructed the patient to continue with pursuit of medical weight loss as a part of this program. Patient does not meet criteria for use of anorectics at this time as  she is pregnant .     The current plan for this month includes:   - Understands active weight loss is discouraged during pregnancy.   - Weight gain of 25-35lb is ok for her, she is at a normal weight.   - Discontinue all anti-obesity medications, continue metformin which is appropriate during pregnancy especially considering she has a history of gestational diabetes and is already having increased glucose with this pregnancy. At risk for birth defects due to topamax use during pregnancy. Has discontinued now.  - Can resume visits with [facility] 6 weeks after delivery, will not be able to use medications other than metformin if she is breastfeeding (this is what she did previously and the accountability was very helpful).

## 2024-08-12 NOTE — ASSESSMENT & PLAN NOTE
Worsening with pregnancy, will discuss with OB/GYN at her appointment next week. Advised to continue metformin.

## 2024-08-15 DIAGNOSIS — Z34.90 EARLY STAGE OF PREGNANCY: Primary | ICD-10-CM

## 2024-08-19 ENCOUNTER — OFFICE VISIT (OUTPATIENT)
Dept: OBSTETRICS AND GYNECOLOGY | Facility: CLINIC | Age: 34
End: 2024-08-19
Payer: COMMERCIAL

## 2024-08-19 VITALS
DIASTOLIC BLOOD PRESSURE: 68 MMHG | SYSTOLIC BLOOD PRESSURE: 122 MMHG | WEIGHT: 177.4 LBS | HEIGHT: 65 IN | BODY MASS INDEX: 29.56 KG/M2

## 2024-08-19 DIAGNOSIS — Z79.899 USE OF MEDICATION WITH TERATOGENIC POTENTIAL IN FEMALE OF REPRODUCTIVE AGE: ICD-10-CM

## 2024-08-19 DIAGNOSIS — O99.841 PREGNANCY AFFECTED BY PREVIOUS BARIATRIC SURGERY, CURRENTLY IN FIRST TRIMESTER: ICD-10-CM

## 2024-08-19 DIAGNOSIS — Z3A.01 LESS THAN 8 WEEKS GESTATION OF PREGNANCY: Primary | Chronic | ICD-10-CM

## 2024-08-19 PROBLEM — K44.9 HIATAL HERNIA: Status: RESOLVED | Noted: 2023-02-09 | Resolved: 2024-08-19

## 2024-08-19 PROBLEM — N39.0 UTI (URINARY TRACT INFECTION): Status: RESOLVED | Noted: 2019-11-28 | Resolved: 2024-08-19

## 2024-08-19 PROBLEM — I95.9 HYPOTENSION: Status: RESOLVED | Noted: 2019-11-28 | Resolved: 2024-08-19

## 2024-08-19 PROBLEM — Z86.32 HISTORY OF GESTATIONAL DIABETES MELLITUS (GDM): Status: RESOLVED | Noted: 2017-05-08 | Resolved: 2024-08-19

## 2024-08-19 PROBLEM — O99.840 PREVIOUS BARIATRIC SURGERY COMPLICATING PREGNANCY: Status: ACTIVE | Noted: 2019-11-26

## 2024-08-19 PROBLEM — K21.00 GASTROESOPHAGEAL REFLUX DISEASE WITH ESOPHAGITIS: Status: RESOLVED | Noted: 2023-02-09 | Resolved: 2024-08-19

## 2024-08-19 PROBLEM — R73.09 ABNORMAL GLUCOSE: Status: RESOLVED | Noted: 2022-03-03 | Resolved: 2024-08-19

## 2024-08-19 PROBLEM — O09.891 MEDICATION EXPOSURE DURING FIRST TRIMESTER OF PREGNANCY: Status: ACTIVE | Noted: 2024-08-19

## 2024-08-19 PROCEDURE — 99203 OFFICE O/P NEW LOW 30 MIN: CPT | Performed by: OBSTETRICS & GYNECOLOGY

## 2024-08-19 PROCEDURE — 1160F RVW MEDS BY RX/DR IN RCRD: CPT | Performed by: OBSTETRICS & GYNECOLOGY

## 2024-08-19 PROCEDURE — 1159F MED LIST DOCD IN RCRD: CPT | Performed by: OBSTETRICS & GYNECOLOGY

## 2024-08-19 NOTE — PROGRESS NOTES
"    Chief Complaint   Patient presents with    Possible Pregnancy     6w6d          HPI  Shelley Longoria is a 34 y.o. female, , who presents with  possible early pregnancy.    Recent Tests:  She had a urine pregnancy test that was done 2 weeks ago that was positive..    US today: Yes.  Findings showed 6w6d pregnancy.  I have personally evaluated the U/S and agree with the findings. Lucho Marinelli MD  She has not had prenatal care.  She denies associated abdominal or pelvic pain.. Her past medical history is non-contributory.  She does not have passage of tissue.  Rh Status: Positive  She reports no additional symptoms or complaints.    The additional following portions of the patient's history were reviewed and updated as appropriate: allergies, current medications, past family history, past medical history, past social history, past surgical history, and problem list.    Review of Systems   Constitutional: Negative.    HENT: Negative.     Eyes: Negative.    Respiratory: Negative.     Cardiovascular: Negative.    Gastrointestinal: Negative.    Endocrine: Negative.    Genitourinary: Negative.    Musculoskeletal: Negative.    Skin: Negative.    Allergic/Immunologic: Negative.    Neurological: Negative.    Hematological: Negative.    Psychiatric/Behavioral: Negative.       All other systems reviewed and are negative.     I have reviewed and agree with the HPI, ROS, and historical information as entered above. Lucho Marinelli MD     Objective   /68   Ht 165.1 cm (65\")   Wt 80.5 kg (177 lb 6.4 oz)   LMP 2024 (Exact Date)   BMI 29.52 kg/m²     Physical Exam  Constitutional:       Appearance: Normal appearance.   HENT:      Head: Normocephalic and atraumatic.   Pulmonary:      Effort: Pulmonary effort is normal.   Neurological:      General: No focal deficit present.      Mental Status: She is alert.   Psychiatric:         Mood and Affect: Mood normal.            Assessment and " Plan    Problem List Items Addressed This Visit       Previous bariatric surgery complicating pregnancy    Relevant Orders    Calcium    CBC (No Diff)    Ferritin    Folate    Vitamin B12    Vitamin D,25-Hydroxy    Iron Profile    Vitamin B1, Whole Blood    Hemoglobin A1c    Use of medication with teratogenic potential in female of reproductive age    Overview     Phentermine/topiramate until positive pregnancy test         Current Assessment & Plan     Discussed association with facial clefts.  Will obtain PDC scan for early anatomy          Other Visit Diagnoses       Less than 8 weeks gestation of pregnancy  (Chronic)   -  Primary            Likely early normal IUP  She reports that she has had some glucose outliers.  Recommend 4 time daily testing: Fasting and 2 hours after each meal.  Will give sugar log.  Repeat U/S in 2 week(s).  Return in about 15 days (around 9/3/2024) for HANDY grijalva/ [facility] in [place] .         Lucho Marinelli MD  08/19/2024

## 2024-08-20 LAB
25(OH)D3+25(OH)D2 SERPL-MCNC: 29.4 NG/ML (ref 30–100)
CALCIUM SERPL-MCNC: 9.3 MG/DL (ref 8.7–10.2)
ERYTHROCYTE [DISTWIDTH] IN BLOOD BY AUTOMATED COUNT: 12 % (ref 11.7–15.4)
FERRITIN SERPL-MCNC: 11 NG/ML (ref 15–150)
FOLATE SERPL-MCNC: 14.5 NG/ML
HBA1C MFR BLD: 6.9 % (ref 4.8–5.6)
HCT VFR BLD AUTO: 36.7 % (ref 34–46.6)
HGB BLD-MCNC: 12.2 G/DL (ref 11.1–15.9)
IRON SATN MFR SERPL: 15 % (ref 15–55)
IRON SERPL-MCNC: 60 UG/DL (ref 27–159)
MCH RBC QN AUTO: 29 PG (ref 26.6–33)
MCHC RBC AUTO-ENTMCNC: 33.2 G/DL (ref 31.5–35.7)
MCV RBC AUTO: 87 FL (ref 79–97)
PLATELET # BLD AUTO: 214 X10E3/UL (ref 150–450)
RBC # BLD AUTO: 4.2 X10E6/UL (ref 3.77–5.28)
TIBC SERPL-MCNC: 401 UG/DL (ref 250–450)
UIBC SERPL-MCNC: 341 UG/DL (ref 131–425)
VIT B12 SERPL-MCNC: 538 PG/ML (ref 232–1245)
WBC # BLD AUTO: 8.9 X10E3/UL (ref 3.4–10.8)

## 2024-08-22 LAB — VIT B1 BLD-SCNC: 110.1 NMOL/L (ref 66.5–200)

## 2024-09-03 ENCOUNTER — INITIAL PRENATAL (OUTPATIENT)
Dept: OBSTETRICS AND GYNECOLOGY | Facility: CLINIC | Age: 34
End: 2024-09-03
Payer: COMMERCIAL

## 2024-09-03 VITALS — SYSTOLIC BLOOD PRESSURE: 112 MMHG | DIASTOLIC BLOOD PRESSURE: 64 MMHG | WEIGHT: 181 LBS | BODY MASS INDEX: 30.12 KG/M2

## 2024-09-03 DIAGNOSIS — O24.119 TYPE 2 DIABETES MELLITUS AFFECTING PREGNANCY, ANTEPARTUM: ICD-10-CM

## 2024-09-03 DIAGNOSIS — Z36.0 ENCOUNTER FOR ANTENATAL SCREENING FOR CHROMOSOMAL ANOMALIES: ICD-10-CM

## 2024-09-03 DIAGNOSIS — O35.9XX0 TERATOGEN EXPOSURE IN CURRENT PREGNANCY, NOT APPLICABLE OR UNSPECIFIED FETUS: ICD-10-CM

## 2024-09-03 DIAGNOSIS — O09.521 MULTIGRAVIDA OF ADVANCED MATERNAL AGE IN FIRST TRIMESTER: Primary | ICD-10-CM

## 2024-09-03 DIAGNOSIS — O34.219 HISTORY OF CESAREAN DELIVERY, CURRENTLY PREGNANT: ICD-10-CM

## 2024-09-03 DIAGNOSIS — F32.1 CURRENT MODERATE EPISODE OF MAJOR DEPRESSIVE DISORDER WITHOUT PRIOR EPISODE: ICD-10-CM

## 2024-09-03 DIAGNOSIS — Z13.71 SCREENING FOR GENETIC DISEASE CARRIER STATUS: ICD-10-CM

## 2024-09-03 DIAGNOSIS — O99.841 PREGNANCY AFFECTED BY PREVIOUS BARIATRIC SURGERY, CURRENTLY IN FIRST TRIMESTER: ICD-10-CM

## 2024-09-03 DIAGNOSIS — K91.89 IRON DEFICIENCY ANEMIA AFTER GASTRECTOMY: ICD-10-CM

## 2024-09-03 DIAGNOSIS — D50.8 IRON DEFICIENCY ANEMIA AFTER GASTRECTOMY: ICD-10-CM

## 2024-09-03 PROBLEM — E66.3 OVERWEIGHT (BMI 25.0-29.9): Status: RESOLVED | Noted: 2022-02-17 | Resolved: 2024-09-03

## 2024-09-03 PROBLEM — Z30.9 ENCOUNTER FOR CONTRACEPTIVE MANAGEMENT: Status: RESOLVED | Noted: 2024-06-11 | Resolved: 2024-09-03

## 2024-09-03 PROBLEM — R13.10 DYSPHAGIA: Status: RESOLVED | Noted: 2023-03-10 | Resolved: 2024-09-03

## 2024-09-03 PROBLEM — R63.1 POLYDIPSIA: Status: RESOLVED | Noted: 2017-05-08 | Resolved: 2024-09-03

## 2024-09-03 PROBLEM — O09.529 AMA (ADVANCED MATERNAL AGE) MULTIGRAVIDA 35+: Status: ACTIVE | Noted: 2024-09-03

## 2024-09-03 PROBLEM — R61 HYPERHIDROSIS: Status: RESOLVED | Noted: 2023-03-20 | Resolved: 2024-09-03

## 2024-09-03 PROBLEM — Z79.899 USE OF MEDICATION WITH TERATOGENIC POTENTIAL IN FEMALE OF REPRODUCTIVE AGE: Status: RESOLVED | Noted: 2024-08-19 | Resolved: 2024-09-03

## 2024-09-03 RX ORDER — IBUPROFEN 600 MG/1
TABLET ORAL
Qty: 1 EACH | Refills: 0 | Status: SHIPPED | OUTPATIENT
Start: 2024-09-03

## 2024-09-03 RX ORDER — FERRIC MALTOL 30 MG/1
1 CAPSULE ORAL 2 TIMES DAILY
Qty: 60 CAPSULE | Refills: 5 | Status: SHIPPED | OUTPATIENT
Start: 2024-09-03

## 2024-09-03 RX ORDER — ACYCLOVIR 400 MG/1
1 TABLET ORAL
Qty: 9 EACH | Refills: 3 | Status: SHIPPED | OUTPATIENT
Start: 2024-09-03

## 2024-09-03 RX ORDER — PEN NEEDLE, DIABETIC 30 GX3/16"
1 NEEDLE, DISPOSABLE MISCELLANEOUS 4 TIMES DAILY PRN
Qty: 100 EACH | Refills: 12 | Status: SHIPPED | OUTPATIENT
Start: 2024-09-03

## 2024-09-03 RX ORDER — ESCITALOPRAM OXALATE 20 MG/1
20 TABLET ORAL DAILY
Qty: 90 TABLET | Refills: 2 | Status: SHIPPED | OUTPATIENT
Start: 2024-09-03

## 2024-09-03 RX ORDER — ASPIRIN 81 MG/1
81 TABLET ORAL DAILY
Qty: 90 TABLET | Refills: 3 | Status: SHIPPED | OUTPATIENT
Start: 2024-09-03

## 2024-09-03 RX ORDER — INSULIN GLARGINE 100 [IU]/ML
10 INJECTION, SOLUTION SUBCUTANEOUS NIGHTLY
Qty: 15 ML | Refills: 3 | Status: SHIPPED | OUTPATIENT
Start: 2024-09-03

## 2024-09-03 RX ORDER — ACYCLOVIR 400 MG/1
1 TABLET ORAL ONCE
Qty: 1 EACH | Refills: 0 | Status: SHIPPED | OUTPATIENT
Start: 2024-09-03 | End: 2024-09-03

## 2024-09-03 NOTE — PROGRESS NOTES
Initial ob visit     CC- Here for care of pregnancy        Shelley Longoria is a 34 y.o. female, , who presents for her first obstetrical visit.  Patient's last menstrual period was 2024 (exact date).. Her RAMAN is 2025, by Ultrasound. Current GA is 9w0d. Fastings routinely 130+ and postprandials 140-160 with occasional 200s.     Initial positive test date : 2024, UPT        Her periods are every 28 days.  Prior obstetric issues: none  Patient's past medical history is significant for:  none .  Family history of genetic issues (includes FOB): no  Prior infections concerning in pregnancy (Rash, fever in last 2 weeks): No  Varicella Hx - history of chicken pox  Prior testing for Cystic Fibrosis Carrier or Sickle Cell Trait- no  Prepregnancy BMI - Body mass index is 30.12 kg/m².  History of STD: yes HSV  Hx of HSV for patient or partner: yes - HSV  Ultrasound Today: yes    OB History    Para Term  AB Living   5 4 4     4   SAB IAB Ectopic Molar Multiple Live Births             4      # Outcome Date GA Lbr Naga/2nd Weight Sex Type Anes PTL Lv   5 Current            4 Term 21 37w0d  3572 g (7 lb 14 oz) M CS-LTranv   [facility]   3 Term 11/06/15 37w0d  4423 g (9 lb 12 oz) M CS-LTranv   [facility]   2 Term 05/15/13 37w0d  4309 g (9 lb 8 oz) F CS-LTranv   [facility]   1 Term 08 37w0d  4082 g (9 lb) M CS-LTranv   [facility]      Obstetric Comments   2008- 37w NF-[id number]-male- [facility]- adoption   2013-37w CXE-[id number]-[id number]-female-[facility]   2015-37w SOZ-[id number]-male-[facility]   2021-37w RCS-7lbs 14oz-male-[facility]          Additional Pertinent History   Last Pap : 02/15/2023 Result: negative HPV: negative     Last Completed Pap Smear            PAP SMEAR (Every 3 Years) Next due on 2/15/2026      02/15/2023  Patient-Reported (Performed Externally) - Dr Fragoso    2019  Patient-Reported (Performed Externally) - [facility]                  History  of abnormal Pap smear: yes - over 5 years ago, unknown results  Family history of uterine, colon, breast, or ovarian cancer: no  Feelings of Anxiety or Depression: yes - Lexapro  Tobacco Usage?: No   Alcohol/Drug Use?: NO  Over the age of 35 at delivery: yes  Genetic Screening: desires cell free DNA  Flu Status: Already given in current flu season    PMH    Current Outpatient Medications:     Blood Glucose Monitoring Suppl (FreeStyle Lite) device, 1 dose Daily., Disp: 1 each, Rfl: 0    escitalopram (Lexapro) 20 MG tablet, Take 1 tablet by mouth Daily., Disp: 90 tablet, Rfl: 2    glucose blood (FREESTYLE LITE) test strip, Use as instructed, Disp: 100 each, Rfl: 12    Lancets (freestyle) lancets, Check blood sugar three times daily PRN., Disp: 100 each, Rfl: 2    metFORMIN ER (GLUCOPHAGE-XR) 500 MG 24 hr tablet, TAKE ONE TABLET BY MOUTH DAILY WITH BREAKFAST AND TWO TABLETS BY MOUTH WITH DINNER OR AT BEDTIME, Disp: 90 tablet, Rfl: 2    Prenatal MV-Min-FA-Omega-3 (VITAFUSION PRENATAL PO), , Disp: , Rfl:     valACYclovir (VALTREX) 1000 MG tablet, Take 1 tablet by mouth Daily., Disp: , Rfl:     aspirin 81 MG EC tablet, Take 1 tablet by mouth Daily., Disp: 90 tablet, Rfl: 3    Continuous Glucose Sensor (Dexcom G7 Sensor) misc, Use 1 each Every 10 (Ten) Days., Disp: 9 each, Rfl: 3    Ferric Maltol (ACCRUFeR) 30 MG capsule, Take 1 capsule by mouth 2 (Two) Times a Day. Take 1 hour before or 2 hours after meals., Disp: 60 capsule, Rfl: 5    glucagon (GLUCAGEN) 1 MG injection, Use as directed for emergently low blood glucose level. Formulary Compliance Approval, Disp: 1 each, Rfl: 0    Insulin Glargine (Lantus SoloStar) 100 UNIT/ML injection pen, Inject 10 Units under the skin into the appropriate area as directed Every Night. Formulary Compliance Approval, Disp: 15 mL, Rfl: 3    Insulin Pen Needle (Pen Needles) 32G X 4 MM misc, Inject 1 each under the skin into the appropriate area as directed 4 (Four) Times a Day As Needed  (for insulin injections). Formulary Compliance Approval, Disp: 100 each, Rfl: 12    Nutritional Supplements (Glucose Management) tablet, Use as needed for emergently low blood glucose levels. Formulary Compliance Approval, Disp: 30 tablet, Rfl: 0     Past Medical History:   Diagnosis Date    Anxiety     Chronic joint pain     denies NSAIDS/steroids    COVID-19     2022    Diabetes mellitus, type II     w/ hx gestational diabetes , dx May 2017, started on insulin when initially dx, lost weight w/ Adipex, stopped insulin 6 months after dx, but now w/ weight regain, back on insulin.  Managed by PCP    Dyspepsia     Dyspnea on exertion     Fatigue     Fatty liver     w/ abnormal LFTs, US 2018      GERD (gastroesophageal reflux disease) 2023    Gestational diabetes     H/O H. pylori infection     UBT (+) 19 - PrevPak RX      Herpes 2017    Hiatal hernia 2023    Added automatically from request for surgery [id number]      History of gestational diabetes     History of Helicobacter pylori infection     UBT (+) 19 - PrevPak RX; repeat HPSA (+) 2019 - Pylera RX; repeat UBT (holding PPI) (+) - referred to GI, RX Levaquin/Flagyl/Pepto RX  - HPSA negative 2023    History of hiatal hernia     History of transfusion 2019    [facility]- PT UNSURE HOW MANY UNITS RECEIVED, NO REACTIONS    Mixed hyperlipidemia 10/15/2019    NAFL (nonalcoholic fatty liver)     w/ abnormal LFTs, US 2018    Obesity (BMI 30-39.9)     Personal history of gestational diabetes 2017    Wears eyeglasses         Past Surgical History:   Procedure Laterality Date    BREAST BIOPSY      CERVICAL CONE BIOPSY  2004    benign     SECTION      , , ,     ENDOSCOPY N/A 2019    Procedure: ESOPHAGOGASTRODUODENOSCOPY;  Surgeon: Mila Whatley MD;  Location: [facility];  Service: Bariatric    EXCESSIVE THIGH / HIP / BUTTOCK / FLANK SKIN EXCISION Bilateral 05/15/2024        GASTRIC SLEEVE  LAPAROSCOPIC N/A 2019    Procedure: GASTRIC SLEEVE LAPAROSCOPIC;  Surgeon: Mila Whatley MD;  Location:  [facility];  Service: Bariatric    HIATAL HERNIA REPAIR N/A 2019    Procedure: HIATAL HERNIA REPAIR LAPAROSCOPIC;  Surgeon: Mila Whatley MD;  Location:  [facility];  Service: Bariatric    HIATAL HERNIA REPAIR N/A 2023    Procedure: LAPAROSCOPIC HIATAL HERNIA REPAIR WITH MESH WITH DAVINCI ROBOT;  Surgeon: Mila Whatley MD;  Location:  [facility];  Service: Robotics - DaVinci;  Laterality: N/A;    UPPER GASTROINTESTINAL ENDOSCOPY  2019    Dr VIVIENNE Whatley    UPPER GASTROINTESTINAL ENDOSCOPY  2022    Dr Aldrich, positive H pylori    WISDOM TOOTH EXTRACTION         Review of Systems   Review of Systems    Patient Reports:  none  Patient Denies:excessive nausea , excessive vomiting, and vaginal bleeding  All systems reviewed and otherwise normal.    I have reviewed and agree with the HPI, ROS, and historical information as entered above. Jammie Marinelli MD     /64   Wt 82.1 kg (181 lb)   LMP 2024 (Exact Date)   BMI 30.12 kg/m²     The additional following portions of the patient's history were reviewed and updated as appropriate: allergies, current medications, past family history, past medical history, past social history, past surgical history, and problem list.    Physical Exam  General:  well developed; well nourished  no acute distress  mentation appropriate   Chest/Respiratory: unlabored   Heart:  not examined   Thyroid: not examined   Breasts:  Not performed.   Abdomen: Not performed.   Pelvis: Not performed.        Assessment and Plan    Problem List Items Addressed This Visit       Previous bariatric surgery complicating pregnancy    Relevant Orders    [facility]    Type 2 diabetes mellitus affecting pregnancy, antepartum    Current Assessment & Plan     Diabetes poorly controlled at home.  Given early gestation, will  start on 10 units of Lantus at night.  Dexcom prescribed.  Discussed with Linda with [facility], she will enroll the patient in their blood sugar monitoring program.  Discussed risks of macrosomia, IUGR, stillbirth, cardiac defects.  Will need fetal echo.         Relevant Medications    metFORMIN ER (GLUCOPHAGE-XR) 500 MG 24 hr tablet    Insulin Glargine (Lantus SoloStar) 100 UNIT/ML injection pen    Insulin Pen Needle (Pen Needles) 32G X 4 MM misc    glucagon (GLUCAGEN) 1 MG injection    Nutritional Supplements (Glucose Management) tablet    Continuous Glucose Sensor (Dexcom G7 Sensor) misc    aspirin 81 MG EC tablet    Other Relevant Orders    Protein / Creatinine Ratio, Urine - Urine, Clean Catch    [facility] [place]    Comprehensive Metabolic Panel (Completed)    Uric Acid (Completed)    Lactate Dehydrogenase (Completed)    Ambulatory Referral to Diabetic Education    Phentermine/topiramate exposure in current pregnancy    Overview     Phentermine/topiramate until positive pregnancy test         Relevant Orders    [facility]    AMA (advanced maternal age) multigravida 35+ - Primary    Relevant Medications    aspirin 81 MG EC tablet    Other Relevant Orders    Urine Culture - Urine, Urine, Clean Catch    Obstetric Panel (Completed)    Urine Drug Screen - Urine, Clean Catch    Chlamydia trachomatis, Neisseria gonorrhoeae, PCR w/ confirmation - Urine, Urine, Clean Catch    Hemoglobinopathy Fractionation Cascade (Completed)    Varicella Zoster Antibody, IgG (Completed)    HIV-1 / O / 2 Ag / Antibody (Completed)    Thyroid Cascade Profile (Completed)    Protein / Creatinine Ratio, Urine - Urine, Clean Catch    Inheritest (R) CF/SMA Panel - Blood, (Completed)    [id number] PLUS Core+SCA+ESS - Blood, (Completed)    [facility] [place]    History of  delivery, currently pregnant    Relevant Orders    [facility] [place]    Current moderate episode  of major depressive disorder without prior episode    Relevant Medications    escitalopram (Lexapro) 20 MG tablet     Other Visit Diagnoses       Screening for genetic disease carrier status        Relevant Orders    Inheritest (R) CF/SMA Panel - Blood, (Completed)    Encounter for  screening for chromosomal anomalies        Relevant Orders    [id number] PLUS Core+SCA+ESS - Blood, (Completed)    Iron deficiency anemia after gastrectomy        Relevant Medications    Ferric Maltol (ACCRUFeR) 30 MG capsule            Pregnancy at 9w0d  Reviewed routine prenatal care with the office and educational materials given  Lab(s) Ordered  Discussed options for genetic testing including first trimester nuchal translucency screen, genetic disease carrier testing, quadruple screen, and NIPT  Discontinue the use of all non-medicinal drugs and chemicals  Nausea/Vomiting - she does not desire medications at this time.  Discussed conservative ways to help with nausea.  Patient is on Prenatal vitamins  Activity recommendation : 150 minutes/week of moderate intensity aerobic activity unless we limit for bleeding, hypertension or other pregnancy complication   U/S ordered at follow up  Recommend limiting weight gain to 15 to 20 pounds in pregnancy.   Discussed carbohydrate control.   Referral to [facility] Ordered  hgb A1C today  TFT today  discussed baby aspirin from 12 weeks to delivery for prevention of preeclampsia   baseline PEP today  Return in about 4 weeks (around 10/1/2024) for Prenatal Care.      Lucho Marinelli MD  2024

## 2024-09-04 PROBLEM — F32.1 CURRENT MODERATE EPISODE OF MAJOR DEPRESSIVE DISORDER WITHOUT PRIOR EPISODE: Status: ACTIVE | Noted: 2024-09-04

## 2024-09-04 LAB — MED LIST OPTION NOT SELECTED: NORMAL

## 2024-09-04 NOTE — ASSESSMENT & PLAN NOTE
Diabetes poorly controlled at home.  Given early gestation, will start on 10 units of Lantus at night.  Dexcom prescribed.  Discussed with Linda with [facility], she will enroll the patient in their blood sugar monitoring program.  Discussed risks of macrosomia, IUGR, stillbirth, cardiac defects.  Will need fetal echo.

## 2024-09-06 LAB
AMPHETAMINES UR QL SCN: NEGATIVE NG/ML
BACTERIA UR CULT: NO GROWTH
BACTERIA UR CULT: NORMAL
BARBITURATES UR QL SCN: NEGATIVE NG/ML
BENZODIAZ UR QL SCN: NEGATIVE NG/ML
BZE UR QL SCN: NEGATIVE NG/ML
C TRACH RRNA SPEC QL NAA+PROBE: NEGATIVE
CANNABINOIDS UR QL SCN: NEGATIVE NG/ML
CREAT UR-MCNC: 36.2 MG/DL
CREAT UR-MCNC: 39 MG/DL (ref 20–300)
LABORATORY COMMENT REPORT: NORMAL
METHADONE UR QL SCN: NEGATIVE NG/ML
N GONORRHOEA RRNA SPEC QL NAA+PROBE: NEGATIVE
OPIATES UR QL SCN: NEGATIVE NG/ML
OXYCODONE+OXYMORPHONE UR QL SCN: NEGATIVE NG/ML
PCP UR QL: NEGATIVE NG/ML
PH UR: 5.6 [PH] (ref 4.5–8.9)
PROPOXYPH UR QL SCN: NEGATIVE NG/ML
PROT UR-MCNC: 4.6 MG/DL
PROT/CREAT UR: 127 MG/G CREAT (ref 0–200)

## 2024-09-09 LAB
5P15 DELETION (CRI-DU-CHAT): NOT DETECTED
ABO GROUP BLD: NORMAL
ALBUMIN SERPL-MCNC: 4.2 G/DL (ref 3.9–4.9)
ALP SERPL-CCNC: 74 IU/L (ref 44–121)
ALT SERPL-CCNC: 25 IU/L (ref 0–32)
AST SERPL-CCNC: 19 IU/L (ref 0–40)
BASOPHILS # BLD AUTO: 0 X10E3/UL (ref 0–0.2)
BASOPHILS NFR BLD AUTO: 0 %
BILIRUB SERPL-MCNC: <0.2 MG/DL (ref 0–1.2)
BLD GP AB SCN SERPL QL: NEGATIVE
BUN SERPL-MCNC: 6 MG/DL (ref 6–20)
BUN/CREAT SERPL: 11 (ref 9–23)
CALCIUM SERPL-MCNC: 9.4 MG/DL (ref 8.7–10.2)
CFDNA.FET/CFDNA.TOTAL SFR FETUS: NORMAL %
CHLORIDE SERPL-SCNC: 102 MMOL/L (ref 96–106)
CITATION REF LAB TEST: NORMAL
CITATION REF LAB TEST: NORMAL
CO2 SERPL-SCNC: 20 MMOL/L (ref 20–29)
CREAT SERPL-MCNC: 0.54 MG/DL (ref 0.57–1)
EGFRCR SERPLBLD CKD-EPI 2021: 123 ML/MIN/1.73
EOSINOPHIL # BLD AUTO: 0.1 X10E3/UL (ref 0–0.4)
EOSINOPHIL NFR BLD AUTO: 2 %
ERYTHROCYTE [DISTWIDTH] IN BLOOD BY AUTOMATED COUNT: 12.5 % (ref 11.7–15.4)
ETHNIC BACKGROUND STATED: NORMAL
FET 13+18+21+X+Y ANEUP PLAS.CFDNA: NEGATIVE
FET 1P36 DEL RISK WBC.DNA+CFDNA QL: NOT DETECTED
FET 22Q11.2 DEL RISK WBC.DNA+CFDNA QL: NOT DETECTED
FET CHR 11Q23 DEL PLAS.CFDNA QL: NOT DETECTED
FET CHR 15Q11 DEL PLAS.CFDNA QL: NOT DETECTED
FET CHR 21 TS PLAS.CFDNA QL: NEGATIVE
FET CHR 4P16 DEL PLAS.CFDNA QL: NOT DETECTED
FET CHR 8Q24 DEL PLAS.CFDNA QL: NOT DETECTED
FET MS X RISK WBC.DNA+CFDNA QL: NOT DETECTED
FET SEX PLAS.CFDNA DOSAGE CFDNA: NORMAL
FET TS 13 RISK PLAS.CFDNA QL: NEGATIVE
FET TS 18 RISK WBC.DNA+CFDNA QL: NEGATIVE
FET X + Y ANEUP RISK PLAS.CFDNA SEQ-IMP: NOT DETECTED
GA EST FROM CONCEPTION DATE: NORMAL D
GENE DIS ANL CARRIER INTERP-IMP: NORMAL
GENE STUDIED ID: NORMAL
GESTATIONAL AGE > 9:: YES
GLOBULIN SER CALC-MCNC: 2.8 G/DL (ref 1.5–4.5)
GLUCOSE SERPL-MCNC: 90 MG/DL (ref 70–99)
HBV SURFACE AG SERPL QL IA: NEGATIVE
HCT VFR BLD AUTO: 38.7 % (ref 34–46.6)
HCV IGG SERPL QL IA: NON REACTIVE
HGB A MFR BLD ELPH: 97.5 % (ref 96.4–98.8)
HGB A2 MFR BLD ELPH: 2.5 % (ref 1.8–3.2)
HGB BLD-MCNC: 12.7 G/DL (ref 11.1–15.9)
HGB F MFR BLD ELPH: 0 % (ref 0–2)
HGB FRACT BLD-IMP: NORMAL
HGB S MFR BLD ELPH: 0 %
HIV 1+2 AB+HIV1 P24 AG SERPL QL IA: NON REACTIVE
IMM GRANULOCYTES # BLD AUTO: 0 X10E3/UL (ref 0–0.1)
IMM GRANULOCYTES NFR BLD AUTO: 0 %
LAB DIRECTOR NAME PROVIDER: NORMAL
LABORATORY COMMENT REPORT: NORMAL
LDH SERPL L TO P-CCNC: 144 IU/L (ref 119–226)
LIMITATIONS OF THE TEST: NORMAL
LYMPHOCYTES # BLD AUTO: 2.6 X10E3/UL (ref 0.7–3.1)
LYMPHOCYTES NFR BLD AUTO: 33 %
Lab: NORMAL
MCH RBC QN AUTO: 29.1 PG (ref 26.6–33)
MCHC RBC AUTO-ENTMCNC: 32.8 G/DL (ref 31.5–35.7)
MCV RBC AUTO: 89 FL (ref 79–97)
MOL DX INTERP BLD/T QL: NORMAL
MONOCYTES # BLD AUTO: 0.4 X10E3/UL (ref 0.1–0.9)
MONOCYTES NFR BLD AUTO: 5 %
NEGATIVE PREDICTIVE VALUE: NORMAL
NEUTROPHILS # BLD AUTO: 4.7 X10E3/UL (ref 1.4–7)
NEUTROPHILS NFR BLD AUTO: 60 %
NOTE: NORMAL
PERFORMANCE CHARACTERISTICS: NORMAL
PLATELET # BLD AUTO: 200 X10E3/UL (ref 150–450)
POSITIVE PREDICTIVE VALUE: NORMAL
POTASSIUM SERPL-SCNC: 3.7 MMOL/L (ref 3.5–5.2)
PROT SERPL-MCNC: 7 G/DL (ref 6–8.5)
RBC # BLD AUTO: 4.36 X10E6/UL (ref 3.77–5.28)
REASON FOR REFERRAL (NARRATIVE): NORMAL
RECOMMENDATION PATIENT DOC-IMP: NORMAL
REF LAB TEST METHOD: NORMAL
REF LAB TEST METHOD: NORMAL
RH BLD: POSITIVE
RPR SER QL: NON REACTIVE
RUBV IGG SERPL IA-ACNC: 1.03 INDEX
SERVICE CMNT-IMP: NORMAL
SODIUM SERPL-SCNC: 136 MMOL/L (ref 134–144)
SPECIMEN SOURCE: NORMAL
TEST PERFORMANCE INFO SPEC: NORMAL
TRIOSOMY 16: NOT DETECTED
TRISOMY 22: NOT DETECTED
TSH SERPL DL<=0.005 MIU/L-ACNC: 1.25 UIU/ML (ref 0.45–4.5)
URATE SERPL-MCNC: 1.5 MG/DL (ref 2.6–6.2)
VZV IGG SER IA-ACNC: 225 INDEX
WBC # BLD AUTO: 7.9 X10E3/UL (ref 3.4–10.8)

## 2024-09-19 ENCOUNTER — TELEPHONE (OUTPATIENT)
Dept: OBSTETRICS AND GYNECOLOGY | Facility: HOSPITAL | Age: 34
End: 2024-09-19
Payer: COMMERCIAL

## 2024-09-19 ENCOUNTER — MEDICATION THERAPY MANAGEMENT (OUTPATIENT)
Dept: OBSTETRICS AND GYNECOLOGY | Facility: HOSPITAL | Age: 34
End: 2024-09-19
Payer: COMMERCIAL

## 2024-09-19 DIAGNOSIS — O24.119 TYPE 2 DIABETES MELLITUS AFFECTING PREGNANCY, ANTEPARTUM: Primary | ICD-10-CM

## 2024-09-19 RX ORDER — INSULIN LISPRO 100 [IU]/ML
4 INJECTION, SOLUTION SUBCUTANEOUS
Qty: 15 ML | Refills: 1 | Status: SHIPPED | OUTPATIENT
Start: 2024-09-19

## 2024-09-20 ENCOUNTER — OFFICE VISIT (OUTPATIENT)
Dept: INTERNAL MEDICINE | Facility: CLINIC | Age: 34
End: 2024-09-20
Payer: COMMERCIAL

## 2024-09-20 VITALS
BODY MASS INDEX: 30.49 KG/M2 | WEIGHT: 183 LBS | SYSTOLIC BLOOD PRESSURE: 118 MMHG | HEIGHT: 65 IN | OXYGEN SATURATION: 98 % | DIASTOLIC BLOOD PRESSURE: 68 MMHG | TEMPERATURE: 98 F | HEART RATE: 97 BPM

## 2024-09-20 DIAGNOSIS — F32.1 CURRENT MODERATE EPISODE OF MAJOR DEPRESSIVE DISORDER WITHOUT PRIOR EPISODE: ICD-10-CM

## 2024-09-20 DIAGNOSIS — E11.65 TYPE 2 DIABETES MELLITUS WITH HYPERGLYCEMIA, WITHOUT LONG-TERM CURRENT USE OF INSULIN: Primary | ICD-10-CM

## 2024-09-20 PROCEDURE — 1160F RVW MEDS BY RX/DR IN RCRD: CPT | Performed by: FAMILY MEDICINE

## 2024-09-20 PROCEDURE — 3044F HG A1C LEVEL LT 7.0%: CPT | Performed by: FAMILY MEDICINE

## 2024-09-20 PROCEDURE — 1159F MED LIST DOCD IN RCRD: CPT | Performed by: FAMILY MEDICINE

## 2024-09-20 PROCEDURE — 99213 OFFICE O/P EST LOW 20 MIN: CPT | Performed by: FAMILY MEDICINE

## 2024-09-20 PROCEDURE — 1126F AMNT PAIN NOTED NONE PRSNT: CPT | Performed by: FAMILY MEDICINE

## 2024-09-20 RX ORDER — ESCITALOPRAM OXALATE 20 MG/1
30 TABLET ORAL DAILY
Qty: 90 TABLET | Refills: 2 | Status: SHIPPED | OUTPATIENT
Start: 2024-09-20

## 2024-09-23 ENCOUNTER — ROUTINE PRENATAL (OUTPATIENT)
Dept: OBSTETRICS AND GYNECOLOGY | Facility: CLINIC | Age: 34
End: 2024-09-23
Payer: COMMERCIAL

## 2024-09-23 ENCOUNTER — OFFICE VISIT (OUTPATIENT)
Dept: OBSTETRICS AND GYNECOLOGY | Facility: HOSPITAL | Age: 34
End: 2024-09-23
Payer: COMMERCIAL

## 2024-09-23 ENCOUNTER — HOSPITAL ENCOUNTER (OUTPATIENT)
Dept: WOMENS IMAGING | Facility: HOSPITAL | Age: 34
Discharge: HOME | End: 2024-09-23
Admitting: OBSTETRICS & GYNECOLOGY
Payer: COMMERCIAL

## 2024-09-23 VITALS — DIASTOLIC BLOOD PRESSURE: 74 MMHG | WEIGHT: 181.4 LBS | SYSTOLIC BLOOD PRESSURE: 114 MMHG | BODY MASS INDEX: 30.19 KG/M2

## 2024-09-23 VITALS — WEIGHT: 182 LBS | SYSTOLIC BLOOD PRESSURE: 113 MMHG | BODY MASS INDEX: 30.29 KG/M2 | DIASTOLIC BLOOD PRESSURE: 72 MMHG

## 2024-09-23 DIAGNOSIS — O99.840 PREVIOUS BARIATRIC SURGERY AFFECTING PREGNANCY, ANTEPARTUM: ICD-10-CM

## 2024-09-23 DIAGNOSIS — O35.9XX0 TERATOGEN EXPOSURE IN CURRENT PREGNANCY, NOT APPLICABLE OR UNSPECIFIED FETUS: ICD-10-CM

## 2024-09-23 DIAGNOSIS — O09.529 ANTEPARTUM MULTIGRAVIDA OF ADVANCED MATERNAL AGE: ICD-10-CM

## 2024-09-23 DIAGNOSIS — O34.219 HISTORY OF CESAREAN DELIVERY, CURRENTLY PREGNANT: ICD-10-CM

## 2024-09-23 DIAGNOSIS — O24.119 TYPE 2 DIABETES MELLITUS AFFECTING PREGNANCY, ANTEPARTUM: ICD-10-CM

## 2024-09-23 DIAGNOSIS — O24.119 TYPE 2 DIABETES MELLITUS AFFECTING PREGNANCY, ANTEPARTUM: Primary | ICD-10-CM

## 2024-09-23 DIAGNOSIS — Z3A.12 12 WEEKS GESTATION OF PREGNANCY: ICD-10-CM

## 2024-09-23 DIAGNOSIS — Z34.91 PRENATAL CARE IN FIRST TRIMESTER: ICD-10-CM

## 2024-09-23 DIAGNOSIS — O09.521 MULTIGRAVIDA OF ADVANCED MATERNAL AGE IN FIRST TRIMESTER: ICD-10-CM

## 2024-09-23 DIAGNOSIS — O99.841 PREGNANCY AFFECTED BY PREVIOUS BARIATRIC SURGERY, CURRENTLY IN FIRST TRIMESTER: ICD-10-CM

## 2024-09-23 DIAGNOSIS — O99.841 PREGNANCY AFFECTED BY PREVIOUS BARIATRIC SURGERY, CURRENTLY IN FIRST TRIMESTER: Primary | ICD-10-CM

## 2024-09-23 LAB
GLUCOSE UR STRIP-MCNC: NEGATIVE MG/DL
PROT UR STRIP-MCNC: NEGATIVE MG/DL

## 2024-09-23 PROCEDURE — 76813 OB US NUCHAL MEAS 1 GEST: CPT

## 2024-09-23 PROCEDURE — 99214 OFFICE O/P EST MOD 30 MIN: CPT

## 2024-09-23 PROCEDURE — 76801 OB US < 14 WKS SINGLE FETUS: CPT

## 2024-09-26 ENCOUNTER — MEDICATION THERAPY MANAGEMENT (OUTPATIENT)
Dept: OBSTETRICS AND GYNECOLOGY | Facility: HOSPITAL | Age: 34
End: 2024-09-26
Payer: COMMERCIAL

## 2024-09-26 ENCOUNTER — TELEPHONE (OUTPATIENT)
Dept: OBSTETRICS AND GYNECOLOGY | Facility: HOSPITAL | Age: 34
End: 2024-09-26
Payer: COMMERCIAL

## 2024-09-26 DIAGNOSIS — O24.119 TYPE 2 DIABETES MELLITUS AFFECTING PREGNANCY, ANTEPARTUM: ICD-10-CM

## 2024-09-26 RX ORDER — INSULIN LISPRO 100 [IU]/ML
6 INJECTION, SOLUTION SUBCUTANEOUS
Qty: 15 ML | Refills: 1 | Status: SHIPPED | OUTPATIENT
Start: 2024-09-26

## 2024-09-26 RX ORDER — INSULIN GLARGINE 100 [IU]/ML
10 INJECTION, SOLUTION SUBCUTANEOUS 2 TIMES DAILY
Qty: 15 ML | Refills: 3 | Status: SHIPPED | OUTPATIENT
Start: 2024-09-26

## 2024-09-28 PROBLEM — Z98.890 HISTORY OF GASTRIC SURGERY: Status: ACTIVE | Noted: 2024-09-28

## 2024-10-03 ENCOUNTER — MEDICATION THERAPY MANAGEMENT (OUTPATIENT)
Dept: OBSTETRICS AND GYNECOLOGY | Facility: HOSPITAL | Age: 34
End: 2024-10-03
Payer: COMMERCIAL

## 2024-10-03 ENCOUNTER — TELEPHONE (OUTPATIENT)
Dept: OBSTETRICS AND GYNECOLOGY | Facility: HOSPITAL | Age: 34
End: 2024-10-03
Payer: COMMERCIAL

## 2024-10-03 DIAGNOSIS — O24.119 TYPE 2 DIABETES MELLITUS AFFECTING PREGNANCY, ANTEPARTUM: ICD-10-CM

## 2024-10-03 RX ORDER — INSULIN GLARGINE 100 [IU]/ML
INJECTION, SOLUTION SUBCUTANEOUS
Qty: 15 ML | Refills: 3 | Status: SHIPPED | OUTPATIENT
Start: 2024-10-03

## 2024-10-03 NOTE — TELEPHONE ENCOUNTER
Spoke with patient over the phone.  Informed patient that Dr. Mahmood has reviewed her blood sugar log and is increasing her Lantus to 12 units in the morning.  She will continue to take Lanuts 10 units in the evening.  Patient voices understanding.  Linda Wen RN

## 2024-10-08 ENCOUNTER — REFERRAL TRIAGE (OUTPATIENT)
Dept: LABOR AND DELIVERY | Facility: HOSPITAL | Age: 34
End: 2024-10-08
Payer: COMMERCIAL

## 2024-10-09 ENCOUNTER — TELEPHONE (OUTPATIENT)
Dept: OBSTETRICS AND GYNECOLOGY | Facility: HOSPITAL | Age: 34
End: 2024-10-09
Payer: COMMERCIAL

## 2024-10-09 NOTE — TELEPHONE ENCOUNTER
Spoke to patient regarding blood sugar log and insulin changes. Instructed to increase Lantas to 14 units twice a day, increase Lispro to 8 units with breakfast and lunch and 12 units with dinner, patient is to continue Meformin as taking. Patient asked for follow up [employer] message outlining changes.

## 2024-10-10 ENCOUNTER — PATIENT OUTREACH (OUTPATIENT)
Dept: LABOR AND DELIVERY | Facility: HOSPITAL | Age: 34
End: 2024-10-10
Payer: COMMERCIAL

## 2024-10-10 NOTE — OUTREACH NOTE
[facility]  Enrollment    Current Estimated Gestational Age: 14w2d    Questions/Answers      Flowsheet Row Responses   Would like to participate? Yes   Date of Intake Visit 10/14/24          Spoke with Shelley to discuss the [facility] program. She agreed to enroll. Intake interview scheduled for 10/14/24 per her request. [facility] survey sent by [employer], response requested.     Juany Mar RN  Maternity Nurse Navigator    10/10/2024, 12:41 EDT

## 2024-10-14 ENCOUNTER — PATIENT OUTREACH (OUTPATIENT)
Dept: LABOR AND DELIVERY | Facility: HOSPITAL | Age: 34
End: 2024-10-14
Payer: COMMERCIAL

## 2024-10-14 NOTE — OUTREACH NOTE
"[facility]  Intake    Current Estimated Gestational Age: 14w6d    Intake Assessment      Flowsheet Row Responses   Best Method for Contacting Cell   Currently Employed Yes   Able to keep appointments as scheduled Yes   Gender(s) and Name(s) Girl- Cassie \"Milagros\"   Do you have a dentist? Yes   Have you seen a dentist in the last 6 months No   Resources Presently Utilizing: WIC (Women, Infant, Children), Other, Medical Specialist, HANDS   Other Resources Utilizing: SNAP, [facility]   Maternal Warning Signs Provided   Other Education How to find a dentist, Insurance benefits/Incentives, HANDS, Mental Health Services            Learning Assessment      Flowsheet Row Responses   Relationship Patient   Learner Name Shelley   Does the learner have any barriers to learning? No Barriers   What is the preferred language of the learner for medical teaching? English   Is an  required? No   How does the learner prefer to learn new concepts? Demonstration          Tobacco, Alcohol, and Drug History     reports that she quit smoking about 12 years ago. Her smoking use included cigarettes. She started smoking about 14 years ago. She has a 4 pack-year smoking history. She has never used smokeless tobacco.   reports no history of alcohol use.   reports no history of drug use.    [facility]  Check-In    Current Estimated Gestational Age: 14w6d      Questions/Answers      Flowsheet Row Responses   Best Method for Contacting Cell   Demographics Reviewed Yes   Currently Employed Yes   Able to keep appointments as scheduled Yes   Gender(s) and Name(s) GirlIsabela Matthews \"Milagros\"   Baby Active/Feeling Fetal Movemen No, Early in Pregnancy   How are you presently feeling? \"I'm tired all the time\"   Are you having any of the following symptoms? --  [denies]   Questions regarding prenatal visits or tests to be ordered? No   Education related to new diagnoses/home equipment No   May I ask you questions about your " substance use? Yes   Resource/Environmental Concerns None   Do you have any questions related to your care experience, your pregnancy, plans for delivery, any concerns, etc? No   Other Education How to find a dentist, Insurance benefits/Incentives, HANDS, Mental Health Services          Shelley is seeing Dr. Marinelli for her prenatal care. Her history is significant for T2DM on insulin, managed by [facility] during this pregnancy; H/O fatty lover, H Pyloi infection, MDD on Lexapro and gastric sleeve in 2019.    She report having had SI prior to starting Lexapro, denies current SI/HI and currently feels safe. Encouraged to consider seeing a mental health provider. Discussed that we will be screening periodically for  and postpartum changes with mood looking for trends which may need to be addressed. VU.    She is feeling tired much of the time, has not begun feeling fetal movement. She has begun gathering items she will need for baby care. Prenatal and breastfeeding education discussed. Discussed bonus benefits of pregnancy from insurance for potential assistance with some infant care items.     SDOH reviewed with patient. No immediate issues were identified. Reports having a stable, involved partner and support system.     Multiple resources provided via [employer] message. Contact information provided. Encouraged to call with questions, concerns, or for support. Will plan f/u around 20 weeks after anatomy scan for wellness and resource needs check in.    Juany Mar RN  Maternity Nurse Navigator    10/14/2024, 18:03 EDT

## 2024-10-17 ENCOUNTER — MEDICATION THERAPY MANAGEMENT (OUTPATIENT)
Dept: OBSTETRICS AND GYNECOLOGY | Facility: HOSPITAL | Age: 34
End: 2024-10-17
Payer: COMMERCIAL

## 2024-10-17 DIAGNOSIS — O24.119 TYPE 2 DIABETES MELLITUS AFFECTING PREGNANCY, ANTEPARTUM: ICD-10-CM

## 2024-10-17 RX ORDER — INSULIN LISPRO 100 [IU]/ML
10 INJECTION, SOLUTION SUBCUTANEOUS
Qty: 15 ML | Refills: 1 | Status: SHIPPED | OUTPATIENT
Start: 2024-10-17

## 2024-10-21 ENCOUNTER — HOSPITAL ENCOUNTER (OUTPATIENT)
Dept: WOMENS IMAGING | Facility: HOSPITAL | Age: 34
Discharge: HOME | End: 2024-10-21
Admitting: OBSTETRICS & GYNECOLOGY
Payer: COMMERCIAL

## 2024-10-21 ENCOUNTER — OFFICE VISIT (OUTPATIENT)
Dept: OBSTETRICS AND GYNECOLOGY | Facility: HOSPITAL | Age: 34
End: 2024-10-21
Payer: COMMERCIAL

## 2024-10-21 ENCOUNTER — ROUTINE PRENATAL (OUTPATIENT)
Dept: OBSTETRICS AND GYNECOLOGY | Facility: CLINIC | Age: 34
End: 2024-10-21
Payer: COMMERCIAL

## 2024-10-21 VITALS — BODY MASS INDEX: 31.92 KG/M2 | SYSTOLIC BLOOD PRESSURE: 114 MMHG | WEIGHT: 191.8 LBS | DIASTOLIC BLOOD PRESSURE: 68 MMHG

## 2024-10-21 VITALS — WEIGHT: 192 LBS | SYSTOLIC BLOOD PRESSURE: 102 MMHG | DIASTOLIC BLOOD PRESSURE: 66 MMHG | BODY MASS INDEX: 31.95 KG/M2

## 2024-10-21 DIAGNOSIS — O24.119 TYPE 2 DIABETES MELLITUS AFFECTING PREGNANCY, ANTEPARTUM: Primary | ICD-10-CM

## 2024-10-21 DIAGNOSIS — O34.219 HISTORY OF CESAREAN DELIVERY, CURRENTLY PREGNANT: ICD-10-CM

## 2024-10-21 DIAGNOSIS — O99.840 PREVIOUS BARIATRIC SURGERY AFFECTING PREGNANCY, ANTEPARTUM: ICD-10-CM

## 2024-10-21 DIAGNOSIS — O09.529 ANTEPARTUM MULTIGRAVIDA OF ADVANCED MATERNAL AGE: ICD-10-CM

## 2024-10-21 DIAGNOSIS — O99.840 PREVIOUS BARIATRIC SURGERY AFFECTING PREGNANCY, ANTEPARTUM: Primary | ICD-10-CM

## 2024-10-21 DIAGNOSIS — O24.119 TYPE 2 DIABETES MELLITUS AFFECTING PREGNANCY, ANTEPARTUM: ICD-10-CM

## 2024-10-21 DIAGNOSIS — O35.9XX0 TERATOGEN EXPOSURE IN CURRENT PREGNANCY, NOT APPLICABLE OR UNSPECIFIED FETUS: ICD-10-CM

## 2024-10-21 DIAGNOSIS — Z34.90 PRENATAL CARE, ANTEPARTUM: ICD-10-CM

## 2024-10-21 LAB
GLUCOSE UR STRIP-MCNC: NEGATIVE MG/DL
PROT UR STRIP-MCNC: NEGATIVE MG/DL

## 2024-10-21 PROCEDURE — 76815 OB US LIMITED FETUS(S): CPT

## 2024-10-21 RX ORDER — FLUCONAZOLE 150 MG/1
150 TABLET ORAL ONCE
Qty: 1 TABLET | Refills: 0 | Status: SHIPPED | OUTPATIENT
Start: 2024-10-21 | End: 2024-10-21

## 2024-10-21 NOTE — PROGRESS NOTES
Patient denies bleeding, leaking fluid or contractions  NIPT  negative  Patients next follow up with Dr. Marinelli is today

## 2024-10-21 NOTE — PROGRESS NOTES
OB FOLLOW UP  CC- Here for care of pregnancy        Shelley Longoria is a 34 y.o.  15w6d patient being seen today for her obstetrical follow up visit. Patient reports itching, burning, and thick vaginal discharge that started 2 days ago. She reports she knows she has yeast infection from DM, because her sugars are out of control. She states they change her insulin levels constantly and she has extreme highs and lows. She does not want to do Monistat. She is requesting Diflucan. She missed diabetic education.     Her prenatal care is complicated by (and status) : see below. and Type Type 2, insulin-dependent DM. Her average fasting BG is . Her average 2hr PP BG is 180-220.    Patient Active Problem List   Diagnosis    Mixed hyperlipidemia    Previous bariatric surgery complicating pregnancy    S/P LSG  - Postop Hemorrhage    FH: bariatric surgery    Type 2 diabetes mellitus affecting pregnancy, antepartum    GERD (gastroesophageal reflux disease)    Dehydration    Vomiting    Major depressive disorder with single episode, in partial remission    Phentermine/topiramate exposure in current pregnancy    AMA (advanced maternal age) multigravida 35+    History of  delivery, currently pregnant    Current moderate episode of major depressive disorder without prior episode    History of gastric surgery       Flu Status: Will give in office today  Ultrasound Today: Yes @ [facility]    AFP: declines    ROS -   Patient Denies: leaking of fluid, vaginal bleeding, dysuria, excessive vomiting, and more than 6 contractions per hour  All other systems reviewed and are negative.       The additional following portions of the patient's history were reviewed and updated as appropriate: allergies, current medications, past family history, past medical history, past social history, past surgical history, and problem list.      I have reviewed and agree with the HPI, ROS, and historical information as  entered above. Lucho Marinelli MD         EXAM:     Prenatal Vitals  BP: 114/68  Weight: 87 kg (191 lb 12.8 oz)             Urine Glucose Read-only: Negative  Urine Protein Read-only: Negative           Assessment and Plan    Problem List Items Addressed This Visit       Previous bariatric surgery complicating pregnancy - Primary    Type 2 diabetes mellitus affecting pregnancy, antepartum    Relevant Medications    metFORMIN ER (GLUCOPHAGE-XR) 500 MG 24 hr tablet    Insulin Pen Needle (Pen Needles) 32G X 4 MM misc    glucagon (GLUCAGEN) 1 MG injection    Nutritional Supplements (Glucose Management) tablet    Continuous Glucose Sensor (Dexcom G7 Sensor) misc    aspirin 81 MG EC tablet    Insulin Glargine (Lantus SoloStar) 100 UNIT/ML injection pen    Insulin Lispro Kang KwikPen 100 UNIT/ML solution pen-injector    Other Relevant Orders    Ambulatory Referral to Diabetic Education    Ambulatory Referral to Nutrition Services    Phentermine/topiramate exposure in current pregnancy    Overview     Phentermine/topiramate until positive pregnancy test         AMA (advanced maternal age) multigravida 35+    Relevant Medications    aspirin 81 MG EC tablet    History of  delivery, currently pregnant     Other Visit Diagnoses       Prenatal care, antepartum        Relevant Orders    POC Urinalysis Dipstick (Completed)    Fluzone >6mos ([id number]) (Completed)            Pregnancy at 15w6d  Fetal status reassuring.   Counseled on MSAFP alone in relation to OTD and placental issues.    Anatomy scan next visit.   Activity and Exercise discussed.  Patient is on Prenatal vitamins  Reviewed the concept of simple carbohydrates and blood sugar spikes.  Recommend she reschedule diabetic education.  She is trying to set up telehealth.  Diflucan prescribed.  Return in about 4 weeks (around 2024) for Prenatal Care.    Lucho Marinelli MD  10/21/2024

## 2024-10-22 NOTE — PROGRESS NOTES
"    Maternal/Fetal Medicine Follow Up Note     Name: Shelley Longoria    : 1990     MRN: [id number]     Referring Provider: Lucho Marinelli MD    Chief Complaint  AMA, T2DM, C/S x 4, hx bariatric surgery    Subjective     History of Present Illness:  Shelley Longoria is a 34 y.o.  16w0d who presents today for follow up     RAMAN: Estimated Date of Delivery: 25     ROS:   As noted in HPI.     Objective     Vital Signs  /66   Wt 87.1 kg (192 lb)   LMP 2024 (Exact Date)   Estimated body mass index is 31.95 kg/m² as calculated from the following:    Height as of 24: 165.1 cm (65\").    Weight as of this encounter: 87.1 kg (192 lb).    Ultrasound Impression:   See viewpoint      Assessment and Plan     Shelley Longoria is a 34 y.o.  16w0d     Diagnoses and all orders for this visit:    1. Type 2 diabetes mellitus affecting pregnancy, antepartum (Primary)  Assessment & Plan:  Reporting some labile glucoses   Has Dexcom   Being followed by MFM for diabetes management   Continue current dosing for the time being   Follow up 4 weeks for detailed anatomic survey            Follow Up  4 weeks     I spent 10 minutes caring for the patient on the day of service. This included: obtaining or reviewing a separately obtained medical history, reviewing patient records, performing a medically appropriate exam and/or evaluation, counseling or educating the patient/family/caregiver, ordering medications, labs, and/or procedures and documenting such in the medical record. This does not include time spent on review and interpretation of other tests such as fetal ultrasound or the performance of other procedures such as amniocentesis or CVS.    Radha Mahmood MD FACOG  Maternal Fetal Medicine, [facility]     10/21/2024  "

## 2024-10-22 NOTE — ASSESSMENT & PLAN NOTE
Reporting some labile glucoses   Has Dexcom   Being followed by MFM for diabetes management   Continue current dosing for the time being   Follow up 4 weeks for detailed anatomic survey

## 2024-10-28 ENCOUNTER — EDUCATION (OUTPATIENT)
Dept: DIABETES SERVICES | Facility: HOSPITAL | Age: 34
End: 2024-10-28
Payer: COMMERCIAL

## 2024-10-28 NOTE — CONSULTS
Attempted to reach patient today for GDM education with RN and RD at 12:30 as per patient request. Patient did not answer, left VM with call back number.

## 2024-10-30 ENCOUNTER — MEDICATION THERAPY MANAGEMENT (OUTPATIENT)
Dept: OBSTETRICS AND GYNECOLOGY | Facility: HOSPITAL | Age: 34
End: 2024-10-30
Payer: COMMERCIAL

## 2024-10-30 DIAGNOSIS — O24.119 TYPE 2 DIABETES MELLITUS AFFECTING PREGNANCY, ANTEPARTUM: ICD-10-CM

## 2024-10-30 RX ORDER — INSULIN LISPRO 100 [IU]/ML
18 INJECTION, SOLUTION SUBCUTANEOUS
Qty: 15 ML | Refills: 1 | Status: SHIPPED | OUTPATIENT
Start: 2024-10-30

## 2024-11-06 ENCOUNTER — MEDICATION THERAPY MANAGEMENT (OUTPATIENT)
Dept: OBSTETRICS AND GYNECOLOGY | Facility: HOSPITAL | Age: 34
End: 2024-11-06
Payer: COMMERCIAL

## 2024-11-06 DIAGNOSIS — O24.119 TYPE 2 DIABETES MELLITUS AFFECTING PREGNANCY, ANTEPARTUM: ICD-10-CM

## 2024-11-06 RX ORDER — INSULIN LISPRO 100 [IU]/ML
22 INJECTION, SOLUTION SUBCUTANEOUS
Qty: 15 ML | Refills: 1 | Status: SHIPPED | OUTPATIENT
Start: 2024-11-06

## 2024-11-07 ENCOUNTER — TELEPHONE (OUTPATIENT)
Dept: OBSTETRICS AND GYNECOLOGY | Facility: HOSPITAL | Age: 34
End: 2024-11-07
Payer: COMMERCIAL

## 2024-11-07 NOTE — TELEPHONE ENCOUNTER
Received message from patient stating she needed her Lispro refilled, that her pharmacy had stated it was not due and they did not have updated prescription.  Spoke with pharmacy, updated prescription of Lispro 12 units with breakfast, 16 units with lunch and 22 units with dinner with MDD of 60 units given to pharmacist.  Linda Wen RN

## 2024-11-12 ENCOUNTER — MEDICATION THERAPY MANAGEMENT (OUTPATIENT)
Dept: OBSTETRICS AND GYNECOLOGY | Facility: HOSPITAL | Age: 34
End: 2024-11-12
Payer: COMMERCIAL

## 2024-11-12 DIAGNOSIS — O24.119 TYPE 2 DIABETES MELLITUS AFFECTING PREGNANCY, ANTEPARTUM: ICD-10-CM

## 2024-11-12 RX ORDER — INSULIN LISPRO 100 [IU]/ML
26 INJECTION, SOLUTION SUBCUTANEOUS
Qty: 15 ML | Refills: 1 | Status: SHIPPED | OUTPATIENT
Start: 2024-11-12 | End: 2024-11-18

## 2024-11-18 ENCOUNTER — OFFICE VISIT (OUTPATIENT)
Dept: OBSTETRICS AND GYNECOLOGY | Facility: HOSPITAL | Age: 34
End: 2024-11-18
Payer: COMMERCIAL

## 2024-11-18 ENCOUNTER — HOSPITAL ENCOUNTER (OUTPATIENT)
Dept: WOMENS IMAGING | Facility: HOSPITAL | Age: 34
Discharge: HOME | End: 2024-11-18
Admitting: OBSTETRICS & GYNECOLOGY
Payer: COMMERCIAL

## 2024-11-18 ENCOUNTER — MEDICATION THERAPY MANAGEMENT (OUTPATIENT)
Dept: OBSTETRICS AND GYNECOLOGY | Facility: HOSPITAL | Age: 34
End: 2024-11-18
Payer: COMMERCIAL

## 2024-11-18 ENCOUNTER — ROUTINE PRENATAL (OUTPATIENT)
Dept: OBSTETRICS AND GYNECOLOGY | Facility: CLINIC | Age: 34
End: 2024-11-18
Payer: COMMERCIAL

## 2024-11-18 VITALS — SYSTOLIC BLOOD PRESSURE: 123 MMHG | WEIGHT: 201 LBS | BODY MASS INDEX: 33.45 KG/M2 | DIASTOLIC BLOOD PRESSURE: 72 MMHG

## 2024-11-18 VITALS — SYSTOLIC BLOOD PRESSURE: 118 MMHG | DIASTOLIC BLOOD PRESSURE: 74 MMHG | WEIGHT: 200.4 LBS | BODY MASS INDEX: 33.35 KG/M2

## 2024-11-18 DIAGNOSIS — O35.9XX0 TERATOGEN EXPOSURE IN CURRENT PREGNANCY, NOT APPLICABLE OR UNSPECIFIED FETUS: ICD-10-CM

## 2024-11-18 DIAGNOSIS — O99.840 PREVIOUS BARIATRIC SURGERY AFFECTING PREGNANCY, ANTEPARTUM: ICD-10-CM

## 2024-11-18 DIAGNOSIS — O09.529 ANTEPARTUM MULTIGRAVIDA OF ADVANCED MATERNAL AGE: ICD-10-CM

## 2024-11-18 DIAGNOSIS — Z84.89: ICD-10-CM

## 2024-11-18 DIAGNOSIS — Z98.84 S/P LAPAROSCOPIC SLEEVE GASTRECTOMY: ICD-10-CM

## 2024-11-18 DIAGNOSIS — O34.219 HISTORY OF CESAREAN DELIVERY, CURRENTLY PREGNANT: ICD-10-CM

## 2024-11-18 DIAGNOSIS — O99.840 PREVIOUS BARIATRIC SURGERY AFFECTING PREGNANCY, ANTEPARTUM: Primary | ICD-10-CM

## 2024-11-18 DIAGNOSIS — E78.2 MIXED HYPERLIPIDEMIA: ICD-10-CM

## 2024-11-18 DIAGNOSIS — F32.4 MAJOR DEPRESSIVE DISORDER WITH SINGLE EPISODE, IN PARTIAL REMISSION: ICD-10-CM

## 2024-11-18 DIAGNOSIS — O24.119 TYPE 2 DIABETES MELLITUS AFFECTING PREGNANCY, ANTEPARTUM: Primary | ICD-10-CM

## 2024-11-18 DIAGNOSIS — Z98.890 HISTORY OF GASTRIC SURGERY: ICD-10-CM

## 2024-11-18 DIAGNOSIS — Z3A.19 19 WEEKS GESTATION OF PREGNANCY: ICD-10-CM

## 2024-11-18 DIAGNOSIS — O24.119 TYPE 2 DIABETES MELLITUS AFFECTING PREGNANCY, ANTEPARTUM: ICD-10-CM

## 2024-11-18 LAB
GLUCOSE UR STRIP-MCNC: NEGATIVE MG/DL
PROT UR STRIP-MCNC: NEGATIVE MG/DL

## 2024-11-18 PROCEDURE — 99213 OFFICE O/P EST LOW 20 MIN: CPT

## 2024-11-18 PROCEDURE — 76811 OB US DETAILED SNGL FETUS: CPT

## 2024-11-18 RX ORDER — INSULIN LISPRO 100 [IU]/ML
26 INJECTION, SOLUTION SUBCUTANEOUS
Qty: 15 ML | Refills: 1 | Status: SHIPPED | OUTPATIENT
Start: 2024-11-18

## 2024-11-18 RX ORDER — UBIDECARENONE 75 MG
50 CAPSULE ORAL DAILY
COMMUNITY

## 2024-11-18 NOTE — PROGRESS NOTES
Patient denies any leaking fluid, bleeding, or contractions. States having a lot of lower back pain  NIPT negative  Patient instructed to increase Lispro to 32 units with dinner, all other medications and dosing stay the same.  Patient states follow up with Dr. Marinelli's office is today.

## 2024-11-18 NOTE — PROGRESS NOTES
OB FOLLOW UP  CC- Here for care of pregnancy        Shelley Longoria is a 34 y.o.  19w6d patient being seen today for her obstetrical follow up visit. Patient reports no complaints.     Her prenatal care is complicated by (and status) : see below. and  Type 2, insulin-dependent DM. Her average fasting BG is 's. Her average 2hr PP BG is 160-180's.  Lispro was increased today to 12u breakfast, 18u lunch, and 32 units dinner. Will continue taking Metformin XL 500mg  1AM & QPM, and 16u Lantus QAM and QPM.   Patient Active Problem List   Diagnosis    Mixed hyperlipidemia    Previous bariatric surgery complicating pregnancy    S/P LSG  - Postop Hemorrhage    FH: bariatric surgery    Type 2 diabetes mellitus affecting pregnancy, antepartum    GERD (gastroesophageal reflux disease)    Dehydration    Vomiting    Major depressive disorder with single episode, in partial remission    Phentermine/topiramate exposure in current pregnancy    AMA (advanced maternal age) multigravida 35+    History of  delivery, currently pregnant    Current moderate episode of major depressive disorder without prior episode    History of gastric surgery       Ultrasound Today: Yes, at [facility]  AFP was declined.    ROS -     Patient Denies: leaking of fluid, vaginal bleeding, dysuria, excessive vomiting, and more than 6 contractions per hour  Fetal Movement : Yes  All other systems reviewed and are negative.       The additional following portions of the patient's history were reviewed and updated as appropriate: allergies, current medications, past family history, past medical history, past social history, past surgical history, and problem list.      I have reviewed and agree with the HPI, ROS, and historical information as entered above. Devora Martin, APRN      /74   Wt 90.9 kg (200 lb 6.4 oz)   LMP 2024 (Exact Date)   BMI 33.35 kg/m²       EXAM:     Prenatal Vitals  BP: 118/74  Weight: 90.9  kg (200 lb 6.4 oz)   Fetal Heart Rate: PD[facility]          Urine Glucose Read-only: Negative  Urine Protein Read-only: Negative       Assessment and Plan    Problem List Items Addressed This Visit       AMA (advanced maternal age) multigravida 35+    Relevant Medications    aspirin 81 MG EC tablet    FH: bariatric surgery    Overview     Gastric sleeve 19         History of  delivery, currently pregnant    History of gastric surgery    Major depressive disorder with single episode, in partial remission    Relevant Medications    escitalopram (Lexapro) 20 MG tablet    Mixed hyperlipidemia    Overview     2022: , HDL 88         Phentermine/topiramate exposure in current pregnancy    Overview     Phentermine/topiramate until positive pregnancy test         Previous bariatric surgery complicating pregnancy    S/P LSG  - Postop Hemorrhage    Type 2 diabetes mellitus affecting pregnancy, antepartum - Primary    Relevant Medications    metFORMIN ER (GLUCOPHAGE-XR) 500 MG 24 hr tablet    Insulin Pen Needle (Pen Needles) 32G X 4 MM misc    glucagon (GLUCAGEN) 1 MG injection    Nutritional Supplements (Glucose Management) tablet    Continuous Glucose Sensor (Dexcom G7 Sensor) misc    aspirin 81 MG EC tablet    Insulin Glargine (Lantus SoloStar) 100 UNIT/ML injection pen    Insulin Lispro Kang KwikPen 100 UNIT/ML solution pen-injector     Other Visit Diagnoses       19 weeks gestation of pregnancy        Relevant Orders    POC Urinalysis Dipstick (Completed)            Pregnancy at 19w6d  PDC report pending. Will review when complete.   Fetal status reassuring.   Activity and Exercise discussed.  Patient is on Prenatal vitamins  Discussed/encouraged Flu vaccination  Discussed/encouraged social distancing/COVID19 precautions; encouraged vaccination when able  Pregnancy and diabetes self care education included in patient instructions.   Return in about 4 weeks (around 2024) for Yves Lyons, Post  PDC appt.      Devora Martin, APRN  11/18/2024

## 2024-11-18 NOTE — LETTER
"2024     Lucho Marinelli MD  [address]  [address]  [facility] [id number]    Patient: Shelley Longoria   YOB: 1990   Date of Visit: 2024       Dear Lucho Marinelli MD,    Thank you for referring Shelley Longoria to me for evaluation. Below is a copy of my consult note.    If you have questions, please do not hesitate to call me. I look forward to following Shelley along with you.         Sincerely,        Radha Mahmood MD        CC: No Recipients    Patient denies any leaking fluid, bleeding, or contractions. States having a lot of lower back pain  NIPT negative  Patient instructed to increase Lispro to 32 units with dinner, all other medications and dosing stay the same.  Patient states follow up with Dr. Marinelli's office is today.        Maternal/Fetal Medicine Follow Up Note     Name: Shelley Longoria    : 1990     MRN: [id number]     Referring Provider: Lucho Marinelli MD    Chief Complaint  T2DM, prior CS x 4     Subjective     History of Present Illness:  Shelley Longoria is a 34 y.o.  20w1d who presents today for follow up   Reports some continued elevations to glucoses largely related to high carb meals   Glucoses being followed by [facility]       RAMAN: Estimated Date of Delivery: 25     ROS:   As noted in HPI.     Objective     Vital Signs  /72   Wt 91.2 kg (201 lb)   LMP 2024 (Exact Date)   Estimated body mass index is 33.45 kg/m² as calculated from the following:    Height as of 24: 165.1 cm (65\").    Weight as of this encounter: 91.2 kg (201 lb).    Ultrasound Impression:   See viewpoint      Assessment and Plan     Shelley Longoria is a 34 y.o.  20w1d     Diagnoses and all orders for this visit:    1. Previous bariatric surgery affecting pregnancy, antepartum (Primary)    2. Type 2 diabetes mellitus affecting pregnancy, antepartum  Assessment & Plan:  Glucoses being followed by M "   Continues to titrate dosing   Highs notably related to dietary indiscretion   Normal appearing though somewhat limited anatomic survey   Follow up 4 weeks for fetal echocardiogram     Orders:  -     [facility]; Future    3. Phentermine/topiramate exposure in current pregnancy    4. Antepartum multigravida of advanced maternal age  -     [facility] [place]; Future    5. History of  delivery, currently pregnant  -     [facility] [place]; Future         Follow Up  4 weeks     I spent 30 minutes caring for the patient on the day of service. This included: obtaining or reviewing a separately obtained medical history, reviewing patient records, performing a medically appropriate exam and/or evaluation, counseling or educating the patient/family/caregiver, ordering medications, labs, and/or procedures and documenting such in the medical record. This does not include time spent on review and interpretation of other tests such as fetal ultrasound or the performance of other procedures such as amniocentesis or CVS.    Radha Mahmood MD FACOG  Maternal Fetal Medicine, [facility] [place]     2024

## 2024-11-20 NOTE — PROGRESS NOTES
"    Maternal/Fetal Medicine Follow Up Note     Name: Shelley Longoria    : 1990     MRN: [id number]     Referring Provider: Lucho Marinelli MD    Chief Complaint  T2DM, prior CS x 4     Subjective     History of Present Illness:  Shelley Longoria is a 34 y.o.  20w1d who presents today for follow up   Reports some continued elevations to glucoses largely related to high carb meals   Glucoses being followed by MFM       RAMAN: Estimated Date of Delivery: 25     ROS:   As noted in HPI.     Objective     Vital Signs  /72   Wt 91.2 kg (201 lb)   LMP 2024 (Exact Date)   Estimated body mass index is 33.45 kg/m² as calculated from the following:    Height as of 24: 165.1 cm (65\").    Weight as of this encounter: 91.2 kg (201 lb).    Ultrasound Impression:   See viewpoint      Assessment and Plan     Shelley Longoria is a 34 y.o.  20w1d     Diagnoses and all orders for this visit:    1. Previous bariatric surgery affecting pregnancy, antepartum (Primary)    2. Type 2 diabetes mellitus affecting pregnancy, antepartum  Assessment & Plan:  Glucoses being followed by MFM   Continues to titrate dosing   Highs notably related to dietary indiscretion   Normal appearing though somewhat limited anatomic survey   Follow up 4 weeks for fetal echocardiogram     Orders:  -     [facility]; Future    3. Phentermine/topiramate exposure in current pregnancy    4. Antepartum multigravida of advanced maternal age  -      [facility]; Future    5. History of  delivery, currently pregnant  -      [facility]; Future         Follow Up  4 weeks     I spent 30 minutes caring for the patient on the day of service. This included: obtaining or reviewing a separately obtained medical history, reviewing patient records, performing a medically appropriate exam and/or evaluation, counseling or educating the " patient/family/caregiver, ordering medications, labs, and/or procedures and documenting such in the medical record. This does not include time spent on review and interpretation of other tests such as fetal ultrasound or the performance of other procedures such as amniocentesis or CVS.    Radha Mahmood MD FACOG  Maternal Fetal Medicine, [facility] [place]     2024

## 2024-11-20 NOTE — ASSESSMENT & PLAN NOTE
Glucoses being followed by MFM   Continues to titrate dosing   Highs notably related to dietary indiscretion   Normal appearing though somewhat limited anatomic survey   Follow up 4 weeks for fetal echocardiogram

## 2024-11-26 ENCOUNTER — MEDICATION THERAPY MANAGEMENT (OUTPATIENT)
Dept: OBSTETRICS AND GYNECOLOGY | Facility: HOSPITAL | Age: 34
End: 2024-11-26
Payer: COMMERCIAL

## 2024-11-26 DIAGNOSIS — O24.119 TYPE 2 DIABETES MELLITUS AFFECTING PREGNANCY, ANTEPARTUM: ICD-10-CM

## 2024-11-26 RX ORDER — INSULIN LISPRO 100 [IU]/ML
26 INJECTION, SOLUTION SUBCUTANEOUS
Qty: 15 ML | Refills: 1 | Status: SHIPPED | OUTPATIENT
Start: 2024-11-26

## 2024-12-02 ENCOUNTER — PATIENT OUTREACH (OUTPATIENT)
Dept: LABOR AND DELIVERY | Facility: HOSPITAL | Age: 34
End: 2024-12-02
Payer: COMMERCIAL

## 2024-12-02 ENCOUNTER — TELEPHONE (OUTPATIENT)
Dept: OBSTETRICS AND GYNECOLOGY | Facility: HOSPITAL | Age: 34
End: 2024-12-02
Payer: COMMERCIAL

## 2024-12-02 NOTE — OUTREACH NOTE
"[facility]  Check-In    Current Estimated Gestational Age: 21w6d      Questions/Answers      Flowsheet Row Responses   Best Method for Contacting Cell   Demographics Reviewed No   Currently Employed Yes   Able to keep appointments as scheduled Yes   Gender(s) and Name(s) \"Milagros\"   Baby Active/Feeling Fetal Movemen Yes   How are you presently feeling? \"I'm feeling good\"   Equipment presently used at home: Glucometer   Education related to new diagnoses/home equipment No   Resource/Environmental Concerns None   Do you have any questions related to your care experience, your pregnancy, plans for delivery, any concerns, etc? No            Feeling well and reports good fetal movement. Discussed need to check frequently for messages from PDC involving changes in insulin dosing. Denies any concerning symptoms but discussed what to watch out for. Discussed that she still has time for childbirth education if she is interested. Discussed breastfeeding video and outpatient lactation clinic support as well as WIC support. She has begun gathering items she will need for baby.    Contact information provided. Encouraged to call with questions, concerns, or for support. Will plan f/u around 28 weeks for updated needs assessment and resource check in.       Juany Mar RN  Maternity Nurse Navigator    12/2/2024, 17:30 EST              "

## 2024-12-02 NOTE — TELEPHONE ENCOUNTER
Attempted to reach patient by phone.  Patient had been notified through [employer] of adjustments to her Lispro but had not read the message.  Left voice mail with these changes.  Will note on her dexcom report tomorrow these had not been  made.  Linda Wen RN

## 2024-12-12 ENCOUNTER — TELEPHONE (OUTPATIENT)
Dept: OBSTETRICS AND GYNECOLOGY | Facility: HOSPITAL | Age: 34
End: 2024-12-12
Payer: COMMERCIAL

## 2024-12-12 NOTE — TELEPHONE ENCOUNTER
Department of Anesthesiology  Preprocedure Note       Name:  Massimo Menjivar   Age:  75 y.o.  :  1953                                          MRN:  [id number]         Date:  2021      Surgeon: Abimael Guajardo):  Jina Fritz MD    Procedure: Procedure(s):  RIGHT HIP INTRAMEDULLARY NAIL KIKO INSERTION    Medications prior to admission:   Prior to Admission medications    Medication Sig Start Date End Date Taking?  Authorizing Provider   atorvastatin (LIPITOR) 40 MG tablet Take 40 mg by mouth nightly 21  Yes Historical Provider, MD   aspirin 81 MG chewable tablet Take 81 mg by mouth daily   Yes Historical Provider, MD   lisinopril (PRINIVIL;ZESTRIL) 2.5 MG tablet Take 2.5 mg by mouth daily 21  Yes Historical Provider, MD   metoprolol succinate (TOPROL XL) 25 MG extended release tablet Take 25 mg by mouth daily 21  Yes Historical Provider, MD   atorvastatin (LIPITOR) 40 MG tablet TAKE 1 TABLET BY MOUTH ONE TIME A DAY  3/22/21  Yes Alexis Huggins MD   nitroGLYCERIN (NITROSTAT) 0.4 MG SL tablet Place 1 tablet under the tongue every 5 minutes as needed for Chest pain 10/22/18  Yes Niall Gaytan MD   Coenzyme Q10 (CO Q 10 PO) Take by mouth   Yes Historical Provider, MD       Current medications:    Current Facility-Administered Medications   Medication Dose Route Frequency Provider Last Rate Last Admin    lactated ringers infusion   Intravenous Continuous Cristhian Richardson MD        sodium chloride flush 0.9 % injection 10 mL  10 mL Intravenous 2 times per day Cristhian Richardson MD   10 mL at 21 1049    sodium chloride flush 0.9 % injection 10 mL  10 mL Intravenous PRN Cristhian Richardson MD        0.9 % sodium chloride infusion  25 mL Intravenous PRN Cristhian Richardson MD        oxyCODONE-acetaminophen (PERCOCET) 5-325 MG per tablet 1 tablet  1 tablet Oral Q4H PRN Neema Tabor MD   1 tablet at 21 1038    Or    oxyCODONE-acetaminophen (PERCOCET) Spoke with patient over the phone.  Informed patient that Dr. Kim has reviewed her blood sugar log and is increasing her Lispro to 16 units with breakfast, 18 units with lunch and 35 units with dinner.  Also instructed to take 15 minutes before each meal.  Patient voices understanding.  Linda Wen RN   05/27/21 0007 05/27/21 0830 05/27/21 0847 05/27/21 1418   BP: 102/67 (!) 160/81  135/76   Pulse: 78 97  84   Resp: 16 16  16   Temp: 98.1 °F (36.7 °C) 97.7 °F (36.5 °C)  97.6 °F (36.4 °C)   TempSrc: Oral Axillary  Oral   SpO2: 97% 96%  94%   Weight:       Height:   5' 5\" (1.651 m)                                               BP Readings from Last 3 Encounters:   05/27/21 135/76   10/26/20 116/70   10/22/19 124/70       NPO Status:                                                                                 BMI:   Wt Readings from Last 3 Encounters:   05/26/21 145 lb (65.8 kg)   10/26/20 150 lb 8 oz (68.3 kg)   01/13/20 149 lb 14.6 oz (68 kg)     Body mass index is 24.13 kg/m². CBC:   Lab Results   Component Value Date    WBC 5.9 05/27/2021    RBC 3.11 05/27/2021    RBC 4.57 11/22/2016    HGB 9.8 05/27/2021    HCT 28.9 05/27/2021    MCV 92.9 05/27/2021    RDW 15.0 05/27/2021     05/27/2021       CMP:   Lab Results   Component Value Date     05/27/2021    K 4.5 05/27/2021    K 3.7 05/26/2021     05/27/2021    CO2 28 05/27/2021    BUN 21 05/27/2021    CREATININE 1.0 05/27/2021    GFRAA >60 05/27/2021    AGRATIO 1.2 08/30/2019    LABGLOM 55 05/27/2021    GLUCOSE 131 05/27/2021    GLUCOSE 102 08/24/2016    PROT 6.3 08/30/2019    PROT 6.8 08/24/2016    CALCIUM 8.8 05/27/2021    BILITOT 0.9 08/30/2019    ALKPHOS 100 08/30/2019    AST 36 08/30/2019    ALT 16 08/30/2019       POC Tests: No results for input(s): POCGLU, POCNA, POCK, POCCL, POCBUN, POCHEMO, POCHCT in the last 72 hours.     Coags:   Lab Results   Component Value Date    PROTIME 14.2 08/24/2019    INR 1.25 08/24/2019       HCG (If Applicable): No results found for: PREGTESTUR, PREGSERUM, HCG, HCGQUANT     ABGs: No results found for: PHART, PO2ART, MAW3SWJ, AGG8LIQ, BEART, O9GISIRK     Type & Screen (If Applicable):  No results found for: LABABO, LABRH    Drug/Infectious Status (If Applicable):  No results found for: HIV, HEPCAB COVID-19 Screening (If Applicable): No results found for: COVID19        Anesthesia Evaluation  Patient summary reviewed and Nursing notes reviewed no history of anesthetic complications:   Airway: Mallampati: II  TM distance: >3 FB   Neck ROM: full  Mouth opening: > = 3 FB Dental: normal exam         Pulmonary:Negative Pulmonary ROS and normal exam                               Cardiovascular:    (+) hypertension:, CAD:, CABG/stent (stent):,                   Neuro/Psych:   Negative Neuro/Psych ROS              GI/Hepatic/Renal: Neg GI/Hepatic/Renal ROS       (-) GERD, liver disease and no renal disease       Endo/Other:    (+) malignancy/cancer (renal). (-) diabetes mellitus               Abdominal:           Vascular: negative vascular ROS. Anesthesia Plan      general     ASA 3     (I discussed with the patient the risks and benefits of PIV, general anesthesia, IV Narcotics, PACU. All questions were answered the patient agrees with the plan)  Induction: intravenous. MIPS: Prophylactic antiemetics administered. Anesthetic plan and risks discussed with patient and child/children. Plan discussed with CRNA.                   Twyla Barahona MD   5/27/2021

## 2024-12-16 ENCOUNTER — OFFICE VISIT (OUTPATIENT)
Dept: OBSTETRICS AND GYNECOLOGY | Facility: HOSPITAL | Age: 34
End: 2024-12-16
Payer: COMMERCIAL

## 2024-12-16 ENCOUNTER — HOSPITAL ENCOUNTER (OUTPATIENT)
Dept: WOMENS IMAGING | Facility: HOSPITAL | Age: 34
Discharge: HOME | End: 2024-12-16
Admitting: OBSTETRICS & GYNECOLOGY
Payer: COMMERCIAL

## 2024-12-16 ENCOUNTER — MEDICATION THERAPY MANAGEMENT (OUTPATIENT)
Dept: OBSTETRICS AND GYNECOLOGY | Facility: HOSPITAL | Age: 34
End: 2024-12-16
Payer: COMMERCIAL

## 2024-12-16 ENCOUNTER — ROUTINE PRENATAL (OUTPATIENT)
Dept: OBSTETRICS AND GYNECOLOGY | Facility: CLINIC | Age: 34
End: 2024-12-16
Payer: COMMERCIAL

## 2024-12-16 VITALS — BODY MASS INDEX: 34.95 KG/M2 | WEIGHT: 210 LBS | SYSTOLIC BLOOD PRESSURE: 116 MMHG | DIASTOLIC BLOOD PRESSURE: 71 MMHG

## 2024-12-16 VITALS — DIASTOLIC BLOOD PRESSURE: 72 MMHG | WEIGHT: 210 LBS | SYSTOLIC BLOOD PRESSURE: 120 MMHG | BODY MASS INDEX: 34.95 KG/M2

## 2024-12-16 DIAGNOSIS — O09.522 MULTIGRAVIDA OF ADVANCED MATERNAL AGE IN SECOND TRIMESTER: ICD-10-CM

## 2024-12-16 DIAGNOSIS — Z98.890 HISTORY OF GASTRIC SURGERY: ICD-10-CM

## 2024-12-16 DIAGNOSIS — O34.219 HISTORY OF CESAREAN DELIVERY, CURRENTLY PREGNANT: ICD-10-CM

## 2024-12-16 DIAGNOSIS — Z3A.23 23 WEEKS GESTATION OF PREGNANCY: ICD-10-CM

## 2024-12-16 DIAGNOSIS — O24.119 TYPE 2 DIABETES MELLITUS AFFECTING PREGNANCY, ANTEPARTUM: ICD-10-CM

## 2024-12-16 DIAGNOSIS — O09.529 ANTEPARTUM MULTIGRAVIDA OF ADVANCED MATERNAL AGE: ICD-10-CM

## 2024-12-16 DIAGNOSIS — O35.9XX0 TERATOGEN EXPOSURE IN CURRENT PREGNANCY, NOT APPLICABLE OR UNSPECIFIED FETUS: ICD-10-CM

## 2024-12-16 DIAGNOSIS — Z34.92 PRENATAL CARE IN SECOND TRIMESTER: ICD-10-CM

## 2024-12-16 DIAGNOSIS — O99.842 PREGNANCY AFFECTED BY PREVIOUS BARIATRIC SURGERY, CURRENTLY IN SECOND TRIMESTER: Primary | ICD-10-CM

## 2024-12-16 DIAGNOSIS — R73.09 ABNORMAL GLUCOSE: ICD-10-CM

## 2024-12-16 DIAGNOSIS — O99.840 PREVIOUS BARIATRIC SURGERY AFFECTING PREGNANCY, ANTEPARTUM: Primary | ICD-10-CM

## 2024-12-16 LAB
GLUCOSE UR STRIP-MCNC: NEGATIVE MG/DL
PROT UR STRIP-MCNC: NEGATIVE MG/DL

## 2024-12-16 PROCEDURE — 76816 OB US FOLLOW-UP PER FETUS: CPT

## 2024-12-16 PROCEDURE — 76825 ECHO EXAM OF FETAL HEART: CPT

## 2024-12-16 PROCEDURE — 93325 DOPPLER ECHO COLOR FLOW MAPG: CPT

## 2024-12-16 RX ORDER — METFORMIN HYDROCHLORIDE 500 MG/1
TABLET, EXTENDED RELEASE ORAL
Qty: 90 TABLET | Refills: 6 | Status: SHIPPED | OUTPATIENT
Start: 2024-12-16

## 2024-12-16 NOTE — PROGRESS NOTES
Patient denies any leaking fluid, vaginal bleeding, or contractions. States having lower back pain  NIPT negative.  Patient reports next follow-up appointment with Dr. Marinelli's office is today.

## 2024-12-16 NOTE — PROGRESS NOTES
OB FOLLOW UP  CC- Here for care of pregnancy        Shelley Longoria is a 34 y.o.  23w6d patient being seen today for her obstetrical follow up visit. Patient reports recent chest tightness and feelings of SOB. She states that symptoms are constant for the past week. She also reports swelling in upper/lower extremities without pitting.      Patient states that Lantus was increased to 18 units every 12 hours by [facility] today. Lispro with meals    Her prenatal care is complicated by (and status) : Type Type 2, insulin-dependent DM. Her average fasting BG is 120-130. Her average 2hr PP BG is 190-220.  Patient Active Problem List   Diagnosis    Mixed hyperlipidemia    Previous bariatric surgery complicating pregnancy    S/P LSG  - Postop Hemorrhage    FH: bariatric surgery    Type 2 diabetes mellitus affecting pregnancy, antepartum    GERD (gastroesophageal reflux disease)    Dehydration    Vomiting    Major depressive disorder with single episode, in partial remission    Phentermine/topiramate exposure in current pregnancy    AMA (advanced maternal age) multigravida 35+    History of  delivery, currently pregnant    Current moderate episode of major depressive disorder without prior episode    History of gastric surgery       Flu Status: Already given in current flu season  Ultrasound Today: Yes  Reviewed 1 hr glucose testing and TDAP next visit.    ROS -   Patient Denies: leaking of fluid, vaginal bleeding, dysuria, excessive vomiting, and more than 6 contractions per hour  Fetal Movement : normal  All other systems reviewed and are negative.       The additional following portions of the patient's history were reviewed and updated as appropriate: allergies, current medications, past family history, past medical history, past social history, past surgical history, and problem list.      I have reviewed and agree with the HPI, ROS, and historical information as entered above. Lucho Marinelli,  MD     /72   Wt 95.3 kg (210 lb)   LMP 2024 (Exact Date)   BMI 34.95 kg/m²       EXAM:     Prenatal Vitals  BP: 120/72  Weight: 95.3 kg (210 lb)             Lungs CTAB      Urine Glucose Read-only: Negative  Urine Protein Read-only: Negative       Assessment and Plan    Problem List Items Addressed This Visit       Previous bariatric surgery complicating pregnancy - Primary    Type 2 diabetes mellitus affecting pregnancy, antepartum    Relevant Medications    Insulin Pen Needle (Pen Needles) 32G X 4 MM misc    glucagon (GLUCAGEN) 1 MG injection    Nutritional Supplements (Glucose Management) tablet    Continuous Glucose Sensor (Dexcom G7 Sensor) misc    aspirin 81 MG EC tablet    Insulin Glargine (Lantus SoloStar) 100 UNIT/ML injection pen    Insulin Lispro Kang KwikPen 100 UNIT/ML solution pen-injector    metFORMIN ER (GLUCOPHAGE-XR) 500 MG 24 hr tablet    Phentermine/topiramate exposure in current pregnancy    Overview     Phentermine/topiramate until positive pregnancy test         AMA (advanced maternal age) multigravida 35+    Relevant Medications    aspirin 81 MG EC tablet    History of  delivery, currently pregnant     Other Visit Diagnoses       Prenatal care in second trimester        Relevant Orders    POC Urinalysis Dipstick (Completed)            Pregnancy at 23w6d  Fetal status reassuring.  No US indicated today.  CBC, Antibody screen, TDAP, and RPR next visit. [place] recheck bariatric labs. Instructions given  Desires sterilization, KMAP to be signed  Discussed/encouraged TDAP vaccination after 28 weeks  Activity and Exercise discussed.  Return in about 4 weeks (around 2025) for Prenatal Care (sign KMAP).      Lucho Marinelli MD  2024

## 2024-12-16 NOTE — LETTER
2024     Lucho Marinelli MD  [address] [facility]  [address]  [facility] [address]    Patient: Shelley Longoria   YOB: 1990   Date of Visit: 2024       Dear Lucho Marinelli MD,    Thank you for referring Shelley Longoria to me for evaluation. Below is a copy of my consult note.    If you have questions, please do not hesitate to call me. I look forward to following Shelley along with you.         Sincerely,        Kimberley Kim MD        CC: No Recipients                      Maternal/Fetal Medicine Consult Note     Name: Shelley Longoria    : 1990     MRN: [id number]     Referring Provider: Lucho Marinelli MD    Chief Complaint  AMA; T2DM; Prev c/s x4; Hx Gastric bypass; obesity    Subjective     History of Present Illness:  Shelley Longoria is a 34 y.o.  23w6d who presents today for a fetal echo and growth assessment.     Pt denies LOF/VB/ctx's. +FM.     RAMAN: Estimated Date of Delivery: 25     ROS:   As noted in HPI.     Past Medical History:   Diagnosis Date   • Anxiety    • Chronic joint pain     denies NSAIDS/steroids   • COVID-19     2022   • Diabetes mellitus, type II     w/ hx gestational diabetes , dx May 2017, started on insulin when initially dx, lost weight w/ Adipex, stopped insulin 6 months after dx, but now w/ weight regain, back on insulin.  Managed by PCP   • Dyspepsia    • Dyspnea on exertion    • Fatigue    • Fatty liver     w/ abnormal LFTs, US 2018     • GERD (gastroesophageal reflux disease) 2023   • Gestational diabetes    • H/O H. pylori infection     UBT (+) 19 - PrevPak RX     • Herpes    • Hiatal hernia 2023    Added automatically from request for surgery [id number]     • History of gestational diabetes    • History of Helicobacter pylori infection     UBT (+) 19 - PrevPak RX; repeat HPSA (+) 2019 - Pylera RX; repeat UBT (holding PPI) (+) - referred to GI, RX  Levaquin/Flagyl/Pepto RX  - HPSA negative 2023   • History of hiatal hernia    • History of transfusion 2019    [facility]- PT UNSURE HOW MANY UNITS RECEIVED, NO REACTIONS   • Mixed hyperlipidemia 10/15/2019   • NAFL (nonalcoholic fatty liver)     w/ abnormal LFTs, US 2018   • Obesity (BMI 30-39.9)    • Personal history of gestational diabetes 2017   • Wears eyeglasses       Past Surgical History:   Procedure Laterality Date   • BREAST BIOPSY     • CERVICAL CONE BIOPSY  2004    benign   •  SECTION      , , ,    • ENDOSCOPY N/A 2019    Procedure: ESOPHAGOGASTRODUODENOSCOPY;  Surgeon: Mila Whatley MD;  Location:  [facility];  Service: Bariatric   • EXCESSIVE THIGH / HIP / BUTTOCK / FLANK SKIN EXCISION Bilateral 05/15/2024       • GASTRIC SLEEVE LAPAROSCOPIC N/A 2019    Procedure: GASTRIC SLEEVE LAPAROSCOPIC;  Surgeon: Mila Whatley MD;  Location:  [facility];  Service: Bariatric   • HIATAL HERNIA REPAIR N/A 2019    Procedure: HIATAL HERNIA REPAIR LAPAROSCOPIC;  Surgeon: Mila Whatley MD;  Location:  [facility];  Service: Bariatric   • HIATAL HERNIA REPAIR N/A 2023    Procedure: LAPAROSCOPIC HIATAL HERNIA REPAIR WITH MESH WITH DAVINCI ROBOT;  Surgeon: Mila Whatley MD;  Location:  [facility];  Service: Robotics - DaVinci;  Laterality: N/A;   • UPPER GASTROINTESTINAL ENDOSCOPY  2019    Dr VIVIENNE Whatley   • UPPER GASTROINTESTINAL ENDOSCOPY  2022    Dr Aldrich, positive H pylori   • WISDOM TOOTH EXTRACTION        OB History          5    Para   4    Term   4            AB        Living   4         SAB        IAB        Ectopic        Molar        Multiple        Live Births   4          Obstetric Comments   2008- 37w [id number]-male- [facility] Moreno- adoption  2013-37w [id number]-female-SJE-Veloudis  2015-37w [id number]-male-S[facility]-Veloudis  2021-37w RCS-7lbs 14oz-male-SJE-Veloudis              "      Objective     Vital Signs  /71   Wt 95.3 kg (210 lb)   LMP 2024 (Exact Date)   Estimated body mass index is 34.95 kg/m² as calculated from the following:    Height as of 24: 165.1 cm (65\").    Weight as of this encounter: 95.3 kg (210 lb).    Physical Exam  Constitutional:       Appearance: Normal appearance. She is normal weight.   HENT:      Head: Normocephalic and atraumatic.   Pulmonary:      Effort: Pulmonary effort is normal.   Musculoskeletal:         General: Normal range of motion.   Neurological:      General: No focal deficit present.      Mental Status: She is alert and oriented to person, place, and time.   Psychiatric:         Mood and Affect: Mood normal.         Behavior: Behavior normal.         Thought Content: Thought content normal.         Judgment: Judgment normal.       Ultrasound Impression:   Breech, S=D, nl RAMAN, posterior placenta, nl CL, nl anatomy, no echo.    Assessment and Plan     Diagnoses and all orders for this visit:    1. Previous bariatric surgery affecting pregnancy, antepartum (Primary)    2. Type 2 diabetes mellitus affecting pregnancy, antepartum  Assessment & Plan:  Pt is currently taking Lantus 16u/16u and Lispro 16u/18u/35u. On review of her last few days of Dexcom data, her fastings are >100mg/dl and she has significant excursions after meals.     - Recommend patient increase to Lantus 18u/18u and Lispro to 16u/20u/40u    - Recommend patient give her Lispro 15-30mins before her meals   - Follow-up for growth scheduled in 4wks   - Patient will need  testing starting at 32wks      3. Phentermine/topiramate exposure in current pregnancy  Assessment & Plan:  Anatomy is normal. Fetal echo is normal.       4. Antepartum multigravida of advanced maternal age  Assessment & Plan:  S/p low risk NIPT and normal anatomic survey.     Echo today is normal. Growth appears appropriate.      - Follow-up scheduled in 4 wks      5. History of  " delivery, currently pregnant  Assessment & Plan:  Pt with history of  x 4. Placenta is posterior.       6. History of gastric surgery    7. 23 weeks gestation of pregnancy         Follow Up  Return in about 4 weeks (around 2025).    I spent 20 minutes caring for the patient on the day of service. This included: obtaining or reviewing a separately obtained medical history, reviewing patient records, performing a medically appropriate exam and/or evaluation, counseling or educating the patient/family/caregiver, ordering medications, labs, and/or procedures and documenting such in the medical record. This does not include time spent on review and interpretation of other tests such as fetal ultrasound or the performance of other procedures such as amniocentesis or CVS.      Kimberley Kim MD  2024

## 2024-12-17 NOTE — PROGRESS NOTES
Maternal/Fetal Medicine Consult Note     Name: Shelley Longoria    : 1990     MRN: [id number]     Referring Provider: Lucho Marinelli MD    Chief Complaint  AMA; T2DM; Prev c/s x4; Hx Gastric bypass; obesity    Subjective     History of Present Illness:  Shelley Longoria is a 34 y.o.  23w6d who presents today for a fetal echo and growth assessment.     Pt denies LOF/VB/ctx's. +FM.     RAMAN: Estimated Date of Delivery: 25     ROS:   As noted in HPI.     Past Medical History:   Diagnosis Date    Anxiety     Chronic joint pain     denies NSAIDS/steroids    COVID-19     2022    Diabetes mellitus, type II     w/ hx gestational diabetes , dx May 2017, started on insulin when initially dx, lost weight w/ Adipex, stopped insulin 6 months after dx, but now w/ weight regain, back on insulin.  Managed by PCP    Dyspepsia     Dyspnea on exertion     Fatigue     Fatty liver     w/ abnormal LFTs, US 2018      GERD (gastroesophageal reflux disease) 2023    Gestational diabetes 2017    H/O H. pylori infection     UBT (+) 19 - PrevPak RX      Herpes 2017    Hiatal hernia 2023    Added automatically from request for surgery [id number]      History of gestational diabetes     History of Helicobacter pylori infection     UBT (+) 19 - PrevPak RX; repeat HPSA (+) 2019 - Pylera RX; repeat UBT (holding PPI) (+) - referred to GI, RX Levaquin/Flagyl/Pepto RX  - HPSA negative 2023    History of hiatal hernia     History of transfusion     [facility]- PT UNSURE HOW MANY UNITS RECEIVED, NO REACTIONS    Mixed hyperlipidemia 10/15/2019    NAFL (nonalcoholic fatty liver)     w/ abnormal LFTs, US 2018    Obesity (BMI 30-39.9)     Personal history of gestational diabetes 2017    Wears eyeglasses       Past Surgical History:   Procedure Laterality Date    BREAST BIOPSY      CERVICAL CONE BIOPSY  2004    benign     SECTION      , , ,     ENDOSCOPY  "N/A 2019    Procedure: ESOPHAGOGASTRODUODENOSCOPY;  Surgeon: Mila Whatley MD;  Location:  [facility];  Service: Bariatric    EXCESSIVE THIGH / HIP / BUTTOCK / FLANK SKIN EXCISION Bilateral 05/15/2024        GASTRIC SLEEVE LAPAROSCOPIC N/A 2019    Procedure: GASTRIC SLEEVE LAPAROSCOPIC;  Surgeon: Mila Whatley MD;  Location:  [facility];  Service: Bariatric    HIATAL HERNIA REPAIR N/A 2019    Procedure: HIATAL HERNIA REPAIR LAPAROSCOPIC;  Surgeon: Mila Whatley MD;  Location:  [facility];  Service: Bariatric    HIATAL HERNIA REPAIR N/A 2023    Procedure: LAPAROSCOPIC HIATAL HERNIA REPAIR WITH MESH WITH DAVINCI ROBOT;  Surgeon: Mila Whatley MD;  Location:  [facility];  Service: Robotics - DaVinci;  Laterality: N/A;    UPPER GASTROINTESTINAL ENDOSCOPY  2019    Dr VIVIENNE Whatley    UPPER GASTROINTESTINAL ENDOSCOPY  2022    Dr Aldrich, positive H pylori    WISDOM TOOTH EXTRACTION        OB History          5    Para   4    Term   4            AB        Living   4         SAB        IAB        Ectopic        Molar        Multiple        Live Births   4          Obstetric Comments   2008- 37w [id number]-male- SJ Moreno- adoption  2013-37w [id number]-[id number]-female-S[facility]-Veloudis  2015-37w [id number]-male-SJE-Veloudis  2021-37w RCS-7lbs 14oz-male-SJE-Veloudis                   Objective     Vital Signs  /71   Wt 95.3 kg (210 lb)   LMP 2024 (Exact Date)   Estimated body mass index is 34.95 kg/m² as calculated from the following:    Height as of 24: 165.1 cm (65\").    Weight as of this encounter: 95.3 kg (210 lb).    Physical Exam  Constitutional:       Appearance: Normal appearance. She is normal weight.   HENT:      Head: Normocephalic and atraumatic.   Pulmonary:      Effort: Pulmonary effort is normal.   Musculoskeletal:         General: Normal range of motion.   Neurological:      General: No focal deficit " present.      Mental Status: She is alert and oriented to person, place, and time.   Psychiatric:         Mood and Affect: Mood normal.         Behavior: Behavior normal.         Thought Content: Thought content normal.         Judgment: Judgment normal.       Ultrasound Impression:   Breech, S=D, nl RAMAN, posterior placenta, nl CL, nl anatomy, no echo.    Assessment and Plan     Diagnoses and all orders for this visit:    1. Previous bariatric surgery affecting pregnancy, antepartum (Primary)    2. Type 2 diabetes mellitus affecting pregnancy, antepartum  Assessment & Plan:  Pt is currently taking Lantus 16u/16u and Lispro 16u/18u/35u. On review of her last few days of Dexcom data, her fastings are >100mg/dl and she has significant excursions after meals.     - Recommend patient increase to Lantus 18u/18u and Lispro to 16u/20u/40u    - Recommend patient give her Lispro 15-30mins before her meals   - Follow-up for growth scheduled in 4wks   - Patient will need  testing starting at 32wks      3. Phentermine/topiramate exposure in current pregnancy  Assessment & Plan:  Anatomy is normal. Fetal echo is normal.       4. Antepartum multigravida of advanced maternal age  Assessment & Plan:  S/p low risk NIPT and normal anatomic survey.     Echo today is normal. Growth appears appropriate.      - Follow-up scheduled in 4 wks      5. History of  delivery, currently pregnant  Assessment & Plan:  Pt with history of  x 4. Placenta is posterior.       6. History of gastric surgery    7. 23 weeks gestation of pregnancy         Follow Up  Return in about 4 weeks (around 2025).    I spent 20 minutes caring for the patient on the day of service. This included: obtaining or reviewing a separately obtained medical history, reviewing patient records, performing a medically appropriate exam and/or evaluation, counseling or educating the patient/family/caregiver, ordering medications, labs, and/or procedures  and documenting such in the medical record. This does not include time spent on review and interpretation of other tests such as fetal ultrasound or the performance of other procedures such as amniocentesis or CVS.      Kimberley Kim MD  12/16/2024

## 2024-12-17 NOTE — ASSESSMENT & PLAN NOTE
S/p low risk NIPT and normal anatomic survey.     Echo today is normal. Growth appears appropriate.      - Follow-up scheduled in 4 wks

## 2024-12-17 NOTE — ASSESSMENT & PLAN NOTE
Pt is currently taking Lantus 16u/16u and Lispro 16u/18u/35u. On review of her last few days of Dexcom data, her fastings are >100mg/dl and she has significant excursions after meals.     - Recommend patient increase to Lantus 18u/18u and Lispro to 16u/20u/40u    - Recommend patient give her Lispro 15-30mins before her meals   - Follow-up for growth scheduled in 4wks   - Patient will need  testing starting at 32wks

## 2024-12-18 ENCOUNTER — TELEPHONE (OUTPATIENT)
Dept: OBSTETRICS AND GYNECOLOGY | Facility: HOSPITAL | Age: 34
End: 2024-12-18
Payer: COMMERCIAL

## 2024-12-18 ENCOUNTER — MEDICATION THERAPY MANAGEMENT (OUTPATIENT)
Dept: OBSTETRICS AND GYNECOLOGY | Facility: HOSPITAL | Age: 34
End: 2024-12-18
Payer: COMMERCIAL

## 2024-12-18 DIAGNOSIS — O24.119 TYPE 2 DIABETES MELLITUS AFFECTING PREGNANCY, ANTEPARTUM: ICD-10-CM

## 2024-12-18 RX ORDER — INSULIN LISPRO 100 [IU]/ML
26 INJECTION, SOLUTION SUBCUTANEOUS
Qty: 15 ML | Refills: 1 | Status: SHIPPED | OUTPATIENT
Start: 2024-12-18 | End: 2024-12-23

## 2024-12-18 RX ORDER — INSULIN GLARGINE 100 [IU]/ML
INJECTION, SOLUTION SUBCUTANEOUS
Qty: 15 ML | Refills: 3 | Status: SHIPPED | OUTPATIENT
Start: 2024-12-18 | End: 2024-12-23

## 2024-12-18 NOTE — TELEPHONE ENCOUNTER
Spoke with patient over the phone.  Informed patient that Dr. Kim has reviewed her blood sugar log and is increasing her Lantus to  20 units twice a day and increasing her Lispro to 18 units with breakfast, 20 units with lunch and to 40 units with dinner.  Also request patient enter in meal moices on her dexcom yaima.  Patient voices understanding.  Linda Wen RN

## 2024-12-23 ENCOUNTER — MEDICATION THERAPY MANAGEMENT (OUTPATIENT)
Dept: OBSTETRICS AND GYNECOLOGY | Facility: HOSPITAL | Age: 34
End: 2024-12-23
Payer: COMMERCIAL

## 2024-12-23 DIAGNOSIS — O24.119 TYPE 2 DIABETES MELLITUS AFFECTING PREGNANCY, ANTEPARTUM: ICD-10-CM

## 2024-12-23 RX ORDER — INSULIN GLARGINE 100 [IU]/ML
INJECTION, SOLUTION SUBCUTANEOUS
Qty: 15 ML | Refills: 3 | Status: SHIPPED | OUTPATIENT
Start: 2024-12-23

## 2024-12-23 RX ORDER — INSULIN LISPRO 100 [IU]/ML
26 INJECTION, SOLUTION SUBCUTANEOUS
Qty: 15 ML | Refills: 1 | Status: SHIPPED | OUTPATIENT
Start: 2024-12-23

## 2025-01-02 ENCOUNTER — MEDICATION THERAPY MANAGEMENT (OUTPATIENT)
Dept: OBSTETRICS AND GYNECOLOGY | Facility: HOSPITAL | Age: 35
End: 2025-01-02
Payer: COMMERCIAL

## 2025-01-02 ENCOUNTER — DOCUMENTATION (OUTPATIENT)
Dept: OBSTETRICS AND GYNECOLOGY | Facility: HOSPITAL | Age: 35
End: 2025-01-02
Payer: COMMERCIAL

## 2025-01-02 ENCOUNTER — TELEPHONE (OUTPATIENT)
Dept: OBSTETRICS AND GYNECOLOGY | Facility: HOSPITAL | Age: 35
End: 2025-01-02
Payer: COMMERCIAL

## 2025-01-02 DIAGNOSIS — O24.119 TYPE 2 DIABETES MELLITUS AFFECTING PREGNANCY, ANTEPARTUM: ICD-10-CM

## 2025-01-02 RX ORDER — INSULIN LISPRO 100 [IU]/ML
26 INJECTION, SOLUTION SUBCUTANEOUS
Start: 2025-01-02

## 2025-01-02 RX ORDER — INSULIN GLARGINE 100 [IU]/ML
22 INJECTION, SOLUTION SUBCUTANEOUS 2 TIMES DAILY
Qty: 30 ML | Refills: 1 | Status: SHIPPED | OUTPATIENT
Start: 2025-01-02

## 2025-01-02 NOTE — TELEPHONE ENCOUNTER
Spoke with patient over the phone.  Informed patient that prior authorization has been submitted for her Lanuts refill.  Dr. Kim has reviewed her blood sugar log and is increasing her Lispro to 20 units with breakfast, 26 units with lunch and 50 units with dinner.  Patient voices understanding.  Linda Wen RN

## 2025-01-02 NOTE — TELEPHONE ENCOUNTER
Pt called and said they are not wanting to refill her insulin and she is out of [place], [facility] [address]

## 2025-01-02 NOTE — PROGRESS NOTES
Received fax from [employer] that prior authorization not needed.  Spoke with Pacheco with [facility] and it still will not go through stating too early to refill.  Called [phone] per [employer] form and spoke with Herb.  Herb states to send through another order with quatity adjustment and it should go through.  If it doesn't go through have the pharmacist call to get the override.  Spoke with Pacheco, pharmacist with [facility] again and notified of this along with reference number for the call of [id number].  Pacheco states he will take care of it.  Spoke with patient over the phone and updated patient on status of refill.  Patient thankful and voices understanding.  Linda Wen RN

## 2025-01-02 NOTE — PROGRESS NOTES
Prior authorization for Lantus submitted to patients insurance for early refill through [employer].  Key [id number]  Waiting for determination.  Linda Wen RN

## 2025-01-02 NOTE — TELEPHONE ENCOUNTER
Spoke with Pacheco, pharmacist with [facility], regarding patients Lantus.  Pacheco states they had received the dosage adjustment last week but insurance does not want to pay for a refill at this time.  He states she is not due for a refill until 1/7/25.  Pacheco states he is working on a discount drug card for patient and that a prior authorization would be needed.  Gave Pacheco dosage adjustment for patients Lispro while on phone of 20 units for breakfast, 26 units for lunch and 50 units for dinner.  Linda Wen RN

## 2025-01-13 ENCOUNTER — TELEPHONE (OUTPATIENT)
Dept: OBSTETRICS AND GYNECOLOGY | Facility: HOSPITAL | Age: 35
End: 2025-01-13
Payer: COMMERCIAL

## 2025-01-13 DIAGNOSIS — O34.219 HISTORY OF CESAREAN DELIVERY, CURRENTLY PREGNANT: ICD-10-CM

## 2025-01-13 DIAGNOSIS — O09.522 MULTIGRAVIDA OF ADVANCED MATERNAL AGE IN SECOND TRIMESTER: ICD-10-CM

## 2025-01-13 DIAGNOSIS — O24.119 TYPE 2 DIABETES MELLITUS AFFECTING PREGNANCY, ANTEPARTUM: Primary | ICD-10-CM

## 2025-01-13 DIAGNOSIS — O99.842 PREGNANCY AFFECTED BY PREVIOUS BARIATRIC SURGERY, CURRENTLY IN SECOND TRIMESTER: ICD-10-CM

## 2025-01-13 NOTE — TELEPHONE ENCOUNTER
Spoke with Pacheco, pharmacist, with [facility] pharmacy.  Explained to Pacheco that patient had sent message that she had never received her Lantus.  Reminded Pacheco that I had spoken with him on 1/2/25 and [employer] on 1/2/25 regarding patients Lantus.  That weekly dosage adjustments had been made.  Gave Pacheco name of [employer] rep (HUNTER)I spoke with along with phone number of [phone] and reference number for call, [id number].  Pacheco is to attempt to run prescription again and reach out to [facility] for override due to dosage adjustment.  Pacheco confirmed patients phone number and states when he gets approval he will call the patient but if he is unable he will return my call.  My call back of [phone] given to Pacheco.  Linda Wen RN

## 2025-01-14 ENCOUNTER — MEDICATION THERAPY MANAGEMENT (OUTPATIENT)
Dept: OBSTETRICS AND GYNECOLOGY | Facility: HOSPITAL | Age: 35
End: 2025-01-14
Payer: COMMERCIAL

## 2025-01-14 ENCOUNTER — ROUTINE PRENATAL (OUTPATIENT)
Dept: OBSTETRICS AND GYNECOLOGY | Facility: CLINIC | Age: 35
End: 2025-01-14
Payer: COMMERCIAL

## 2025-01-14 ENCOUNTER — HOSPITAL ENCOUNTER (OUTPATIENT)
Dept: WOMENS IMAGING | Facility: HOSPITAL | Age: 35
Discharge: HOME | End: 2025-01-14
Admitting: OBSTETRICS & GYNECOLOGY
Payer: COMMERCIAL

## 2025-01-14 ENCOUNTER — OFFICE VISIT (OUTPATIENT)
Dept: OBSTETRICS AND GYNECOLOGY | Facility: HOSPITAL | Age: 35
End: 2025-01-14
Payer: COMMERCIAL

## 2025-01-14 VITALS — DIASTOLIC BLOOD PRESSURE: 64 MMHG | SYSTOLIC BLOOD PRESSURE: 117 MMHG | BODY MASS INDEX: 36.61 KG/M2 | WEIGHT: 220 LBS

## 2025-01-14 VITALS — WEIGHT: 218 LBS | SYSTOLIC BLOOD PRESSURE: 102 MMHG | BODY MASS INDEX: 36.28 KG/M2 | DIASTOLIC BLOOD PRESSURE: 60 MMHG

## 2025-01-14 DIAGNOSIS — O99.843 PREGNANCY AFFECTED BY PREVIOUS BARIATRIC SURGERY, CURRENTLY IN THIRD TRIMESTER: Primary | ICD-10-CM

## 2025-01-14 DIAGNOSIS — O34.219 HISTORY OF CESAREAN DELIVERY, CURRENTLY PREGNANT: ICD-10-CM

## 2025-01-14 DIAGNOSIS — O99.842 PREGNANCY AFFECTED BY PREVIOUS BARIATRIC SURGERY, CURRENTLY IN SECOND TRIMESTER: ICD-10-CM

## 2025-01-14 DIAGNOSIS — O24.119 TYPE 2 DIABETES MELLITUS AFFECTING PREGNANCY, ANTEPARTUM: Primary | ICD-10-CM

## 2025-01-14 DIAGNOSIS — Z34.83 MULTIGRAVIDA IN THIRD TRIMESTER: ICD-10-CM

## 2025-01-14 DIAGNOSIS — O24.119 TYPE 2 DIABETES MELLITUS AFFECTING PREGNANCY, ANTEPARTUM: ICD-10-CM

## 2025-01-14 DIAGNOSIS — O09.522 MULTIGRAVIDA OF ADVANCED MATERNAL AGE IN SECOND TRIMESTER: ICD-10-CM

## 2025-01-14 DIAGNOSIS — O35.9XX0 TERATOGEN EXPOSURE IN CURRENT PREGNANCY, NOT APPLICABLE OR UNSPECIFIED FETUS: ICD-10-CM

## 2025-01-14 DIAGNOSIS — R73.09 ABNORMAL GLUCOSE: ICD-10-CM

## 2025-01-14 DIAGNOSIS — O09.523 MULTIGRAVIDA OF ADVANCED MATERNAL AGE IN THIRD TRIMESTER: ICD-10-CM

## 2025-01-14 LAB
GLUCOSE UR STRIP-MCNC: NEGATIVE MG/DL
PROT UR STRIP-MCNC: NEGATIVE MG/DL

## 2025-01-14 PROCEDURE — 76816 OB US FOLLOW-UP PER FETUS: CPT

## 2025-01-14 RX ORDER — INSULIN LISPRO 100 [IU]/ML
26 INJECTION, SOLUTION SUBCUTANEOUS
Status: ON HOLD
Start: 2025-01-14

## 2025-01-14 RX ORDER — METFORMIN HYDROCHLORIDE 500 MG/1
TABLET, EXTENDED RELEASE ORAL
Qty: 120 TABLET | Refills: 6 | Status: ON HOLD | OUTPATIENT
Start: 2025-01-14

## 2025-01-14 NOTE — PROGRESS NOTES
Patient denies bleeding, but does complain of leaking fluid, and is having david samuel  NIPT negative  Patients next follow up with Dr. Marinelli is today

## 2025-01-14 NOTE — PROGRESS NOTES
OB FOLLOW UP  CC- Here for care of pregnancy        Shelley Longoria is a 34 y.o.  28w0d patient being seen today for her obstetrical follow up. Patient reports intermittent headaches with blurred vision, shortness of breath, pelvic pressure, and low ab cramping. States these symptoms are her typical pregnancy symptoms and not worse.      MBT: B+  Rhogam: is not indicated.  28 week packet: reviewed with patient   TDAP:  wants to discuss with provider  Flu Status: Already given in current flu season    Ultrasound Today: Yes at [facility]. Today's exam reveals a SIUP in cephalic presentation with biometry consistent with dates. Limited fetal anatomic survey appears normal. The RAMAN is normal.     Her prenatal care is complicated by (and status) : see below. and Type Type 2, insulin-dependent DM. Her average fasting BG is . Her average 2hr PP BG is 140-190.  Patient Active Problem List   Diagnosis    Mixed hyperlipidemia    Previous bariatric surgery complicating pregnancy    S/P LSG  - Postop Hemorrhage    FH: bariatric surgery    Type 2 diabetes mellitus affecting pregnancy, antepartum    GERD (gastroesophageal reflux disease)    Dehydration    Vomiting    Major depressive disorder with single episode, in partial remission    Phentermine/topiramate exposure in current pregnancy    AMA (advanced maternal age) multigravida 35+    History of  delivery, currently pregnant    Current moderate episode of major depressive disorder without prior episode    History of gastric surgery         ROS -   Patient Denies: Loss of Fluid, Vaginal Spotting, Vomiting , Contractions, Epigastric pain, and skin itching  Fetal Movement : normal    The additional following portions of the patient's history were reviewed and updated as appropriate: allergies, current medications, past family history, past medical history, past social history, past surgical history, and problem list.    I have reviewed and agree  with the HPI, ROS, and historical information as entered above. Batool Posada, APRN      /60   Wt 98.9 kg (218 lb)   LMP 2024 (Exact Date)   BMI 36.28 kg/m²         EXAM:     Prenatal Vitals  BP: 102/60  Weight: 98.9 kg (218 lb)   Fetal Heart Rate: [facility]               Urine Glucose Read-only: Negative  Urine Protein Read-only: Negative         Assessment and Plan    Problem List Items Addressed This Visit       Previous bariatric surgery complicating pregnancy - Primary    Relevant Orders    CBC (No Diff)    Antibody Screen    RPR, Rfx Qn RPR / Confirm TP    Vitamin B12    Folate    Vitamin D 25 Hydroxy    Iron Profile    Vitamin B1, Whole Blood    Ferritin    Calcium    Type 2 diabetes mellitus affecting pregnancy, antepartum    Relevant Medications    Insulin Pen Needle (Pen Needles) 32G X 4 MM misc    glucagon (GLUCAGEN) 1 MG injection    Nutritional Supplements (Glucose Management) tablet    Continuous Glucose Sensor (Dexcom G7 Sensor) misc    aspirin 81 MG EC tablet    Insulin Glargine (Lantus SoloStar) 100 UNIT/ML injection pen    Insulin Lispro Kang KwikPen 100 UNIT/ML solution pen-injector    metFORMIN ER (GLUCOPHAGE-XR) 500 MG 24 hr tablet    Other Relevant Orders    CBC (No Diff)    Antibody Screen    RPR, Rfx Qn RPR / Confirm TP    Vitamin B12    Folate    Vitamin D 25 Hydroxy    Iron Profile    Vitamin B1, Whole Blood    Ferritin    Calcium    Phentermine/topiramate exposure in current pregnancy    Overview     Phentermine/topiramate until positive pregnancy test         AMA (advanced maternal age) multigravida 35+    Relevant Medications    aspirin 81 MG EC tablet    Other Relevant Orders    POC Urinalysis Dipstick (Completed)    History of  delivery, currently pregnant     Other Visit Diagnoses       Multigravida in third trimester        Relevant Orders    POC Urinalysis Dipstick (Completed)    CBC (No Diff)    Antibody Screen    RPR, Rfx Qn RPR / Confirm TP    Vitamin  B12    Folate    Vitamin D 25 Hydroxy    Iron Profile    Vitamin B1, Whole Blood    Ferritin    Calcium            Pregnancy at 28w0d  Fetal status reassuring. Today's exam reveals a SIUP in cephalic presentation with biometry consistent with dates. Limited fetal anatomic survey appears normal. The RAMAN is normal.   gtt not indicated. CBC, RPR, Antibody screen today and TDAP next visit  Fetal movement/PTL or Labor precautions  Lab(s) Ordered  Patient is on Prenatal vitamins  Reviewed FSBS goals: fasting <90, 2hr PP < 120  Activity and Exercise discussed.  Return in about 4 weeks (around 2/11/2025).after next [facility] appt        Batool Posada, APRN  01/14/2025

## 2025-01-15 LAB
25(OH)D3+25(OH)D2 SERPL-MCNC: 29.3 NG/ML (ref 30–100)
BLD GP AB SCN SERPL QL: NEGATIVE
CALCIUM SERPL-MCNC: 9.3 MG/DL (ref 8.7–10.2)
ERYTHROCYTE [DISTWIDTH] IN BLOOD BY AUTOMATED COUNT: 13.4 % (ref 11.7–15.4)
FERRITIN SERPL-MCNC: 7 NG/ML (ref 15–150)
FOLATE SERPL-MCNC: 17.5 NG/ML
HCT VFR BLD AUTO: 31.1 % (ref 34–46.6)
HGB BLD-MCNC: 10.3 G/DL (ref 11.1–15.9)
IRON SATN MFR SERPL: 8 % (ref 15–55)
IRON SERPL-MCNC: 46 UG/DL (ref 27–159)
MCH RBC QN AUTO: 27.2 PG (ref 26.6–33)
MCHC RBC AUTO-ENTMCNC: 33.1 G/DL (ref 31.5–35.7)
MCV RBC AUTO: 82 FL (ref 79–97)
PLATELET # BLD AUTO: 199 X10E3/UL (ref 150–450)
RBC # BLD AUTO: 3.79 X10E6/UL (ref 3.77–5.28)
RPR SER QL: NON REACTIVE
TIBC SERPL-MCNC: 552 UG/DL (ref 250–450)
UIBC SERPL-MCNC: 506 UG/DL (ref 131–425)
VIT B12 SERPL-MCNC: 734 PG/ML (ref 232–1245)
WBC # BLD AUTO: 8.4 X10E3/UL (ref 3.4–10.8)

## 2025-01-17 DIAGNOSIS — O99.013 MATERNAL IRON DEFICIENCY ANEMIA AFFECTING PREGNANCY IN THIRD TRIMESTER, ANTEPARTUM: Primary | ICD-10-CM

## 2025-01-17 DIAGNOSIS — Z90.3 HISTORY OF SLEEVE GASTRECTOMY: ICD-10-CM

## 2025-01-17 DIAGNOSIS — D50.9 MATERNAL IRON DEFICIENCY ANEMIA AFFECTING PREGNANCY IN THIRD TRIMESTER, ANTEPARTUM: Primary | ICD-10-CM

## 2025-01-17 RX ORDER — SODIUM CHLORIDE 9 MG/ML
1000 INJECTION, SOLUTION INTRAVENOUS ONCE AS NEEDED
OUTPATIENT
Start: 2025-01-20 | End: 2025-01-23

## 2025-01-17 RX ORDER — EPINEPHRINE 1 MG/ML
0.3 INJECTION, SOLUTION, CONCENTRATE INTRAVENOUS ONCE AS NEEDED
OUTPATIENT
Start: 2025-01-20

## 2025-01-17 RX ORDER — SODIUM CHLORIDE 9 MG/ML
20 INJECTION, SOLUTION INTRAVENOUS ONCE
OUTPATIENT
Start: 2025-01-20

## 2025-01-18 LAB — VIT B1 BLD-SCNC: 93 NMOL/L (ref 66.5–200)

## 2025-01-20 ENCOUNTER — HOSPITAL ENCOUNTER (INPATIENT)
Facility: HOSPITAL | Age: 35
LOS: 3 days | Discharge: HOME | End: 2025-01-23
Attending: OBSTETRICS & GYNECOLOGY | Admitting: OBSTETRICS & GYNECOLOGY
Payer: COMMERCIAL

## 2025-01-20 ENCOUNTER — TELEPHONE (OUTPATIENT)
Dept: OBSTETRICS AND GYNECOLOGY | Facility: CLINIC | Age: 35
End: 2025-01-20
Payer: COMMERCIAL

## 2025-01-20 ENCOUNTER — APPOINTMENT (OUTPATIENT)
Dept: WOMENS IMAGING | Facility: HOSPITAL | Age: 35
End: 2025-01-20
Payer: COMMERCIAL

## 2025-01-20 DIAGNOSIS — O24.119 TYPE 2 DIABETES MELLITUS AFFECTING PREGNANCY, ANTEPARTUM: ICD-10-CM

## 2025-01-20 PROBLEM — R10.9 ABDOMINAL PAIN AFFECTING PREGNANCY: Status: ACTIVE | Noted: 2025-01-20

## 2025-01-20 PROBLEM — O26.899 ABDOMINAL PAIN AFFECTING PREGNANCY: Status: ACTIVE | Noted: 2025-01-20

## 2025-01-20 LAB
ABO GROUP BLD: NORMAL
BACTERIA UR QL AUTO: ABNORMAL /HPF
BILIRUB UR QL STRIP: NEGATIVE
BLD GP AB SCN SERPL QL: NEGATIVE
CLARITY UR: ABNORMAL
COLOR UR: YELLOW
DEPRECATED RDW RBC AUTO: 44.3 FL (ref 37–54)
ERYTHROCYTE [DISTWIDTH] IN BLOOD BY AUTOMATED COUNT: 14.4 % (ref 12.3–15.4)
GLUCOSE BLDC GLUCOMTR-MCNC: 118 MG/DL (ref 70–130)
GLUCOSE BLDC GLUCOMTR-MCNC: 139 MG/DL (ref 70–130)
GLUCOSE UR STRIP-MCNC: ABNORMAL MG/DL
HCT VFR BLD AUTO: 30 % (ref 34–46.6)
HGB BLD-MCNC: 9.7 G/DL (ref 12–15.9)
HGB UR QL STRIP.AUTO: NEGATIVE
HYALINE CASTS UR QL AUTO: ABNORMAL /LPF
KETONES UR QL STRIP: NEGATIVE
LEUKOCYTE ESTERASE UR QL STRIP.AUTO: NEGATIVE
MCH RBC QN AUTO: 27.6 PG (ref 26.6–33)
MCHC RBC AUTO-ENTMCNC: 32.3 G/DL (ref 31.5–35.7)
MCV RBC AUTO: 85.5 FL (ref 79–97)
NITRITE UR QL STRIP: NEGATIVE
PH UR STRIP.AUTO: 6.5 [PH] (ref 5–8)
PLATELET # BLD AUTO: 206 10*3/MM3 (ref 140–450)
PMV BLD AUTO: 11 FL (ref 6–12)
PROT UR QL STRIP: NEGATIVE
RBC # BLD AUTO: 3.51 10*6/MM3 (ref 3.77–5.28)
RBC # UR STRIP: ABNORMAL /HPF
REF LAB TEST METHOD: ABNORMAL
RH BLD: POSITIVE
SP GR UR STRIP: 1.02 (ref 1–1.03)
SQUAMOUS #/AREA URNS HPF: ABNORMAL /HPF
T&S EXPIRATION DATE: NORMAL
TREPONEMA PALLIDUM IGG+IGM AB [PRESENCE] IN SERUM OR PLASMA BY IMMUNOASSAY: NORMAL
UROBILINOGEN UR QL STRIP: ABNORMAL
WBC # UR STRIP: ABNORMAL /HPF
WBC NRBC COR # BLD AUTO: 9.63 10*3/MM3 (ref 3.4–10.8)

## 2025-01-20 PROCEDURE — 76819 FETAL BIOPHYS PROFIL W/O NST: CPT

## 2025-01-20 PROCEDURE — 99222 1ST HOSP IP/OBS MODERATE 55: CPT | Performed by: OBSTETRICS & GYNECOLOGY

## 2025-01-20 PROCEDURE — 63710000001 INSULIN LISPRO (HUMAN) PER 5 UNITS: Performed by: OBSTETRICS & GYNECOLOGY

## 2025-01-20 PROCEDURE — 86900 BLOOD TYPING SEROLOGIC ABO: CPT | Performed by: OBSTETRICS & GYNECOLOGY

## 2025-01-20 PROCEDURE — 76819 FETAL BIOPHYS PROFIL W/O NST: CPT | Performed by: OBSTETRICS & GYNECOLOGY

## 2025-01-20 PROCEDURE — 25810000003 LACTATED RINGERS PER 1000 ML: Performed by: OBSTETRICS & GYNECOLOGY

## 2025-01-20 PROCEDURE — 81001 URINALYSIS AUTO W/SCOPE: CPT | Performed by: OBSTETRICS & GYNECOLOGY

## 2025-01-20 PROCEDURE — 85027 COMPLETE CBC AUTOMATED: CPT | Performed by: OBSTETRICS & GYNECOLOGY

## 2025-01-20 PROCEDURE — 25010000002 BETAMETHASONE ACET & SOD PHOS PER 4 MG: Performed by: OBSTETRICS & GYNECOLOGY

## 2025-01-20 PROCEDURE — 86850 RBC ANTIBODY SCREEN: CPT | Performed by: OBSTETRICS & GYNECOLOGY

## 2025-01-20 PROCEDURE — 25810000003 SODIUM CHLORIDE 0.9 % SOLUTION 250 ML FLEX CONT: Performed by: OBSTETRICS & GYNECOLOGY

## 2025-01-20 PROCEDURE — 86901 BLOOD TYPING SEROLOGIC RH(D): CPT | Performed by: OBSTETRICS & GYNECOLOGY

## 2025-01-20 PROCEDURE — 82948 REAGENT STRIP/BLOOD GLUCOSE: CPT

## 2025-01-20 PROCEDURE — 86780 TREPONEMA PALLIDUM: CPT | Performed by: OBSTETRICS & GYNECOLOGY

## 2025-01-20 PROCEDURE — 25010000002 IRON SUCROSE PER 1 MG: Performed by: OBSTETRICS & GYNECOLOGY

## 2025-01-20 RX ORDER — ESCITALOPRAM OXALATE 20 MG/1
30 TABLET ORAL DAILY
Status: DISCONTINUED | OUTPATIENT
Start: 2025-01-21 | End: 2025-01-23 | Stop reason: HOSPADM

## 2025-01-20 RX ORDER — MAGNESIUM SULFATE HEPTAHYDRATE 40 MG/ML
2 INJECTION, SOLUTION INTRAVENOUS CONTINUOUS
Status: DISPENSED | OUTPATIENT
Start: 2025-01-20 | End: 2025-01-22

## 2025-01-20 RX ORDER — SODIUM CHLORIDE, SODIUM LACTATE, POTASSIUM CHLORIDE, CALCIUM CHLORIDE 600; 310; 30; 20 MG/100ML; MG/100ML; MG/100ML; MG/100ML
50 INJECTION, SOLUTION INTRAVENOUS CONTINUOUS
Status: ACTIVE | OUTPATIENT
Start: 2025-01-20 | End: 2025-01-22

## 2025-01-20 RX ORDER — BETAMETHASONE SODIUM PHOSPHATE AND BETAMETHASONE ACETATE 3; 3 MG/ML; MG/ML
12 INJECTION, SUSPENSION INTRA-ARTICULAR; INTRALESIONAL; INTRAMUSCULAR; SOFT TISSUE EVERY 24 HOURS
Status: COMPLETED | OUTPATIENT
Start: 2025-01-20 | End: 2025-01-21

## 2025-01-20 RX ORDER — ASPIRIN 81 MG/1
81 TABLET ORAL DAILY
Status: DISCONTINUED | OUTPATIENT
Start: 2025-01-21 | End: 2025-01-23 | Stop reason: HOSPADM

## 2025-01-20 RX ORDER — MAGNESIUM SULFATE HEPTAHYDRATE 40 MG/ML
INJECTION, SOLUTION INTRAVENOUS
Status: COMPLETED
Start: 2025-01-20 | End: 2025-01-20

## 2025-01-20 RX ORDER — DEXTROSE MONOHYDRATE 25 G/50ML
25 INJECTION, SOLUTION INTRAVENOUS
Status: DISCONTINUED | OUTPATIENT
Start: 2025-01-20 | End: 2025-01-23 | Stop reason: HOSPADM

## 2025-01-20 RX ORDER — NICOTINE POLACRILEX 4 MG
15 LOZENGE BUCCAL
Status: DISCONTINUED | OUTPATIENT
Start: 2025-01-20 | End: 2025-01-23 | Stop reason: HOSPADM

## 2025-01-20 RX ORDER — LIDOCAINE HYDROCHLORIDE 10 MG/ML
0.5 INJECTION, SOLUTION EPIDURAL; INFILTRATION; INTRACAUDAL; PERINEURAL ONCE AS NEEDED
Status: DISCONTINUED | OUTPATIENT
Start: 2025-01-20 | End: 2025-01-23 | Stop reason: HOSPADM

## 2025-01-20 RX ORDER — METFORMIN HYDROCHLORIDE 500 MG/1
500 TABLET, EXTENDED RELEASE ORAL 2 TIMES DAILY WITH MEALS
Status: DISCONTINUED | OUTPATIENT
Start: 2025-01-20 | End: 2025-01-21

## 2025-01-20 RX ORDER — CALCIUM GLUCONATE 94 MG/ML
1 INJECTION, SOLUTION INTRAVENOUS ONCE AS NEEDED
Status: DISCONTINUED | OUTPATIENT
Start: 2025-01-20 | End: 2025-01-23 | Stop reason: HOSPADM

## 2025-01-20 RX ORDER — SODIUM CHLORIDE 0.9 % (FLUSH) 0.9 %
10 SYRINGE (ML) INJECTION AS NEEDED
Status: DISCONTINUED | OUTPATIENT
Start: 2025-01-20 | End: 2025-01-23 | Stop reason: HOSPADM

## 2025-01-20 RX ORDER — SODIUM CHLORIDE 9 MG/ML
40 INJECTION, SOLUTION INTRAVENOUS AS NEEDED
Status: DISCONTINUED | OUTPATIENT
Start: 2025-01-20 | End: 2025-01-23 | Stop reason: HOSPADM

## 2025-01-20 RX ORDER — VALACYCLOVIR HYDROCHLORIDE 500 MG/1
1000 TABLET, FILM COATED ORAL DAILY
Status: DISCONTINUED | OUTPATIENT
Start: 2025-01-21 | End: 2025-01-23 | Stop reason: HOSPADM

## 2025-01-20 RX ORDER — INSULIN LISPRO 100 [IU]/ML
4-24 INJECTION, SOLUTION INTRAVENOUS; SUBCUTANEOUS
Status: DISCONTINUED | OUTPATIENT
Start: 2025-01-20 | End: 2025-01-23 | Stop reason: HOSPADM

## 2025-01-20 RX ORDER — INSULIN LISPRO 100 [IU]/ML
24 INJECTION, SOLUTION INTRAVENOUS; SUBCUTANEOUS
Status: DISCONTINUED | OUTPATIENT
Start: 2025-01-20 | End: 2025-01-21

## 2025-01-20 RX ORDER — ONDANSETRON 2 MG/ML
4 INJECTION INTRAMUSCULAR; INTRAVENOUS EVERY 8 HOURS PRN
Status: DISCONTINUED | OUTPATIENT
Start: 2025-01-20 | End: 2025-01-23 | Stop reason: HOSPADM

## 2025-01-20 RX ORDER — FERRIC MALTOL 30 MG/1
1 CAPSULE ORAL 2 TIMES DAILY
Start: 2025-01-20

## 2025-01-20 RX ORDER — BISACODYL 10 MG
10 SUPPOSITORY, RECTAL RECTAL DAILY PRN
Status: DISCONTINUED | OUTPATIENT
Start: 2025-01-20 | End: 2025-01-23 | Stop reason: HOSPADM

## 2025-01-20 RX ORDER — SODIUM CHLORIDE 0.9 % (FLUSH) 0.9 %
10 SYRINGE (ML) INJECTION EVERY 12 HOURS SCHEDULED
Status: DISCONTINUED | OUTPATIENT
Start: 2025-01-20 | End: 2025-01-23 | Stop reason: HOSPADM

## 2025-01-20 RX ORDER — UBIDECARENONE 75 MG
50 CAPSULE ORAL DAILY
Status: DISCONTINUED | OUTPATIENT
Start: 2025-01-21 | End: 2025-01-23 | Stop reason: HOSPADM

## 2025-01-20 RX ORDER — ACETAMINOPHEN 325 MG/1
650 TABLET ORAL EVERY 4 HOURS PRN
Status: DISCONTINUED | OUTPATIENT
Start: 2025-01-20 | End: 2025-01-23 | Stop reason: HOSPADM

## 2025-01-20 RX ORDER — DOCUSATE SODIUM 100 MG/1
100 CAPSULE, LIQUID FILLED ORAL 2 TIMES DAILY PRN
Status: DISCONTINUED | OUTPATIENT
Start: 2025-01-20 | End: 2025-01-23 | Stop reason: HOSPADM

## 2025-01-20 RX ORDER — IBUPROFEN 600 MG/1
1 TABLET ORAL
Status: DISCONTINUED | OUTPATIENT
Start: 2025-01-20 | End: 2025-01-23 | Stop reason: HOSPADM

## 2025-01-20 RX ORDER — ONDANSETRON 4 MG/1
8 TABLET, ORALLY DISINTEGRATING ORAL EVERY 8 HOURS PRN
Status: DISCONTINUED | OUTPATIENT
Start: 2025-01-20 | End: 2025-01-23 | Stop reason: HOSPADM

## 2025-01-20 RX ADMIN — METFORMIN HYDROCHLORIDE 500 MG: 500 TABLET, EXTENDED RELEASE ORAL at 19:41

## 2025-01-20 RX ADMIN — MAGNESIUM SULFATE HEPTAHYDRATE 4 G: 40 INJECTION, SOLUTION INTRAVENOUS at 16:30

## 2025-01-20 RX ADMIN — IRON SUCROSE 300 MG: 20 INJECTION, SOLUTION INTRAVENOUS at 20:37

## 2025-01-20 RX ADMIN — SODIUM CHLORIDE, POTASSIUM CHLORIDE, SODIUM LACTATE AND CALCIUM CHLORIDE 50 ML/HR: 600; 310; 30; 20 INJECTION, SOLUTION INTRAVENOUS at 16:30

## 2025-01-20 RX ADMIN — INSULIN LISPRO 24 UNITS: 100 INJECTION, SOLUTION INTRAVENOUS; SUBCUTANEOUS at 18:14

## 2025-01-20 RX ADMIN — BETAMETHASONE SODIUM PHOSPHATE AND BETAMETHASONE ACETATE 12 MG: 3; 3 INJECTION, SUSPENSION INTRA-ARTICULAR; INTRALESIONAL; INTRAMUSCULAR at 17:00

## 2025-01-20 NOTE — H&P
"[facility]  Obstetric History and Physical    Chief Complaint   Patient presents with    Abdominal Pain     25 0900 abdominal pain started.       HPI:      Patient is a 34 y.o. female  currently at 28w6d, who presents with an acute onset of lower abdominal pain.  She said she was at work when she started having sharp lower abdominal pain.  The pain is constant, but she is having contractions beyond the abdominal pain she is feeling.   She denies vaginal leaking or bleeding.  +FM.   She has a history of 4 prior C/S and has poorly controlled IDDM.          The following portions of the patients history were reviewed and updated as appropriate: current medications, allergies, past medical history, past surgical history, past family history, past social history and problem list .       Prenatal Information:   Maternal Prenatal Labs  Blood Type No results found for: \"ABO\"   Rh Status No results found for: \"RH\"   Antibody Screen No results found for: \"ABSCRN\"   Gonnorhea No results found for: \"GCCX\"   Chlamydia No results found for: \"CLAMYDCU\"   RPR No results found for: \"RPR\"   Syphilis Antibody No results found for: \"SYPHILIS\"   Rubella No results found for: \"RUBELLAIGGIN\"   Hepatitis B Surface Antigen No results found for: \"HEPBSAG\"   HIV-1 Antibody No results found for: \"LABHIV1\"   Hepatitis C Antibody No results found for: \"HEPCAB\"   Rapid Urin Drug Screen No results found for: \"AMPMETHU\", \"BARBITSCNUR\", \"LABBENZSCN\", \"LABMETHSCN\", \"LABOPIASCN\", \"THCURSCR\", \"COCAINEUR\", \"AMPHETSCREEN\", \"PROPOXSCN\", \"BUPRENORSCNU\", \"METAMPSCNUR\", \"OXYCODONESCN\", \"TRICYCLICSCN\"   Group B Strep Culture No results found for: \"GBSANTIGEN\"           External Prenatal Results       Pregnancy Outside Results - Transcribed From Office Records - See Scanned Records For Details       Test Value Date Time    ABO  B  24 1558    Rh  Positive  24 1558    Antibody Screen  Negative  25 1713       Negative  24 " 1558    Varicella IgG  225 index 24 1558    Rubella  1.03 index 24 1558    Hgb  10.3 g/dL 25 1713       12.7 g/dL 24 1558       12.2 g/dL 24 1503    Hct  31.1 % 25 1713       38.7 % 24 1558       36.7 % 24 1503    HgB A1c   6.9 % 24 1503    1h GTT       3h GTT Fasting       3h GTT 1 hour       3h GTT 2 hour       3h GTT 3 hour        Gonorrhea (discrete)  Negative  24 1600    Chlamydia (discrete)  Negative  24 1600    RPR  Non Reactive  25 1713       Non Reactive  24 1558    Syphils cascade: TP-Ab (FTA)       TP-Ab       TP-Ab (EIA)       TPPA       HBsAg  Negative  24 1558    Herpes Simplex Virus PCR       Herpes Simplex VIrus Culture       HIV  Non Reactive  24 1558    Hep C RNA Quant PCR       Hep C Antibody  Non Reactive  24 1558    AFP       NIPT       Cystic Fibrosis (Elijah)       Cystic Fibroisis        Spinal Muscular atrophy       Fragile X       Group B Strep       GBS Susceptibility to Clindamycin       GBS Susceptibility to Erythromycin       Fetal Fibronectin       Genetic Testing, Maternal Blood                 Drug Screening       Test Value Date Time    Urine Drug Screen       Amphetamine Screen  Negative ng/mL 24 1600    Barbiturate Screen  Negative ng/mL 24 1600    Benzodiazepine Screen  Negative ng/mL 24 1600    Methadone Screen  Negative ng/mL 24 1600    Phencyclidine Screen  Negative ng/mL 24 1600    Opiates Screen       THC Screen       Cocaine Screen       Propoxyphene Screen  Negative ng/mL 24 1600    Buprenorphine Screen       Methamphetamine Screen       Oxycodone Screen       Tricyclic Antidepressants Screen                 Legend    ^: Historical                              Past OB History:     OB History    Para Term  AB Living   5 4 4 0 0 4   SAB IAB Ectopic Molar Multiple Live Births   0 0 0 0 0 4      # Outcome Date GA Lbr Naga/2nd Weight  Sex Type Anes PTL Lv   5 Current            4 Term 09/30/21 37w0d  3572 g (7 lb 14 oz) M CS-LTranv   [facility]   3 Term 11/06/15 37w0d  4423 g (9 lb 12 oz) M CS-LTranv   [facility]   2 Term 05/15/13 37w0d  4309 g (9 lb 8 oz) F CS-LTranv   [facility]   1 Term 05/23/08 37w0d  4082 g (9 lb) M CS-LTranv   [facility]      Obstetric Comments   5/2008- 37w [id number]-male- [facility] Moreno- adoption   5/2013-37w [id number]-female-[facility]   11/2015-37w [id number]-male-[facility]   9/2021-37w RCS-7lbs 14oz-male-S[facility]-Veloudis          Past Medical History: Past Medical History:   Diagnosis Date    Anxiety     Chronic joint pain     denies NSAIDS/steroids    COVID-19     2020, 2022    Diabetes mellitus, type II     w/ hx gestational diabetes 2015, dx May 2017, started on insulin when initially dx, lost weight w/ Adipex, stopped insulin 6 months after dx, but now w/ weight regain, back on insulin.  Managed by PCP    Dyspepsia     Dyspnea on exertion     Fatigue     Fatty liver     w/ abnormal LFTs, US 7/2018      GERD (gastroesophageal reflux disease) 02/01/2023    Gestational diabetes 2017    H/O H. pylori infection     UBT (+) 6/14/19 - PrevPak RX      Herpes 2017    Hiatal hernia 02/09/2023    Added automatically from request for surgery [id number]      History of gestational diabetes     History of Helicobacter pylori infection     UBT (+) 6/14/19 - PrevPak RX; repeat HPSA (+) 11/2019 - Pylera RX; repeat UBT (holding PPI) (+) - referred to GI, RX Levaquin/Flagyl/Pepto RX 2/22 - HPSA negative 1/2023    History of hiatal hernia     History of transfusion 2019    [facility]- PT UNSURE HOW MANY UNITS RECEIVED, NO REACTIONS    Maternal iron deficiency anemia affecting pregnancy in third trimester, antepartum 1/17/2025    Mixed hyperlipidemia 10/15/2019    NAFL (nonalcoholic fatty liver)     w/ abnormal LFTs, US 7/2018    Obesity (BMI 30-39.9)     Personal history of gestational diabetes 05/08/2017    Wears eyeglasses       Past Surgical History Past  Surgical History:   Procedure Laterality Date    BREAST BIOPSY      CERVICAL CONE BIOPSY  2004    benign     SECTION      , , ,     ENDOSCOPY N/A 2019    Procedure: ESOPHAGOGASTRODUODENOSCOPY;  Surgeon: Mila Whatley MD;  Location:  [facility];  Service: Bariatric    EXCESSIVE THIGH / HIP / BUTTOCK / FLANK SKIN EXCISION Bilateral 05/15/2024        GASTRIC SLEEVE LAPAROSCOPIC N/A 2019    Procedure: GASTRIC SLEEVE LAPAROSCOPIC;  Surgeon: Mila Whatley MD;  Location:  [facility];  Service: Bariatric    HIATAL HERNIA REPAIR N/A 2019    Procedure: HIATAL HERNIA REPAIR LAPAROSCOPIC;  Surgeon: Mila Whatley MD;  Location:  [facility];  Service: Bariatric    HIATAL HERNIA REPAIR N/A 2023    Procedure: LAPAROSCOPIC HIATAL HERNIA REPAIR WITH MESH WITH DAVINCI ROBOT;  Surgeon: Mila Whatley MD;  Location:  [facility];  Service: Robotics - DaVinci;  Laterality: N/A;    UPPER GASTROINTESTINAL ENDOSCOPY  2019    Dr VIVIENNE Whatley    UPPER GASTROINTESTINAL ENDOSCOPY  2022    Dr Aldrich, positive H pylori    WISDOM TOOTH EXTRACTION        Family History: Family History   Problem Relation Age of Onset    Stroke Mother     Heart disease Mother     Diabetes Mother     Obesity Mother     Hypertension Mother             Arthritis Mother     Diabetes Father     Hypertension Father     Heart disease Father     Arthritis Father       Social History:  reports that she quit smoking about 12 years ago. Her smoking use included cigarettes. She started smoking about 14 years ago. She has a 4 pack-year smoking history. She has never used smokeless tobacco.   reports no history of alcohol use.   reports no history of drug use.            Objective     Vital Signs Range for the last 24 hours  Temperature: Temp:  [98.1 °F (36.7 °C)] 98.1 °F (36.7 °C)   Temp Source:     BP: BP: (120)/(77) 120/77   Pulse: Heart Rate:  [] 97   Respirations: Resp:   [18] 18   SPO2: SpO2:  [98 %] 98 %   O2 Amount (l/min):     O2 Devices     Weight: Weight:  [98.9 kg (218 lb)] 98.9 kg (218 lb)     Physical Examination: General appearance - alert, and in no distress and oriented to person, place, and time  Chest - Breathing is unlaboured   Heart - regular rate   Abdomen - soft, mild tenderness along prior C/S incision.  Gravid uterus.    Extremities - pedal edema +1    Presentation: Breech                      Fetal Heart Rate Assessment   Method: Fetal HR Assessment Method: external   Beats/min: Fetal HR (beats/min): 145   Baseline: Fetal HR Baseline: normal range   Varibility: Fetal HR Variability: moderate (amplitude range 6 to 25 bpm)   Accels: Fetal HR Accelerations: greater than/equal to 10 bpm (32 wks gest or less), lasts at least 10 seconds (32 wks gest or less)   Decels: Fetal HR Decelerations: absent   Tracing Category:       Uterine Assessment   Method: Method: external tocotransducer (efm reapplied)   Frequency (min): Contraction Frequency (Minutes): x1   Ctx Count in 10 min:     Duration:     Intensity:     Intensity by IUPC:     Resting Tone: Uterine Resting Tone: soft by palpation   Resting Tone by IUPC:           USN:     SLIUP in breech position, posterior placenta, RAMAN 23.                Uterine scar appear intact, but is thin.          Assessment:  1. Intrauterine pregnancy at 28w6d gestation with reactive fetal status  2. IDDM  3. Pre-term uterine contractions  4. Polyhydramnios  5. Breech presentation  6. Prior C/S x4 with thinning of uterine scar and lower abdominal pain.     Plan:  1. Admit to [facility]  2. IV, CBC, T&S  3. Magnesium 4 gms loading followed by 2 gms maintenance   4. BMZ x2  5. Home insulin covered with sliding scale   6. If pain subsides, can move to 1 hour monitoring TID with NST.      Kurtis Betancur MD  1/20/2025  16:18 EST

## 2025-01-20 NOTE — TELEPHONE ENCOUNTER
Patient of Dr. Marinelli;  @ 28w 6d. LOV 25; NOV 25. Previous C/S x4.  Returned patient's call.   Reports onset this morning at 9 am of what she thinks is contractions. Reports abdominal tightness and rectal pressure. States discomfort has progressively worsened and is constant.   Reports abdomen is firm and painful.   Denies any bleeding or leaking fluid.   States she has not felt any movement today.   VALENCIA Rockwell spoke with patient and discussed the potential emergent nature of the situation; she advised patient to call EMS now. Patient v/u and agreed.

## 2025-01-20 NOTE — LETTER
January 23, 2025     Patient: Shelley Longoria   YOB: 1990   Date of Visit: 12/17/2024       To Whom It May Concern:    It is my medical opinion that Shelley Longoria  should work from home for the duration of her pregnancy .       Please feel free to contact my office with any questions or concerns.    Lucho Fairchild MD     CC: No Recipients

## 2025-01-20 NOTE — TELEPHONE ENCOUNTER
"PT CALLED AND STATED THAT SHE IS WORRIED THAT SHE IS CONCERNED THAT SHE IS HAVING CONTRACTIONS, SHE HAS ONLY HAD C SECTIONS SO SHE HAS NEVER EXPERIENCED CONTRACTIONS. PT REPORTS LIKE IT FEELS SHE'S GOING \"TO POOP HER PANTS\" AND SHE FEELS TIGHTNESS IN HER STOMACH AREA DOWN TO HER \"CROTCH AREA\" PT ALSO STATES THAT SHE IS HAVING STEADY BACK PAIN. PT HAS NOT TIMED THIS POSSIBLE CONTRACTIONS STATING THEY HAVE BEEN STEADY NOT SPORADIC, NEEDS A CALL BACK ASAP   "

## 2025-01-21 ENCOUNTER — PATIENT OUTREACH (OUTPATIENT)
Dept: LABOR AND DELIVERY | Facility: HOSPITAL | Age: 35
End: 2025-01-21
Payer: COMMERCIAL

## 2025-01-21 LAB
GLUCOSE BLDC GLUCOMTR-MCNC: 127 MG/DL (ref 70–130)
GLUCOSE BLDC GLUCOMTR-MCNC: 135 MG/DL (ref 70–130)
GLUCOSE BLDC GLUCOMTR-MCNC: 164 MG/DL (ref 70–130)
GLUCOSE BLDC GLUCOMTR-MCNC: 177 MG/DL (ref 70–130)
GLUCOSE BLDC GLUCOMTR-MCNC: 181 MG/DL (ref 70–130)
GLUCOSE BLDC GLUCOMTR-MCNC: 232 MG/DL (ref 70–130)
GLUCOSE BLDC GLUCOMTR-MCNC: 84 MG/DL (ref 70–130)

## 2025-01-21 PROCEDURE — 25810000003 LACTATED RINGERS PER 1000 ML: Performed by: OBSTETRICS & GYNECOLOGY

## 2025-01-21 PROCEDURE — 99232 SBSQ HOSP IP/OBS MODERATE 35: CPT | Performed by: OBSTETRICS & GYNECOLOGY

## 2025-01-21 PROCEDURE — 4A0HXCZ MEASUREMENT OF PRODUCTS OF CONCEPTION, CARDIAC RATE, EXTERNAL APPROACH: ICD-10-PCS | Performed by: OBSTETRICS & GYNECOLOGY

## 2025-01-21 PROCEDURE — 63710000001 INSULIN LISPRO (HUMAN) PER 5 UNITS: Performed by: OBSTETRICS & GYNECOLOGY

## 2025-01-21 PROCEDURE — 63710000001 INSULIN GLARGINE PER 5 UNITS: Performed by: OBSTETRICS & GYNECOLOGY

## 2025-01-21 PROCEDURE — 25010000002 MAGNESIUM SULFATE 20 GM/500ML SOLUTION: Performed by: OBSTETRICS & GYNECOLOGY

## 2025-01-21 PROCEDURE — 82948 REAGENT STRIP/BLOOD GLUCOSE: CPT

## 2025-01-21 PROCEDURE — 25010000002 BETAMETHASONE ACET & SOD PHOS PER 4 MG: Performed by: OBSTETRICS & GYNECOLOGY

## 2025-01-21 PROCEDURE — 59025 FETAL NON-STRESS TEST: CPT | Performed by: OBSTETRICS & GYNECOLOGY

## 2025-01-21 PROCEDURE — 59025 FETAL NON-STRESS TEST: CPT

## 2025-01-21 RX ORDER — INSULIN LISPRO 100 [IU]/ML
30 INJECTION, SOLUTION INTRAVENOUS; SUBCUTANEOUS
Status: DISCONTINUED | OUTPATIENT
Start: 2025-01-21 | End: 2025-01-23 | Stop reason: HOSPADM

## 2025-01-21 RX ORDER — INSULIN LISPRO 100 [IU]/ML
24 INJECTION, SOLUTION INTRAVENOUS; SUBCUTANEOUS
Status: DISCONTINUED | OUTPATIENT
Start: 2025-01-22 | End: 2025-01-23 | Stop reason: HOSPADM

## 2025-01-21 RX ORDER — INSULIN LISPRO 100 [IU]/ML
60 INJECTION, SOLUTION INTRAVENOUS; SUBCUTANEOUS
Status: DISCONTINUED | OUTPATIENT
Start: 2025-01-21 | End: 2025-01-23 | Stop reason: HOSPADM

## 2025-01-21 RX ORDER — METFORMIN HYDROCHLORIDE 500 MG/1
1000 TABLET, EXTENDED RELEASE ORAL 2 TIMES DAILY WITH MEALS
Status: DISCONTINUED | OUTPATIENT
Start: 2025-01-21 | End: 2025-01-23 | Stop reason: HOSPADM

## 2025-01-21 RX ADMIN — INSULIN GLARGINE 22 UNITS: 100 INJECTION, SOLUTION SUBCUTANEOUS at 08:28

## 2025-01-21 RX ADMIN — INSULIN LISPRO 4 UNITS: 100 INJECTION, SOLUTION INTRAVENOUS; SUBCUTANEOUS at 20:52

## 2025-01-21 RX ADMIN — INSULIN LISPRO 8 UNITS: 100 INJECTION, SOLUTION INTRAVENOUS; SUBCUTANEOUS at 00:45

## 2025-01-21 RX ADMIN — INSULIN GLARGINE 22 UNITS: 100 INJECTION, SOLUTION SUBCUTANEOUS at 00:45

## 2025-01-21 RX ADMIN — INSULIN GLARGINE 22 UNITS: 100 INJECTION, SOLUTION SUBCUTANEOUS at 20:48

## 2025-01-21 RX ADMIN — BETAMETHASONE SODIUM PHOSPHATE AND BETAMETHASONE ACETATE 12 MG: 3; 3 INJECTION, SUSPENSION INTRA-ARTICULAR; INTRALESIONAL; INTRAMUSCULAR at 16:33

## 2025-01-21 RX ADMIN — Medication 5000 UNITS: at 08:28

## 2025-01-21 RX ADMIN — INSULIN LISPRO 60 UNITS: 100 INJECTION, SOLUTION INTRAVENOUS; SUBCUTANEOUS at 16:51

## 2025-01-21 RX ADMIN — METFORMIN HYDROCHLORIDE 1000 MG: 500 TABLET, EXTENDED RELEASE ORAL at 18:30

## 2025-01-21 RX ADMIN — SODIUM CHLORIDE, POTASSIUM CHLORIDE, SODIUM LACTATE AND CALCIUM CHLORIDE 50 ML/HR: 600; 310; 30; 20 INJECTION, SOLUTION INTRAVENOUS at 17:29

## 2025-01-21 RX ADMIN — METFORMIN HYDROCHLORIDE 500 MG: 500 TABLET, EXTENDED RELEASE ORAL at 08:28

## 2025-01-21 RX ADMIN — MAGNESIUM SULFATE IN WATER 2 G/HR: 20 INJECTION, SOLUTION INTRAVENOUS at 02:32

## 2025-01-21 RX ADMIN — VITAM B12 50 MCG: 100 TAB at 08:28

## 2025-01-21 RX ADMIN — MAGNESIUM SULFATE IN WATER 2 G/HR: 20 INJECTION, SOLUTION INTRAVENOUS at 10:57

## 2025-01-21 RX ADMIN — ASPIRIN 81 MG: 81 TABLET, COATED ORAL at 08:28

## 2025-01-21 RX ADMIN — VALACYCLOVIR HYDROCHLORIDE 1000 MG: 500 TABLET, FILM COATED ORAL at 08:28

## 2025-01-21 RX ADMIN — INSULIN LISPRO 24 UNITS: 100 INJECTION, SOLUTION INTRAVENOUS; SUBCUTANEOUS at 07:39

## 2025-01-21 RX ADMIN — MAGNESIUM SULFATE IN WATER 2 G/HR: 20 INJECTION, SOLUTION INTRAVENOUS at 23:35

## 2025-01-21 RX ADMIN — SODIUM CHLORIDE, POTASSIUM CHLORIDE, SODIUM LACTATE AND CALCIUM CHLORIDE 50 ML/HR: 600; 310; 30; 20 INJECTION, SOLUTION INTRAVENOUS at 05:39

## 2025-01-21 RX ADMIN — INSULIN LISPRO 30 UNITS: 100 INJECTION, SOLUTION INTRAVENOUS; SUBCUTANEOUS at 11:51

## 2025-01-21 NOTE — PAYOR COMM NOTE
"Shelley Lozano May (34 y.o. Female)     From:Annetta Wooten LPN, Utilization Review  Phone #[phone]  Fax #[phone]    MEDICAL INPATIENT ADMISSION    Passport ID#[id number]           Date of Birth   1990    Social Security Number       Address   [address] [id number]    Home Phone   [phone]    MRN   [id number]       Alevism   Jehovah's witness    Marital Status                               Admission Date   1/20/25    Admission Type   Elective    Admitting Provider   Lucho Marinelli MD    Attending Provider   Lucho Marinelli MD    Department, Room/Bed   [facility] ANTEPARTUM, N326/1       Discharge Date       Discharge Disposition       Discharge Destination                                 Attending Provider: Lucho Marinelli MD    Allergies: Buspar [Buspirone]    Isolation: None   Infection: None   Code Status: CPR    Ht: 180.3 cm (71\")   Wt: 98.9 kg (218 lb)    Admission Cmt: None   Principal Problem: Abdominal pain affecting pregnancy [O26.899,R10.9]                   Active Insurance as of 1/20/2025       Primary Coverage       Payor Plan Insurance Group Employer/Plan Group    PASSPORT [employer] BY [place] PASSPORT BY [place] [id number]       Payor Plan Address Payor Plan Phone Number Payor Plan Fax Number Effective Dates    [address] [address]   1/1/2021 - None Entered    [facility] [address]         Subscriber Name Subscriber Birth Date Member ID       SHELLEY LOZANO MAY 1990 [id number]                     Emergency Contacts        (Rel.) Home Phone Work Phone Mobile Phone    Harley Lo (Spouse) [phone] -- [phone]              Insurance Information                  [employer] [place]/[employer] [place] Phone: --    Subscriber: Shelley Lozano May Subscriber#: [id number]    Group#: [id number] Precert#: --    Authorization#: -- Effective Date: --             History & Physical    " "    Kurtis Betancur MD at 25 1618          [facility]  Obstetric History and Physical    Chief Complaint   Patient presents with    Abdominal Pain     25 0900 abdominal pain started.       HPI:      Patient is a 34 y.o. female  currently at 28w6d, who presents with an acute onset of lower abdominal pain.  She said she was at work when she started having sharp lower abdominal pain.  The pain is constant, but she is having contractions beyond the abdominal pain she is feeling.   She denies vaginal leaking or bleeding.  +FM.   She has a history of 4 prior C/S and has poorly controlled IDDM.          The following portions of the patients history were reviewed and updated as appropriate: current medications, allergies, past medical history, past surgical history, past family history, past social history and problem list .       Prenatal Information:   Maternal Prenatal Labs  Blood Type No results found for: \"ABO\"   Rh Status No results found for: \"RH\"   Antibody Screen No results found for: \"ABSCRN\"   Gonnorhea No results found for: \"GCCX\"   Chlamydia No results found for: \"CLAMYDCU\"   RPR No results found for: \"RPR\"   Syphilis Antibody No results found for: \"SYPHILIS\"   Rubella No results found for: \"RUBELLAIGGIN\"   Hepatitis B Surface Antigen No results found for: \"HEPBSAG\"   HIV-1 Antibody No results found for: \"LABHIV1\"   Hepatitis C Antibody No results found for: \"HEPCAB\"   Rapid Urin Drug Screen No results found for: \"AMPMETHU\", \"BARBITSCNUR\", \"LABBENZSCN\", \"LABMETHSCN\", \"LABOPIASCN\", \"THCURSCR\", \"COCAINEUR\", \"AMPHETSCREEN\", \"PROPOXSCN\", \"BUPRENORSCNU\", \"METAMPSCNUR\", \"OXYCODONESCN\", \"TRICYCLICSCN\"   Group B Strep Culture No results found for: \"GBSANTIGEN\"           External Prenatal Results       Pregnancy Outside Results - Transcribed From Office Records - See Scanned Records For Details       Test Value Date Time    ABO  B  24 1558    Rh  Positive  24 1558    Antibody Screen  " Negative  25 1713       Negative  24 1558    Varicella IgG  225 index 24 1558    Rubella  1.03 index 24 1558    Hgb  10.3 g/dL 25 1713       12.7 g/dL 24 1558       12.2 g/dL 24 1503    Hct  31.1 % 25 1713       38.7 % 24 1558       36.7 % 24 1503    HgB A1c   6.9 % 24 1503    1h GTT       3h GTT Fasting       3h GTT 1 hour       3h GTT 2 hour       3h GTT 3 hour        Gonorrhea (discrete)  Negative  24 1600    Chlamydia (discrete)  Negative  24 1600    RPR  Non Reactive  25 1713       Non Reactive  24 1558    Syphils cascade: TP-Ab (FTA)       TP-Ab       TP-Ab (EIA)       TPPA       HBsAg  Negative  24 1558    Herpes Simplex Virus PCR       Herpes Simplex VIrus Culture       HIV  Non Reactive  24 1558    Hep C RNA Quant PCR       Hep C Antibody  Non Reactive  24 1558    AFP       NIPT       Cystic Fibrosis (Elijah)       Cystic Fibroisis        Spinal Muscular atrophy       Fragile X       Group B Strep       GBS Susceptibility to Clindamycin       GBS Susceptibility to Erythromycin       Fetal Fibronectin       Genetic Testing, Maternal Blood                 Drug Screening       Test Value Date Time    Urine Drug Screen       Amphetamine Screen  Negative ng/mL 24 1600    Barbiturate Screen  Negative ng/mL 24 1600    Benzodiazepine Screen  Negative ng/mL 24 1600    Methadone Screen  Negative ng/mL 24 1600    Phencyclidine Screen  Negative ng/mL 24 1600    Opiates Screen       THC Screen       Cocaine Screen       Propoxyphene Screen  Negative ng/mL 24 1600    Buprenorphine Screen       Methamphetamine Screen       Oxycodone Screen       Tricyclic Antidepressants Screen                 Legend    ^: Historical                              Past OB History:     OB History    Para Term  AB Living   5 4 4 0 0 4   SAB IAB Ectopic Molar Multiple Live Births   0 0 0  0 0 4      # Outcome Date GA Lbr Naga/2nd Weight Sex Type Anes PTL Lv   5 Current            4 Term 09/30/21 37w0d  3572 g (7 lb 14 oz) M CS-LTranv   [facility]   3 Term 11/06/15 37w0d  4423 g (9 lb 12 oz) M CS-LTranv   [facility]   2 Term 05/15/13 37w0d  4309 g (9 lb 8 oz) F CS-LTranv   [facility]   1 Term 05/23/08 37w0d  4082 g (9 lb) M CS-LTranv   [facility]      Obstetric Comments   5/2008- 37w OA-[id number]-male- [facility] Moreno- adoption   5/2013-37w ITP-OWT-[id number]-female-[facility]   11/2015-37w DBB-[id number]-male-[facility]   9/2021-37w RCS-7lbs 14oz-male-[facility]          Past Medical History: Past Medical History:   Diagnosis Date    Anxiety     Chronic joint pain     denies NSAIDS/steroids    COVID-19     2020, 2022    Diabetes mellitus, type II     w/ hx gestational diabetes 2015, dx May 2017, started on insulin when initially dx, lost weight w/ Adipex, stopped insulin 6 months after dx, but now w/ weight regain, back on insulin.  Managed by PCP    Dyspepsia     Dyspnea on exertion     Fatigue     Fatty liver     w/ abnormal LFTs, US 7/2018      GERD (gastroesophageal reflux disease) 02/01/2023    Gestational diabetes 2017    H/O H. pylori infection     UBT (+) 6/14/19 - PrevPak RX      Herpes 2017    Hiatal hernia 02/09/2023    Added automatically from request for surgery [id number]      History of gestational diabetes     History of Helicobacter pylori infection     UBT (+) 6/14/19 - PrevPak RX; repeat HPSA (+) 11/2019 - Pylera RX; repeat UBT (holding PPI) (+) - referred to GI, RX Levaquin/Flagyl/Pepto RX 2/22 - HPSA negative 1/2023    History of hiatal hernia     History of transfusion 2019    [facility]- PT UNSURE HOW MANY UNITS RECEIVED, NO REACTIONS    Maternal iron deficiency anemia affecting pregnancy in third trimester, antepartum 1/17/2025    Mixed hyperlipidemia 10/15/2019    NAFL (nonalcoholic fatty liver)     w/ abnormal LFTs, US 7/2018    Obesity (BMI 30-39.9)     Personal history of gestational diabetes 05/08/2017    Wears  eyeglasses       Past Surgical History Past Surgical History:   Procedure Laterality Date    BREAST BIOPSY      CERVICAL CONE BIOPSY  2004    benign     SECTION      , , ,     ENDOSCOPY N/A 2019    Procedure: ESOPHAGOGASTRODUODENOSCOPY;  Surgeon: Mila Whatley MD;  Location:  [facility];  Service: Bariatric    EXCESSIVE THIGH / HIP / BUTTOCK / FLANK SKIN EXCISION Bilateral 05/15/2024        GASTRIC SLEEVE LAPAROSCOPIC N/A 2019    Procedure: GASTRIC SLEEVE LAPAROSCOPIC;  Surgeon: Mila Whatley MD;  Location:  [facility];  Service: Bariatric    HIATAL HERNIA REPAIR N/A 2019    Procedure: HIATAL HERNIA REPAIR LAPAROSCOPIC;  Surgeon: Mila Whatley MD;  Location:  [facility];  Service: Bariatric    HIATAL HERNIA REPAIR N/A 2023    Procedure: LAPAROSCOPIC HIATAL HERNIA REPAIR WITH MESH WITH DAVINCI ROBOT;  Surgeon: Mila Whatley MD;  Location:  [facility];  Service: Robotics - DaVinci;  Laterality: N/A;    UPPER GASTROINTESTINAL ENDOSCOPY  2019    Dr VIVIENNE Whatley    UPPER GASTROINTESTINAL ENDOSCOPY  2022    Dr Aldrich, positive H pylori    WISDOM TOOTH EXTRACTION        Family History: Family History   Problem Relation Age of Onset    Stroke Mother     Heart disease Mother     Diabetes Mother     Obesity Mother     Hypertension Mother             Arthritis Mother     Diabetes Father     Hypertension Father     Heart disease Father     Arthritis Father       Social History:  reports that she quit smoking about 12 years ago. Her smoking use included cigarettes. She started smoking about 14 years ago. She has a 4 pack-year smoking history. She has never used smokeless tobacco.   reports no history of alcohol use.   reports no history of drug use.            Objective    Vital Signs Range for the last 24 hours  Temperature: Temp:  [98.1 °F (36.7 °C)] 98.1 °F (36.7 °C)   Temp Source:     BP: BP: (120)/(77) 120/77   Pulse: Heart  Rate:  [] 97   Respirations: Resp:  [18] 18   SPO2: SpO2:  [98 %] 98 %   O2 Amount (l/min):     O2 Devices     Weight: Weight:  [98.9 kg (218 lb)] 98.9 kg (218 lb)     Physical Examination: General appearance - alert, and in no distress and oriented to person, place, and time  Chest - Breathing is unlaboured   Heart - regular rate   Abdomen - soft, mild tenderness along prior C/S incision.  Gravid uterus.    Extremities - pedal edema +1    Presentation: Breech                      Fetal Heart Rate Assessment   Method: Fetal HR Assessment Method: external   Beats/min: Fetal HR (beats/min): 145   Baseline: Fetal HR Baseline: normal range   Varibility: Fetal HR Variability: moderate (amplitude range 6 to 25 bpm)   Accels: Fetal HR Accelerations: greater than/equal to 10 bpm (32 wks gest or less), lasts at least 10 seconds (32 wks gest or less)   Decels: Fetal HR Decelerations: absent   Tracing Category:       Uterine Assessment   Method: Method: external tocotransducer (efm reapplied)   Frequency (min): Contraction Frequency (Minutes): x1   Ctx Count in 10 min:     Duration:     Intensity:     Intensity by IUPC:     Resting Tone: Uterine Resting Tone: soft by palpation   Resting Tone by IUPC:           USN:     SLIUP in breech position, posterior placenta, RAMAN 23.                Uterine scar appear intact, but is thin.          Assessment:  1. Intrauterine pregnancy at 28w6d gestation with reactive fetal status  2. IDDM  3. Pre-term uterine contractions  4. Polyhydramnios  5. Breech presentation  6. Prior C/S x4 with thinning of uterine scar and lower abdominal pain.     Plan:  1. Admit to [facility]  2. IV, CBC, T&S  3. Magnesium 4 gms loading followed by 2 gms maintenance   4. BMZ x2  5. Home insulin covered with sliding scale   6. If pain subsides, can move to 1 hour monitoring TID with NST.      Kurtis Betancur MD  1/20/2025  16:18 EST    Electronically signed by Kurtis Betancur MD at 01/20/25 8529       Vital  Signs (last day)       Date/Time Temp Temp src Pulse Resp BP Patient Position SpO2    01/21/25 0828 -- -- 113 -- -- -- 91    01/21/25 0827 -- -- 112 18 139/61 -- --    01/21/25 0730 98.2 (36.8) -- 107 16 108/64 -- 95    01/21/25 0644 -- -- 101 16 109/62 -- 95    01/21/25 0539 -- -- 97 16 113/63 -- 93    01/21/25 0435 -- -- 101 16 103/61 -- 93    01/21/25 0350 -- -- 101 16 109/67 -- 94    01/21/25 0232 -- -- 96 16 97/52 -- 95    01/21/25 0135 -- -- 99 16 103/57 -- 94    01/21/25 0045 98.1 (36.7) -- 102 16 108/66 Lying 94    01/20/25 2332 -- -- 102 16 100/57 -- 94    01/20/25 2215 -- -- 101 16 96/54 -- 97    01/20/25 2030 -- -- 108 16 113/67 -- 97    01/20/25 1907 -- -- 104 16 117/59 -- 98    01/20/25 1843 -- -- 108 16 117/67 -- 98    01/20/25 1731 -- -- 99 16 118/67 -- 98    01/20/25 1641 -- -- 98 18 120/66 -- 98    01/20/25 1636 -- -- 98 18 118/65 -- 98    01/20/25 1631 -- -- 94 16 116/67 -- 98    01/20/25 1427 -- -- 97 -- -- -- 98    01/20/25 1408 98.1 (36.7) -- 104 18 120/77 -- --          Facility-Administered Medications as of 1/21/2025   Medication Dose Route Frequency Provider Last Rate Last Admin    acetaminophen (TYLENOL) tablet 650 mg  650 mg Oral Q4H PRN Lucho Marinelli MD        aspirin EC tablet 81 mg  81 mg Oral Daily Kurtis Betancur MD   81 mg at 01/21/25 0828    betamethasone acetate-betamethasone sodium phosphate (CELESTONE SOLUSPAN) injection 12 mg  12 mg Intramuscular Q24H Lucho Marinelli MD   12 mg at 01/20/25 1700    bisacodyl (DULCOLAX) suppository 10 mg  10 mg Rectal Daily PRN Lucho Marinelli MD        calcium gluconate injection 1 g  1 g Intravenous Once PRN Lucho Marinelli MD        dextrose (D50W) (25 g/50 mL) IV injection 25 g  25 g Intravenous Q15 Min PRN Lucho Marinelli MD        dextrose (GLUTOSE) oral gel 15 g  15 g Oral Q15 Min PRN Lucho Marinelli MD        docusate sodium (COLACE) capsule 100 mg  100 mg Oral BID PRN Lucho Marinelli MD        escitalopram (LEXAPRO) tablet 30 mg  30 mg  Oral Daily Kurtis Betancur MD        glucagon (GLUCAGEN) injection 1 mg  1 mg Intramuscular Q15 Min PRN Lucho Marinelli MD        insulin glargine (LANTUS, SEMGLEE) injection 22 Units  22 Units Subcutaneous BID Kurtis Betancur MD   22 Units at 01/21/25 0828    [START ON 1/22/2025] Insulin Lispro (humaLOG) injection 24 Units  24 Units Subcutaneous Daily With Breakfast Lucho Marinelli MD        Insulin Lispro (humaLOG) injection 30 Units  30 Units Subcutaneous Daily With Lunch Lucho Marinelli MD        Insulin Lispro (humaLOG) injection 4-24 Units  4-24 Units Subcutaneous 4x Daily AC & at Bedtime Lucho Marinelli MD   8 Units at 01/21/25 0045    Insulin Lispro (humaLOG) injection 60 Units  60 Units Subcutaneous Daily With Dinner Lucho Marinelli MD        [COMPLETED] iron sucrose (VENOFER) 300 mg in sodium chloride 0.9 % 250 mL IVPB  300 mg Intravenous Once Lucho Marinelli .7 mL/hr at 01/20/25 2037 300 mg at 01/20/25 2037    lactated ringers infusion  50 mL/hr Intravenous Continuous Lucho Marinelli MD 50 mL/hr at 01/21/25 0539 50 mL/hr at 01/21/25 0539    lidocaine PF 1% (XYLOCAINE) injection 0.5 mL  0.5 mL Intradermal Once PRN Lucho Marinelli MD        magnesium sulfate 20 GM/500ML infusion  2 g/hr Intravenous Continuous Lucho Marinelli MD 50 mL/hr at 01/21/25 0232 2 g/hr at 01/21/25 0232    [COMPLETED] magnesium sulfate bolus from bag 0.04 g/mL solution 4 g  4 g Intravenous Once Lucho Marinelli MD   4 g at 01/20/25 1630    metFORMIN ER (GLUCOPHAGE-XR) 24 hr tablet 500 mg  500 mg Oral BID With Meals Kurtis Betancur MD   500 mg at 01/21/25 0828    ondansetron ODT (ZOFRAN-ODT) disintegrating tablet 8 mg  8 mg Oral Q8H PRN Lucho Marinelli MD        Or    ondansetron (ZOFRAN) injection 4 mg  4 mg Intravenous Q8H PRN Lucho Marinelli MD        sodium chloride 0.9 % flush 10 mL  10 mL Intravenous Q12H Lucho Marinelli MD        sodium chloride 0.9 % flush 10 mL  10 mL Intravenous PRN Lucho Marinelli MD        sodium  chloride 0.9 % infusion 40 mL  40 mL Intravenous PRN Lucho Marinelli MD        valACYclovir (VALTREX) tablet 1,000 mg  1,000 mg Oral Daily Kurtis Betancur MD   1,000 mg at 01/21/25 0828    vitamin B-12 (CYANOCOBALAMIN) tablet 50 mcg  50 mcg Oral Daily Kurtis Betancur MD   50 mcg at 01/21/25 0828    vitamin D3 capsule 5,000 Units  5,000 Units Oral Daily Kurtis Betancur MD   5,000 Units at 01/21/25 0828     Lab Results (last 24 hours)       Procedure Component Value Units Date/Time    POC Glucose Once [[id number]]  (Abnormal) Collected: 01/21/25 0732    Specimen: Blood Updated: 01/21/25 0735     Glucose 135 mg/dL     POC Glucose Once [[id number]]  (Abnormal) Collected: 01/21/25 0037    Specimen: Blood Updated: 01/21/25 0040     Glucose 232 mg/dL     Treponema pallidum AB w/Reflex RPR [[id number]]  (Normal) Collected: 01/20/25 1910    Specimen: Blood Updated: 01/20/25 2341     Treponemal AB Total Non-Reactive    Narrative:      Reactive results will reflex RPR testing.    CBC (No Diff) [[id number]]  (Abnormal) Collected: 01/20/25 1910    Specimen: Blood Updated: 01/20/25 1941     WBC 9.63 10*3/mm3      RBC 3.51 10*6/mm3      Hemoglobin 9.7 g/dL      Hematocrit 30.0 %      MCV 85.5 fL      MCH 27.6 pg      MCHC 32.3 g/dL      RDW 14.4 %      RDW-SD 44.3 fl      MPV 11.0 fL      Platelets 206 10*3/mm3     POC Glucose 4x Daily Before Meals & at Bedtime [[id number]]  (Abnormal) Collected: 01/20/25 1708    Specimen: Blood Updated: 01/20/25 1710     Glucose 139 mg/dL     POC Glucose Once [[id number]]  (Normal) Collected: 01/20/25 1653    Specimen: Blood Updated: 01/20/25 1656     Glucose 118 mg/dL     Urinalysis With Microscopic If Indicated (No Culture) - Urine, Clean Catch [[id number]]  (Abnormal) Collected: 01/20/25 1556    Specimen: Urine, Clean Catch Updated: 01/20/25 1607     Color, UA Yellow     Appearance, UA Cloudy     pH, UA 6.5     Specific Gravity, UA 1.018     Glucose,  mg/dL (2+)     Ketones, UA Negative      Bilirubin, UA Negative     Blood, UA Negative     Protein, UA Negative     Leuk Esterase, UA Negative     Nitrite, UA Negative     Urobilinogen, UA 1.0 E.U./dL    Urinalysis, Microscopic Only - Urine, Clean Catch [[id number]]  (Abnormal) Collected: 25 1556    Specimen: Urine, Clean Catch Updated: 25 1607     RBC, UA 0-2 /HPF      WBC, UA 0-2 /HPF      Bacteria, UA Trace /HPF      Squamous Epithelial Cells, UA 3-6 /HPF      Hyaline Casts, UA None Seen /LPF      Methodology Automated Microscopy          Imaging Results (Last 24 Hours)       Procedure Component Value Units Date/Time    [facility] [[id number]] Collected: 25 1531     Updated: 25 153    Narrative:      PAT NAME: YUSUF MOON  MED REC#: [id number]  BIRTH DA: 1990  PAT GEND: F  ACCOUNT#: [id number]  PAT TYPE: O  EXAM QUINTEN: [id number]  REF PHYS [facility]  ACCESSION [id number]      Comparison Studies  =================    The findings of this study are compared to the prior ultrasound study dated 25      Patient Status  ============    Inpatient      Indication  ========    Severe abdominal pain in MARIA ALEJANDRA, AMA-multip. Type 2 diabetes. Medication exposure. Previous c/s x 4. Obesity History of gastric surgery.      Maternal Assessment  ==================    Height 165 cm  Height (ft) 5 ft  Height (in) 5 in  Weight 98 kg  Weight (lb) 216 lb  BMI 36.00 kg/m²      Method  =======    Transabdominal ultrasound examination. View: Adequate view      Pregnancy  =========    Gore pregnancy. Number of fetuses: 1      Dating  ======    LMP on: 2024  GA by LMP 32 w + 5 d  RAMAN by LMP: 3/12/2025  GA by prior assessment 28 w + 6 d  RAMAN by prior assessment: 2025  Method of dating: Restore dating from previous exam  Previous dating: based on stated RAMAN, selected on 2024  Agreed RAMAN of previous datin2025  Assigned: based on stated RAMAN, selected on 2024  Assigned GA 28 w + 6  d  Assigned RAMAN: 4/8/2025  Pregnancy length 280 d      General Evaluation  ================    Cardiac activity present.  bpm.  Fetal movements present.  Presentation cephalic.  Placenta posterior, Grade 1.  Umbilical cord 3 vessel cord.  Amniotic fluid Amount of AF: normal. MVP 6.6 cm. RAMAN 23.2 cm. Q1 6.0 cm, Q2 4.5 cm, Q3 6.6 cm, Q4 6.1 cm.      Fetal Anatomy  ============    4-chamber view: Normal  Heart / Thorax  4-chamber view: patent foramen ovale  Stomach: Normal  Bladder: Normal  Gender: female  Wants to know gender: yes      Biophysical Profile  ===============    2: Fetal breathing movements  2: Gross body movements  2: Fetal tone  2: Amniotic fluid volume  8/8 Biophysical profile score      Fetal Doppler  ===========    Arterial  Umbilical A PI 1.29  93%        Jose    Umbilical A RI 0.76  93%        Jose    Umbilical A PS 56.84 cm/s  94%        Ebbing    Umbilical A ED 13.89 cm/s  Umbilical A TAmax 33.39 cm/s  78%        Ebbing    Umbilical A MD 13.67 cm/s  Umbilical A S / D 4.09  93%        Jose    Umbilical A  bpm      Impression  ==========    Single intrauterine gestation in cephalic lie  Normal amniotic fluid volume  Normal umbilical Dopplers  BPP 8/8  Lower uterine segment appears thin though no ultrasonographic evidence of dehiscence      Recommendation  ===============    Recommend observation given pain and prior cesareans      Coding  =======    Description: 37900-87 BPP without NST        Sonographer: Evelina Holloway RDMS  Physician: Johan Nuñez MD, FACOG    Electronically signed by: Johan Nuñez MD, FACOG at: 2025/01/20 15:38          Orders (last 24 hrs)        Start     Ordered    01/22/25 0800  Insulin Lispro (humaLOG) injection 24 Units  Daily With Breakfast         01/21/25 0743    01/21/25 1800  Insulin Lispro (humaLOG) injection 60 Units  Daily With Dinner         01/21/25 0743    01/21/25 1200  Insulin Lispro (humaLOG) injection 30 Units  Daily With Lunch          01/21/25 0743    01/21/25 0900  escitalopram (LEXAPRO) tablet 30 mg  Daily         01/20/25 1617    01/21/25 0900  aspirin EC tablet 81 mg  Daily         01/20/25 1617 01/21/25 0900  valACYclovir (VALTREX) tablet 1,000 mg  Daily         01/20/25 1617    01/21/25 0900  vitamin B-12 (CYANOCOBALAMIN) tablet 50 mcg  Daily         01/20/25 1617 01/21/25 0900  vitamin D3 capsule 5,000 Units  Daily         01/20/25 1617 01/21/25 0736  POC Glucose Once  PROCEDURE ONCE        Comments: Complete no more than 45 minutes prior to patient eating      01/21/25 0732    01/21/25 0040  POC Glucose Once  PROCEDURE ONCE        Comments: Complete no more than 45 minutes prior to patient eating      01/21/25 0037    01/20/25 2100  sodium chloride 0.9 % flush 10 mL  Every 12 Hours Scheduled         01/20/25 1613    01/20/25 2100  insulin glargine (LANTUS, SEMGLEE) injection 22 Units  2 Times Daily         01/20/25 1617 01/20/25 2000  Vital Signs q 4 while awake  Every 4 Hours      Comments: While the patient is awake.    01/20/25 1613    01/20/25 1800  Insulin Lispro (humaLOG) injection 24 Units  3 Times Daily With Meals,   Status:  Discontinued         01/20/25 1617 01/20/25 1800  metFORMIN ER (GLUCOPHAGE-XR) 24 hr tablet 500 mg  2 Times Daily With Meals         01/20/25 1617 01/20/25 1800  iron sucrose (VENOFER) 300 mg in sodium chloride 0.9 % 250 mL IVPB  Once         01/20/25 1642    01/20/25 1730  Insulin Lispro (humaLOG) injection 4-24 Units  4 Times Daily Before Meals & Nightly         01/20/25 1527    01/20/25 1715  lactated ringers infusion  Continuous         01/20/25 1627 01/20/25 1700  POC Glucose 4x Daily Before Meals & at Bedtime  4 Times Daily Before Meals & at Bedtime      Comments: Complete no more than 45 minutes prior to patient eating      01/20/25 1527    01/20/25 1700  magnesium sulfate bolus from bag 0.04 g/mL solution 4 g  Once         01/20/25 1613    01/20/25 1700  magnesium sulfate 20  GM/500ML infusion  Continuous         25 1613    25 1700  betamethasone acetate-betamethasone sodium phosphate (CELESTONE SOLUSPAN) injection 12 mg  Every 24 Hours         25 1613    25 1657  POC Glucose Once  PROCEDURE ONCE        Comments: Complete no more than 45 minutes prior to patient eating      25 1653    25 1653  Diet: Diabetic; Consistent Carbohydrate; Fluid Consistency: Thin (IDDSI 0)  Diet Effective Now         25 1653    25 1614  Weigh Patient Daily  Daily       25 1613    25 1613  calcium gluconate injection 1 g  Once As Needed         25 1613    25 1613  Admit To Obstetrics Inpatient  Once         25 1613    01/20/25 1613  Code Status and Medical Interventions: CPR (Attempt to Resuscitate); Full Support  Continuous         25 1613    01/20/25 1613  Place Sequential Compression Device  Once         25 1613    01/20/25 1613  Maintain Sequential Compression Device  Continuous         25 1613    25 1613  Notify Provider (Specified)  Continuous        Comments: Open Order Report to View Parameters Requiring Provider Notification    25 1613    25 1613  Notify Provider of Tachysystole (Per Hospital Algorithm)  Continuous        Comments: Open Order Report to View Parameters Requiring Provider Notification    25 1613    25 1613  Notify Provider if Membranes Ruptured, Bleeding Greater Than 1 Pad Per Hour, Fetal Heart Tone Abnormality or Severe Pain  Continuous        Comments: Open Order Report to View Parameters Requiring Provider Notification    25 1613    25 1613  Initiate Group Beta Strep (GBS) Prophylaxis Protocol, If Criteria Met  Continuous        Comments: NO TREATMENT RECOMMENDED IF: 1) Maternal GBS Status Known Negative 2) Scheduled  Birth With Intact Membranes, Not in Labor 3) Maternal GBS Status Unknown, No Risk Factors  TREAT WITH ANTIBIOTICS IF:  1)  "Maternal GBS Status Known Positive 2) Maternal GBS Status Unknown With Risk Factors: a)  Previous Infant Affected By GBS Infection b) GBS Urinary Tract Infection (UTI) or Bacteriuria During Pregnancy c) Unexplained Maternal Fever (100.4F (38C) or Greater) During Labor d)  Prolonged Rupture of Membranes (18 or More Hours) e) Gestational Age Less Than 37 Weeks    01/20/25 1613 01/20/25 1613  CBC (No Diff)  Once         01/20/25 1613 01/20/25 1613  Treponema pallidum AB w/Reflex RPR  Once         01/20/25 1613    01/20/25 1613  Type & Screen  Once         01/20/25 1613    01/20/25 1613  Insert Peripheral IV  Once         01/20/25 1613 01/20/25 1613  Saline Lock & Maintain IV Access  Continuous         01/20/25 1613 01/20/25 1613  NST Moderate/High risk >24 weeks:   NST (One Hour) 3 Times Daily and PRN (Antepartum)  Per Order Details        Comments: For Antepartum Patients Greater Than 24 Weeks - NST Daily & Monitor Fetal Heart Tones For One Hour 3 Times Daily & PRN.    01/20/25 1613 01/20/25 1613  Diet: Diabetic; Consistent Carbohydrate; Fluid Consistency: Honey Thick  Diet Effective Now,   Status:  Canceled         01/20/25 1613    01/20/25 1612  sodium chloride 0.9 % flush 10 mL  As Needed         01/20/25 1613 01/20/25 1612  sodium chloride 0.9 % infusion 40 mL  As Needed         01/20/25 1613 01/20/25 1612  lidocaine PF 1% (XYLOCAINE) injection 0.5 mL  Once As Needed         01/20/25 1613    01/20/25 1612  acetaminophen (TYLENOL) tablet 650 mg  Every 4 Hours PRN         01/20/25 1613    01/20/25 1612  ondansetron ODT (ZOFRAN-ODT) disintegrating tablet 8 mg  Every 8 Hours PRN        Placed in \"Or\" Linked Group    01/20/25 1613 01/20/25 1612  ondansetron (ZOFRAN) injection 4 mg  Every 8 Hours PRN        Placed in \"Or\" Linked Group    01/20/25 1613    01/20/25 1612  docusate sodium (COLACE) capsule 100 mg  2 Times Daily PRN         01/20/25 1613    01/20/25 1612  bisacodyl (DULCOLAX) " suppository 10 mg  Daily PRN         25 1613    25 1607  Urinalysis, Microscopic Only - Urine, Clean Catch  Once         25 1606    25 1553  Urinalysis With Microscopic If Indicated (No Culture) - Urine, Clean Catch  Once         25 1552    25 1526  dextrose (GLUTOSE) oral gel 15 g  Every 15 Minutes PRN         25 1527    25 1526  dextrose (D50W) (25 g/50 mL) IV injection 25 g  Every 15 Minutes PRN         25 1527    25 1526  glucagon (GLUCAGEN) injection 1 mg  Every 15 Minutes PRN         25 1527    25 1509  [facility]  Diagn[facility]  1 Time Imaging         25 1508    25 0000  Ferric Maltol (ACCRUFeR) 30 MG capsule  2 Times Daily         25 1641    Unscheduled  Follow Hypoglycemia Standing Orders For Blood Glucose <70 & Notify Provider of Treatment  As Needed      Comments: Follow Hypoglycemia Orders As Outlined in Process Instructions (Open Order Report to View Full Instructions)  Notify Provider Any Time Hypoglycemia Treatment is Administered    25 1527    Unscheduled  Position Change - For Intra-Uterine Resusitation for Hypertonus, HyperStimulation or Non-Reassuring Fetal Status  As Needed       25 1613                     Physician Progress Notes (last 24 hours)        Lucho Marinelli MD at 25 0827          Antepartum Daily Progress Note    Patient name: Shelley Longoria  YOB: 1990   MRN: [id number]  Admission Date: 2025  Date of Service: 2025  Referring Provider: Lucho Marinelli MD    Shelley Longoria is a 34 y.o.    at 29w0d  admitted on 2025 for Abdominal pain affecting pregnancy    Hospital day 1      Diagnoses:   Active Hospital Problems    Diagnosis  POA    **Abdominal pain affecting pregnancy [O26.899, R10.9]  Yes    Maternal iron deficiency anemia affecting pregnancy in third trimester, antepartum [O99.013, D50.9]  Yes    History of  gastric surgery [Z98.890]  Not Applicable    AMA (advanced maternal age) multigravida 35+ [O09.529]  Yes    History of  delivery, currently pregnant [O34.219]  Yes    Phentermine/topiramate exposure in current pregnancy [O35.9XX0]  Yes    Type 2 diabetes mellitus affecting pregnancy, antepartum [O24.119]  Yes    Previous bariatric surgery complicating pregnancy [O99.840]  Yes      Resolved Hospital Problems   No resolved problems to display.        Chief Complaint:  Chief Complaint   Patient presents with    Abdominal Pain     25 0900 abdominal pain started.       Subjective:      Shelley reports mildly painful contractions   Reports fetal movement is normal  Denies leakage of amniotic fluid.  Denies vaginal bleeding    Objective:     Vital signs:  Temp:  [98.1 °F (36.7 °C)] 98.1 °F (36.7 °C)  Heart Rate:  [] 101  Resp:  [16-18] 16  BP: ()/(52-77) 109/62    Abdomen: soft, nontender  Uterus: gravid, nontender  Extremities: nontender; no edema        Fetal Monitoring:       Cervix: Exam by:     Dilation:     Effacement:     Station:         Most recent ultrasound:  Results for orders placed during the hospital encounter of 25    [facility]  Diagnostic Center    Narrative  PAT NAME: SHELLEY MOON  MED REC#: [id number]  BIRTH DA: 1990  PAT GEND: F  ACCOUNT#: [id number]  PAT TYPE: O  EXAM QUINTEN: [id number]  REF PHYS JAMMIE ARGUELLO  ACCESSION [id number]      Comparison Studies  =================    The findings of this study are compared to the prior ultrasound study dated 25      Patient Status  ============    Inpatient      Indication  ========    Severe abdominal pain in MARIA ALEJANDRA, AMA-multip. Type 2 diabetes. Medication exposure. Previous c/s x 4. Obesity History of gastric surgery.      Maternal Assessment  ==================    Height 165 cm  Height (ft) 5 ft  Height (in) 5 in  Weight 98 kg  Weight (lb) 216 lb  BMI 36.00 kg/m²      Method  =======    Transabdominal  ultrasound examination. View: Adequate view      Pregnancy  =========    Gore pregnancy. Number of fetuses: 1      Dating  ======    LMP on: 2024  GA by LMP 32 w + 5 d  RAMAN by LMP: 3/12/2025  GA by prior assessment 28 w + 6 d  RAMAN by prior assessment: 2025  Method of dating: Restore dating from previous exam  Previous dating: based on stated RAMAN, selected on 2024  Agreed RAMAN of previous datin2025  Assigned: based on stated RAMAN, selected on 2024  Assigned GA 28 w + 6 d  Assigned RAMAN: 2025  Pregnancy length 280 d      General Evaluation  ================    Cardiac activity present.  bpm.  Fetal movements present.  Presentation cephalic.  Placenta posterior, Grade 1.  Umbilical cord 3 vessel cord.  Amniotic fluid Amount of AF: normal. MVP 6.6 cm. RAMAN 23.2 cm. Q1 6.0 cm, Q2 4.5 cm, Q3 6.6 cm, Q4 6.1 cm.      Fetal Anatomy  ============    4-chamber view: Normal  Heart / Thorax  4-chamber view: patent foramen ovale  Stomach: Normal  Bladder: Normal  Gender: female  Wants to know gender: yes      Biophysical Profile  ===============    2: Fetal breathing movements  2: Gross body movements  2: Fetal tone  2: Amniotic fluid volume  8/8 Biophysical profile score      Fetal Doppler  ===========    Arterial  Umbilical A PI 1.29  93%        Jose    Umbilical A RI 0.76  93%        Jose    Umbilical A PS 56.84 cm/s  94%        Ebbing    Umbilical A ED 13.89 cm/s  Umbilical A TAmax 33.39 cm/s  78%        Ebbing    Umbilical A MD 13.67 cm/s  Umbilical A S / D 4.09  93%        Jose    Umbilical A  bpm      Impression  ==========    Single intrauterine gestation in cephalic lie  Normal amniotic fluid volume  Normal umbilical Dopplers  BPP 8/8  Lower uterine segment appears thin though no ultrasonographic evidence of dehiscence      Recommendation  ===============    Recommend observation given pain and prior cesareans      Coding  =======    Description: 12785-00 BPP without  NST        Sonographer: Evelina Holloway RDMS  Physician: Johan Nuñez MD, FACOG    Electronically signed by: Johan Nuñez MD, FACOG at:  15:38       Medications:  aspirin, 81 mg, Oral, Daily  betamethasone acetate-betamethasone sodium phosphate, 12 mg, Intramuscular, Q24H  escitalopram, 30 mg, Oral, Daily  insulin glargine, 22 Units, Subcutaneous, BID  [START ON 2025] insulin lispro, 24 Units, Subcutaneous, Daily With Breakfast  Insulin Lispro, 30 Units, Subcutaneous, Daily With Lunch  insulin lispro, 4-24 Units, Subcutaneous, 4x Daily AC & at Bedtime  Insulin Lispro, 60 Units, Subcutaneous, Daily With Dinner  metFORMIN ER, 500 mg, Oral, BID With Meals  sodium chloride, 10 mL, Intravenous, Q12H  valACYclovir, 1,000 mg, Oral, Daily  vitamin B-12, 50 mcg, Oral, Daily  vitamin D3, 5,000 Units, Oral, Daily         acetaminophen    bisacodyl    calcium gluconate    dextrose    dextrose    docusate sodium    glucagon (human recombinant)    lidocaine PF 1%    ondansetron ODT **OR** ondansetron    sodium chloride    sodium chloride    Labs:  Results from last 7 days   Lab Units 25  1910 25  1713   WBC 10*3/mm3 9.63 8.4   HEMOGLOBIN g/dL 9.7* 10.3*   PLATELETS 10*3/mm3 206 199         Assessment/Plan:      Shelley is a 34 y.o.    at 29w0d.    Abdominal pain affecting pregnancy: Differential includes placental abruption versus impending uterine dehiscence, versus  labor.  Continue observation.  Continue magnesium sulfate for neuroprotection and tocolysis.  Can always add Indocin if needed.  Continue home insulin with sliding scale.  Discussed with patient and nursing that she may eventually need an insulin drip if her sugars remain out of range.  Fetal Wellbeing: NST pending. Continue NST Three times per day; BPPs twice weekly, growth q 3-4 weeks.  Delivery plan: For repeat   Iron deficiency anemia. Continue IV Iron while inpatient.     All questions were answered to the best of  my ability.    Lucho Marinelli MD  1/21/2025       Electronically signed by Lucho Marinelli MD at 01/21/25 0912       Consult Notes (last 24 hours)  Notes from 01/20/25 1029 through 01/21/25 1029   No notes of this type exist for this encounter.       FHR (last day)        Date/Time Fetal HR Assessment Method Fetal HR (beats/min) Fetal HR Baseline Fetal HR Variability Fetal HR Accelerations Fetal HR Decelerations Fetal HR Tracing Category    01/20/25 2045 external  140  normal range  moderate (amplitude range 6 to 25 bpm)  greater than/equal to 15 bpm;lasting at least 15 seconds  absent  --     01/20/25 2030 external  145  normal range  moderate (amplitude range 6 to 25 bpm)  absent  absent  --     01/20/25 2015 external  145  normal range  moderate (amplitude range 6 to 25 bpm)  greater than/equal to 15 bpm;lasting at least 15 seconds  absent  --     01/20/25 2000 external  140  normal range  moderate (amplitude range 6 to 25 bpm)  greater than/equal to 10 bpm (32 wks gest or less);lasts at least 10 seconds (32 wks gest or less)  absent  --     01/20/25 1945 external  140  normal range  moderate (amplitude range 6 to 25 bpm)  greater than/equal to 10 bpm (32 wks gest or less);lasts at least 10 seconds (32 wks gest or less)  absent  --     01/20/25 1930 external  135  normal range  moderate (amplitude range 6 to 25 bpm)  greater than/equal to 10 bpm (32 wks gest or less);lasts at least 10 seconds (32 wks gest or less)  absent  --     01/20/25 1915 external  140  normal range  moderate (amplitude range 6 to 25 bpm)  absent  absent  --     01/20/25 1700 external  135  normal range  moderate (amplitude range 6 to 25 bpm)  greater than/equal to 10 bpm (32 wks gest or less);lasts at least 10 seconds (32 wks gest or less)  absent  --     01/20/25 1630 external  140  normal range  moderate (amplitude range 6 to 25 bpm)  greater than/equal to 10 bpm (32 wks gest or less);lasts at least 10 seconds (32 wks gest or less)   absent  --     01/20/25 1606 external  145  --  --  --  --  --     01/20/25 1511 external  135  normal range  moderate (amplitude range 6 to 25 bpm)  greater than/equal to 10 bpm (32 wks gest or less);lasts at least 10 seconds (32 wks gest or less)  absent  --     01/20/25 1430 external  135  normal range  moderate (amplitude range 6 to 25 bpm)  greater than/equal to 10 bpm (32 wks gest or less);lasts at least 10 seconds (32 wks gest or less)  absent  --     01/20/25 1408 external  135  --  --  --  --  --                   Uterine Activity (last day)        Date/Time Method Contraction Frequency (Minutes) Contraction Duration (sec) Contraction Intensity Uterine Resting Tone Contraction Pattern    01/21/25 0600 external tocotransducer  x5    --  --  --     01/21/25 0500 external tocotransducer  x4    --  --  --     01/21/25 0400 external tocotransducer  x5    --  --  --     01/21/25 0300 external tocotransducer  x1  60  --  --  --     01/21/25 0200 external tocotransducer  x2    --  --  --     01/21/25 0100 external tocotransducer  --  --  no contractions  --  --     01/21/25 0000 external tocotransducer  --  --  no contractions  --  --     01/20/25 2300 external tocotransducer  --  --  no contractions  --  --     01/20/25 2200 external tocotransducer  --  --  no contractions  --  --     01/20/25 2100 external tocotransducer  --  --  no contractions  --  --     01/20/25 2045 external tocotransducer  --  --  no contractions  --  --     01/20/25 2030 external tocotransducer  --  --  no contractions  --  --     01/20/25 2015 external tocotransducer  x1  120  --  --  --     01/20/25 2000 external tocotransducer  --  --  --  --  --     01/20/25 1945 external tocotransducer  --  --  --  --  --     01/20/25 1930 external tocotransducer;palpation;per patient report  --  --  --  soft by palpation  --     01/20/25 1915 external tocotransducer  --  --  --  --  --     01/20/25 1700 external tocotransducer   0  --  --  --  --     01/20/25 1630 external tocotransducer  2-10  40-50  mild by palpation  soft by palpation  --     01/20/25 1606 external tocotransducer  efm reapplied  --  --  --  soft by palpation  --     01/20/25 1511 external tocotransducer  efm d/c  x1  --  --  soft by palpation  --     01/20/25 1430 external tocotransducer  0  --  --  soft by palpation  --     01/20/25 1408 external tocotransducer  --  --  --  soft by palpation  --

## 2025-01-21 NOTE — PROCEDURES
Obstetrical Non-stress Test Interpretation     Name:  Shelley Longoria  MRN: [id number]    34 y.o. female  at 29w0d    Indication: diabetes mellitus, polyhydramnios, and contractions  Start Time: 08  End Time (Of Evaluation): 97655    Baseline: Normal 110-160 bpm  Variability:   Moderate/Normal (amplitude 6-25 bpm)  Accelerations: Present (<32 weeks) 10 x 10 bpm   Decelerations: Absent     Contractions:  Present       Impression:  reactive NST    Lucho Marinelli MD  Obstetrics and Gynecology  2025 11:05 EST

## 2025-01-21 NOTE — OUTREACH NOTE
"[facility]  IP Antepartum    Current Estimated Gestational Age: 29w0d      Questions/Answers      Flowsheet Row Responses   Best Method for Contacting Cell   Support Person Present No   Community Resources/Benefits currently in use: [employer] Benefits   Understanding of diagnosis/reason for admission: Patient understands, no questions or concerns at this time   Follow-up with patient in: 25          [facility]  Check-In    Current Estimated Gestational Age: 29w0d      Questions/Answers      Flowsheet Row Responses   Best Method for Contacting Cell   Demographics Reviewed No   Currently Employed Yes   Able to keep appointments as scheduled Yes   Gender(s) and Name(s) Baby Girl \"Milagros\"   Baby Active/Feeling Fetal Movemen Yes   How are you presently feeling? \"I'm ok, not liking this Magnesium\"   Are you having any of the following symptoms? Contractions   Questions regarding prenatal visits or tests to be ordered? No   New Diagnosis PTL, Other   Other New Diagnosis Polyhydramnios   Equipment presently used at home: Glucometer   Supplies ready for baby Car Seat   Resource/Environmental Concerns None   Do you have any questions related to your care experience, your pregnancy, plans for delivery, any concerns, etc? Yes   Question Discussed moving up delivery date of ordered breast pump and NICU consult scheduled for later today.   Other Education Insurance benefits/Incentives, Meds to Beds          Admitted to [facility] yesterday with c/o's lower abdomen pain. Noted to be having  contractions and was treated with Magnesium and  steroids. PDC U/S noted thinning of lower uterine segment but no active dehiscence. Feeling occasion contractions, c/o blurry vision and reports good fetal movement. Denies any concerning symptoms but discussed what to watch out for.     Discussed that she still has time for childbirth education if she is interested. Discussed breastfeeding video and outpatient " lactation clinic support as well as WIC support. She had not begun gathering items she will need for baby, but was online ordering needed supplies while MNN at [place].     Updated resources provided via [employer] message. Contact information provided. Encouraged to call with questions, concerns, or for support. Will plan f/u around 33 weeks to ensure she has everything she needs in place and feels ready for delivery.    Juany Mar RN  Maternity Nurse Navigator    1/21/2025, 11:44 EST

## 2025-01-21 NOTE — PROGRESS NOTES
Antepartum Daily Progress Note    Patient name: Yusuf Longoria  YOB: 1990   MRN: [id number]  Admission Date: 2025  Date of Service: 2025  Referring Provider: Lucho Marinelli MD    Yusuf Longoria is a 34 y.o.    at 29w0d  admitted on 2025 for Abdominal pain affecting pregnancy    Hospital day 1      Diagnoses:   Active Hospital Problems    Diagnosis  POA    **Abdominal pain affecting pregnancy [O26.899, R10.9]  Yes    Maternal iron deficiency anemia affecting pregnancy in third trimester, antepartum [O99.013, D50.9]  Yes    History of gastric surgery [Z98.890]  Not Applicable    AMA (advanced maternal age) multigravida 35+ [O09.529]  Yes    History of  delivery, currently pregnant [O34.219]  Yes    Phentermine/topiramate exposure in current pregnancy [O35.9XX0]  Yes    Type 2 diabetes mellitus affecting pregnancy, antepartum [O24.119]  Yes    Previous bariatric surgery complicating pregnancy [O99.840]  Yes      Resolved Hospital Problems   No resolved problems to display.        Chief Complaint:  Chief Complaint   Patient presents with    Abdominal Pain     25 0900 abdominal pain started.       Subjective:      Yusuf reports mildly painful contractions   Reports fetal movement is normal  Denies leakage of amniotic fluid.  Denies vaginal bleeding    Objective:     Vital signs:  Temp:  [98.1 °F (36.7 °C)] 98.1 °F (36.7 °C)  Heart Rate:  [] 101  Resp:  [16-18] 16  BP: ()/(52-77) 109/62    Abdomen: soft, nontender  Uterus: gravid, nontender  Extremities: nontender; no edema        Fetal Monitoring:       Cervix: Exam by:     Dilation:     Effacement:     Station:         Most recent ultrasound:  Results for orders placed during the hospital encounter of 25    [facility]  Diagnostic Center    Narrative  PAT NAME: YUSUF LONGORIA  MED REC#: [id number]  BIRTH DA: 1990  PAT GEND: F  ACCOUNT#: [id number]  PAT TYPE: O  EXAM  QUINTEN: [id number]  REF PHYS JAMMIE ARGUELLO  ACCESSION [id number]      Comparison Studies  =================    The findings of this study are compared to the prior ultrasound study dated 25      Patient Status  ============    Inpatient      Indication  ========    Severe abdominal pain in MARIA ALEJANDRA, AMA-multip. Type 2 diabetes. Medication exposure. Previous c/s x 4. Obesity History of gastric surgery.      Maternal Assessment  ==================    Height 165 cm  Height (ft) 5 ft  Height (in) 5 in  Weight 98 kg  Weight (lb) 216 lb  BMI 36.00 kg/m²      Method  =======    Transabdominal ultrasound examination. View: Adequate view      Pregnancy  =========    Gore pregnancy. Number of fetuses: 1      Dating  ======    LMP on: 2024  GA by LMP 32 w + 5 d  RAMAN by LMP: 3/12/2025  GA by prior assessment 28 w + 6 d  RAMAN by prior assessment: 2025  Method of dating: Restore dating from previous exam  Previous dating: based on stated RAMAN, selected on 2024  Agreed RAMAN of previous datin2025  Assigned: based on stated RAMAN, selected on 2024  Assigned GA 28 w + 6 d  Assigned RAMAN: 2025  Pregnancy length 280 d      General Evaluation  ================    Cardiac activity present.  bpm.  Fetal movements present.  Presentation cephalic.  Placenta posterior, Grade 1.  Umbilical cord 3 vessel cord.  Amniotic fluid Amount of AF: normal. MVP 6.6 cm. RAMAN 23.2 cm. Q1 6.0 cm, Q2 4.5 cm, Q3 6.6 cm, Q4 6.1 cm.      Fetal Anatomy  ============    4-chamber view: Normal  Heart / Thorax  4-chamber view: patent foramen ovale  Stomach: Normal  Bladder: Normal  Gender: female  Wants to know gender: yes      Biophysical Profile  ===============    2: Fetal breathing movements  2: Gross body movements  2: Fetal tone  2: Amniotic fluid volume  8 Biophysical profile score      Fetal Doppler  ===========    Arterial  Umbilical A PI 1.29  93%        Jose    Umbilical A RI 0.76  93%         Jose    Umbilical A PS 56.84 cm/s  94%        Ebbing    Umbilical A ED 13.89 cm/s  Umbilical A TAmax 33.39 cm/s  78%        Ebbing    Umbilical A MD 13.67 cm/s  Umbilical A S / D 4.09  93%        Jose    Umbilical A  bpm      Impression  ==========    Single intrauterine gestation in cephalic lie  Normal amniotic fluid volume  Normal umbilical Dopplers  BPP 8/8  Lower uterine segment appears thin though no ultrasonographic evidence of dehiscence      Recommendation  ===============    Recommend observation given pain and prior cesareans      Coding  =======    Description: 48149-85 BPP without NST        Sonographer: Evelina Holloway RDMS  Physician: Johan Nuñez MD, FACOG    Electronically signed by: Johan Nuñez MD, FACOG at:  15:38       Medications:  aspirin, 81 mg, Oral, Daily  betamethasone acetate-betamethasone sodium phosphate, 12 mg, Intramuscular, Q24H  escitalopram, 30 mg, Oral, Daily  insulin glargine, 22 Units, Subcutaneous, BID  [START ON 2025] insulin lispro, 24 Units, Subcutaneous, Daily With Breakfast  Insulin Lispro, 30 Units, Subcutaneous, Daily With Lunch  insulin lispro, 4-24 Units, Subcutaneous, 4x Daily AC & at Bedtime  Insulin Lispro, 60 Units, Subcutaneous, Daily With Dinner  metFORMIN ER, 500 mg, Oral, BID With Meals  sodium chloride, 10 mL, Intravenous, Q12H  valACYclovir, 1,000 mg, Oral, Daily  vitamin B-12, 50 mcg, Oral, Daily  vitamin D3, 5,000 Units, Oral, Daily         acetaminophen    bisacodyl    calcium gluconate    dextrose    dextrose    docusate sodium    glucagon (human recombinant)    lidocaine PF 1%    ondansetron ODT **OR** ondansetron    sodium chloride    sodium chloride    Labs:  Results from last 7 days   Lab Units 25  1910 25  1713   WBC 10*3/mm3 9.63 8.4   HEMOGLOBIN g/dL 9.7* 10.3*   PLATELETS 10*3/mm3 206 199         Assessment/Plan:      Shelley is a 34 y.o.    at 29w0d.    Abdominal pain affecting pregnancy: Differential  includes placental abruption versus impending uterine dehiscence, versus  labor.  Continue observation.  Continue magnesium sulfate for neuroprotection and tocolysis.  Can always add Indocin if needed.  Continue home insulin with sliding scale.  Discussed with patient and nursing that she may eventually need an insulin drip if her sugars remain out of range.  Fetal Wellbeing: NST pending. Continue NST Three times per day; BPPs twice weekly, growth q 3-4 weeks.  Delivery plan: For repeat   Iron deficiency anemia. Continue IV Iron while inpatient.     All questions were answered to the best of my ability.    Lucho Marinelli MD  2025

## 2025-01-22 LAB
GLUCOSE BLDC GLUCOMTR-MCNC: 120 MG/DL (ref 70–130)
GLUCOSE BLDC GLUCOMTR-MCNC: 156 MG/DL (ref 70–130)
GLUCOSE BLDC GLUCOMTR-MCNC: 163 MG/DL (ref 70–130)
GLUCOSE BLDC GLUCOMTR-MCNC: 168 MG/DL (ref 70–130)
GLUCOSE BLDC GLUCOMTR-MCNC: 246 MG/DL (ref 70–130)
GLUCOSE BLDC GLUCOMTR-MCNC: 90 MG/DL (ref 70–130)

## 2025-01-22 PROCEDURE — 99254 IP/OBS CNSLTJ NEW/EST MOD 60: CPT | Performed by: OBSTETRICS & GYNECOLOGY

## 2025-01-22 PROCEDURE — 59025 FETAL NON-STRESS TEST: CPT

## 2025-01-22 PROCEDURE — 59025 FETAL NON-STRESS TEST: CPT | Performed by: OBSTETRICS & GYNECOLOGY

## 2025-01-22 PROCEDURE — 25010000002 MAGNESIUM SULFATE 20 GM/500ML SOLUTION: Performed by: OBSTETRICS & GYNECOLOGY

## 2025-01-22 PROCEDURE — 99232 SBSQ HOSP IP/OBS MODERATE 35: CPT | Performed by: OBSTETRICS & GYNECOLOGY

## 2025-01-22 PROCEDURE — 63710000001 INSULIN LISPRO (HUMAN) PER 5 UNITS: Performed by: OBSTETRICS & GYNECOLOGY

## 2025-01-22 PROCEDURE — 82948 REAGENT STRIP/BLOOD GLUCOSE: CPT

## 2025-01-22 PROCEDURE — 63710000001 INSULIN GLARGINE PER 5 UNITS: Performed by: OBSTETRICS & GYNECOLOGY

## 2025-01-22 RX ORDER — INSULIN LISPRO 100 [IU]/ML
5 INJECTION, SOLUTION INTRAVENOUS; SUBCUTANEOUS ONCE
Status: COMPLETED | OUTPATIENT
Start: 2025-01-22 | End: 2025-01-22

## 2025-01-22 RX ADMIN — ASPIRIN 81 MG: 81 TABLET, COATED ORAL at 08:31

## 2025-01-22 RX ADMIN — INSULIN LISPRO 60 UNITS: 100 INJECTION, SOLUTION INTRAVENOUS; SUBCUTANEOUS at 16:36

## 2025-01-22 RX ADMIN — METFORMIN HYDROCHLORIDE 1000 MG: 500 TABLET, EXTENDED RELEASE ORAL at 16:37

## 2025-01-22 RX ADMIN — INSULIN LISPRO 4 UNITS: 100 INJECTION, SOLUTION INTRAVENOUS; SUBCUTANEOUS at 07:51

## 2025-01-22 RX ADMIN — VITAM B12 50 MCG: 100 TAB at 08:32

## 2025-01-22 RX ADMIN — INSULIN LISPRO 24 UNITS: 100 INJECTION, SOLUTION INTRAVENOUS; SUBCUTANEOUS at 07:51

## 2025-01-22 RX ADMIN — Medication 5000 UNITS: at 08:32

## 2025-01-22 RX ADMIN — INSULIN LISPRO 4 UNITS: 100 INJECTION, SOLUTION INTRAVENOUS; SUBCUTANEOUS at 16:36

## 2025-01-22 RX ADMIN — METFORMIN HYDROCHLORIDE 1000 MG: 500 TABLET, EXTENDED RELEASE ORAL at 07:51

## 2025-01-22 RX ADMIN — INSULIN GLARGINE 22 UNITS: 100 INJECTION, SOLUTION SUBCUTANEOUS at 08:32

## 2025-01-22 RX ADMIN — INSULIN LISPRO 5 UNITS: 100 INJECTION, SOLUTION INTRAVENOUS; SUBCUTANEOUS at 09:55

## 2025-01-22 RX ADMIN — VALACYCLOVIR HYDROCHLORIDE 1000 MG: 500 TABLET, FILM COATED ORAL at 08:32

## 2025-01-22 RX ADMIN — MAGNESIUM SULFATE IN WATER 2 G/HR: 20 INJECTION, SOLUTION INTRAVENOUS at 09:56

## 2025-01-22 RX ADMIN — INSULIN LISPRO 30 UNITS: 100 INJECTION, SOLUTION INTRAVENOUS; SUBCUTANEOUS at 12:33

## 2025-01-22 RX ADMIN — INSULIN GLARGINE 22 UNITS: 100 INJECTION, SOLUTION SUBCUTANEOUS at 21:23

## 2025-01-22 NOTE — CONSULTS
Maternal/Fetal Medicine Consult Note     Name: Shelley Longoria    : 1990     MRN: [id number]     Requesting Provider: Lucho Marinelli MD    HD#:  3    Chief Complaint:  Abdominal Pain (25 0900 abdominal pain started.)    Subjective     History of Present Illness:  Shelley Longoria is a 34 y.o.  29w1d with a history of four prior CS admitted (last delivery 2021) and suboptimally controlled T2DM admitted with lower abdominal pain   She was started on Mag Sulfate and a course of betamethasone   Patient reports that the pain is central lower abdomen and is notable only while walking and not when at rest.   No LOF, VB.   +FM     RAMAN: Estimated Date of Delivery: 25     ROS:   As noted in HPI.     Past Medical History:   Diagnosis Date    Anxiety     Chronic joint pain     denies NSAIDS/steroids    COVID-19     2022    Diabetes mellitus, type II     w/ hx gestational diabetes , dx May 2017, started on insulin when initially dx, lost weight w/ Adipex, stopped insulin 6 months after dx, but now w/ weight regain, back on insulin.  Managed by PCP    Dyspepsia     Dyspnea on exertion     Fatigue     Fatty liver     w/ abnormal LFTs, US 2018      GERD (gastroesophageal reflux disease) 2023    Gestational diabetes 2017    H/O H. pylori infection     UBT (+) 19 - PrevPak RX      Herpes 2017    Hiatal hernia 2023    Added automatically from request for surgery [id number]      History of gestational diabetes     History of Helicobacter pylori infection     UBT (+) 19 - PrevPak RX; repeat HPSA (+) 2019 - Pylera RX; repeat UBT (holding PPI) (+) - referred to GI, RX Levaquin/Flagyl/Pepto RX  - HPSA negative 2023    History of hiatal hernia     History of transfusion     [facility]- PT UNSURE HOW MANY UNITS RECEIVED, NO REACTIONS    Maternal iron deficiency anemia affecting pregnancy in third trimester, antepartum 2025    Mixed hyperlipidemia  10/15/2019    NAFL (nonalcoholic fatty liver)     w/ abnormal LFTs, US 2018    Obesity (BMI 30-39.9)     Personal history of gestational diabetes 2017    Wears eyeglasses       Past Surgical History:   Procedure Laterality Date    BREAST BIOPSY      CERVICAL CONE BIOPSY  2004    benign     SECTION      , , ,     ENDOSCOPY N/A 2019    Procedure: ESOPHAGOGASTRODUODENOSCOPY;  Surgeon: Mila Whatley MD;  Location:  [facility];  Service: Bariatric    EXCESSIVE THIGH / HIP / BUTTOCK / FLANK SKIN EXCISION Bilateral 05/15/2024        GASTRIC SLEEVE LAPAROSCOPIC N/A 2019    Procedure: GASTRIC SLEEVE LAPAROSCOPIC;  Surgeon: Mila Whatley MD;  Location:  [facility];  Service: Bariatric    HIATAL HERNIA REPAIR N/A 2019    Procedure: HIATAL HERNIA REPAIR LAPAROSCOPIC;  Surgeon: Mila Whatley MD;  Location:  [facility];  Service: Bariatric    HIATAL HERNIA REPAIR N/A 2023    Procedure: LAPAROSCOPIC HIATAL HERNIA REPAIR WITH MESH WITH DAVINCI ROBOT;  Surgeon: Mila Whatley MD;  Location:  [facility];  Service: Robotics - DaVinci;  Laterality: N/A;    UPPER GASTROINTESTINAL ENDOSCOPY  2019    Dr VIVIENNE Whatley    UPPER GASTROINTESTINAL ENDOSCOPY  2022    Dr Aldrich, positive H pylori    WISDOM TOOTH EXTRACTION        Family History   Problem Relation Age of Onset    Stroke Mother     Heart disease Mother     Diabetes Mother     Obesity Mother     Hypertension Mother             Arthritis Mother     Diabetes Father     Hypertension Father     Heart disease Father     Arthritis Father       OB History          5    Para   4    Term   4            AB        Living   4         SAB        IAB        Ectopic        Molar        Multiple        Live Births   4          Obstetric Comments   2008- 37w [id number]- [facility]- adoption  2013-36w [id number]-female-[facility]  2015-37w  [id number]-male-[facility]  9/2021-36w [id number] 14oz-male-[facility]                 Prior to Admission medications    Medication Sig Start Date End Date Taking? Authorizing Provider   aspirin 81 MG EC tablet Take 1 tablet by mouth Daily. 9/3/24  Yes Lucho Marinelli MD   Insulin Glargine (Lantus SoloStar) 100 UNIT/ML injection pen Inject 22 Units under the skin into the appropriate area as directed 2 (Two) Times a Day. Inject under the skin into the appropriate area 22 units in the morning and 22 units in the evening approximately 12 hours apart. 1/2/25  Yes Kimberley Kim MD   Insulin Lispro Kang KwikPen 100 UNIT/ML solution pen-injector Inject 26 Units under the skin into the appropriate area as directed 3 (Three) Times a Day With Meals. Patient is to inject 24 units under the skin into the appropriate area as directed 15-30 minutes before breakfast, 30 units 15-30 minutes before lunch and 60 units 15-30 minutes before dinner.  Add 10 units with snacks  Patient taking differently: Inject 24 Units under the skin into the appropriate area as directed 3 (Three) Times a Day With Meals. Patient is to inject 24 units under the skin into the appropriate area as directed 15-30 minutes before breakfast, 30 units 15-30 minutes before lunch and 60 units 15-30 minutes before dinner.  Add 10 units with snacks 1/14/25  Yes Kimberley Kim MD   metFORMIN ER (GLUCOPHAGE-XR) 500 MG 24 hr tablet TAKE TWO TABLETS BY MOUTH DAILY WITH BREAKFAST AND TWO TABLETS BY MOUTH WITH DINNER OR AT BEDTIME 1/14/25  Yes Kimberley Kim MD   Prenatal MV-Min-FA-Omega-3 (VITAFUSION PRENATAL PO)  8/12/24  Yes ProviderGenna MD   valACYclovir (VALTREX) 1000 MG tablet Take 1 tablet by mouth Daily. 1/29/22  Yes Genna Medina MD   vitamin B-12 (CYANOCOBALAMIN) 100 MCG tablet Take 0.5 tablets by mouth Daily.   Yes Genna Medina MD   vitamin D3 125 MCG (5000 UT) capsule capsule Take 1 capsule by mouth Daily.    Yes Provider, MD Genna   Blood Glucose Monitoring Suppl (FreeStyle Lite) device 1 dose Daily. 8/11/22   Courtney Lew APRN   Continuous Glucose Sensor (Dexcom G7 Sensor) misc Use 1 each Every 10 (Ten) Days. 9/3/24   Lucho Marinelli MD   escitalopram (Lexapro) 20 MG tablet Take 1.5 tablets by mouth Daily.  Patient not taking: Reported on 1/14/2025 9/20/24   Amee Larson MD   Ferric Maltol (ACCRUFeR) 30 MG capsule Take 1 capsule by mouth 2 (Two) Times a Day. Take 1 hour before or 2 hours after meals. 1/20/25   Lucho Marinelli MD   glucagon (GLUCAGEN) 1 MG injection Use as directed for emergently low blood glucose level. Formulary Compliance Approval 9/3/24   Lucho Marinelli MD   glucose blood (FREESTYLE LITE) test strip Use as instructed 8/31/23   Courtney Lew APRN   Insulin Pen Needle (Pen Needles) 32G X 4 MM misc Inject 1 each under the skin into the appropriate area as directed 4 (Four) Times a Day As Needed (for insulin injections). Formulary Compliance Approval 9/3/24   Lucho Marinelli MD   Lancets (freestyle) lancets Check blood sugar three times daily PRN. 8/31/23   Courtney Lew APRN   Nutritional Supplements (Glucose Management) tablet Use as needed for emergently low blood glucose levels. Formulary Compliance Approval  Patient not taking: Reported on 1/14/2025 9/3/24   Lucho Marinelli MD        Current Facility-Administered Medications:     acetaminophen (TYLENOL) tablet 650 mg, 650 mg, Oral, Q4H PRN, Lucho Marinelli MD    aspirin EC tablet 81 mg, 81 mg, Oral, Daily, Krutis Betancur MD, 81 mg at 01/22/25 0831    bisacodyl (DULCOLAX) suppository 10 mg, 10 mg, Rectal, Daily PRN, Lucho Marinelli MD    calcium gluconate injection 1 g, 1 g, Intravenous, Once PRN, Lucho Marinelli MD    dextrose (D50W) (25 g/50 mL) IV injection 25 g, 25 g, Intravenous, Q15 Min PRN, Lucho Marinelli MD    dextrose (GLUTOSE) oral gel 15 g, 15 g, Oral, Q15 Min PRN, Lucho Marinelli MD    docusate sodium  (COLACE) capsule 100 mg, 100 mg, Oral, BID PRN, Lucho Marinelli MD    escitalopram (LEXAPRO) tablet 30 mg, 30 mg, Oral, Daily, Kurtis Betancur MD    glucagon (GLUCAGEN) injection 1 mg, 1 mg, Intramuscular, Q15 Min PRN, Lucho Marinelli MD    insulin glargine (LANTUS, SEMGLEE) injection 22 Units, 22 Units, Subcutaneous, BID, Kurtis Betancur MD, 22 Units at 01/22/25 0832    Insulin Lispro (humaLOG) injection 24 Units, 24 Units, Subcutaneous, Daily With Breakfast, Lucho Marinelli MD, 24 Units at 01/22/25 0751    Insulin Lispro (humaLOG) injection 30 Units, 30 Units, Subcutaneous, Daily With Lunch, Lucho Marinelli MD, 30 Units at 01/22/25 1233    Insulin Lispro (humaLOG) injection 4-24 Units, 4-24 Units, Subcutaneous, 4x Daily AC & at Bedtime, Lucho Marinelli MD, 4 Units at 01/22/25 0751    Insulin Lispro (humaLOG) injection 60 Units, 60 Units, Subcutaneous, Daily With Dinner, Lucho Marinelli MD, 60 Units at 01/21/25 1651    lactated ringers infusion, 50 mL/hr, Intravenous, Continuous, Lucho Marinelli MD, Last Rate: 50 mL/hr at 01/21/25 1729, 50 mL/hr at 01/21/25 1729    lidocaine PF 1% (XYLOCAINE) injection 0.5 mL, 0.5 mL, Intradermal, Once PRN, Lucho Marinelli MD    magnesium sulfate 20 GM/500ML infusion, 2 g/hr, Intravenous, Continuous, Lucho Marinelli MD, Last Rate: 50 mL/hr at 01/22/25 0956, 2 g/hr at 01/22/25 0956    metFORMIN ER (GLUCOPHAGE-XR) 24 hr tablet 1,000 mg, 1,000 mg, Oral, BID With Meals, Lucho Marinelli MD, 1,000 mg at 01/22/25 0751    ondansetron ODT (ZOFRAN-ODT) disintegrating tablet 8 mg, 8 mg, Oral, Q8H PRN **OR** ondansetron (ZOFRAN) injection 4 mg, 4 mg, Intravenous, Q8H PRN, Lucho Marinelli MD    sodium chloride 0.9 % flush 10 mL, 10 mL, Intravenous, Q12H, Lucho Marinelli MD    sodium chloride 0.9 % flush 10 mL, 10 mL, Intravenous, PRN, Lucho Marinelli MD    sodium chloride 0.9 % infusion 40 mL, 40 mL, Intravenous, PRN, Lucho Marinelli MD    valACYclovir (VALTREX) tablet 1,000 mg, 1,000 mg,  "Oral, Daily, Kurtis Betancur MD, 1,000 mg at 01/22/25 0832    vitamin B-12 (CYANOCOBALAMIN) tablet 50 mcg, 50 mcg, Oral, Daily, Kurtis Betancur MD, 50 mcg at 01/22/25 0832    vitamin D3 capsule 5,000 Units, 5,000 Units, Oral, Daily, Kurtis Betancur MD, 5,000 Units at 01/22/25 0832    Objective     Vital Signs  /63   Pulse 103   Temp 97.9 °F (36.6 °C) (Oral)   Resp 16   Ht 180.3 cm (71\")   Wt 98.9 kg (218 lb)   LMP 06/04/2024 (Exact Date)   Estimated body mass index is 30.4 kg/m² as calculated from the following:    Height as of this encounter: 180.3 cm (71\").    Weight as of this encounter: 98.9 kg (218 lb).    Physical Exam    NAD   Resting comfortably   Normal pulmonary effort   Abdomen gravid. Mild lower abdominal pain reported though no guarding, no rebound       EFM: Cat 1   [place]: Irregular contractions     WBC   Date Value Ref Range Status   01/20/2025 9.63 3.40 - 10.80 10*3/mm3 Final     RBC   Date Value Ref Range Status   01/20/2025 3.51 (L) 3.77 - 5.28 10*6/mm3 Final     Hemoglobin   Date Value Ref Range Status   01/20/2025 9.7 (L) 12.0 - 15.9 g/dL Final     Hematocrit   Date Value Ref Range Status   01/20/2025 30.0 (L) 34.0 - 46.6 % Final     MCV   Date Value Ref Range Status   01/20/2025 85.5 79.0 - 97.0 fL Final     MCH   Date Value Ref Range Status   01/20/2025 27.6 26.6 - 33.0 pg Final     MCHC   Date Value Ref Range Status   01/20/2025 32.3 31.5 - 35.7 g/dL Final     RDW   Date Value Ref Range Status   01/20/2025 14.4 12.3 - 15.4 % Final     RDW-SD   Date Value Ref Range Status   01/20/2025 44.3 37.0 - 54.0 fl Final     MPV   Date Value Ref Range Status   01/20/2025 11.0 6.0 - 12.0 fL Final     Platelets   Date Value Ref Range Status   01/20/2025 206 140 - 450 10*3/mm3 Final      Lab Results   Component Value Date    GLUCOSE 90 09/03/2024    BUN 6 09/03/2024    CREATININE 0.54 (L) 09/03/2024    EGFRRESULT 123 09/03/2024    EGFR 97.2 03/21/2024    BCR 11 09/03/2024    K 3.7 09/03/2024    " CO2 20 2024    CALCIUM 9.3 2025    PROTENTOTREF 7.0 2024    ALBUMIN 4.2 2024    BILITOT <0.2 2024    AST 19 2024    ALT 25 2024      UA          2025    15:56   Urinalysis   Squamous Epithelial Cells, UA 3-6    Specific Gravity, UA 1.018    Ketones, UA Negative    Blood, UA Negative    Leukocytes, UA Negative    Nitrite, UA Negative    RBC, UA 0-2    WBC, UA 0-2    Bacteria, UA Trace       Lab Results   Component Value Date    CREATININEUR 119.0 2024    UTPCR 127 2024           Results from last 7 days   Lab Units 25  1910   WBC 10*3/mm3 9.63   HEMOGLOBIN g/dL 9.7*   HEMATOCRIT % 30.0*   PLATELETS 10*3/mm3 206      Results from last 7 days   Lab Units 25  1228 25  0829 25  0741 25  2044   GLUCOSE mg/dL 120 246* 163* 177*     Lab Results   Component Value Date    PROTEINUA Negative 2025    PROTEINUA Trace (A) 2019     Ultrasound      Impression  ==========     Single intrauterine gestation in cephalic lie  Normal amniotic fluid volume  Normal umbilical Dopplers  BPP 8/8  Lower uterine segment appears thin though no ultrasonographic evidence of dehiscence          Assessment and Plan     34 y.o.  29w1d with four prior CS,  contractions and lower abdominal pain   FWB reassuring   Patient has remained stable over the course of her admission without evidence of progressive  labor or uterine rupture.   Recommend continued course of Mag Sulfate and betamethasone with plan to d/c Mag Sulfate at steroid complete (this afternoon at 4PM).   Plan to monitor patient overnight   If no worsening pain or other concerning symptoms, okay for d/c home with close follow up (weekly with primary OB, monthly with MFM)    Glucoses this afternoon appear to be improving. Cont SSI. Will monitor. MFM is following glucoses in the outpatient setting       I spent 30 minutes caring for the patient on the day of service. This  included: obtaining or reviewing a separately obtained medical history, reviewing patient records, performing a medically appropriate exam and/or evaluation, counseling or educating the patient/family/caregiver, ordering medications, labs, and/or procedures and documenting such in the medical record. This does not include time spent on review and interpretation of other tests such as fetal ultrasound or the performance of other procedures such as amniocentesis or CVS.    Radha Mahmood MD FACOG  Maternal Fetal Medicine, [facility]     2024

## 2025-01-22 NOTE — PROGRESS NOTES
Antepartum Daily Progress Note    Patient name: Shelley Longoria  YOB: 1990   MRN: [id number]  Admission Date: 2025  Date of Service: 2025  Referring Provider: Lucho Marinelli MD    Shelley Longoria is a 34 y.o.    at 29w1d  admitted on 2025 for Abdominal pain affecting pregnancy    Hospital day 2      Diagnoses:   Active Hospital Problems    Diagnosis  POA    **Abdominal pain affecting pregnancy [O26.899, R10.9]  Yes    Maternal iron deficiency anemia affecting pregnancy in third trimester, antepartum [O99.013, D50.9]  Yes    History of gastric surgery [Z98.890]  Not Applicable    AMA (advanced maternal age) multigravida 35+ [O09.529]  Yes    History of  delivery, currently pregnant [O34.219]  Yes    Phentermine/topiramate exposure in current pregnancy [O35.9XX0]  Yes    Type 2 diabetes mellitus affecting pregnancy, antepartum [O24.119]  Yes    Previous bariatric surgery complicating pregnancy [O99.840]  Yes      Resolved Hospital Problems   No resolved problems to display.        Chief Complaint:  Chief Complaint   Patient presents with    Abdominal Pain     25 0900 abdominal pain started.       Subjective:      Shelley reports abdominal pain in the lower midline.   Reports fetal movement is normal  Denies leakage of amniotic fluid.  Denies vaginal bleeding    Objective:     Vital signs:  Temp:  [97.7 °F (36.5 °C)-98.4 °F (36.9 °C)] 97.9 °F (36.6 °C)  Heart Rate:  [] 111  Resp:  [16-18] 16  BP: ()/(50-69) 117/56    Abdomen: soft, nontender  Uterus: gravid, nontender  Extremities: nontender; no edema        [facility]     [facility]      Cervix: Exam by:     Dilation:     Effacement:     Station:         Most recent ultrasound:      Medications:  aspirin, 81 mg, Oral, Daily  escitalopram, 30 mg, Oral, Daily  insulin glargine, 22 Units, Subcutaneous, BID  insulin lispro, 24 Units, Subcutaneous, Daily With Breakfast  Insulin Lispro, 30  Units, Subcutaneous, Daily With Lunch  insulin lispro, 4-24 Units, Subcutaneous, 4x Daily AC & at Bedtime  Insulin Lispro, 60 Units, Subcutaneous, Daily With Dinner  metFORMIN ER, 1,000 mg, Oral, BID With Meals  sodium chloride, 10 mL, Intravenous, Q12H  valACYclovir, 1,000 mg, Oral, Daily  vitamin B-12, 50 mcg, Oral, Daily  vitamin D3, 5,000 Units, Oral, Daily         acetaminophen    bisacodyl    calcium gluconate    dextrose    dextrose    docusate sodium    glucagon (human recombinant)    lidocaine PF 1%    ondansetron ODT **OR** ondansetron    sodium chloride    sodium chloride    Labs:  Results from last 7 days   Lab Units 25  1910   WBC 10*3/mm3 9.63   HEMOGLOBIN g/dL 9.7*   PLATELETS 10*3/mm3 206         Assessment/Plan:      Shelley is a 34 y.o.    at 29w1d.    Abdominal pain affecting pregnancy: Likely secondary to polyhydramnios and thin lower uterine segment.  Discussed that prior to discharge, we need to determine which milestones the patient should meet.  MFM consult placed to help determine when DC would be appropriate.  Continue magnesium for 24 hours after second dose of steroids which should be 4 PM today.    Type 2 diabetes: Continue home insulin with sliding scale.  Fetal Wellbeing: Reactive. Continue NST Three times per day;, growth q 3-4 weeks.  Delivery plan: desires repeat  delivery. Timing pending    Discussed with Dr. Mahmood, maternal-fetal medicine    All questions were answered to the best of my ability.    Lucho Marinelli MD  2025

## 2025-01-22 NOTE — PROCEDURES
Obstetrical Non-stress Test Interpretation     Name:  Shelley Longoria  MRN: [id number]    34 y.o. female  at 29w1d    Indication: diabetes mellitus, polyhydramnios, and contractions  Start Time: 0900  End Time (Of Evaluation): 1000    Baseline: Normal 110-160 bpm  Variability:   Moderate/Normal (amplitude 6-25 bpm)  Accelerations: Present (<32 weeks) 10 x 10 bpm   Decelerations: Absent     Contractions:  Present       Impression:  reactive NST

## 2025-01-23 VITALS
HEIGHT: 71 IN | TEMPERATURE: 98.6 F | DIASTOLIC BLOOD PRESSURE: 62 MMHG | HEART RATE: 99 BPM | RESPIRATION RATE: 16 BRPM | OXYGEN SATURATION: 95 % | SYSTOLIC BLOOD PRESSURE: 110 MMHG | WEIGHT: 218 LBS | BODY MASS INDEX: 30.52 KG/M2

## 2025-01-23 PROBLEM — O99.840 PREVIOUS BARIATRIC SURGERY COMPLICATING PREGNANCY: Status: RESOLVED | Noted: 2019-11-26 | Resolved: 2025-01-23

## 2025-01-23 PROBLEM — O40.3XX0 POLYHYDRAMNIOS IN THIRD TRIMESTER: Status: ACTIVE | Noted: 2025-01-23

## 2025-01-23 LAB
GLUCOSE BLDC GLUCOMTR-MCNC: 100 MG/DL (ref 70–130)
GLUCOSE BLDC GLUCOMTR-MCNC: 106 MG/DL (ref 70–130)

## 2025-01-23 PROCEDURE — 59025 FETAL NON-STRESS TEST: CPT | Performed by: OBSTETRICS & GYNECOLOGY

## 2025-01-23 PROCEDURE — 63710000001 INSULIN LISPRO (HUMAN) PER 5 UNITS: Performed by: OBSTETRICS & GYNECOLOGY

## 2025-01-23 PROCEDURE — 59025 FETAL NON-STRESS TEST: CPT

## 2025-01-23 PROCEDURE — 63710000001 INSULIN GLARGINE PER 5 UNITS: Performed by: OBSTETRICS & GYNECOLOGY

## 2025-01-23 PROCEDURE — 82948 REAGENT STRIP/BLOOD GLUCOSE: CPT

## 2025-01-23 PROCEDURE — 99239 HOSP IP/OBS DSCHRG MGMT >30: CPT | Performed by: OBSTETRICS & GYNECOLOGY

## 2025-01-23 RX ORDER — INSULIN LISPRO 100 [IU]/ML
INJECTION, SOLUTION SUBCUTANEOUS
Start: 2025-01-23 | End: 2025-01-30

## 2025-01-23 RX ADMIN — ASPIRIN 81 MG: 81 TABLET, COATED ORAL at 09:07

## 2025-01-23 RX ADMIN — INSULIN GLARGINE 22 UNITS: 100 INJECTION, SOLUTION SUBCUTANEOUS at 09:09

## 2025-01-23 RX ADMIN — ACETAMINOPHEN 650 MG: 325 TABLET ORAL at 07:41

## 2025-01-23 RX ADMIN — METFORMIN HYDROCHLORIDE 1000 MG: 500 TABLET, EXTENDED RELEASE ORAL at 09:07

## 2025-01-23 RX ADMIN — INSULIN LISPRO 30 UNITS: 100 INJECTION, SOLUTION INTRAVENOUS; SUBCUTANEOUS at 11:48

## 2025-01-23 RX ADMIN — INSULIN LISPRO 24 UNITS: 100 INJECTION, SOLUTION INTRAVENOUS; SUBCUTANEOUS at 07:46

## 2025-01-23 NOTE — PAYOR COMM NOTE
"Shelley Moon May (35 y.o. Female)     Auth#[id number]     From:Annetta Wooten LPN, Utilization Review  Phone #[phone]  Fax #[phone]    Discharged 1/23/25.          Date of Birth   1990    Social Security Number       Address   [address] [id number]    Home Phone   [phone]    MRN   [id number]       Zoroastrianism   Catholic    Marital Status                               Admission Date   1/20/25    Admission Type   Elective    Admitting Provider   Lucho Marinelli MD    Attending Provider       Department, Room/Bed   [facility] ANTEPARTUM, N326/1       Discharge Date   1/23/2025    Discharge Disposition   Home or Self Care    Discharge Destination                                 Attending Provider: (none)   Allergies: Buspar [Buspirone]    Isolation: None   Infection: None   Code Status: CPR    Ht: 180.3 cm (71\")   Wt: 98.9 kg (218 lb)    Admission Cmt: None   Principal Problem: Abdominal pain affecting pregnancy [O26.899,R10.9]                   Active Insurance as of 1/20/2025       Primary Coverage       Payor Plan Insurance Group Employer/Plan Group    [facility] BY MADISON [facility] BY MADISON [id number]       Payor Plan Address Payor Plan Phone Number Payor Plan Fax Number Effective Dates    [address] [address]   1/1/2021 - None Entered    [facility] [id number]         Subscriber Name Subscriber Birth Date Member ID       SHELLEY MOON MAY 1990 [id number]                     Emergency Contacts        (Rel.) Home Phone Work Phone Mobile Phone    Harley Lo (Spouse) [phone] -- [phone]                 Discharge Summary        Lucho Marinelli MD at 01/23/25 1141          Admission date: 1/20/2025  Discharge date: 1/23/2025    Referring Provider: Lucho Marinelli MD    Admission diagnosis:  Abdominal pain affecting pregnancy [O26.899, R10.9]    Discharge diagnosis:     Abdominal pain affecting " pregnancy    Previous bariatric surgery complicating pregnancy    Type 2 diabetes mellitus affecting pregnancy, antepartum    Phentermine/topiramate exposure in current pregnancy    AMA (advanced maternal age) multigravida 35+    History of  delivery, currently pregnant    History of gastric surgery    Maternal iron deficiency anemia affecting pregnancy in third trimester, antepartum         Consultants:  Maternal Fetal Medicine    Hospital course:  Shelley Longoria is a 35 y.o.  who was admitted on 2025 at 28w6d for Abdominal pain affecting pregnancy [O26.899, R10.9].  She had a history of 4 prior C-sections) controlled type 2 diabetes.  She had a PDC  scan which showed a thin but intact lower uterine segment. She was also found to have elevated, but normal, amniotic fluid. She was started on betamethasone for fetal lung maturity as well as magnesium sulfate for neuroprotection and for tocolysis.  On hospital day #3, she was stable off of magnesium sulfate with a benign exam and was stable for discharge home.      Exam on Day of Discharge:    Vitals:    25 0723   BP: 112/66   Pulse: 91   Resp: 16   Temp: 98.1 °F (36.7 °C)   SpO2:        GENERAL:  Well-developed, well-nourished in no acute distress.   ABD:  Gravid, Nontender  EXTREMITIES:  No clubbing, cyanosis or edema.  PSYCHIATRIC:  Normal affect and mood.      Fetal Monitoring:    Obstetrical Non-stress Test Interpretation     Name:  Shelley Longoria  MRN: [id number]    35 y.o. female  at 29w2d    Indication: Contractions, DM2  Start Time: 0915  End Time (Of Evaluation): 1015    Baseline: Normal 110-160 bpm  Variability:   Moderate/Normal (amplitude 6-25 bpm)  Accelerations: Present (<32 weeks) 10 x 10 bpm   Decelerations: Absent     Contractions:  Absent       Impression:  reactive NST    Discharge condition: stable  Discharge diet   Dietary Orders (From admission, onward)       Start     Ordered    25 2434   Diet: Diabetic; Consistent Carbohydrate; Fluid Consistency: Thin (IDDSI 0)  Diet Effective Now        References:    Diet Order Definitions   Question Answer Comment   Diets: Diabetic    Diabetic Diet: Consistent Carbohydrate    Fluid Consistency: Thin (IDDSI 0)        01/20/25 7970                  Additional Instructions: Call with fevers, uncontrolled nausea/vomiting/pain.  Medications:      Discharge Medications        New Medications        Instructions Start Date   ACCRUFeR 30 MG capsule  Generic drug: Ferric Maltol   30 mg, Oral, 2 Times Daily, Take 1 hour before or 2 hours after meals.             Changes to Medications        Instructions Start Date   Insulin Lispro Kang KwikPen 100 UNIT/ML solution pen-injector  What changed: See the new instructions.   Inject 24 Units under the skin into the appropriate area as directed Every Morning Before Breakfast AND 30 Units Daily Before Lunch AND 60 Units Daily Before Supper. May also inject 10 Units As Needed (with snacks).             Continue These Medications        Instructions Start Date   aspirin 81 MG EC tablet   81 mg, Oral, Daily      Dexcom G7 Sensor misc   1 each, Not Applicable, Every 10 Days      freestyle lancets   Check blood sugar three times daily PRN.      FreeStyle Lite device   1 dose, Not Applicable, Daily      FREESTYLE LITE test strip  Generic drug: glucose blood   Use as instructed      glucagon 1 MG injection  Commonly known as: GLUCAGEN   Use as directed for emergently low blood glucose level. Formulary Compliance Approval      Glucose Management tablet   Use as needed for emergently low blood glucose levels. Formulary Compliance Approval      Lantus SoloStar 100 UNIT/ML injection pen  Generic drug: Insulin Glargine   22 Units, Subcutaneous, 2 Times Daily, Inject under the skin into the appropriate area 22 units in the morning and 22 units in the evening approximately 12 hours apart.      metFORMIN  MG 24 hr tablet  Commonly known  as: GLUCOPHAGE-XR   TAKE TWO TABLETS BY MOUTH DAILY WITH BREAKFAST AND TWO TABLETS BY MOUTH WITH DINNER OR AT BEDTIME      Pen Needles 32G X 4 MM misc   1 each, Subcutaneous, 4 Times Daily PRN, Formulary Compliance Approval      valACYclovir 1000 MG tablet  Commonly known as: VALTREX   1 g, Daily      VITAFUSION PRENATAL PO       vitamin B-12 100 MCG tablet  Commonly known as: CYANOCOBALAMIN   50 mcg, Daily      vitamin D3 125 MCG (5000 UT) capsule capsule   5,000 Units, Daily             Stop These Medications      escitalopram 20 MG tablet  Commonly known as: Lexapro            Disposition:Home or Self Care  Follow up:   Future Appointments   Date Time Provider Department Center   1/29/2025  1:20 PM Lucho Marinelli MD [facility]  [facility]   2/3/2025  1:00 PM ROOM A  [facility]   2/5/2025  1:50 PM Lucho Marinelli MD [facility]   2/10/2025  1:00 PM CHAIR 17  [facility]   2/11/2025  3:45 PM [facility] PDC US 1  [facility]   2/11/2025  3:45 PM  [facility] PD[facility] FOLLOW UP [facility] PDC [facility] None   2/11/2025  4:00 PM Batool Posada APRN [facility]  [facility]   2/17/2025  1:00 PM CHAIR 12  [facility]   2/19/2025  1:20 PM Lucho Marinelli MD [facility]  [facility]   2/24/2025  1:00 PM CHAIR 10  [facility]   2/26/2025  1:20 PM Lucho Marinelli MD [facility]  [facility]   3/3/2025  1:00 PM CHAIR 8  [facility]   3/17/2025  2:30 PM PAT 1 [facility]  [facility]       Over 30 minutes on discharge.  Counseling and coordinating care.    Lucho Marinelli MD         Electronically signed by Lucho Marinelli MD at 01/23/25 5692

## 2025-01-23 NOTE — DISCHARGE SUMMARY
Admission date: 2025  Discharge date: 2025    Referring Provider: Lucho Marinelli MD    Admission diagnosis:  Abdominal pain affecting pregnancy [O26.899, R10.9]    Discharge diagnosis:     Abdominal pain affecting pregnancy    Previous bariatric surgery complicating pregnancy    Type 2 diabetes mellitus affecting pregnancy, antepartum    Phentermine/topiramate exposure in current pregnancy    AMA (advanced maternal age) multigravida 35+    History of  delivery, currently pregnant    History of gastric surgery    Maternal iron deficiency anemia affecting pregnancy in third trimester, antepartum         Consultants:  Maternal Fetal Medicine    Hospital course:  Shelley Longoria is a 35 y.o.  who was admitted on 2025 at 28w6d for Abdominal pain affecting pregnancy [O26.899, R10.9].  She had a history of 4 prior C-sections) controlled type 2 diabetes.  She had a PDC  scan which showed a thin but intact lower uterine segment. She was also found to have elevated, but normal, amniotic fluid. She was started on betamethasone for fetal lung maturity as well as magnesium sulfate for neuroprotection and for tocolysis.  On hospital day #3, she was stable off of magnesium sulfate with a benign exam and was stable for discharge home.      Exam on Day of Discharge:    Vitals:    25 0723   BP: 112/66   Pulse: 91   Resp: 16   Temp: 98.1 °F (36.7 °C)   SpO2:        GENERAL:  Well-developed, well-nourished in no acute distress.   ABD:  Gravid, Nontender  EXTREMITIES:  No clubbing, cyanosis or edema.  PSYCHIATRIC:  Normal affect and mood.      Fetal Monitoring:    Obstetrical Non-stress Test Interpretation     Name:  Shelley Longoria  MRN: [id number]    35 y.o. female  at 29w2d    Indication: Contractions, DM2  Start Time: 0915  End Time (Of Evaluation): 1015    Baseline: Normal 110-160 bpm  Variability:   Moderate/Normal (amplitude 6-25 bpm)  Accelerations: Present (<32 weeks)  10 x 10 bpm   Decelerations: Absent     Contractions:  Absent       Impression:  reactive NST    Discharge condition: stable  Discharge diet   Dietary Orders (From admission, onward)       Start     Ordered    01/20/25 1653  Diet: Diabetic; Consistent Carbohydrate; Fluid Consistency: Thin (IDDSI 0)  Diet Effective Now        References:    Diet Order Definitions   Question Answer Comment   Diets: Diabetic    Diabetic Diet: Consistent Carbohydrate    Fluid Consistency: Thin (IDDSI 0)        01/20/25 1653                  Additional Instructions: Call with fevers, uncontrolled nausea/vomiting/pain.  Medications:      Discharge Medications        New Medications        Instructions Start Date   ACCRUFeR 30 MG capsule  Generic drug: Ferric Maltol   30 mg, Oral, 2 Times Daily, Take 1 hour before or 2 hours after meals.             Changes to Medications        Instructions Start Date   Insulin Lispro Kang KwikPen 100 UNIT/ML solution pen-injector  What changed: See the new instructions.   Inject 24 Units under the skin into the appropriate area as directed Every Morning Before Breakfast AND 30 Units Daily Before Lunch AND 60 Units Daily Before Supper. May also inject 10 Units As Needed (with snacks).             Continue These Medications        Instructions Start Date   aspirin 81 MG EC tablet   81 mg, Oral, Daily      Dexcom G7 Sensor misc   1 each, Not Applicable, Every 10 Days      freestyle lancets   Check blood sugar three times daily PRN.      FreeStyle Lite device   1 dose, Not Applicable, Daily      FREESTYLE LITE test strip  Generic drug: glucose blood   Use as instructed      glucagon 1 MG injection  Commonly known as: GLUCAGEN   Use as directed for emergently low blood glucose level. Formulary Compliance Approval      Glucose Management tablet   Use as needed for emergently low blood glucose levels. Formulary Compliance Approval      Lantus SoloStar 100 UNIT/ML injection pen  Generic drug: Insulin  Glargine   22 Units, Subcutaneous, 2 Times Daily, Inject under the skin into the appropriate area 22 units in the morning and 22 units in the evening approximately 12 hours apart.      metFORMIN  MG 24 hr tablet  Commonly known as: GLUCOPHAGE-XR   TAKE TWO TABLETS BY MOUTH DAILY WITH BREAKFAST AND TWO TABLETS BY MOUTH WITH DINNER OR AT BEDTIME      Pen Needles 32G X 4 MM misc   1 each, Subcutaneous, 4 Times Daily PRN, Formulary Compliance Approval      valACYclovir 1000 MG tablet  Commonly known as: VALTREX   1 g, Daily      VITAFUSION PRENATAL PO       vitamin B-12 100 MCG tablet  Commonly known as: CYANOCOBALAMIN   50 mcg, Daily      vitamin D3 125 MCG (5000 UT) capsule capsule   5,000 Units, Daily             Stop These Medications      escitalopram 20 MG tablet  Commonly known as: Lexapro            Disposition:Home or Self Care  Follow up:   Future Appointments   Date Time Provider Department Center   1/29/2025  1:20 PM Lucho Marinelli MD [facility]  [facility]   2/3/2025  1:00 PM ROOM A  [facility]   2/5/2025  1:50 PM Lucho Marinelli MD [facility]  [facility]   2/10/2025  1:00 PM CHAIR 17  [facility]   2/11/2025  3:45 PM [facility] PDC US 1  [facility] PDC  [facility]   2/11/2025  3:45 PM  [facility] PDC FOLLOW UP [facility] PDC [facility] None   2/11/2025  4:00 PM Batool Posada APRN [facility]  [facility]   2/17/2025  1:00 PM CHAIR 12  [facility]   2/19/2025  1:20 PM Lucho Marinelli MD [facility]  [facility]   2/24/2025  1:00 PM CHAIR 10  [facility]   2/26/2025  1:20 PM Lucho Marinelli MD [facility]  [facility]   3/3/2025  1:00 PM CHAIR 8  [facility]   3/17/2025  2:30 PM PAT 1 [facility]  [facility]       Over 30 minutes on discharge.  Counseling and coordinating care.    Lucho Marinelli MD

## 2025-01-29 ENCOUNTER — ROUTINE PRENATAL (OUTPATIENT)
Dept: OBSTETRICS AND GYNECOLOGY | Facility: CLINIC | Age: 35
End: 2025-01-29
Payer: COMMERCIAL

## 2025-01-29 VITALS — WEIGHT: 226.2 LBS | DIASTOLIC BLOOD PRESSURE: 70 MMHG | BODY MASS INDEX: 31.55 KG/M2 | SYSTOLIC BLOOD PRESSURE: 126 MMHG

## 2025-01-29 DIAGNOSIS — Z34.93 PRENATAL CARE IN THIRD TRIMESTER: Primary | ICD-10-CM

## 2025-01-29 DIAGNOSIS — O99.013 MATERNAL IRON DEFICIENCY ANEMIA AFFECTING PREGNANCY IN THIRD TRIMESTER, ANTEPARTUM: ICD-10-CM

## 2025-01-29 DIAGNOSIS — O24.119 TYPE 2 DIABETES MELLITUS AFFECTING PREGNANCY, ANTEPARTUM: ICD-10-CM

## 2025-01-29 DIAGNOSIS — O34.219 HISTORY OF CESAREAN DELIVERY, CURRENTLY PREGNANT: ICD-10-CM

## 2025-01-29 DIAGNOSIS — O99.843 PREGNANCY AFFECTED BY PREVIOUS BARIATRIC SURGERY, CURRENTLY IN THIRD TRIMESTER: ICD-10-CM

## 2025-01-29 DIAGNOSIS — O09.523 MULTIGRAVIDA OF ADVANCED MATERNAL AGE IN THIRD TRIMESTER: ICD-10-CM

## 2025-01-29 DIAGNOSIS — D50.9 MATERNAL IRON DEFICIENCY ANEMIA AFFECTING PREGNANCY IN THIRD TRIMESTER, ANTEPARTUM: ICD-10-CM

## 2025-01-29 LAB
GLUCOSE UR STRIP-MCNC: NEGATIVE MG/DL
PROT UR STRIP-MCNC: NEGATIVE MG/DL

## 2025-01-29 NOTE — PROGRESS NOTES
OB FOLLOW UP  CC- Here for care of pregnancy        Shelley Longoria is a 35 y.o.  30w1d patient being seen today for her obstetrical follow up visit. Patient reports intermittent ctx. She has persistent pain around her lower uterine segment but it has NOT worsened or changed.      Her prenatal care is complicated by (and status) :  see below. and Type 2, insulin-dependent DM. Her average fasting BG is 85-90s. Her average 2hr PP BG is 145-180.  Patient Active Problem List   Diagnosis    Mixed hyperlipidemia    Previous bariatric surgery complicating pregnancy    S/P LSG  - Postop Hemorrhage    FH: bariatric surgery    Type 2 diabetes mellitus affecting pregnancy, antepartum    GERD (gastroesophageal reflux disease)    Dehydration    Vomiting    Major depressive disorder with single episode, in partial remission    Phentermine/topiramate exposure in current pregnancy    AMA (advanced maternal age) multigravida 35+    History of  delivery, currently pregnant    Current moderate episode of major depressive disorder without prior episode    History of gastric surgery    Maternal iron deficiency anemia affecting pregnancy in third trimester, antepartum    History of sleeve gastrectomy    Abdominal pain affecting pregnancy       Flu Status: Already given in current flu season  TDAP status: given today  Rhogam status: was not indicated  28 week labs: Reviewed and Labs show anemia. She is taking additional iron supplement.  Ultrasound Today: No  Non Stress Test: No.      ROS -   Patient Denies: Loss of Fluid, Vaginal Spotting, Vision Changes, Headaches, Nausea , Vomiting , Epigastric pain, and skin itching  Fetal Movement : normal  Other than what is documented in the HPI, all other systems reviewed and are negative.     The additional following portions of the patient's history were reviewed and updated as appropriate: allergies and current medications.    I have reviewed and agree with  the HPI, ROS, and historical information as entered above. Lucho Marinelli MD     /70   Wt 103 kg (226 lb 3.2 oz)   LMP 2024 (Exact Date)   BMI 31.55 kg/m²         EXAM:     Prenatal Vitals  BP: 126/70  Weight: 103 kg (226 lb 3.2 oz)                   Urine Glucose Read-only: Negative  Urine Protein Read-only: Negative           Assessment and Plan    Problem List Items Addressed This Visit       Previous bariatric surgery complicating pregnancy    Type 2 diabetes mellitus affecting pregnancy, antepartum    Relevant Medications    Insulin Pen Needle (Pen Needles) 32G X 4 MM misc    glucagon (GLUCAGEN) 1 MG injection    Nutritional Supplements (Glucose Management) tablet    Continuous Glucose Sensor (Dexcom G7 Sensor) misc    aspirin 81 MG EC tablet    Insulin Glargine (Lantus SoloStar) 100 UNIT/ML injection pen    metFORMIN ER (GLUCOPHAGE-XR) 500 MG 24 hr tablet    Insulin Lispro Kang KwikPen 100 UNIT/ML solution pen-injector    AMA (advanced maternal age) multigravida 35+    Relevant Medications    aspirin 81 MG EC tablet    History of  delivery, currently pregnant    Maternal iron deficiency anemia affecting pregnancy in third trimester, antepartum    Relevant Medications    vitamin B-12 (CYANOCOBALAMIN) 100 MCG tablet    Ferric Maltol (ACCRUFeR) 30 MG capsule     Other Visit Diagnoses       Prenatal care in third trimester    -  Primary    Relevant Orders    POC Urinalysis Dipstick (Completed)    Tdap Vaccine Greater Than or Equal To 8yo IM (Completed)            Pregnancy at 30w1d  Fetal status reassuring.  28 week labs reviewed.    Activity and Exercise discussed.  Fetal movement/PTL or Labor precautions  Reviewed FSBS goals: fasting <90, 2hr PP < 120  Reviewed Pre-eclampsia signs/symptoms  Discussed bASA for PIH prevention from 12 to 36wk  Return in about 1 week (around 2025) for Prenatal Care.    Lucho Marinelli MD  2025

## 2025-01-30 ENCOUNTER — MEDICATION THERAPY MANAGEMENT (OUTPATIENT)
Dept: OBSTETRICS AND GYNECOLOGY | Facility: HOSPITAL | Age: 35
End: 2025-01-30
Payer: COMMERCIAL

## 2025-01-30 DIAGNOSIS — O24.119 TYPE 2 DIABETES MELLITUS AFFECTING PREGNANCY, ANTEPARTUM: ICD-10-CM

## 2025-01-30 RX ORDER — INSULIN LISPRO 100 [IU]/ML
INJECTION, SOLUTION SUBCUTANEOUS
Start: 2025-01-30

## 2025-02-03 ENCOUNTER — HOSPITAL ENCOUNTER (OUTPATIENT)
Dept: ONCOLOGY | Facility: HOSPITAL | Age: 35
Discharge: HOME | End: 2025-02-03
Admitting: OBSTETRICS & GYNECOLOGY
Payer: COMMERCIAL

## 2025-02-03 ENCOUNTER — TELEPHONE (OUTPATIENT)
Dept: OBSTETRICS AND GYNECOLOGY | Facility: CLINIC | Age: 35
End: 2025-02-03
Payer: COMMERCIAL

## 2025-02-03 VITALS
WEIGHT: 221 LBS | HEIGHT: 71 IN | SYSTOLIC BLOOD PRESSURE: 103 MMHG | RESPIRATION RATE: 16 BRPM | DIASTOLIC BLOOD PRESSURE: 62 MMHG | HEART RATE: 109 BPM | BODY MASS INDEX: 30.94 KG/M2 | TEMPERATURE: 97.3 F

## 2025-02-03 DIAGNOSIS — O99.013 MATERNAL IRON DEFICIENCY ANEMIA AFFECTING PREGNANCY IN THIRD TRIMESTER, ANTEPARTUM: ICD-10-CM

## 2025-02-03 DIAGNOSIS — D50.9 MATERNAL IRON DEFICIENCY ANEMIA AFFECTING PREGNANCY IN THIRD TRIMESTER, ANTEPARTUM: ICD-10-CM

## 2025-02-03 DIAGNOSIS — Z90.3 HISTORY OF SLEEVE GASTRECTOMY: Primary | ICD-10-CM

## 2025-02-03 PROCEDURE — 96366 THER/PROPH/DIAG IV INF ADDON: CPT

## 2025-02-03 PROCEDURE — 96365 THER/PROPH/DIAG IV INF INIT: CPT

## 2025-02-03 PROCEDURE — 25010000002 IRON SUCROSE PER 1 MG: Performed by: OBSTETRICS & GYNECOLOGY

## 2025-02-03 PROCEDURE — 25810000003 SODIUM CHLORIDE 0.9 % SOLUTION 250 ML FLEX CONT: Performed by: OBSTETRICS & GYNECOLOGY

## 2025-02-03 RX ORDER — METHYLPREDNISOLONE SODIUM SUCCINATE 125 MG/2ML
125 INJECTION, POWDER, LYOPHILIZED, FOR SOLUTION INTRAMUSCULAR; INTRAVENOUS ONCE AS NEEDED
Status: DISCONTINUED | OUTPATIENT
Start: 2025-02-03 | End: 2025-02-04 | Stop reason: HOSPADM

## 2025-02-03 RX ORDER — SODIUM CHLORIDE 9 MG/ML
20 INJECTION, SOLUTION INTRAVENOUS ONCE
Status: CANCELLED | OUTPATIENT
Start: 2025-02-10

## 2025-02-03 RX ORDER — SODIUM CHLORIDE 9 MG/ML
20 INJECTION, SOLUTION INTRAVENOUS ONCE
Status: DISCONTINUED | OUTPATIENT
Start: 2025-02-03 | End: 2025-02-04 | Stop reason: HOSPADM

## 2025-02-03 RX ORDER — EPINEPHRINE 1 MG/ML
0.3 INJECTION, SOLUTION, CONCENTRATE INTRAVENOUS ONCE AS NEEDED
Status: CANCELLED | OUTPATIENT
Start: 2025-02-10

## 2025-02-03 RX ORDER — SODIUM CHLORIDE 9 MG/ML
1000 INJECTION, SOLUTION INTRAVENOUS ONCE AS NEEDED
Status: CANCELLED | OUTPATIENT
Start: 2025-02-10 | End: 2025-02-13

## 2025-02-03 RX ORDER — EPINEPHRINE 1 MG/ML
0.3 INJECTION, SOLUTION, CONCENTRATE INTRAVENOUS ONCE AS NEEDED
Status: DISCONTINUED | OUTPATIENT
Start: 2025-02-03 | End: 2025-02-04 | Stop reason: HOSPADM

## 2025-02-03 RX ORDER — SODIUM CHLORIDE 9 MG/ML
1000 INJECTION, SOLUTION INTRAVENOUS ONCE AS NEEDED
Status: DISCONTINUED | OUTPATIENT
Start: 2025-02-03 | End: 2025-02-04 | Stop reason: HOSPADM

## 2025-02-03 RX ORDER — METHYLPREDNISOLONE SODIUM SUCCINATE 125 MG/2ML
125 INJECTION, POWDER, LYOPHILIZED, FOR SOLUTION INTRAMUSCULAR; INTRAVENOUS ONCE AS NEEDED
Status: CANCELLED | OUTPATIENT
Start: 2025-02-10

## 2025-02-03 RX ADMIN — SODIUM CHLORIDE 300 MG: 9 INJECTION, SOLUTION INTRAVENOUS at 13:50

## 2025-02-03 NOTE — TELEPHONE ENCOUNTER
Returned call to Mariama in [facility]. She states she was able to message Dr. Marinelli and he took care of the situation.

## 2025-02-03 NOTE — TELEPHONE ENCOUNTER
Hub staff attempted to follow warm transfer process and was unsuccessful     Caller: ALEN Fairchild call back number: [phone]     Patient is needing: PT IS THERE CURRENTLY TO GET HER IRON INFUSION TREATMENT. STATED HER PLAN IS CURRENTLY ON HOLD.     REQUESTING A CALL BACK ASAP

## 2025-02-05 ENCOUNTER — ROUTINE PRENATAL (OUTPATIENT)
Dept: OBSTETRICS AND GYNECOLOGY | Facility: CLINIC | Age: 35
End: 2025-02-05
Payer: COMMERCIAL

## 2025-02-05 VITALS — DIASTOLIC BLOOD PRESSURE: 78 MMHG | WEIGHT: 225.4 LBS | SYSTOLIC BLOOD PRESSURE: 130 MMHG | BODY MASS INDEX: 31.44 KG/M2

## 2025-02-05 DIAGNOSIS — O35.9XX0 TERATOGEN EXPOSURE IN CURRENT PREGNANCY, NOT APPLICABLE OR UNSPECIFIED FETUS: ICD-10-CM

## 2025-02-05 DIAGNOSIS — Z34.93 THIRD TRIMESTER PREGNANCY: ICD-10-CM

## 2025-02-05 DIAGNOSIS — O40.9XX0 AFI (AMNIOTIC FLUID INDEX) INCREASED: ICD-10-CM

## 2025-02-05 DIAGNOSIS — O34.219 HISTORY OF CESAREAN DELIVERY, CURRENTLY PREGNANT: ICD-10-CM

## 2025-02-05 DIAGNOSIS — O24.119 TYPE 2 DIABETES MELLITUS AFFECTING PREGNANCY, ANTEPARTUM: Primary | ICD-10-CM

## 2025-02-05 LAB
GLUCOSE UR STRIP-MCNC: NEGATIVE MG/DL
PROT UR STRIP-MCNC: NEGATIVE MG/DL

## 2025-02-05 NOTE — PROGRESS NOTES
OB FOLLOW UP  CC- Here for care of pregnancy        Shelley Longoria is a 35 y.o.  31w1d patient being seen today for her obstetrical follow up visit. Patient reports moderate cramping. She reports worsening shortness of breath and tachycardia.     Her prenatal care is complicated by (and status) :  see below. and Type Type 2, insulin-dependent DM. Her average fasting BG is . Her average 2hr PP BG is 180.  Patient Active Problem List   Diagnosis    Mixed hyperlipidemia    Previous bariatric surgery complicating pregnancy    S/P LSG  - Postop Hemorrhage    FH: bariatric surgery    Type 2 diabetes mellitus affecting pregnancy, antepartum    GERD (gastroesophageal reflux disease)    Dehydration    Vomiting    Major depressive disorder with single episode, in partial remission    Phentermine/topiramate exposure in current pregnancy    AMA (advanced maternal age) multigravida 35+    History of  delivery, currently pregnant    Current moderate episode of major depressive disorder without prior episode    History of gastric surgery    Maternal iron deficiency anemia affecting pregnancy in third trimester, antepartum    History of sleeve gastrectomy    Abdominal pain affecting pregnancy       Flu Status: Already given in current flu season  TDAP status: received at last visit  Rhogam status: was not indicated  28 week labs: Reviewed and Labs show anemia. She is taking additional iron supplement.  Ultrasound Today: No  Non Stress Test: No.      ROS -   Patient Denies: Loss of Fluid, Vaginal Spotting, Vision Changes, Headaches, Nausea , Vomiting , Contractions, Epigastric pain, and skin itching  Fetal Movement : normal  Other than what is documented in the HPI, all other systems reviewed and are negative.     The additional following portions of the patient's history were reviewed and updated as appropriate: allergies and current medications.    I have reviewed and agree with the HPI,  ROS, and historical information as entered above. Lucho Marinelli MD     /78   Wt 102 kg (225 lb 6.4 oz)   LMP 2024 (Exact Date)   BMI 31.44 kg/m²         EXAM:     Prenatal Vitals  BP: 130/78  Weight: 102 kg (225 lb 6.4 oz)   Fetal Heart Rate: +               Urine Glucose Read-only: Negative  Urine Protein Read-only: Negative           Assessment and Plan    Problem List Items Addressed This Visit       Type 2 diabetes mellitus affecting pregnancy, antepartum - Primary    Relevant Medications    Insulin Pen Needle (Pen Needles) 32G X 4 MM misc    glucagon (GLUCAGEN) 1 MG injection    Nutritional Supplements (Glucose Management) tablet    Continuous Glucose Sensor (Dexcom G7 Sensor) misc    aspirin 81 MG EC tablet    Insulin Glargine (Lantus SoloStar) 100 UNIT/ML injection pen    metFORMIN ER (GLUCOPHAGE-XR) 500 MG 24 hr tablet    Insulin Lispro Kang KwikPen 100 UNIT/ML solution pen-injector    Phentermine/topiramate exposure in current pregnancy    Overview     Phentermine/topiramate until positive pregnancy test         History of  delivery, currently pregnant     Other Visit Diagnoses       Third trimester pregnancy        Relevant Orders    POC Urinalysis Dipstick (Completed)    RAMAN (amniotic fluid index) increased        Relevant Orders    US Ob Limited 1 + Fetuses (Completed)            Pregnancy at 31w1d  Fetal status reassuring.  28 week labs reviewed.    Activity and Exercise discussed.  Fetal movement/PTL or Labor precautions  Mild polyhydramnios today, max vertical pocket 8 cm.  Return in about 9 days (around 2025) for NST (Please schedule for Tuesday BPP and Friday NSTs starting next Friday until delivery 3/19).    Lucho Marinelli MD  2025

## 2025-02-06 ENCOUNTER — TELEPHONE (OUTPATIENT)
Dept: OBSTETRICS AND GYNECOLOGY | Facility: HOSPITAL | Age: 35
End: 2025-02-06
Payer: COMMERCIAL

## 2025-02-06 ENCOUNTER — MEDICATION THERAPY MANAGEMENT (OUTPATIENT)
Dept: OBSTETRICS AND GYNECOLOGY | Facility: HOSPITAL | Age: 35
End: 2025-02-06
Payer: COMMERCIAL

## 2025-02-06 DIAGNOSIS — R73.09 ABNORMAL GLUCOSE: ICD-10-CM

## 2025-02-06 DIAGNOSIS — O24.119 TYPE 2 DIABETES MELLITUS AFFECTING PREGNANCY, ANTEPARTUM: ICD-10-CM

## 2025-02-06 RX ORDER — INSULIN LISPRO 100 [IU]/ML
INJECTION, SOLUTION SUBCUTANEOUS
Qty: 60 ML | Refills: 1 | Status: SHIPPED | OUTPATIENT
Start: 2025-02-06

## 2025-02-06 RX ORDER — INSULIN LISPRO 100 [IU]/ML
INJECTION, SOLUTION SUBCUTANEOUS
Qty: 60 ML | Refills: 1
Start: 2025-02-06 | End: 2025-02-06 | Stop reason: SDUPTHER

## 2025-02-06 RX ORDER — METFORMIN HYDROCHLORIDE 500 MG/1
TABLET, EXTENDED RELEASE ORAL
Qty: 150 TABLET | Refills: 6 | Status: SHIPPED | OUTPATIENT
Start: 2025-02-06

## 2025-02-06 NOTE — TELEPHONE ENCOUNTER
Spoke with Debra with [facility] regarding dosage adjustment on patient insulin.  Debra states the Metformin order had come through but the Lispro order had not.  She states they will run when it comes through and call me back.  Call back number of [phone] given.  Linda Wen RN

## 2025-02-06 NOTE — TELEPHONE ENCOUNTER
Spoke with patient over the phone.  Informed patient that Dr. Kim has reviewed her blood sugar log and is increasing her Lispro to 34 units with breakfast, 42 units with lunch and 86 units with dinner.  She is also increasing her Metformin to 2 tablets with breakfast and 3 tablets with dinner.  Patient states she is almost down to her last pen of Lispro and the pharmacy has told her it will be 6-7 days before insurance will cover it.  Due to issues in the past with her insurance and her pharmacy and the dosage adjustments, informed patient would call her pharmacy after sending in dosage adjustment and have them run it.  Patient voices understanding.  Linda Wen RN

## 2025-02-10 ENCOUNTER — HOSPITAL ENCOUNTER (OUTPATIENT)
Dept: ONCOLOGY | Facility: HOSPITAL | Age: 35
Discharge: HOME | End: 2025-02-10
Admitting: OBSTETRICS & GYNECOLOGY
Payer: COMMERCIAL

## 2025-02-10 VITALS
DIASTOLIC BLOOD PRESSURE: 70 MMHG | RESPIRATION RATE: 16 BRPM | WEIGHT: 228 LBS | TEMPERATURE: 97.8 F | SYSTOLIC BLOOD PRESSURE: 111 MMHG | HEART RATE: 109 BPM | BODY MASS INDEX: 31.92 KG/M2 | HEIGHT: 71 IN

## 2025-02-10 DIAGNOSIS — D50.9 MATERNAL IRON DEFICIENCY ANEMIA AFFECTING PREGNANCY IN THIRD TRIMESTER, ANTEPARTUM: ICD-10-CM

## 2025-02-10 DIAGNOSIS — Z90.3 HISTORY OF SLEEVE GASTRECTOMY: Primary | ICD-10-CM

## 2025-02-10 DIAGNOSIS — O99.013 MATERNAL IRON DEFICIENCY ANEMIA AFFECTING PREGNANCY IN THIRD TRIMESTER, ANTEPARTUM: ICD-10-CM

## 2025-02-10 PROCEDURE — 96365 THER/PROPH/DIAG IV INF INIT: CPT

## 2025-02-10 PROCEDURE — 25010000002 IRON SUCROSE PER 1 MG: Performed by: OBSTETRICS & GYNECOLOGY

## 2025-02-10 PROCEDURE — 25810000003 SODIUM CHLORIDE 0.9 % SOLUTION: Performed by: OBSTETRICS & GYNECOLOGY

## 2025-02-10 PROCEDURE — 96366 THER/PROPH/DIAG IV INF ADDON: CPT

## 2025-02-10 PROCEDURE — 25810000003 SODIUM CHLORIDE 0.9 % SOLUTION 250 ML FLEX CONT: Performed by: OBSTETRICS & GYNECOLOGY

## 2025-02-10 RX ORDER — SODIUM CHLORIDE 9 MG/ML
1000 INJECTION, SOLUTION INTRAVENOUS ONCE AS NEEDED
Status: CANCELLED | OUTPATIENT
Start: 2025-02-17 | End: 2025-02-20

## 2025-02-10 RX ORDER — SODIUM CHLORIDE 9 MG/ML
20 INJECTION, SOLUTION INTRAVENOUS ONCE
Status: COMPLETED | OUTPATIENT
Start: 2025-02-10 | End: 2025-02-10

## 2025-02-10 RX ORDER — SODIUM CHLORIDE 9 MG/ML
20 INJECTION, SOLUTION INTRAVENOUS ONCE
Status: CANCELLED | OUTPATIENT
Start: 2025-02-17

## 2025-02-10 RX ORDER — METHYLPREDNISOLONE SODIUM SUCCINATE 125 MG/2ML
125 INJECTION, POWDER, LYOPHILIZED, FOR SOLUTION INTRAMUSCULAR; INTRAVENOUS ONCE AS NEEDED
Status: CANCELLED | OUTPATIENT
Start: 2025-02-17

## 2025-02-10 RX ORDER — EPINEPHRINE 1 MG/ML
0.3 INJECTION, SOLUTION, CONCENTRATE INTRAVENOUS ONCE AS NEEDED
Status: CANCELLED | OUTPATIENT
Start: 2025-02-17

## 2025-02-10 RX ADMIN — SODIUM CHLORIDE 300 MG: 9 INJECTION, SOLUTION INTRAVENOUS at 13:25

## 2025-02-10 RX ADMIN — SODIUM CHLORIDE 20 ML/HR: 9 INJECTION, SOLUTION INTRAVENOUS at 13:21

## 2025-02-11 ENCOUNTER — HOSPITAL ENCOUNTER (OUTPATIENT)
Dept: WOMENS IMAGING | Facility: HOSPITAL | Age: 35
Discharge: HOME | End: 2025-02-11
Admitting: OBSTETRICS & GYNECOLOGY
Payer: COMMERCIAL

## 2025-02-11 ENCOUNTER — ROUTINE PRENATAL (OUTPATIENT)
Dept: OBSTETRICS AND GYNECOLOGY | Facility: CLINIC | Age: 35
End: 2025-02-11
Payer: COMMERCIAL

## 2025-02-11 ENCOUNTER — OFFICE VISIT (OUTPATIENT)
Dept: OBSTETRICS AND GYNECOLOGY | Facility: HOSPITAL | Age: 35
End: 2025-02-11
Payer: COMMERCIAL

## 2025-02-11 VITALS — SYSTOLIC BLOOD PRESSURE: 122 MMHG | DIASTOLIC BLOOD PRESSURE: 68 MMHG | WEIGHT: 230 LBS | BODY MASS INDEX: 32.08 KG/M2

## 2025-02-11 VITALS — SYSTOLIC BLOOD PRESSURE: 129 MMHG | BODY MASS INDEX: 32.22 KG/M2 | DIASTOLIC BLOOD PRESSURE: 71 MMHG | WEIGHT: 231 LBS

## 2025-02-11 DIAGNOSIS — O24.119 TYPE 2 DIABETES MELLITUS AFFECTING PREGNANCY, ANTEPARTUM: ICD-10-CM

## 2025-02-11 DIAGNOSIS — F32.4 MAJOR DEPRESSIVE DISORDER WITH SINGLE EPISODE, IN PARTIAL REMISSION: ICD-10-CM

## 2025-02-11 DIAGNOSIS — Z98.890 HISTORY OF GASTRIC SURGERY: ICD-10-CM

## 2025-02-11 DIAGNOSIS — Z98.84 S/P LAPAROSCOPIC SLEEVE GASTRECTOMY: ICD-10-CM

## 2025-02-11 DIAGNOSIS — N76.0 RECURRENT VAGINITIS: ICD-10-CM

## 2025-02-11 DIAGNOSIS — O35.9XX0 TERATOGEN EXPOSURE IN CURRENT PREGNANCY, NOT APPLICABLE OR UNSPECIFIED FETUS: ICD-10-CM

## 2025-02-11 DIAGNOSIS — Z34.90 PRENATAL CARE, ANTEPARTUM: Primary | ICD-10-CM

## 2025-02-11 DIAGNOSIS — R10.9 ABDOMINAL PAIN AFFECTING PREGNANCY: ICD-10-CM

## 2025-02-11 DIAGNOSIS — O99.843 PREGNANCY AFFECTED BY PREVIOUS BARIATRIC SURGERY, CURRENTLY IN THIRD TRIMESTER: Primary | ICD-10-CM

## 2025-02-11 DIAGNOSIS — O09.523 MULTIGRAVIDA OF ADVANCED MATERNAL AGE IN THIRD TRIMESTER: ICD-10-CM

## 2025-02-11 DIAGNOSIS — O34.219 HISTORY OF CESAREAN DELIVERY, CURRENTLY PREGNANT: ICD-10-CM

## 2025-02-11 DIAGNOSIS — E78.2 MIXED HYPERLIPIDEMIA: ICD-10-CM

## 2025-02-11 DIAGNOSIS — O26.899 ABDOMINAL PAIN AFFECTING PREGNANCY: ICD-10-CM

## 2025-02-11 DIAGNOSIS — O99.840 PREVIOUS BARIATRIC SURGERY AFFECTING PREGNANCY, ANTEPARTUM: ICD-10-CM

## 2025-02-11 DIAGNOSIS — D50.9 MATERNAL IRON DEFICIENCY ANEMIA AFFECTING PREGNANCY IN THIRD TRIMESTER, ANTEPARTUM: ICD-10-CM

## 2025-02-11 DIAGNOSIS — Z84.89: ICD-10-CM

## 2025-02-11 DIAGNOSIS — O99.013 MATERNAL IRON DEFICIENCY ANEMIA AFFECTING PREGNANCY IN THIRD TRIMESTER, ANTEPARTUM: ICD-10-CM

## 2025-02-11 DIAGNOSIS — O09.529 ANTEPARTUM MULTIGRAVIDA OF ADVANCED MATERNAL AGE: ICD-10-CM

## 2025-02-11 LAB
GLUCOSE UR STRIP-MCNC: NEGATIVE MG/DL
PROT UR STRIP-MCNC: NEGATIVE MG/DL

## 2025-02-11 PROCEDURE — 76819 FETAL BIOPHYS PROFIL W/O NST: CPT

## 2025-02-11 PROCEDURE — 76816 OB US FOLLOW-UP PER FETUS: CPT

## 2025-02-11 RX ORDER — FLUCONAZOLE 150 MG/1
150 TABLET ORAL ONCE
Qty: 1 TABLET | Refills: 0 | Status: SHIPPED | OUTPATIENT
Start: 2025-02-11 | End: 2025-02-11

## 2025-02-11 NOTE — PROGRESS NOTES
Patient denies any leaking fluid or vaginal bleeding. States having irregular contractions in abdomen and back.   NIPT negative.  Patient reports next follow-up appointment with Dr. Marinelli's office is today.

## 2025-02-11 NOTE — PROGRESS NOTES
OB FOLLOW UP  CC- Here for care of pregnancy        Shelley Longoria is a 35 y.o.  32w0d patient being seen today for her obstetrical follow up visit. Patient reports her average fasting BG is . Her average 2hr PP BG is 140-180. [facility] plans to check DEXCOM tomorrow. They will adjust dose. Report vaginal burning. She reports recurrent yeast infections. Patient states Dr Marinelli usually prescribes oral Diflucan.    Her prenatal care is complicated by (and status) :  see below.  Patient Active Problem List   Diagnosis    Mixed hyperlipidemia    Previous bariatric surgery complicating pregnancy    S/P LSG  - Postop Hemorrhage    FH: bariatric surgery    Type 2 diabetes mellitus affecting pregnancy, antepartum    GERD (gastroesophageal reflux disease)    Dehydration    Vomiting    Major depressive disorder with single episode, in partial remission    Phentermine/topiramate exposure in current pregnancy    AMA (advanced maternal age) multigravida 35+    History of  delivery, currently pregnant    Current moderate episode of major depressive disorder without prior episode    History of gastric surgery    Maternal iron deficiency anemia affecting pregnancy in third trimester, antepartum    History of sleeve gastrectomy    Abdominal pain affecting pregnancy       Flu Status: Already given in current flu season  TDAP status: received at last visit  RSV vaccine:  N/A  Rhogam status: was not indicated  28 week labs: Reviewed and Labs show anemia. She is taking additional iron supplement.  Ultrasound Today: Yes at [facility]. Report pending. Patient states she was told fluid decreased (improved). Will review when complete.   Non Stress Test: No.      ROS -   Patient Denies: Loss of Fluid, Vaginal Spotting, Vision Changes, Headaches, Nausea , Vomiting , Contractions, Epigastric pain, and skin itching  Fetal Movement : normal  Other than what is documented in the HPI, all other systems reviewed and  are negative.     The additional following portions of the patient's history were reviewed and updated as appropriate: allergies, current medications, past medical history, past social history, past surgical history, and problem list.    I have reviewed and agree with the HPI, ROS, and historical information as entered above. Devora Martin, APRN      /68   Wt 104 kg (230 lb)   LMP 2024 (Exact Date)   BMI 32.08 kg/m²         EXAM:     Prenatal Vitals  BP: 122/68  Weight: 104 kg (230 lb)   Fetal Heart Rate: 146               Urine Glucose Read-only: Negative  Urine Protein Read-only: Negative           Assessment and Plan    Problem List Items Addressed This Visit       Abdominal pain affecting pregnancy    AMA (advanced maternal age) multigravida 35+    Relevant Medications    aspirin 81 MG EC tablet    FH: bariatric surgery    Overview     Gastric sleeve 19         History of  delivery, currently pregnant    History of gastric surgery    Major depressive disorder with single episode, in partial remission    Maternal iron deficiency anemia affecting pregnancy in third trimester, antepartum    Relevant Medications    vitamin B-12 (CYANOCOBALAMIN) 100 MCG tablet    Ferric Maltol (ACCRUFeR) 30 MG capsule    Mixed hyperlipidemia    Overview     2022: , HDL 88         Phentermine/topiramate exposure in current pregnancy    Overview     Phentermine/topiramate until positive pregnancy test         Previous bariatric surgery complicating pregnancy    S/P LSG  - Postop Hemorrhage    Type 2 diabetes mellitus affecting pregnancy, antepartum    Relevant Medications    Insulin Pen Needle (Pen Needles) 32G X 4 MM misc    glucagon (GLUCAGEN) 1 MG injection    Nutritional Supplements (Glucose Management) tablet    Continuous Glucose Sensor (Dexcom G7 Sensor) misc    aspirin 81 MG EC tablet    Insulin Glargine (Lantus SoloStar) 100 UNIT/ML injection pen    metFORMIN ER (GLUCOPHAGE-XR) 500  MG 24 hr tablet    Insulin Lispro Kang KwikPen 100 UNIT/ML solution pen-injector    fluconazole (Diflucan) 150 MG tablet     Other Visit Diagnoses       Prenatal care, antepartum    -  Primary    Relevant Medications    fluconazole (Diflucan) 150 MG tablet    Other Relevant Orders    POC Urinalysis Dipstick (Completed)    Recurrent vaginitis        Relevant Medications    fluconazole (Diflucan) 150 MG tablet            Pregnancy at 32w0d  Fetal status reassuring.  [facility] report pending. Will review when complete.   28 week labs reviewed.    Activity and Exercise discussed.  Recurrent vaginitis. Declined vaginal exam. Consulted with Dr Marinelli. Per Yves avila to send Diflucan. Rx sent. Vaginitis education included in patient instructions.   Reviewed FSBS goals: fasting <90, 2hr PP < 120. Encouraged to decrease simple carb and sugar intake.  Fetal movement/PTL or Labor precautions  Patient is on Prenatal vitamins  Discussed/encouraged social distancing/COVID19 precautions; encouraged vaccination when able  Reviewed Pre-eclampsia signs/symptoms  Return in about 3 days (around 2/14/2025) for YASMEEN grijalva/Yves NST.    Devora Martin, APRN  02/11/2025

## 2025-02-11 NOTE — LETTER
"2025     [facility] Marinelli MD  [address]  [address]  [facility], located within [facility] [address]    Patient: Shelley Longoria   YOB: 1990   Date of Visit: 2025       Dear [facility] Marinelli MD,    Thank you for referring Shelley Longoria to me for evaluation. Below is a copy of my consult note.    If you have questions, please do not hesitate to call me. I look forward to following Shelley along with you.         Sincerely,        Radha Mahmood MD        CC: No Recipients    Patient denies any leaking fluid or vaginal bleeding. States having irregular contractions in abdomen and back.   NIPT negative.  Patient reports next follow-up appointment with Dr. Marinelli's office is today.          Maternal/Fetal Medicine Follow Up Note     Name: Shelley Longoria    : 1990     MRN: [id number]     Referring Provider: [facility] Marinelli MD    Chief Complaint  AMA; T2DM; Hx gastric bypass; Hx c/s x 4    Subjective     History of Present Illness:  Shelley Longoria is a 35 y.o.  33w0d who presents today for follow up     RAMAN: Estimated Date of Delivery: 25     ROS:   As noted in HPI.     Objective     Vital Signs  /71   Wt 105 kg (231 lb)   LMP 2024 (Exact Date)   Estimated body mass index is 32.22 kg/m² as calculated from the following:    Height as of 2/10/25: 180.3 cm (71\").    Weight as of this encounter: 105 kg (231 lb).    Ultrasound Impression:   See viewpoint      Assessment and Plan     Shelley Longoria is a 35 y.o.  33w0d     Diagnoses and all orders for this visit:    1. Pregnancy affected by previous bariatric surgery, currently in third trimester (Primary)  -     [facility]; Future    2. Type 2 diabetes mellitus affecting pregnancy, antepartum  -     [facility]; Future    3. Phentermine/topiramate exposure in current pregnancy  -     [facility]; Future    4. " Multigravida of advanced maternal age in third trimester  -     [facility]; Future    5. History of  delivery, currently pregnant  Assessment & Plan:  Stable appearing lower uterine segment   Posterior placenta   Reviewed labor and pain precautions   Glucoses followed by MFM   Will continue to monitor   Follow up 4 weeks       Orders:  -     [facility] [place]; Future         Follow Up  4 weeks     I spent 10 minutes caring for the patient on the day of service. This included: obtaining or reviewing a separately obtained medical history, reviewing patient records, performing a medically appropriate exam and/or evaluation, counseling or educating the patient/family/caregiver, ordering medications, labs, and/or procedures and documenting such in the medical record. This does not include time spent on review and interpretation of other tests such as fetal ultrasound or the performance of other procedures such as amniocentesis or CVS.    Radha Mahmood MD FACOG  Maternal Fetal Medicine, [facility] [place]     2025

## 2025-02-14 ENCOUNTER — ROUTINE PRENATAL (OUTPATIENT)
Dept: OBSTETRICS AND GYNECOLOGY | Facility: CLINIC | Age: 35
End: 2025-02-14
Payer: COMMERCIAL

## 2025-02-14 VITALS — BODY MASS INDEX: 32.27 KG/M2 | WEIGHT: 231.4 LBS | SYSTOLIC BLOOD PRESSURE: 120 MMHG | DIASTOLIC BLOOD PRESSURE: 72 MMHG

## 2025-02-14 DIAGNOSIS — Z98.890 HISTORY OF GASTRIC SURGERY: ICD-10-CM

## 2025-02-14 DIAGNOSIS — F32.4 MAJOR DEPRESSIVE DISORDER WITH SINGLE EPISODE, IN PARTIAL REMISSION: ICD-10-CM

## 2025-02-14 DIAGNOSIS — K21.9 GASTROESOPHAGEAL REFLUX DISEASE, UNSPECIFIED WHETHER ESOPHAGITIS PRESENT: ICD-10-CM

## 2025-02-14 DIAGNOSIS — O09.523 MULTIGRAVIDA OF ADVANCED MATERNAL AGE IN THIRD TRIMESTER: ICD-10-CM

## 2025-02-14 DIAGNOSIS — Z98.84 S/P LAPAROSCOPIC SLEEVE GASTRECTOMY: ICD-10-CM

## 2025-02-14 DIAGNOSIS — Z34.90 PRENATAL CARE, ANTEPARTUM: ICD-10-CM

## 2025-02-14 DIAGNOSIS — O34.219 HISTORY OF CESAREAN DELIVERY, CURRENTLY PREGNANT: ICD-10-CM

## 2025-02-14 DIAGNOSIS — O99.843 PREGNANCY AFFECTED BY PREVIOUS BARIATRIC SURGERY, CURRENTLY IN THIRD TRIMESTER: Primary | ICD-10-CM

## 2025-02-14 DIAGNOSIS — O24.119 TYPE 2 DIABETES MELLITUS AFFECTING PREGNANCY, ANTEPARTUM: ICD-10-CM

## 2025-02-14 DIAGNOSIS — Z90.3 HISTORY OF SLEEVE GASTRECTOMY: ICD-10-CM

## 2025-02-14 DIAGNOSIS — O35.9XX0 TERATOGEN EXPOSURE IN CURRENT PREGNANCY, NOT APPLICABLE OR UNSPECIFIED FETUS: ICD-10-CM

## 2025-02-14 DIAGNOSIS — Z84.89: ICD-10-CM

## 2025-02-14 DIAGNOSIS — E78.2 MIXED HYPERLIPIDEMIA: ICD-10-CM

## 2025-02-14 LAB
GLUCOSE UR STRIP-MCNC: NEGATIVE MG/DL
PROT UR STRIP-MCNC: NEGATIVE MG/DL

## 2025-02-14 NOTE — PROGRESS NOTES
OB FOLLOW UP  CC- Here for care of pregnancy        Shelely Longoria is a 35 y.o.  32w3d patient being seen today for her obstetrical follow up visit. Patient reports BH contractions that started today in the waiting room. She states they come and go and feel like tightening all over her belly and into her lower back. She states she was admitted - for contractions and received steroids and Magnesium.      Her prenatal care is complicated by (and status) :  see below. and Type Type 2, insulin-dependent DM. Her average fasting BG is 80-90. Her average 2hr PP BG is 140-180. Managed by [facility].  Patient Active Problem List   Diagnosis    Mixed hyperlipidemia    Previous bariatric surgery complicating pregnancy    S/P LSG  - Postop Hemorrhage    FH: bariatric surgery    Type 2 diabetes mellitus affecting pregnancy, antepartum    GERD (gastroesophageal reflux disease)    Dehydration    Vomiting    Major depressive disorder with single episode, in partial remission    Phentermine/topiramate exposure in current pregnancy    AMA (advanced maternal age) multigravida 35+    History of  delivery, currently pregnant    Current moderate episode of major depressive disorder without prior episode    History of gastric surgery    Maternal iron deficiency anemia affecting pregnancy in third trimester, antepartum    History of sleeve gastrectomy    Abdominal pain affecting pregnancy       Flu Status: Already given in current flu season  TDAP status: received at last visit  Rhogam status: was not indicated  28 week labs: Reviewed and Labs show anemia. She getting iron infusions every Monday.  Ultrasound Today: No  Non Stress Test: Yes minutes >20  non-stress test: NST: Reactive  indication: Diabetes Mellitus Gestational and AMA        ROS -   Patient Denies: Loss of Fluid, Vaginal Spotting, Vision Changes, Headaches, Nausea , Vomiting , Epigastric pain, and skin itching  Fetal Movement :  normal  Other than what is documented in the HPI, all other systems reviewed and are negative.     The additional following portions of the patient's history were reviewed and updated as appropriate: allergies, current medications, past family history, past medical history, past social history, past surgical history, and problem list.    I have reviewed and agree with the HPI, ROS, and historical information as entered above. Devora Martin, APRN      /72   Wt 105 kg (231 lb 6.4 oz)   LMP 2024 (Exact Date)   BMI 32.27 kg/m²         EXAM:     Prenatal Vitals  BP: 120/72  Weight: 105 kg (231 lb 6.4 oz)   Fetal Heart Rate: NST   Dilation/Effacement/Station  Dilation: Closed           Urine Glucose Read-only: Negative  Urine Protein Read-only: Negative           Assessment and Plan    Problem List Items Addressed This Visit       AMA (advanced maternal age) multigravida 35+    Relevant Medications    aspirin 81 MG EC tablet    FH: bariatric surgery    Overview     Gastric sleeve 19         GERD (gastroesophageal reflux disease)    History of  delivery, currently pregnant    History of gastric surgery    History of sleeve gastrectomy    Major depressive disorder with single episode, in partial remission    Mixed hyperlipidemia    Overview     2022: , HDL 88         Phentermine/topiramate exposure in current pregnancy    Overview     Phentermine/topiramate until positive pregnancy test         Previous bariatric surgery complicating pregnancy - Primary    S/P LSG  - Postop Hemorrhage    Type 2 diabetes mellitus affecting pregnancy, antepartum    Relevant Medications    Insulin Pen Needle (Pen Needles) 32G X 4 MM misc    glucagon (GLUCAGEN) 1 MG injection    Nutritional Supplements (Glucose Management) tablet    Continuous Glucose Sensor (Dexcom G7 Sensor) misc    aspirin 81 MG EC tablet    Insulin Glargine (Lantus SoloStar) 100 UNIT/ML injection pen    metFORMIN ER  (GLUCOPHAGE-XR) 500 MG 24 hr tablet    Insulin Lispro Kang KwikPen 100 UNIT/ML solution pen-injector     Other Visit Diagnoses       Prenatal care, antepartum        Relevant Orders    POC Urinalysis Dipstick (Completed)            Pregnancy at 32w3d  Fetal status reassuring.  28 week labs reviewed.    Activity and Exercise discussed.  Fetal movement/PTL or Labor precautions  Patient is on Prenatal vitamins  Discussed/encouraged social distancing/COVID19 precautions; encouraged vaccination when able  Reviewed FSBS goals: fasting <90, 2hr PP < 120. Encouraged to decrease simple carb and sugar intake.   Reviewed Pre-eclampsia signs/symptoms  DM diet education included in patient instructions.   Return in about 4 days (around 2/18/2025) for YASMEEN grijalva/Yves NST.    Devora Martin, VALENCIA  02/14/2025

## 2025-02-17 ENCOUNTER — HOSPITAL ENCOUNTER (OUTPATIENT)
Dept: ONCOLOGY | Facility: HOSPITAL | Age: 35
Discharge: HOME | End: 2025-02-17
Admitting: OBSTETRICS & GYNECOLOGY
Payer: COMMERCIAL

## 2025-02-17 VITALS
HEIGHT: 71 IN | DIASTOLIC BLOOD PRESSURE: 63 MMHG | BODY MASS INDEX: 32.2 KG/M2 | HEART RATE: 112 BPM | WEIGHT: 230 LBS | RESPIRATION RATE: 16 BRPM | SYSTOLIC BLOOD PRESSURE: 108 MMHG | TEMPERATURE: 98.3 F

## 2025-02-17 DIAGNOSIS — Z34.93 THIRD TRIMESTER PREGNANCY: ICD-10-CM

## 2025-02-17 DIAGNOSIS — O99.013 MATERNAL IRON DEFICIENCY ANEMIA AFFECTING PREGNANCY IN THIRD TRIMESTER, ANTEPARTUM: ICD-10-CM

## 2025-02-17 DIAGNOSIS — O09.523 MULTIGRAVIDA OF ADVANCED MATERNAL AGE IN THIRD TRIMESTER: ICD-10-CM

## 2025-02-17 DIAGNOSIS — D50.9 MATERNAL IRON DEFICIENCY ANEMIA AFFECTING PREGNANCY IN THIRD TRIMESTER, ANTEPARTUM: ICD-10-CM

## 2025-02-17 DIAGNOSIS — Z90.3 HISTORY OF SLEEVE GASTRECTOMY: Primary | ICD-10-CM

## 2025-02-17 DIAGNOSIS — O99.843 PREGNANCY AFFECTED BY PREVIOUS BARIATRIC SURGERY, CURRENTLY IN THIRD TRIMESTER: Primary | ICD-10-CM

## 2025-02-17 DIAGNOSIS — O24.119 TYPE 2 DIABETES MELLITUS AFFECTING PREGNANCY, ANTEPARTUM: ICD-10-CM

## 2025-02-17 PROCEDURE — 96375 TX/PRO/DX INJ NEW DRUG ADDON: CPT

## 2025-02-17 PROCEDURE — 96366 THER/PROPH/DIAG IV INF ADDON: CPT

## 2025-02-17 PROCEDURE — 25810000003 SODIUM CHLORIDE 0.9 % SOLUTION: Performed by: ADVANCED PRACTICE MIDWIFE

## 2025-02-17 PROCEDURE — 25010000002 METOCLOPRAMIDE PER 10 MG: Performed by: ADVANCED PRACTICE MIDWIFE

## 2025-02-17 PROCEDURE — 25810000003 SODIUM CHLORIDE 0.9 % SOLUTION 250 ML FLEX CONT: Performed by: OBSTETRICS & GYNECOLOGY

## 2025-02-17 PROCEDURE — 25010000002 IRON SUCROSE PER 1 MG: Performed by: OBSTETRICS & GYNECOLOGY

## 2025-02-17 PROCEDURE — 96365 THER/PROPH/DIAG IV INF INIT: CPT

## 2025-02-17 PROCEDURE — 96361 HYDRATE IV INFUSION ADD-ON: CPT

## 2025-02-17 PROCEDURE — 25010000002 DIPHENHYDRAMINE PER 50 MG: Performed by: ADVANCED PRACTICE MIDWIFE

## 2025-02-17 RX ORDER — SODIUM CHLORIDE 9 MG/ML
1000 INJECTION, SOLUTION INTRAVENOUS ONCE AS NEEDED
Status: CANCELLED | OUTPATIENT
Start: 2025-02-24 | End: 2025-02-27

## 2025-02-17 RX ORDER — DIPHENHYDRAMINE HYDROCHLORIDE 50 MG/ML
25 INJECTION INTRAMUSCULAR; INTRAVENOUS ONCE
Status: COMPLETED | OUTPATIENT
Start: 2025-02-17 | End: 2025-02-17

## 2025-02-17 RX ORDER — METHYLPREDNISOLONE SODIUM SUCCINATE 125 MG/2ML
125 INJECTION, POWDER, LYOPHILIZED, FOR SOLUTION INTRAMUSCULAR; INTRAVENOUS ONCE AS NEEDED
Status: CANCELLED | OUTPATIENT
Start: 2025-02-24

## 2025-02-17 RX ORDER — SODIUM CHLORIDE 9 MG/ML
20 INJECTION, SOLUTION INTRAVENOUS ONCE
Status: DISCONTINUED | OUTPATIENT
Start: 2025-02-17 | End: 2025-02-18 | Stop reason: HOSPADM

## 2025-02-17 RX ORDER — EPINEPHRINE 1 MG/ML
0.3 INJECTION, SOLUTION, CONCENTRATE INTRAVENOUS ONCE AS NEEDED
Status: CANCELLED | OUTPATIENT
Start: 2025-02-24

## 2025-02-17 RX ORDER — METOCLOPRAMIDE HYDROCHLORIDE 5 MG/ML
10 INJECTION INTRAMUSCULAR; INTRAVENOUS EVERY 6 HOURS
Status: DISCONTINUED | OUTPATIENT
Start: 2025-02-17 | End: 2025-02-18 | Stop reason: HOSPADM

## 2025-02-17 RX ORDER — SODIUM CHLORIDE 9 MG/ML
20 INJECTION, SOLUTION INTRAVENOUS ONCE
Status: CANCELLED | OUTPATIENT
Start: 2025-02-24

## 2025-02-17 RX ORDER — SODIUM CHLORIDE 9 MG/ML
1000 INJECTION, SOLUTION INTRAVENOUS ONCE
Status: COMPLETED | OUTPATIENT
Start: 2025-02-17 | End: 2025-02-17

## 2025-02-17 RX ADMIN — DIPHENHYDRAMINE HYDROCHLORIDE 25 MG: 50 INJECTION INTRAMUSCULAR; INTRAVENOUS at 15:40

## 2025-02-17 RX ADMIN — METOCLOPRAMIDE 10 MG: 5 INJECTION, SOLUTION INTRAMUSCULAR; INTRAVENOUS at 15:36

## 2025-02-17 RX ADMIN — IRON SUCROSE 300 MG: 20 INJECTION, SOLUTION INTRAVENOUS at 13:17

## 2025-02-17 RX ADMIN — SODIUM CHLORIDE 1000 ML/HR: 9 INJECTION, SOLUTION INTRAVENOUS at 15:31

## 2025-02-18 ENCOUNTER — ROUTINE PRENATAL (OUTPATIENT)
Dept: OBSTETRICS AND GYNECOLOGY | Facility: CLINIC | Age: 35
End: 2025-02-18
Payer: COMMERCIAL

## 2025-02-18 VITALS — WEIGHT: 230 LBS | SYSTOLIC BLOOD PRESSURE: 122 MMHG | BODY MASS INDEX: 32.08 KG/M2 | DIASTOLIC BLOOD PRESSURE: 74 MMHG

## 2025-02-18 DIAGNOSIS — D50.9 MATERNAL IRON DEFICIENCY ANEMIA AFFECTING PREGNANCY IN THIRD TRIMESTER, ANTEPARTUM: ICD-10-CM

## 2025-02-18 DIAGNOSIS — O09.523 MULTIGRAVIDA OF ADVANCED MATERNAL AGE IN THIRD TRIMESTER: ICD-10-CM

## 2025-02-18 DIAGNOSIS — Z34.90 PRENATAL CARE, ANTEPARTUM: Primary | ICD-10-CM

## 2025-02-18 DIAGNOSIS — O24.119 TYPE 2 DIABETES MELLITUS AFFECTING PREGNANCY, ANTEPARTUM: ICD-10-CM

## 2025-02-18 DIAGNOSIS — O99.843 PREGNANCY AFFECTED BY PREVIOUS BARIATRIC SURGERY, CURRENTLY IN THIRD TRIMESTER: ICD-10-CM

## 2025-02-18 DIAGNOSIS — O99.013 MATERNAL IRON DEFICIENCY ANEMIA AFFECTING PREGNANCY IN THIRD TRIMESTER, ANTEPARTUM: ICD-10-CM

## 2025-02-18 DIAGNOSIS — O34.219 HISTORY OF CESAREAN DELIVERY, CURRENTLY PREGNANT: ICD-10-CM

## 2025-02-18 LAB
GLUCOSE UR STRIP-MCNC: NEGATIVE MG/DL
PROT UR STRIP-MCNC: NEGATIVE MG/DL

## 2025-02-18 NOTE — PROGRESS NOTES
"    Maternal/Fetal Medicine Follow Up Note     Name: Shelley Lognoria    : 1990     MRN: [id number]     Referring Provider: [facility] Marinelli MD    Chief Complaint  AMA; T2DM; Hx gastric bypass; Hx c/s x 4    Subjective     History of Present Illness:  Shelley Longoria is a 35 y.o.  33w0d who presents today for follow up     RAMAN: Estimated Date of Delivery: 25     ROS:   As noted in HPI.     Objective     Vital Signs  /71   Wt 105 kg (231 lb)   LMP 2024 (Exact Date)   Estimated body mass index is 32.22 kg/m² as calculated from the following:    Height as of 2/10/25: 180.3 cm (71\").    Weight as of this encounter: 105 kg (231 lb).    Ultrasound Impression:   See viewpoint      Assessment and Plan     Shelley Longoria is a 35 y.o.  33w0d     Diagnoses and all orders for this visit:    1. Pregnancy affected by previous bariatric surgery, currently in third trimester (Primary)  -      [facility]    2. Type 2 diabetes mellitus affecting pregnancy, antepartum  -      [facility]; Future    3. Phentermine/topiramate exposure in current pregnancy  -     [facility]; Future    4. Multigravida of advanced maternal age in third trimester  -     [facility]; Future    5. History of  delivery, currently pregnant  Assessment & Plan:  Stable appearing lower uterine segment   Posterior placenta   Reviewed labor and pain precautions   Glucoses followed by MFM   Will continue to monitor   Follow up 4 weeks       Orders:  -      [facility]; Future         Follow Up  4 weeks     I spent 10 minutes caring for the patient on the day of service. This included: obtaining or reviewing a separately obtained medical history, reviewing patient records, performing a medically appropriate exam and/or evaluation, counseling or educating the " patient/family/caregiver, ordering medications, labs, and/or procedures and documenting such in the medical record. This does not include time spent on review and interpretation of other tests such as fetal ultrasound or the performance of other procedures such as amniocentesis or CVS.    Radha Mahmood MD FACOG  Maternal Fetal Medicine, [facility] [place]     2025

## 2025-02-18 NOTE — PROGRESS NOTES
OB FOLLOW UP  CC- Here for care of pregnancy        Shelley Longoria is a 35 y.o.  33w0d patient being seen today for her obstetrical follow up visit. Patient reports her average fasting BG is 85-90. Her average 2hr PP BG is 160-180. [facility] is managing insulin.    Her prenatal care is complicated by (and status) : see below.  Patient Active Problem List   Diagnosis    Mixed hyperlipidemia    Previous bariatric surgery complicating pregnancy    S/P LSG  - Postop Hemorrhage    FH: bariatric surgery    Type 2 diabetes mellitus affecting pregnancy, antepartum    GERD (gastroesophageal reflux disease)    Dehydration    Vomiting    Major depressive disorder with single episode, in partial remission    Phentermine/topiramate exposure in current pregnancy    AMA (advanced maternal age) multigravida 35+    History of  delivery, currently pregnant    Current moderate episode of major depressive disorder without prior episode    History of gastric surgery    Maternal iron deficiency anemia affecting pregnancy in third trimester, antepartum    History of sleeve gastrectomy    Abdominal pain affecting pregnancy       Flu Status: Already given in current flu season  Ultrasound Today: Yes. BPP .  Non Stress Test: No.    ROS -   Patient Denies: Loss of Fluid, Vaginal Spotting, Vision Changes, Headaches, Nausea , Vomiting , Epigastric pain, and skin itching  Fetal Movement : normal  Other than what is documented in the HPI, all other systems reviewed and are negative.       The additional following portions of the patient's history were reviewed and updated as appropriate: allergies, current medications, and problem list.    I have reviewed and agree with the HPI, ROS, and historical information as entered above. Batool Posada, APRN      /74 Comment: r arm  Wt 104 kg (230 lb)   LMP 2024 (Exact Date)   BMI 32.08 kg/m²       EXAM:     Prenatal Vitals  BP: 122/74 (r  arm)  Weight: 104 kg (230 lb)   Fetal Heart Rate: 162               Urine Glucose Read-only: Negative  Urine Protein Read-only: Negative           Assessment and Plan    Problem List Items Addressed This Visit       Previous bariatric surgery complicating pregnancy    Type 2 diabetes mellitus affecting pregnancy, antepartum    Relevant Medications    Insulin Pen Needle (Pen Needles) 32G X 4 MM misc    glucagon (GLUCAGEN) 1 MG injection    Nutritional Supplements (Glucose Management) tablet    Continuous Glucose Sensor (Dexcom G7 Sensor) misc    aspirin 81 MG EC tablet    Insulin Glargine (Lantus SoloStar) 100 UNIT/ML injection pen    metFORMIN ER (GLUCOPHAGE-XR) 500 MG 24 hr tablet    Insulin Lispro Kang KwikPen 100 UNIT/ML solution pen-injector    AMA (advanced maternal age) multigravida 35+    Relevant Medications    aspirin 81 MG EC tablet    History of  delivery, currently pregnant    Maternal iron deficiency anemia affecting pregnancy in third trimester, antepartum    Overview     Weekly iron infusions. Repeat labs after every 5 infusions.         Relevant Medications    vitamin B-12 (CYANOCOBALAMIN) 100 MCG tablet    Ferric Maltol (ACCRUFeR) 30 MG capsule    iron sucrose (VENOFER) 300 mg in sodium chloride 0.9 % 265 mL IVPB (Completed)     Other Visit Diagnoses       Prenatal care, antepartum    -  Primary    Relevant Orders    POC Urinalysis Dipstick (Completed)            Pregnancy at 33w0d  Fetal status reassuring. BPP 8.  Activity and Exercise discussed.  Fetal movement/PTL or Labor precautions  Patient is on Prenatal vitamins  Reviewed FSBS goals: fasting <90, 2hr PP < 120  Discussed bASA for PIH prevention from 12 to 36wk  Return in about 3 days (around 2025).    Batool Posada, APRN  2025

## 2025-02-18 NOTE — ASSESSMENT & PLAN NOTE
Stable appearing lower uterine segment   Posterior placenta   Reviewed labor and pain precautions   Glucoses followed by MFM   Will continue to monitor   Follow up 4 weeks

## 2025-02-21 ENCOUNTER — ROUTINE PRENATAL (OUTPATIENT)
Dept: OBSTETRICS AND GYNECOLOGY | Facility: CLINIC | Age: 35
End: 2025-02-21
Payer: COMMERCIAL

## 2025-02-21 VITALS — WEIGHT: 233.6 LBS | SYSTOLIC BLOOD PRESSURE: 112 MMHG | DIASTOLIC BLOOD PRESSURE: 70 MMHG | BODY MASS INDEX: 32.58 KG/M2

## 2025-02-21 DIAGNOSIS — Z34.93 PRENATAL CARE, THIRD TRIMESTER: Primary | ICD-10-CM

## 2025-02-21 DIAGNOSIS — O24.119 TYPE 2 DIABETES MELLITUS AFFECTING PREGNANCY, ANTEPARTUM: ICD-10-CM

## 2025-02-21 DIAGNOSIS — O09.523 MULTIGRAVIDA OF ADVANCED MATERNAL AGE IN THIRD TRIMESTER: ICD-10-CM

## 2025-02-21 DIAGNOSIS — Z3A.33 33 WEEKS GESTATION OF PREGNANCY: ICD-10-CM

## 2025-02-21 LAB
EXPIRATION DATE: 0
GLUCOSE UR STRIP-MCNC: ABNORMAL MG/DL
Lab: 0
PROT UR STRIP-MCNC: NEGATIVE MG/DL

## 2025-02-21 RX ORDER — SODIUM CHLORIDE 9 MG/ML
20 INJECTION, SOLUTION INTRAVENOUS ONCE
Status: CANCELLED | OUTPATIENT
Start: 2025-02-21

## 2025-02-21 RX ORDER — EPINEPHRINE 1 MG/ML
0.3 INJECTION, SOLUTION, CONCENTRATE INTRAVENOUS ONCE AS NEEDED
Status: CANCELLED | OUTPATIENT
Start: 2025-02-21

## 2025-02-21 RX ORDER — SODIUM CHLORIDE 9 MG/ML
1000 INJECTION, SOLUTION INTRAVENOUS ONCE AS NEEDED
Status: CANCELLED | OUTPATIENT
Start: 2025-02-21 | End: 2025-02-24

## 2025-02-21 NOTE — PROGRESS NOTES
"      OB FOLLOW UP  CC- Here for care of pregnancy        Shelley Longoria is a 35 y.o.  33w3d patient being seen today for her obstetrical follow up visit. Patient reports she had an iron infusion on Monday and \"blacked out\" immediately after the infusion; headaches daily since then (mostly when bending her head downwards; Tylenol only helps a little). Also reporting occasional trace BLE edema and daily [place] (radiate into her lower back). Fasting glucoses 87 this AM; the past few nights, her glucose dropped below 40 around midngiht. \"Fasting\" glucoses after those instances were 120s (she contacted [facility] about this). Post prandials running 140-180s. No changes to insulin from med list.    Her prenatal care is complicated by (and status):  see below.  Patient Active Problem List   Diagnosis    Mixed hyperlipidemia    Previous bariatric surgery complicating pregnancy    S/P LSG  - Postop Hemorrhage    FH: bariatric surgery    Type 2 diabetes mellitus affecting pregnancy, antepartum    GERD (gastroesophageal reflux disease)    Dehydration    Vomiting    Major depressive disorder with single episode, in partial remission    Phentermine/topiramate exposure in current pregnancy    AMA (advanced maternal age) multigravida 35+    History of  delivery, currently pregnant    Current moderate episode of major depressive disorder without prior episode    History of gastric surgery    Maternal iron deficiency anemia affecting pregnancy in third trimester, antepartum    History of sleeve gastrectomy    Abdominal pain affecting pregnancy       Flu Status: Already given in current flu season  TDAP status: received at last visit  RSV vaccine:  no longer available in office  Rhogam status: was not indicated  28 week labs: Reviewed and Labs show anemia. She is taking additional iron supplement.  Ultrasound Today: No  Non Stress Test: Yes, 20+ minutes  non-stress test: NST: Reactive  indication:  AMA, " pls inform of normal CBC and lead DM II with insulin       ROS -   Patient Denies: Loss of Fluid, Vaginal Spotting, Vision Changes, Nausea , Vomiting , Epigastric pain, and skin itching  Fetal Movement: decreased  Other than what is documented in the HPI, all other systems reviewed and are negative.     The additional following portions of the patient's history were reviewed and updated as appropriate: allergies, current medications, past family history, past medical history, past social history, past surgical history, and problem list.    I have reviewed and agree with the HPI, ROS, and historical information as entered above. Luch[facility] Marinelli MD     /70   Wt 106 kg (233 lb 9.6 oz)   LMP 06/04/2024 (Exact Date)   BMI 32.58 kg/m²         EXAM:     Prenatal Vitals  BP: 112/70  Weight: 106 kg (233 lb 9.6 oz)                   Urine Glucose Read-only: (!) 250 mg/dL  Urine Protein Read-only: Negative           Assessment and Plan    Problem List Items Addressed This Visit       Type 2 diabetes mellitus affecting pregnancy, antepartum    Relevant Medications    Insulin Pen Needle (Pen Needles) 32G X 4 MM misc    glucagon (GLUCAGEN) 1 MG injection    Nutritional Supplements (Glucose Management) tablet    Continuous Glucose Sensor (Dexcom G7 Sensor) misc    aspirin 81 MG EC tablet    Insulin Glargine (Lantus SoloStar) 100 UNIT/ML injection pen    metFORMIN ER (GLUCOPHAGE-XR) 500 MG 24 hr tablet    Insulin Lispro Kang KwikPen 100 UNIT/ML solution pen-injector    Other Relevant Orders    Fetal Nonstress Test    AMA (advanced maternal age) multigravida 35+    Relevant Medications    aspirin 81 MG EC tablet    Other Relevant Orders    Fetal Nonstress Test     Other Visit Diagnoses       Prenatal care, third trimester    -  Primary    Relevant Orders    POC Glucose, Urine, Qualitative, Dipstick (Completed)    POC Protein, Urine, Qualitative, Dipstick (Completed)    Fetal Nonstress Test    33 weeks gestation of pregnancy        Relevant Orders    POC  Glucose, Urine, Qualitative, Dipstick (Completed)    POC Protein, Urine, Qualitative, Dipstick (Completed)    Fetal Nonstress Test            Pregnancy at 33w3d  Fetal status reassuring.  28 week labs reviewed.    Activity and Exercise discussed.  Fetal movement/PTL or Labor precautions  Reviewed FSBS goals: fasting <90, 2hr PP < 120  Reviewed Pre-eclampsia signs/symptoms  Discussed bASA for PIH prevention from 12 to 36wk  Desires Sterilization: Reviewed risks/benefits of BTL vs bilateral salpingectomy. Consent has been signed.  Recommend cutting back on dinner lispro to 60units and touch base with [facility] on Monday regarding low fastings. +Glucose tabs sent in to [facility]  Return for  testing as scheduled.    [facility] Marinelli MD  2025

## 2025-02-24 ENCOUNTER — HOSPITAL ENCOUNTER (OUTPATIENT)
Facility: HOSPITAL | Age: 35
Discharge: HOME | End: 2025-02-24
Attending: OBSTETRICS & GYNECOLOGY | Admitting: OBSTETRICS & GYNECOLOGY
Payer: COMMERCIAL

## 2025-02-24 ENCOUNTER — MEDICATION THERAPY MANAGEMENT (OUTPATIENT)
Dept: OBSTETRICS AND GYNECOLOGY | Facility: HOSPITAL | Age: 35
End: 2025-02-24
Payer: COMMERCIAL

## 2025-02-24 ENCOUNTER — HOSPITAL ENCOUNTER (OUTPATIENT)
Dept: ONCOLOGY | Facility: HOSPITAL | Age: 35
Discharge: HOME | End: 2025-02-24
Admitting: OBSTETRICS & GYNECOLOGY
Payer: COMMERCIAL

## 2025-02-24 VITALS
HEIGHT: 65 IN | OXYGEN SATURATION: 100 % | SYSTOLIC BLOOD PRESSURE: 113 MMHG | HEART RATE: 102 BPM | BODY MASS INDEX: 38.77 KG/M2 | DIASTOLIC BLOOD PRESSURE: 77 MMHG | RESPIRATION RATE: 18 BRPM

## 2025-02-24 VITALS
SYSTOLIC BLOOD PRESSURE: 126 MMHG | HEIGHT: 71 IN | DIASTOLIC BLOOD PRESSURE: 72 MMHG | WEIGHT: 233 LBS | BODY MASS INDEX: 32.62 KG/M2 | TEMPERATURE: 97.3 F | HEART RATE: 117 BPM | RESPIRATION RATE: 18 BRPM

## 2025-02-24 DIAGNOSIS — O09.523 MULTIGRAVIDA OF ADVANCED MATERNAL AGE IN THIRD TRIMESTER: ICD-10-CM

## 2025-02-24 DIAGNOSIS — Z90.3 HISTORY OF SLEEVE GASTRECTOMY: Primary | ICD-10-CM

## 2025-02-24 DIAGNOSIS — Z34.93 PRENATAL CARE, THIRD TRIMESTER: ICD-10-CM

## 2025-02-24 DIAGNOSIS — O24.119 TYPE 2 DIABETES MELLITUS AFFECTING PREGNANCY, ANTEPARTUM: Primary | ICD-10-CM

## 2025-02-24 DIAGNOSIS — D50.9 MATERNAL IRON DEFICIENCY ANEMIA AFFECTING PREGNANCY IN THIRD TRIMESTER, ANTEPARTUM: ICD-10-CM

## 2025-02-24 DIAGNOSIS — O99.013 MATERNAL IRON DEFICIENCY ANEMIA AFFECTING PREGNANCY IN THIRD TRIMESTER, ANTEPARTUM: ICD-10-CM

## 2025-02-24 DIAGNOSIS — O24.119 TYPE 2 DIABETES MELLITUS AFFECTING PREGNANCY, ANTEPARTUM: ICD-10-CM

## 2025-02-24 LAB
DEPRECATED RDW RBC AUTO: 65.3 FL (ref 37–54)
ERYTHROCYTE [DISTWIDTH] IN BLOOD BY AUTOMATED COUNT: 21.1 % (ref 12.3–15.4)
FERRITIN SERPL-MCNC: 176.6 NG/ML (ref 13–150)
HCT VFR BLD AUTO: 32.5 % (ref 34–46.6)
HGB BLD-MCNC: 10.8 G/DL (ref 12–15.9)
IRON 24H UR-MRATE: 112 MCG/DL (ref 37–145)
IRON SATN MFR SERPL: 22 % (ref 20–50)
MCH RBC QN AUTO: 28.9 PG (ref 26.6–33)
MCHC RBC AUTO-ENTMCNC: 33.2 G/DL (ref 31.5–35.7)
MCV RBC AUTO: 86.9 FL (ref 79–97)
PLATELET # BLD AUTO: 144 10*3/MM3 (ref 140–450)
PMV BLD AUTO: 10.6 FL (ref 6–12)
RBC # BLD AUTO: 3.74 10*6/MM3 (ref 3.77–5.28)
TIBC SERPL-MCNC: 507 MCG/DL (ref 298–536)
TRANSFERRIN SERPL-MCNC: 340 MG/DL (ref 200–360)
WBC NRBC COR # BLD AUTO: 7.34 10*3/MM3 (ref 3.4–10.8)

## 2025-02-24 PROCEDURE — 59025 FETAL NON-STRESS TEST: CPT

## 2025-02-24 PROCEDURE — 25010000002 IRON SUCROSE PER 1 MG: Performed by: OBSTETRICS & GYNECOLOGY

## 2025-02-24 PROCEDURE — 25810000003 SODIUM CHLORIDE 0.9 % SOLUTION 250 ML FLEX CONT: Performed by: OBSTETRICS & GYNECOLOGY

## 2025-02-24 PROCEDURE — 85027 COMPLETE CBC AUTOMATED: CPT | Performed by: OBSTETRICS & GYNECOLOGY

## 2025-02-24 PROCEDURE — 84466 ASSAY OF TRANSFERRIN: CPT

## 2025-02-24 PROCEDURE — 82728 ASSAY OF FERRITIN: CPT | Performed by: OBSTETRICS & GYNECOLOGY

## 2025-02-24 PROCEDURE — G0378 HOSPITAL OBSERVATION PER HR: HCPCS

## 2025-02-24 PROCEDURE — 25810000003 SODIUM CHLORIDE 0.9 % SOLUTION: Performed by: OBSTETRICS & GYNECOLOGY

## 2025-02-24 PROCEDURE — 83540 ASSAY OF IRON: CPT

## 2025-02-24 PROCEDURE — 96365 THER/PROPH/DIAG IV INF INIT: CPT

## 2025-02-24 RX ORDER — SODIUM CHLORIDE 9 MG/ML
20 INJECTION, SOLUTION INTRAVENOUS ONCE
Status: CANCELLED | OUTPATIENT
Start: 2025-03-03

## 2025-02-24 RX ORDER — METHYLPREDNISOLONE SODIUM SUCCINATE 125 MG/2ML
125 INJECTION, POWDER, LYOPHILIZED, FOR SOLUTION INTRAMUSCULAR; INTRAVENOUS ONCE AS NEEDED
Status: DISCONTINUED | OUTPATIENT
Start: 2025-02-24 | End: 2025-02-25 | Stop reason: HOSPADM

## 2025-02-24 RX ORDER — INSULIN LISPRO 100 [IU]/ML
INJECTION, SOLUTION SUBCUTANEOUS
Qty: 60 ML | Refills: 1 | Status: ON HOLD | OUTPATIENT
Start: 2025-02-24

## 2025-02-24 RX ORDER — SODIUM CHLORIDE 9 MG/ML
20 INJECTION, SOLUTION INTRAVENOUS ONCE
Status: COMPLETED | OUTPATIENT
Start: 2025-02-24 | End: 2025-02-24

## 2025-02-24 RX ORDER — METHYLPREDNISOLONE SODIUM SUCCINATE 125 MG/2ML
125 INJECTION, POWDER, LYOPHILIZED, FOR SOLUTION INTRAMUSCULAR; INTRAVENOUS ONCE AS NEEDED
OUTPATIENT
Start: 2025-03-03

## 2025-02-24 RX ORDER — EPINEPHRINE 1 MG/ML
0.3 INJECTION, SOLUTION, CONCENTRATE INTRAVENOUS ONCE AS NEEDED
Status: DISCONTINUED | OUTPATIENT
Start: 2025-02-24 | End: 2025-02-25 | Stop reason: HOSPADM

## 2025-02-24 RX ORDER — EPINEPHRINE 1 MG/ML
0.3 INJECTION, SOLUTION, CONCENTRATE INTRAVENOUS ONCE AS NEEDED
OUTPATIENT
Start: 2025-03-03

## 2025-02-24 RX ORDER — SODIUM CHLORIDE 9 MG/ML
1000 INJECTION, SOLUTION INTRAVENOUS ONCE AS NEEDED
Status: DISCONTINUED | OUTPATIENT
Start: 2025-02-24 | End: 2025-02-25 | Stop reason: HOSPADM

## 2025-02-24 RX ORDER — SODIUM CHLORIDE 9 MG/ML
1000 INJECTION, SOLUTION INTRAVENOUS ONCE AS NEEDED
OUTPATIENT
Start: 2025-03-03 | End: 2025-03-06

## 2025-02-24 RX ADMIN — SODIUM CHLORIDE 20 ML/HR: 9 INJECTION, SOLUTION INTRAVENOUS at 13:45

## 2025-02-24 RX ADMIN — SODIUM CHLORIDE 300 MG: 9 INJECTION, SOLUTION INTRAVENOUS at 13:45

## 2025-02-24 NOTE — PROGRESS NOTES
45 minutes into 300mg Venofer IV 90 minute infusion, patient c/o of feeling contractions in abdomen. /74, . Venofer stopped and Dr. Marinelli's office notified. TORIN Judge, Charge nurse, spoke with Dr. Marinelli's nurse. Orders to leave IV in and send patient to L&D Triage. Patient accompanied to 3rd floor by RN.

## 2025-02-24 NOTE — H&P
" [facility]  Obstetric History and Physical    Referring Provider: Lucho Marinelli MD      Chief Complaint   Patient presents with    Elevated Blood Pressure       Subjective     Patient is a 35 y.o. female  currently at 33w6d, who sent from the infusion center after receiving Venofer injections noted to have slightly elevated heart rate and elevated blood pressure.  Patient was [facility] through her infusion ( # 5).  Patient denied any chest pain, shortness of breath, headache, leaking of fluid, vaginal bleeding, or increased uterine activity.  Or any other concerns.  Course complicated by AMA, prior  x 4 at term, history of bariatric surgery and iron deficiency anemia.     .    The following portions of the patients history were reviewed and updated as appropriate: current medications, allergies, past medical history, past surgical history, past family history, past social history, and problem list .       Prenatal Information:   Maternal Prenatal Labs  Blood Type No results found for: \"ABO\"   Rh Status No results found for: \"RH\"   Antibody Screen No results found for: \"ABSCRN\"   Gonnorhea No results found for: \"GCCX\"   Chlamydia No results found for: \"CLAMYDCU\"   RPR No results found for: \"RPR\"   Syphilis Antibody No results found for: \"SYPHILIS\"   Rubella No results found for: \"RUBELLAIGGIN\"   Hepatitis B Surface Antigen No results found for: \"HEPBSAG\"   HIV-1 Antibody No results found for: \"LABHIV1\"   Hepatitis C Antibody No results found for: \"HEPCAB\"   Rapid Urin Drug Screen No results found for: \"AMPMETHU\", \"BARBITSCNUR\", \"LABBENZSCN\", \"LABMETHSCN\", \"LABOPIASCN\", \"THCURSCR\", \"COCAINEUR\", \"AMPHETSCREEN\", \"PROPOXSCN\", \"BUPRENORSCNU\", \"METAMPSCNUR\", \"OXYCODONESCN\", \"TRICYCLICSCN\"   Group B Strep Culture No results found for: \"GBSANTIGEN\"           External Prenatal Results       Pregnancy Outside Results - Transcribed From Office Records - See Scanned Records For Details       Test Value Date Time "    ABO  B  01/20/25 1910    Rh  Positive  01/20/25 1910    Antibody Screen  Negative  01/20/25 1910       Negative  01/14/25 1713       Negative  09/03/24 1558    Varicella IgG  225 index 09/03/24 1558    Rubella  1.03 index 09/03/24 1558    Hgb  10.8 g/dL 02/24/25 1313       9.7 g/dL 01/20/25 1910       10.3 g/dL 01/14/25 1713       12.7 g/dL 09/03/24 1558       12.2 g/dL 08/19/24 1503    Hct  32.5 % 02/24/25 1313       30.0 % 01/20/25 1910       31.1 % 01/14/25 1713       38.7 % 09/03/24 1558       36.7 % 08/19/24 1503    HgB A1c   6.9 % 08/19/24 1503    1h GTT       3h GTT Fasting       3h GTT 1 hour       3h GTT 2 hour       3h GTT 3 hour        Gonorrhea (discrete)  Negative  09/03/24 1600    Chlamydia (discrete)  Negative  09/03/24 1600    RPR  Non Reactive  01/14/25 1713       Non Reactive  09/03/24 1558    Syphils cascade: TP-Ab (FTA)  Non-Reactive  01/20/25 1910    TP-Ab  Non-Reactive  01/20/25 1910    TP-Ab (EIA)       TPPA       HBsAg  Negative  09/03/24 1558    Herpes Simplex Virus PCR       Herpes Simplex VIrus Culture       HIV  Non Reactive  09/03/24 1558    Hep C RNA Quant PCR       Hep C Antibody  Non Reactive  09/03/24 1558    AFP       NIPT       Cystic Fibrosis (Elijah)       Cystic Fibroisis        Spinal Muscular atrophy       Fragile X       Group B Strep       GBS Susceptibility to Clindamycin       GBS Susceptibility to Erythromycin       Fetal Fibronectin       Genetic Testing, Maternal Blood                 Drug Screening       Test Value Date Time    Urine Drug Screen       Amphetamine Screen  Negative ng/mL 09/03/24 1600    Barbiturate Screen  Negative ng/mL 09/03/24 1600    Benzodiazepine Screen  Negative ng/mL 09/03/24 1600    Methadone Screen  Negative ng/mL 09/03/24 1600    Phencyclidine Screen  Negative ng/mL 09/03/24 1600    Opiates Screen       THC Screen       Cocaine Screen       Propoxyphene Screen  Negative ng/mL 09/03/24 1600    Buprenorphine Screen       Methamphetamine  Screen       Oxycodone Screen       Tricyclic Antidepressants Screen                 Legend    ^: Historical                              Past OB History:       OB History    Para Term  AB Living   5 4 4 0 0 4   SAB IAB Ectopic Molar Multiple Live Births   0 0 0 0 0 4      # Outcome Date GA Lbr Naga/2nd Weight Sex Type Anes PTL Lv   5 Current            4 Term 21 37w0d  3572 g (7 lb 14 oz) M CS-LTranv   [facility]   3 Term 11/06/15 37w0d  4423 g (9 lb 12 oz) M CS-LTranv   [facility]   2 Term 05/15/13 37w0d  4309 g (9 lb 8 oz) F CS-LTranv   [facility]   1 Term 08 37w0d  4082 g (9 lb) M CS-LTranv   [facility]      Obstetric Comments   2008- 37w [id number]-male- SJ Moreno- adoption   2013-37w PPD-[id number]-female-[facility]-Veloudis   2015-37w [id number]-male-[facility]is   2021-37w RCS-[id number] 14oz-male-SJE-Veloudis          Past Medical History: Past Medical History:   Diagnosis Date    Anxiety     Chronic joint pain     denies NSAIDS/steroids    COVID-19     ,     Diabetes mellitus, type II     w/ hx gestational diabetes , dx May 2017, started on insulin when initially dx, lost weight w/ Adipex, stopped insulin 6 months after dx, but now w/ weight regain, back on insulin.  Managed by PCP    Dyspepsia     Dyspnea on exertion     Fatigue     Fatty liver     w/ abnormal LFTs, US 2018      GERD (gastroesophageal reflux disease) 2023    Gestational diabetes 2017    H/O H. pylori infection     UBT (+) 19 - PrevPak RX      Herpes 2017    Hiatal hernia 2023    Added automatically from request for surgery [id number]      History of gestational diabetes     History of Helicobacter pylori infection     UBT (+) 19 - PrevPak RX; repeat HPSA (+) 2019 - Pylera RX; repeat UBT (holding PPI) (+) - referred to GI, RX Levaquin/Flagyl/Pepto RX  - HPSA negative 2023    History of hiatal hernia     History of transfusion     [facility]- PT UNSURE HOW MANY UNITS RECEIVED, NO REACTIONS    Maternal  iron deficiency anemia affecting pregnancy in third trimester, antepartum 2025    Mixed hyperlipidemia 10/15/2019    NAFL (nonalcoholic fatty liver)     w/ abnormal LFTs, US 2018    Obesity (BMI 30-39.9)     Personal history of gestational diabetes 2017    Wears eyeglasses       Past Surgical History Past Surgical History:   Procedure Laterality Date    BREAST BIOPSY      CERVICAL CONE BIOPSY  2004    benign     SECTION      , , ,     ENDOSCOPY N/A 2019    Procedure: ESOPHAGOGASTRODUODENOSCOPY;  Surgeon: Mila Whatley MD;  Location:  [facility];  Service: Bariatric    EXCESSIVE THIGH / HIP / BUTTOCK / FLANK SKIN EXCISION Bilateral 05/15/2024        GASTRIC SLEEVE LAPAROSCOPIC N/A 2019    Procedure: GASTRIC SLEEVE LAPAROSCOPIC;  Surgeon: Mila Whatley MD;  Location:  [facility];  Service: Bariatric    HIATAL HERNIA REPAIR N/A 2019    Procedure: HIATAL HERNIA REPAIR LAPAROSCOPIC;  Surgeon: Mila Whatley MD;  Location:  [facility];  Service: Bariatric    HIATAL HERNIA REPAIR N/A 2023    Procedure: LAPAROSCOPIC HIATAL HERNIA REPAIR WITH MESH WITH DAVINCI ROBOT;  Surgeon: Mila Whatley MD;  Location:  [facility];  Service: Robotics - DaVinci;  Laterality: N/A;    UPPER GASTROINTESTINAL ENDOSCOPY  2019    Dr VIVIENNE Whatley    UPPER GASTROINTESTINAL ENDOSCOPY  2022    Dr Aldrich, positive H pylori    WISDOM TOOTH EXTRACTION        Family History: Family History   Problem Relation Age of Onset    Stroke Mother     Heart disease Mother     Diabetes Mother     Obesity Mother     Hypertension Mother             Arthritis Mother     Diabetes Father     Hypertension Father     Heart disease Father     Arthritis Father       Social History:  reports that she quit smoking about 12 years ago. Her smoking use included cigarettes. She started smoking about 14 years ago. She has a 4 pack-year smoking history. She has never  used smokeless tobacco.   reports no history of alcohol use.   reports no history of drug use.                   General ROS Negative Findings:Headaches, Visual Changes, Epigastric pain, Anorexia, Nausia/Vomiting, ROM, and Vaginal Bleeding    ROS     All other systems have been reviewed and are neg  Objective       Vital Signs Range for the last 24 hours  Temperature: Temp:  [97.3 °F (36.3 °C)] 97.3 °F (36.3 °C)   Temp Source: Temp src: Temporal   BP: BP: (105-127)/(70-74) 126/72   Pulse: Heart Rate:  [111-124] 124   Respirations: Resp:  [18] 18   SPO2: SpO2:  [93 %-95 %] 95 %   O2 Amount (l/min):     O2 Devices     Weight: Weight:  [106 kg (233 lb)] 106 kg (233 lb)     Physical Examination:   General:   alert, appears stated age, and cooperative   Skin:   normal   HEENT:     Lungs:   clear to auscultation bilaterally   Heart:  Heart rate slightly tachycardic no appreciable murmurs noted regular rhythm   Gastrointestinal: Soft, gravid uterus nontender no guarding benign exam   Lower Extremities: Trace edema, no calf tenderness   :    Musculoskeletal:     Neuro:                 Fetal Heart Rate Assessment   Method:     Beats/min:     Baseline:     Varibility:     Accels:     Decels:     Tracing Category:     NST-indications AMA type 2 diabetes.  Interpretation reactive, moderate variability, accelerations present 15 x 15, no fetal deceleration noted, onset 1640 endtime 1804 occasional contraction  Uterine Assessment   Method:     Frequency (min):     Ctx Count in 10 min:     Duration:     Intensity:     Intensity by IUPC:     Resting Tone:     Resting Tone by IUPC:     [place] Units:       Laboratory Results:   Lab Results (last 24 hours)       ** No results found for the last 24 hours. **          Radiology Review:   Imaging Results (Last 24 Hours)       ** No results found for the last 24 hours. **          Other Studies:    Assessment & Plan       Third trimester pregnancy        Assessment:  1.  Intrauterine  pregnancy at 33w6d gestation with reactive fetal status.         Maternal aunt fetal wellbeing established.  2.  AMA  3.  History of  x 4  4.  Type 2 diabetes requiring insulin.  5.  Iron deficiency anemia status post 5 parenteral infusions of Venofer  Plan:  1.  Discharge to home, kick count, continue medications, follow-up OB provider tomorrow for scheduled pulmonary as needed  2. Plan of care has been reviewed with patient.  3.  Risks, benefits of treatment plan have been discussed.  4.  All questions have been answered.  5      Enrico Greenwood, DO  2025  17:18 EST

## 2025-02-25 ENCOUNTER — ROUTINE PRENATAL (OUTPATIENT)
Dept: OBSTETRICS AND GYNECOLOGY | Facility: CLINIC | Age: 35
End: 2025-02-25
Payer: COMMERCIAL

## 2025-02-25 VITALS — BODY MASS INDEX: 38.61 KG/M2 | DIASTOLIC BLOOD PRESSURE: 76 MMHG | WEIGHT: 232 LBS | SYSTOLIC BLOOD PRESSURE: 118 MMHG

## 2025-02-25 DIAGNOSIS — O24.119 TYPE 2 DIABETES MELLITUS AFFECTING PREGNANCY, ANTEPARTUM: ICD-10-CM

## 2025-02-25 DIAGNOSIS — O99.013 MATERNAL IRON DEFICIENCY ANEMIA AFFECTING PREGNANCY IN THIRD TRIMESTER, ANTEPARTUM: ICD-10-CM

## 2025-02-25 DIAGNOSIS — O09.523 MULTIGRAVIDA OF ADVANCED MATERNAL AGE IN THIRD TRIMESTER: ICD-10-CM

## 2025-02-25 DIAGNOSIS — O99.843 PREGNANCY AFFECTED BY PREVIOUS BARIATRIC SURGERY, CURRENTLY IN THIRD TRIMESTER: ICD-10-CM

## 2025-02-25 DIAGNOSIS — O34.219 HISTORY OF CESAREAN DELIVERY, CURRENTLY PREGNANT: ICD-10-CM

## 2025-02-25 DIAGNOSIS — Z34.90 PRENATAL CARE, ANTEPARTUM: Primary | ICD-10-CM

## 2025-02-25 DIAGNOSIS — D50.9 MATERNAL IRON DEFICIENCY ANEMIA AFFECTING PREGNANCY IN THIRD TRIMESTER, ANTEPARTUM: ICD-10-CM

## 2025-02-25 LAB
GLUCOSE UR STRIP-MCNC: ABNORMAL MG/DL
PROT UR STRIP-MCNC: NEGATIVE MG/DL

## 2025-02-25 NOTE — PROGRESS NOTES
OB FOLLOW UP  CC- Here for care of pregnancy        Shelley Longoria is a 35 y.o.  34w0d patient being seen today for her obstetrical follow up visit. Patient reports her average fasting BG is . Her average 2hr PP BG is 140-180. She reports headaches with visual changes for the last 2 weeks that are not helped with tylenol or rest.     Her prenatal care is complicated by (and status) : see below.  Patient Active Problem List   Diagnosis    Mixed hyperlipidemia    Previous bariatric surgery complicating pregnancy    S/P LSG  - Postop Hemorrhage    FH: bariatric surgery    Type 2 diabetes mellitus affecting pregnancy, antepartum    GERD (gastroesophageal reflux disease)    Dehydration    Vomiting    Major depressive disorder with single episode, in partial remission    Phentermine/topiramate exposure in current pregnancy    AMA (advanced maternal age) multigravida 35+    History of  delivery, currently pregnant    Current moderate episode of major depressive disorder without prior episode    History of gastric surgery    Maternal iron deficiency anemia affecting pregnancy in third trimester, antepartum    History of sleeve gastrectomy    Abdominal pain affecting pregnancy    Third trimester pregnancy       Flu Status: Already given in current flu season  RSV:  N/A  Ultrasound Today: Yes  Non Stress Test: No.    ROS -   Patient Denies: Loss of Fluid, Vaginal Spotting, Nausea , Vomiting , Contractions, Epigastric pain, and skin itching  Fetal Movement : normal  Other than what is documented in the HPI, all other systems reviewed and are negative.       The additional following portions of the patient's history were reviewed and updated as appropriate: allergies, current medications, past medical history, past social history, past surgical history, and problem list.    I have reviewed and agree with the HPI, ROS, and historical information as entered above. Batool Posada,  APRN      /76   Wt 105 kg (232 lb)   LMP 2024 (Exact Date)   BMI 38.61 kg/m²       EXAM:     Prenatal Vitals  BP: 118/76  Weight: 105 kg (232 lb)   Fetal Heart Rate: 153         Urine Glucose Read-only: (!) 2+  Urine Protein Read-only: Negative       Assessment and Plan    Problem List Items Addressed This Visit       Previous bariatric surgery complicating pregnancy    Type 2 diabetes mellitus affecting pregnancy, antepartum    Relevant Medications    Insulin Pen Needle (Pen Needles) 32G X 4 MM misc    glucagon (GLUCAGEN) 1 MG injection    Continuous Glucose Sensor (Dexcom G7 Sensor) misc    aspirin 81 MG EC tablet    Insulin Glargine (Lantus SoloStar) 100 UNIT/ML injection pen    metFORMIN ER (GLUCOPHAGE-XR) 500 MG 24 hr tablet    glucose (DEX4) 4 GM chewable tablet    Insulin Lispro Kang KwikPen 100 UNIT/ML solution pen-injector    AMA (advanced maternal age) multigravida 35+    Relevant Medications    aspirin 81 MG EC tablet    History of  delivery, currently pregnant    Maternal iron deficiency anemia affecting pregnancy in third trimester, antepartum    Overview     Weekly iron infusions. Repeat labs after every 5 infusions.         Relevant Medications    vitamin B-12 (CYANOCOBALAMIN) 100 MCG tablet    Ferric Maltol (ACCRUFeR) 30 MG capsule     Other Visit Diagnoses       Prenatal care, antepartum    -  Primary    Relevant Orders    POC Urinalysis Dipstick (Completed)            Pregnancy at 34w0d  Fetal status reassuring. BPP 8/8. RAMAN normal. Breech presentation.  Activity and Exercise discussed.  Fetal movement/PTL or Labor precautions  Patient is on Prenatal vitamins  Reviewed FSBS goals: fasting <90, 2hr PP < 120  Discussed bASA for PIH prevention from 12 to 36wk  Return in about 31 days (around 3/28/2025) for NST.    Batool Posada, APRN  2025

## 2025-02-28 ENCOUNTER — ROUTINE PRENATAL (OUTPATIENT)
Dept: OBSTETRICS AND GYNECOLOGY | Facility: CLINIC | Age: 35
End: 2025-02-28
Payer: COMMERCIAL

## 2025-02-28 VITALS — WEIGHT: 232.2 LBS | BODY MASS INDEX: 38.64 KG/M2 | DIASTOLIC BLOOD PRESSURE: 76 MMHG | SYSTOLIC BLOOD PRESSURE: 118 MMHG

## 2025-02-28 DIAGNOSIS — O24.119 TYPE 2 DIABETES MELLITUS AFFECTING PREGNANCY, ANTEPARTUM: ICD-10-CM

## 2025-02-28 DIAGNOSIS — O09.523 MULTIGRAVIDA OF ADVANCED MATERNAL AGE IN THIRD TRIMESTER: ICD-10-CM

## 2025-02-28 DIAGNOSIS — Z34.93 PRENATAL CARE, THIRD TRIMESTER: Primary | ICD-10-CM

## 2025-02-28 DIAGNOSIS — Z3A.34 34 WEEKS GESTATION OF PREGNANCY: ICD-10-CM

## 2025-02-28 LAB
EXPIRATION DATE: 0
GLUCOSE UR STRIP-MCNC: ABNORMAL MG/DL
Lab: 0
PROT UR STRIP-MCNC: ABNORMAL MG/DL

## 2025-02-28 NOTE — PROGRESS NOTES
OB FOLLOW UP  CC- Here for care of pregnancy        Shelley Longoria is a 35 y.o.  34w3d patient being seen today for her obstetrical follow up visit. Patient reports contractions every 15 minutes for the past couple of days. Also reporting daily headaches (Tylenol not much help; denies vision changes), trace BLE/BUE edema (with needle-like pains), and daily heartburn (prilosec helps). Fasting glucoses running 80-100s; postprandials 140-160s. Patient reports she was out of lantus for 2 days; her first dose after being out was this AM. Insulin dosing has not changed.    Her prenatal care is complicated by (and status): see below.  Patient Active Problem List   Diagnosis    Mixed hyperlipidemia    Previous bariatric surgery complicating pregnancy    S/P LSG  - Postop Hemorrhage    FH: bariatric surgery    Type 2 diabetes mellitus affecting pregnancy, antepartum    GERD (gastroesophageal reflux disease)    Dehydration    Vomiting    Major depressive disorder with single episode, in partial remission    Phentermine/topiramate exposure in current pregnancy    AMA (advanced maternal age) multigravida 35+    History of  delivery, currently pregnant    Current moderate episode of major depressive disorder without prior episode    History of gastric surgery    Maternal iron deficiency anemia affecting pregnancy in third trimester, antepartum    History of sleeve gastrectomy    Abdominal pain affecting pregnancy    Third trimester pregnancy       Flu Status: Already given in current flu season  RSV:  no longer available in clinic  Ultrasound Today: No  Non Stress Test: Yes, 20+ minutes  non-stress test: NST: Reactive  indication: Advanced Maternal Age, DM Type 2    ROS -   Patient Denies: Loss of Fluid, Vaginal Spotting, Vision Changes, Nausea , Vomiting , Epigastric pain, and skin itching  Fetal Movement: normal  Other than what is documented in the HPI, all other systems reviewed and are  Please advise on mychart message from patient  Windy Fuchs CMA    negative.       The additional following portions of the patient's history were reviewed and updated as appropriate: allergies, current medications, past family history, past medical history, past social history, past surgical history, and problem list.    I have reviewed and agree with the HPI, ROS, and historical information as entered above. Lucho Marinelli MD     /76 Comment: large cuff  Wt 105 kg (232 lb 3.2 oz)   LMP 06/04/2024 (Exact Date)   BMI 38.64 kg/m²       EXAM:     Prenatal Vitals  BP: 118/76 (large cuff)  Weight: 105 kg (232 lb 3.2 oz)   Fetal Heart Rate: NST+               Urine Glucose Read-only: (!) 500 mg/dL  Urine Protein Read-only: (!) Trace           Assessment and Plan    Problem List Items Addressed This Visit       Type 2 diabetes mellitus affecting pregnancy, antepartum    Relevant Medications    Insulin Pen Needle (Pen Needles) 32G X 4 MM misc    glucagon (GLUCAGEN) 1 MG injection    Continuous Glucose Sensor (Dexcom G7 Sensor) misc    aspirin 81 MG EC tablet    Insulin Glargine (Lantus SoloStar) 100 UNIT/ML injection pen    metFORMIN ER (GLUCOPHAGE-XR) 500 MG 24 hr tablet    glucose (DEX4) 4 GM chewable tablet    Insulin Lispro Kang KwikPen 100 UNIT/ML solution pen-injector    AMA (advanced maternal age) multigravida 35+    Relevant Medications    aspirin 81 MG EC tablet     Other Visit Diagnoses       Prenatal care, third trimester    -  Primary    Relevant Orders    POC Glucose, Urine, Qualitative, Dipstick (Completed)    POC Protein, Urine, Qualitative, Dipstick (Completed)    34 weeks gestation of pregnancy        Relevant Orders    POC Glucose, Urine, Qualitative, Dipstick (Completed)    POC Protein, Urine, Qualitative, Dipstick (Completed)            Pregnancy at 34w3d  Fetal status reassuring.   Activity and Exercise discussed.  Fetal movement/PTL or Labor precautions  Reviewed FSBS goals: fasting <90, 2hr PP < 120  GBS next visit  Planning 37w1d RCS with tubal. Timing  subject to change per M recs.   Abdomen nontender. Precautions provided regarding contractions  Return for  testing as scheduled.    Lucho Marinelli MD  2025

## 2025-03-02 ENCOUNTER — ANESTHESIA EVENT (OUTPATIENT)
Dept: LABOR AND DELIVERY | Facility: HOSPITAL | Age: 35
End: 2025-03-02
Payer: COMMERCIAL

## 2025-03-02 ENCOUNTER — HOSPITAL ENCOUNTER (INPATIENT)
Facility: HOSPITAL | Age: 35
LOS: 4 days | Discharge: HOME | End: 2025-03-06
Attending: OBSTETRICS & GYNECOLOGY | Admitting: OBSTETRICS & GYNECOLOGY
Payer: COMMERCIAL

## 2025-03-02 ENCOUNTER — ANESTHESIA (OUTPATIENT)
Dept: LABOR AND DELIVERY | Facility: HOSPITAL | Age: 35
End: 2025-03-02
Payer: COMMERCIAL

## 2025-03-02 DIAGNOSIS — O24.119 TYPE 2 DIABETES MELLITUS AFFECTING PREGNANCY, ANTEPARTUM: ICD-10-CM

## 2025-03-02 PROBLEM — S37.69XA UTERINE RUPTURE: Status: ACTIVE | Noted: 2025-03-02

## 2025-03-02 LAB
ABO GROUP BLD: NORMAL
ALP SERPL-CCNC: 123 U/L (ref 39–117)
ALT SERPL W P-5'-P-CCNC: 13 U/L (ref 1–33)
AST SERPL-CCNC: 12 U/L (ref 1–32)
ATMOSPHERIC PRESS: ABNORMAL MM[HG]
ATMOSPHERIC PRESS: ABNORMAL MM[HG]
BASE EXCESS BLDCOA CALC-SCNC: -3.3 MMOL/L (ref 0–2)
BASE EXCESS BLDCOV CALC-SCNC: -3.3 MMOL/L (ref 0–2)
BDY SITE: ABNORMAL
BDY SITE: ABNORMAL
BILIRUB SERPL-MCNC: <0.2 MG/DL (ref 0–1.2)
BLD GP AB SCN SERPL QL: NEGATIVE
BODY TEMPERATURE: 37
BODY TEMPERATURE: 37
CO2 BLDA-SCNC: 23.3 MMOL/L (ref 22–33)
CO2 BLDA-SCNC: 24.9 MMOL/L (ref 22–33)
CREAT SERPL-MCNC: 0.42 MG/DL (ref 0.57–1)
DEPRECATED RDW RBC AUTO: 65.2 FL (ref 37–54)
EPAP: 0
EPAP: 0
ERYTHROCYTE [DISTWIDTH] IN BLOOD BY AUTOMATED COUNT: 21.5 % (ref 12.3–15.4)
GLUCOSE BLDC GLUCOMTR-MCNC: 65 MG/DL (ref 70–130)
GLUCOSE BLDC GLUCOMTR-MCNC: 90 MG/DL (ref 70–130)
HCO3 BLDCOA-SCNC: 22.1 MMOL/L (ref 16.9–20.5)
HCO3 BLDCOV-SCNC: 23.4 MMOL/L (ref 18.6–21.4)
HCT VFR BLD AUTO: 34 % (ref 34–46.6)
HGB BLD-MCNC: 11.5 G/DL (ref 12–15.9)
HGB BLDA-MCNC: 14.7 G/DL (ref 14–18)
HGB BLDA-MCNC: 15.4 G/DL (ref 14–18)
INHALED O2 CONCENTRATION: 21 %
INHALED O2 CONCENTRATION: 21 %
IPAP: 0
IPAP: 0
LDH SERPL-CCNC: 179 U/L (ref 135–214)
MCH RBC QN AUTO: 29.3 PG (ref 26.6–33)
MCHC RBC AUTO-ENTMCNC: 33.8 G/DL (ref 31.5–35.7)
MCV RBC AUTO: 86.5 FL (ref 79–97)
MODALITY: ABNORMAL
MODALITY: ABNORMAL
NOTE: 0
PAW @ PEAK INSP FLOW SETTING VENT: 0 CMH2O
PAW @ PEAK INSP FLOW SETTING VENT: 0 CMH2O
PCO2 BLDCOA: 40.1 MMHG (ref 43.3–54.9)
PCO2 BLDCOV: 47.2 MM HG (ref 28–40)
PH BLDCOA: 7.35 PH UNITS (ref 7.22–7.3)
PH BLDCOV: 7.3 PH UNITS (ref 7.31–7.37)
PLATELET # BLD AUTO: 149 10*3/MM3 (ref 140–450)
PMV BLD AUTO: 11 FL (ref 6–12)
PO2 BLDCOA: 34.6 MMHG (ref 11.5–43.3)
PO2 BLDCOV: 24.8 MM HG (ref 21–31)
RBC # BLD AUTO: 3.93 10*6/MM3 (ref 3.77–5.28)
RH BLD: POSITIVE
SAO2 % BLDCOA: 73.5 %
SAO2 % BLDCOA: ABNORMAL %
SAO2 % BLDCOV: 54.6 %
T&S EXPIRATION DATE: NORMAL
TOTAL RATE: 0 BREATHS/MINUTE
TOTAL RATE: 0 BREATHS/MINUTE
URATE SERPL-MCNC: 2.6 MG/DL (ref 2.4–5.7)
VENTILATOR MODE: ABNORMAL
VENTILATOR MODE: ABNORMAL
WBC NRBC COR # BLD AUTO: 7.13 10*3/MM3 (ref 3.4–10.8)

## 2025-03-02 PROCEDURE — 25810000003 LACTATED RINGERS PER 1000 ML: Performed by: OBSTETRICS & GYNECOLOGY

## 2025-03-02 PROCEDURE — 25010000002 ONDANSETRON PER 1 MG: Performed by: ANESTHESIOLOGY

## 2025-03-02 PROCEDURE — 25010000002 AZITHROMYCIN PER 500 MG: Performed by: OBSTETRICS & GYNECOLOGY

## 2025-03-02 PROCEDURE — 88307 TISSUE EXAM BY PATHOLOGIST: CPT | Performed by: OBSTETRICS & GYNECOLOGY

## 2025-03-02 PROCEDURE — 99222 1ST HOSP IP/OBS MODERATE 55: CPT | Performed by: OBSTETRICS & GYNECOLOGY

## 2025-03-02 PROCEDURE — 25010000002 KETOROLAC TROMETHAMINE PER 15 MG: Performed by: OBSTETRICS & GYNECOLOGY

## 2025-03-02 PROCEDURE — 82948 REAGENT STRIP/BLOOD GLUCOSE: CPT

## 2025-03-02 PROCEDURE — 86780 TREPONEMA PALLIDUM: CPT | Performed by: OBSTETRICS & GYNECOLOGY

## 2025-03-02 PROCEDURE — 63710000001 INSULIN GLARGINE PER 5 UNITS: Performed by: OBSTETRICS & GYNECOLOGY

## 2025-03-02 PROCEDURE — 25010000002 METOCLOPRAMIDE PER 10 MG: Performed by: ANESTHESIOLOGY

## 2025-03-02 PROCEDURE — 59025 FETAL NON-STRESS TEST: CPT | Performed by: OBSTETRICS & GYNECOLOGY

## 2025-03-02 PROCEDURE — 25810000003 SODIUM CHLORIDE 0.9 % SOLUTION 250 ML FLEX CONT: Performed by: OBSTETRICS & GYNECOLOGY

## 2025-03-02 PROCEDURE — 0UT70ZZ RESECTION OF BILATERAL FALLOPIAN TUBES, OPEN APPROACH: ICD-10-PCS | Performed by: OBSTETRICS & GYNECOLOGY

## 2025-03-02 PROCEDURE — 86850 RBC ANTIBODY SCREEN: CPT | Performed by: OBSTETRICS & GYNECOLOGY

## 2025-03-02 PROCEDURE — 88302 TISSUE EXAM BY PATHOLOGIST: CPT | Performed by: OBSTETRICS & GYNECOLOGY

## 2025-03-02 PROCEDURE — 82565 ASSAY OF CREATININE: CPT | Performed by: OBSTETRICS & GYNECOLOGY

## 2025-03-02 PROCEDURE — 84460 ALANINE AMINO (ALT) (SGPT): CPT | Performed by: OBSTETRICS & GYNECOLOGY

## 2025-03-02 PROCEDURE — 25010000002 MIDAZOLAM PER 1 MG: Performed by: ANESTHESIOLOGY

## 2025-03-02 PROCEDURE — 25010000002 BUPIVACAINE IN DEXTROSE 0.75-8.25 % SOLUTION: Performed by: ANESTHESIOLOGY

## 2025-03-02 PROCEDURE — 25010000002 OXYTOCIN PER 10 UNITS: Performed by: ANESTHESIOLOGY

## 2025-03-02 PROCEDURE — 83615 LACTATE (LD) (LDH) ENZYME: CPT | Performed by: OBSTETRICS & GYNECOLOGY

## 2025-03-02 PROCEDURE — 25010000002 METHYLERGONOVINE MALEATE PER 0.2 MG: Performed by: ANESTHESIOLOGY

## 2025-03-02 PROCEDURE — 82247 BILIRUBIN TOTAL: CPT | Performed by: OBSTETRICS & GYNECOLOGY

## 2025-03-02 PROCEDURE — 84075 ASSAY ALKALINE PHOSPHATASE: CPT | Performed by: OBSTETRICS & GYNECOLOGY

## 2025-03-02 PROCEDURE — 86900 BLOOD TYPING SEROLOGIC ABO: CPT | Performed by: OBSTETRICS & GYNECOLOGY

## 2025-03-02 PROCEDURE — 25010000002 MORPHINE PER 10 MG: Performed by: ANESTHESIOLOGY

## 2025-03-02 PROCEDURE — 25010000002 CEFAZOLIN PER 500 MG: Performed by: OBSTETRICS & GYNECOLOGY

## 2025-03-02 PROCEDURE — 84550 ASSAY OF BLOOD/URIC ACID: CPT | Performed by: OBSTETRICS & GYNECOLOGY

## 2025-03-02 PROCEDURE — 84450 TRANSFERASE (AST) (SGOT): CPT | Performed by: OBSTETRICS & GYNECOLOGY

## 2025-03-02 PROCEDURE — 82805 BLOOD GASES W/O2 SATURATION: CPT

## 2025-03-02 PROCEDURE — 59515 CESAREAN DELIVERY: CPT | Performed by: OBSTETRICS & GYNECOLOGY

## 2025-03-02 PROCEDURE — 59025 FETAL NON-STRESS TEST: CPT

## 2025-03-02 PROCEDURE — 58611 LIGATE OVIDUCT(S) ADD-ON: CPT | Performed by: OBSTETRICS & GYNECOLOGY

## 2025-03-02 PROCEDURE — 86901 BLOOD TYPING SEROLOGIC RH(D): CPT | Performed by: OBSTETRICS & GYNECOLOGY

## 2025-03-02 PROCEDURE — 85027 COMPLETE CBC AUTOMATED: CPT | Performed by: OBSTETRICS & GYNECOLOGY

## 2025-03-02 PROCEDURE — 25810000003 LACTATED RINGERS SOLUTION: Performed by: OBSTETRICS & GYNECOLOGY

## 2025-03-02 PROCEDURE — 25010000002 FENTANYL CITRATE (PF) 100 MCG/2ML SOLUTION: Performed by: ANESTHESIOLOGY

## 2025-03-02 RX ORDER — ONDANSETRON 2 MG/ML
INJECTION INTRAMUSCULAR; INTRAVENOUS AS NEEDED
Status: DISCONTINUED | OUTPATIENT
Start: 2025-03-02 | End: 2025-03-02 | Stop reason: SURG

## 2025-03-02 RX ORDER — SODIUM CHLORIDE 9 MG/ML
40 INJECTION, SOLUTION INTRAVENOUS AS NEEDED
Status: DISCONTINUED | OUTPATIENT
Start: 2025-03-02 | End: 2025-03-02 | Stop reason: HOSPADM

## 2025-03-02 RX ORDER — BUPIVACAINE HYDROCHLORIDE 7.5 MG/ML
INJECTION, SOLUTION INTRASPINAL
Status: COMPLETED | OUTPATIENT
Start: 2025-03-02 | End: 2025-03-02

## 2025-03-02 RX ORDER — OXYTOCIN/0.9 % SODIUM CHLORIDE 30/500 ML
650 PLASTIC BAG, INJECTION (ML) INTRAVENOUS ONCE
Status: DISCONTINUED | OUTPATIENT
Start: 2025-03-02 | End: 2025-03-02 | Stop reason: HOSPADM

## 2025-03-02 RX ORDER — METHYLERGONOVINE MALEATE 0.2 MG/ML
INJECTION INTRAVENOUS AS NEEDED
Status: DISCONTINUED | OUTPATIENT
Start: 2025-03-02 | End: 2025-03-02 | Stop reason: SURG

## 2025-03-02 RX ORDER — SODIUM CHLORIDE 0.9 % (FLUSH) 0.9 %
10 SYRINGE (ML) INJECTION EVERY 12 HOURS SCHEDULED
Status: DISCONTINUED | OUTPATIENT
Start: 2025-03-02 | End: 2025-03-02 | Stop reason: HOSPADM

## 2025-03-02 RX ORDER — CARBOPROST TROMETHAMINE 250 UG/ML
250 INJECTION, SOLUTION INTRAMUSCULAR AS NEEDED
Status: DISCONTINUED | OUTPATIENT
Start: 2025-03-02 | End: 2025-03-02 | Stop reason: HOSPADM

## 2025-03-02 RX ORDER — OXYTOCIN/0.9 % SODIUM CHLORIDE 30/500 ML
PLASTIC BAG, INJECTION (ML) INTRAVENOUS AS NEEDED
Status: DISCONTINUED | OUTPATIENT
Start: 2025-03-02 | End: 2025-03-02 | Stop reason: SURG

## 2025-03-02 RX ORDER — SODIUM CHLORIDE, SODIUM LACTATE, POTASSIUM CHLORIDE, CALCIUM CHLORIDE 600; 310; 30; 20 MG/100ML; MG/100ML; MG/100ML; MG/100ML
125 INJECTION, SOLUTION INTRAVENOUS CONTINUOUS
Status: ACTIVE | OUTPATIENT
Start: 2025-03-02 | End: 2025-03-03

## 2025-03-02 RX ORDER — MIDAZOLAM HYDROCHLORIDE 1 MG/ML
INJECTION, SOLUTION INTRAMUSCULAR; INTRAVENOUS AS NEEDED
Status: DISCONTINUED | OUTPATIENT
Start: 2025-03-02 | End: 2025-03-02 | Stop reason: SURG

## 2025-03-02 RX ORDER — METHYLERGONOVINE MALEATE 0.2 MG/ML
200 INJECTION INTRAVENOUS ONCE AS NEEDED
Status: DISCONTINUED | OUTPATIENT
Start: 2025-03-02 | End: 2025-03-02 | Stop reason: HOSPADM

## 2025-03-02 RX ORDER — SODIUM CHLORIDE, SODIUM LACTATE, POTASSIUM CHLORIDE, CALCIUM CHLORIDE 600; 310; 30; 20 MG/100ML; MG/100ML; MG/100ML; MG/100ML
125 INJECTION, SOLUTION INTRAVENOUS CONTINUOUS
Status: ACTIVE | OUTPATIENT
Start: 2025-03-02 | End: 2025-03-04

## 2025-03-02 RX ORDER — DIPHENHYDRAMINE HCL 25 MG
25 CAPSULE ORAL EVERY 4 HOURS PRN
Status: CANCELLED | OUTPATIENT
Start: 2025-03-02

## 2025-03-02 RX ORDER — MORPHINE SULFATE 0.5 MG/ML
INJECTION, SOLUTION EPIDURAL; INTRATHECAL; INTRAVENOUS AS NEEDED
Status: DISCONTINUED | OUTPATIENT
Start: 2025-03-02 | End: 2025-03-02 | Stop reason: SURG

## 2025-03-02 RX ORDER — OXYTOCIN 10 [USP'U]/ML
INJECTION, SOLUTION INTRAMUSCULAR; INTRAVENOUS AS NEEDED
Status: DISCONTINUED | OUTPATIENT
Start: 2025-03-02 | End: 2025-03-02 | Stop reason: SURG

## 2025-03-02 RX ORDER — CITRIC ACID/SODIUM CITRATE 334-500MG
30 SOLUTION, ORAL ORAL ONCE
Status: DISCONTINUED | OUTPATIENT
Start: 2025-03-02 | End: 2025-03-04

## 2025-03-02 RX ORDER — METOCLOPRAMIDE HYDROCHLORIDE 5 MG/ML
INJECTION INTRAMUSCULAR; INTRAVENOUS AS NEEDED
Status: DISCONTINUED | OUTPATIENT
Start: 2025-03-02 | End: 2025-03-02 | Stop reason: SURG

## 2025-03-02 RX ORDER — CITRIC ACID/SODIUM CITRATE 334-500MG
30 SOLUTION, ORAL ORAL ONCE
Status: COMPLETED | OUTPATIENT
Start: 2025-03-02 | End: 2025-03-02

## 2025-03-02 RX ORDER — MISOPROSTOL 200 UG/1
800 TABLET ORAL AS NEEDED
Status: DISCONTINUED | OUTPATIENT
Start: 2025-03-02 | End: 2025-03-02 | Stop reason: HOSPADM

## 2025-03-02 RX ORDER — FAMOTIDINE 10 MG/ML
INJECTION, SOLUTION INTRAVENOUS AS NEEDED
Status: DISCONTINUED | OUTPATIENT
Start: 2025-03-02 | End: 2025-03-02 | Stop reason: SURG

## 2025-03-02 RX ORDER — DIPHENHYDRAMINE HYDROCHLORIDE 50 MG/ML
25 INJECTION INTRAMUSCULAR; INTRAVENOUS EVERY 4 HOURS PRN
Status: CANCELLED | OUTPATIENT
Start: 2025-03-02

## 2025-03-02 RX ORDER — SODIUM CHLORIDE 0.9 % (FLUSH) 0.9 %
10 SYRINGE (ML) INJECTION EVERY 12 HOURS SCHEDULED
Status: DISCONTINUED | OUTPATIENT
Start: 2025-03-02 | End: 2025-03-04

## 2025-03-02 RX ORDER — INSULIN LISPRO 100 [IU]/ML
10 INJECTION, SOLUTION INTRAVENOUS; SUBCUTANEOUS
Status: DISCONTINUED | OUTPATIENT
Start: 2025-03-03 | End: 2025-03-06 | Stop reason: HOSPADM

## 2025-03-02 RX ORDER — KETOROLAC TROMETHAMINE 30 MG/ML
30 INJECTION, SOLUTION INTRAMUSCULAR; INTRAVENOUS ONCE
Status: COMPLETED | OUTPATIENT
Start: 2025-03-02 | End: 2025-03-02

## 2025-03-02 RX ORDER — ACETAMINOPHEN 500 MG
1000 TABLET ORAL ONCE
Status: COMPLETED | OUTPATIENT
Start: 2025-03-02 | End: 2025-03-02

## 2025-03-02 RX ORDER — FENTANYL CITRATE 50 UG/ML
INJECTION, SOLUTION INTRAMUSCULAR; INTRAVENOUS AS NEEDED
Status: DISCONTINUED | OUTPATIENT
Start: 2025-03-02 | End: 2025-03-02 | Stop reason: SURG

## 2025-03-02 RX ORDER — SODIUM CHLORIDE 0.9 % (FLUSH) 0.9 %
10 SYRINGE (ML) INJECTION AS NEEDED
Status: DISCONTINUED | OUTPATIENT
Start: 2025-03-02 | End: 2025-03-02 | Stop reason: HOSPADM

## 2025-03-02 RX ORDER — LIDOCAINE HYDROCHLORIDE 10 MG/ML
0.5 INJECTION, SOLUTION EPIDURAL; INFILTRATION; INTRACAUDAL; PERINEURAL ONCE AS NEEDED
Status: DISCONTINUED | OUTPATIENT
Start: 2025-03-02 | End: 2025-03-02 | Stop reason: HOSPADM

## 2025-03-02 RX ORDER — ACETAMINOPHEN 500 MG
1000 TABLET ORAL EVERY 6 HOURS PRN
Status: DISCONTINUED | OUTPATIENT
Start: 2025-03-02 | End: 2025-03-06 | Stop reason: HOSPADM

## 2025-03-02 RX ORDER — SODIUM CHLORIDE 0.9 % (FLUSH) 0.9 %
10 SYRINGE (ML) INJECTION AS NEEDED
Status: DISCONTINUED | OUTPATIENT
Start: 2025-03-02 | End: 2025-03-04

## 2025-03-02 RX ORDER — DIPHENHYDRAMINE HYDROCHLORIDE 50 MG/ML
25 INJECTION INTRAMUSCULAR; INTRAVENOUS ONCE AS NEEDED
Status: CANCELLED | OUTPATIENT
Start: 2025-03-02

## 2025-03-02 RX ORDER — OXYTOCIN/0.9 % SODIUM CHLORIDE 30/500 ML
85 PLASTIC BAG, INJECTION (ML) INTRAVENOUS ONCE
Status: DISCONTINUED | OUTPATIENT
Start: 2025-03-02 | End: 2025-03-02 | Stop reason: HOSPADM

## 2025-03-02 RX ADMIN — SODIUM CHLORIDE, SODIUM LACTATE, POTASSIUM CHLORIDE, CALCIUM CHLORIDE: 20; 30; 600; 310 INJECTION, SOLUTION INTRAVENOUS at 21:25

## 2025-03-02 RX ADMIN — METHYLERGONOVINE MALEATE 200 MCG: 0.2 INJECTION, SOLUTION INTRAMUSCULAR; INTRAVENOUS at 21:16

## 2025-03-02 RX ADMIN — BUPIVACAINE HYDROCHLORIDE IN DEXTROSE 1.6 ML: 7.5 INJECTION, SOLUTION SUBARACHNOID at 20:58

## 2025-03-02 RX ADMIN — SODIUM CHLORIDE, SODIUM LACTATE, POTASSIUM CHLORIDE, CALCIUM CHLORIDE 125 ML/HR: 20; 30; 600; 310 INJECTION, SOLUTION INTRAVENOUS at 19:18

## 2025-03-02 RX ADMIN — AZITHROMYCIN DIHYDRATE 500 MG: 500 INJECTION, POWDER, LYOPHILIZED, FOR SOLUTION INTRAVENOUS at 20:47

## 2025-03-02 RX ADMIN — FENTANYL CITRATE 20 MCG: 50 INJECTION, SOLUTION INTRAMUSCULAR; INTRAVENOUS at 20:58

## 2025-03-02 RX ADMIN — MIDAZOLAM HYDROCHLORIDE 1 MG: 1 INJECTION, SOLUTION INTRAMUSCULAR; INTRAVENOUS at 21:25

## 2025-03-02 RX ADMIN — MIDAZOLAM HYDROCHLORIDE 1 MG: 1 INJECTION, SOLUTION INTRAMUSCULAR; INTRAVENOUS at 21:30

## 2025-03-02 RX ADMIN — SODIUM CHLORIDE, SODIUM LACTATE, POTASSIUM CHLORIDE, CALCIUM CHLORIDE: 20; 30; 600; 310 INJECTION, SOLUTION INTRAVENOUS at 20:54

## 2025-03-02 RX ADMIN — MORPHINE SULFATE 0.15 MG: 0.5 INJECTION, SOLUTION EPIDURAL; INTRATHECAL; INTRAVENOUS at 20:58

## 2025-03-02 RX ADMIN — OXYTOCIN 3 UNITS: 10 INJECTION, SOLUTION INTRAMUSCULAR; INTRAVENOUS at 21:15

## 2025-03-02 RX ADMIN — ONDANSETRON 4 MG: 2 INJECTION INTRAMUSCULAR; INTRAVENOUS at 20:53

## 2025-03-02 RX ADMIN — SODIUM CITRATE AND CITRIC ACID MONOHYDRATE 30 ML: 500; 334 SOLUTION ORAL at 20:50

## 2025-03-02 RX ADMIN — FAMOTIDINE 20 MG: 10 INJECTION, SOLUTION INTRAVENOUS at 20:53

## 2025-03-02 RX ADMIN — SODIUM CHLORIDE 2 G: 900 INJECTION INTRAVENOUS at 20:47

## 2025-03-02 RX ADMIN — MIDAZOLAM HYDROCHLORIDE 1 MG: 1 INJECTION, SOLUTION INTRAMUSCULAR; INTRAVENOUS at 21:18

## 2025-03-02 RX ADMIN — OXYTOCIN 4 UNITS: 10 INJECTION, SOLUTION INTRAMUSCULAR; INTRAVENOUS at 21:30

## 2025-03-02 RX ADMIN — METOCLOPRAMIDE 10 MG: 5 INJECTION, SOLUTION INTRAMUSCULAR; INTRAVENOUS at 21:09

## 2025-03-02 RX ADMIN — OXYTOCIN 3 UNITS: 10 INJECTION, SOLUTION INTRAMUSCULAR; INTRAVENOUS at 21:22

## 2025-03-02 RX ADMIN — Medication 500 ML: at 21:30

## 2025-03-02 RX ADMIN — Medication 500 ML: at 21:15

## 2025-03-02 RX ADMIN — KETOROLAC TROMETHAMINE 30 MG: 30 INJECTION, SOLUTION INTRAMUSCULAR; INTRAVENOUS at 22:50

## 2025-03-02 RX ADMIN — MIDAZOLAM HYDROCHLORIDE 1 MG: 1 INJECTION, SOLUTION INTRAMUSCULAR; INTRAVENOUS at 20:53

## 2025-03-02 RX ADMIN — ACETAMINOPHEN 1000 MG: 500 TABLET ORAL at 20:47

## 2025-03-02 RX ADMIN — SODIUM CHLORIDE, SODIUM LACTATE, POTASSIUM CHLORIDE, CALCIUM CHLORIDE 1000 ML: 20; 30; 600; 310 INJECTION, SOLUTION INTRAVENOUS at 18:07

## 2025-03-02 RX ADMIN — INSULIN GLARGINE 10 UNITS: 100 INJECTION, SOLUTION SUBCUTANEOUS at 23:16

## 2025-03-02 RX ADMIN — FENTANYL CITRATE 80 MCG: 50 INJECTION, SOLUTION INTRAMUSCULAR; INTRAVENOUS at 21:25

## 2025-03-02 NOTE — H&P
" [place]  Obstetric History and Physical    Referring Provider: Lucho Marinelli      Chief Complaint   Patient presents with    Contractions       Subjective     Patient is a 35 y.o. female  currently at 34w5d, who presents with complaint of contractions lower abdominal pain.  Reports mild to moderate contractions every 5 to 10 minutes with intense sharp lower abdominal discomfort.  Patient denies nausea, vomiting, leaking of fluid, vaginal bleeding, fever, reports normal fetal activity.   course complicated by AMA, prior  x 4, type 2 diabetes requiring insulin fair control, iron deficiency anemia status post parenteral infusions,  and history of bariatric surgery.        The following portions of the patients history were reviewed and updated as appropriate: current medications, allergies, past medical history, past surgical history, past family history, past social history, and problem list .       Prenatal Information:   Maternal Prenatal Labs  Blood Type No results found for: \"ABO\"   Rh Status No results found for: \"RH\"   Antibody Screen No results found for: \"ABSCRN\"   Gonnorhea No results found for: \"GCCX\"   Chlamydia No results found for: \"CLAMYDCU\"   RPR No results found for: \"RPR\"   Syphilis Antibody No results found for: \"SYPHILIS\"   Rubella No results found for: \"RUBELLAIGGIN\"   Hepatitis B Surface Antigen No results found for: \"HEPBSAG\"   HIV-1 Antibody No results found for: \"LABHIV1\"   Hepatitis C Antibody No results found for: \"HEPCAB\"   Rapid [place]rin Drug Screen No results found for: \"AMPMETHU\", \"BARBITSCNUR\", \"LABBENZSCN\", \"LABMETHSCN\", \"LABOPIASCN\", \"THCURSCR\", \"COCAINEUR\", \"AMPHETSCREEN\", \"PROPOXSCN\", \"BUPRENORSCNU\", \"METAMPSCNUR\", \"OXYCODONESCN\", \"TRICYCLICSCN\"   Group B Strep Culture No results found for: \"GBSANTIGEN\"           External Prenatal Results       Pregnancy Outside Results - Transcribed From Office Records - See Scanned Records For Details       Test Value Date " Time    ABO  B  01/20/25 1910    Rh  Positive  01/20/25 1910    Antibody Screen  Negative  01/20/25 1910       Negative  01/14/25 1713       Negative  09/03/24 1558    Varicella IgG  225 index 09/03/24 1558    Rubella  1.03 index 09/03/24 1558    Hgb  11.5 g/dL 03/02/25 1814       10.8 g/dL 02/24/25 1313       9.7 g/dL 01/20/25 1910       10.3 g/dL 01/14/25 1713       12.7 g/dL 09/03/24 1558       12.2 g/dL 08/19/24 1503    Hct  34.0 % 03/02/25 1814       32.5 % 02/24/25 1313       30.0 % 01/20/25 1910       31.1 % 01/14/25 1713       38.7 % 09/03/24 1558       36.7 % 08/19/24 1503    HgB A1c   6.9 % 08/19/24 1503    1h GTT       3h GTT Fasting       3h GTT 1 hour       3h GTT 2 hour       3h GTT 3 hour        Gonorrhea (discrete)  Negative  09/03/24 1600    Chlamydia (discrete)  Negative  09/03/24 1600    RPR  Non Reactive  01/14/25 1713       Non Reactive  09/03/24 1558    Syphils cascade: TP-Ab (FTA)  Non-Reactive  01/20/25 1910    TP-Ab  Non-Reactive  01/20/25 1910    TP-Ab (EIA)       TPPA       HBsAg  Negative  09/03/24 1558    Herpes Simplex Virus PCR       Herpes Simplex VIrus Culture       HIV  Non Reactive  09/03/24 1558    Hep C RNA Quant PCR       Hep C Antibody  Non Reactive  09/03/24 1558    AFP       NIPT       Cystic Fibrosis (Elijah)       Cystic Fibroisis        Spinal Muscular atrophy       Fragile X       Group B Strep       GBS Susceptibility to Clindamycin       GBS Susceptibility to Erythromycin       Fetal Fibronectin       Genetic Testing, Maternal Blood                 Drug Screening       Test Value Date Time    Urine Drug Screen       Amphetamine Screen  Negative ng/mL 09/03/24 1600    Barbiturate Screen  Negative ng/mL 09/03/24 1600    Benzodiazepine Screen  Negative ng/mL 09/03/24 1600    Methadone Screen  Negative ng/mL 09/03/24 1600    Phencyclidine Screen  Negative ng/mL 09/03/24 1600    Opiates Screen       THC Screen       Cocaine Screen       Propoxyphene Screen  Negative ng/mL  24 1600    Buprenorphine Screen       Methamphetamine Screen       Oxycodone Screen       Tricyclic Antidepressants Screen                 Legend    ^: Historical                              Past OB History:       OB History    Para Term  AB Living   5 4 4 0 0 4   SAB IAB Ectopic Molar Multiple Live Births   0 0 0 0 0 4      # Outcome Date GA Lbr Naga/2nd Weight Sex Type Anes PTL Lv   5 Current            4 Term 21 37w0d  3572 g (7 lb 14 oz) M CS-LTranv   [facility]   3 Term 11/06/15 37w0d  4423 g (9 lb 12 oz) M CS-LTranv   [facility]   2 Term 05/15/13 37w0d  4309 g (9 lb 8 oz) F CS-LTranv   [facility]   1 Term 08 37w0d  4082 g (9 lb) M CS-LTranv   [facility]      Obstetric Comments   2008- 37w [id number]-male- SJ Moreno- adoption   2013-37w EUI-DIL-[id number]-female-SJE-Veloudis   2015-37w [id number]-male-SJE-Veloudis   2021-37w RCS-7lbs 14oz-male-SJE-Veloudis          Past Medical History: Past Medical History:   Diagnosis Date    Anxiety     COVID-19     ,     Diabetes mellitus, type II     w/ hx gestational diabetes , dx May 2017, started on insulin when initially dx, lost weight w/ Adipex, stopped insulin 6 months after dx, but now w/ weight regain, back on insulin.  Managed by PCP    Dyspepsia     Dyspnea on exertion     Fatigue     Fatty liver     w/ abnormal LFTs, US 2018      GERD (gastroesophageal reflux disease) 2023    Gestational diabetes     diagnosed with gestational diabetes with pregnancy in  and     H/O H. pylori infection     UBT (+) 19 - PrevPak RX      Herpes 2017    Hiatal hernia 2023    Added automatically from request for surgery [id number]      Hiatal hernia     Hernia in  when sleeve done and then repaired in     History of Helicobacter pylori infection     UBT (+) 19 - PrevPak RX; repeat HPSA (+) 2019 - Pylera RX; repeat UBT (holding PPI) (+) - referred to GI, RX Levaquin/Flagyl/Pepto RX  - HPSA negative 2023     History of transfusion     [facility]- PT UNSURE HOW MANY UNITS RECEIVED, NO REACTIONS    Maternal iron deficiency anemia affecting pregnancy in third trimester, antepartum 2025    Mixed hyperlipidemia 10/15/2019    NAFL (nonalcoholic fatty liver)     w/ abnormal LFTs, US 2018    Obesity (BMI 30-39.9)     Personal history of gestational diabetes 2017    Wears eyeglasses       Past Surgical History Past Surgical History:   Procedure Laterality Date    BREAST BIOPSY      CERVICAL CONE BIOPSY  2004    benign     SECTION      , , ,     ENDOSCOPY N/A 2019    Procedure: ESOPHAGOGASTRODUODENOSCOPY;  Surgeon: Mila Whatley MD;  Location:  [facility];  Service: Bariatric    EXCESSIVE THIGH / HIP / BUTTOCK / FLANK SKIN EXCISION Bilateral 05/15/2024        GASTRIC SLEEVE LAPAROSCOPIC N/A 2019    Procedure: GASTRIC SLEEVE LAPAROSCOPIC;  Surgeon: Mila Whatley MD;  Location:  [facility];  Service: Bariatric    HIATAL HERNIA REPAIR N/A 2019    Procedure: HIATAL HERNIA REPAIR LAPAROSCOPIC;  Surgeon: Mila Whatley MD;  Location:  [facility];  Service: Bariatric    HIATAL HERNIA REPAIR N/A 2023    Procedure: LAPAROSCOPIC HIATAL HERNIA REPAIR WITH MESH WITH DAVINCI ROBOT;  Surgeon: Mila Whatley MD;  Location:  [facility];  Service: Robotics - DaVinci;  Laterality: N/A;    UPPER GASTROINTESTINAL ENDOSCOPY  2019    Dr VIVIENNE Whatley    UPPER GASTROINTESTINAL ENDOSCOPY  2022    Dr Aldrich, positive H pylori    WISDOM TOOTH EXTRACTION        Family History: Family History   Problem Relation Age of Onset    Stroke Mother     Heart disease Mother     Diabetes Mother     Obesity Mother     Hypertension Mother             Arthritis Mother     Diabetes Father     Hypertension Father     Heart disease Father     Arthritis Father       Social History:  reports that she quit smoking about 12 years ago. Her smoking use included cigarettes.  She started smoking about 14 years ago. She has a 4 pack-year smoking history. She has never used smokeless tobacco.   reports no history of alcohol use.   reports no history of drug use.                   General ROS Negative Findings:Headaches, Visual Changes, Epigastric pain, Anorexia, Nausia/Vomiting, ROM, and Vaginal Bleeding    ROS     All other systems have been reviewed and are neg  Objective       Vital Signs Range for the last 24 hours  Temperature: Temp:  [98.2 °F (36.8 °C)] 98.2 °F (36.8 °C)   Temp Source: Temp src: Oral   BP: BP: (123)/(70) 123/70   Pulse: Heart Rate:  [106] 106   Respirations: Resp:  [18] 18   SPO2: SpO2:  [99 %] 99 %   O2 Amount (l/min):     O2 Devices Device (Oxygen Therapy): room air   Weight: Weight:  [105 kg (232 lb)] 105 kg (232 lb)     Physical Examination:   General:   alert, appears stated age, and cooperative   Skin:   normal   HEENT:     Lungs:   clear to auscultation bilaterally   Heart:   regular rate and rhythm, S1, S2 normal, no murmur, click, rub or gallop   Gastrointestinal: Soft, gravid uterus nontender no guarding benign exam   Lower Extremities: Trace of edema  no calf tenderness   :    Musculoskeletal:     Neuro: Deficits none           Fetal Heart Rate Assessment   Method:     Beats/min:     Baseline:     Varibility:     Accels:     Decels:     Tracing Category:     NST-indications contractions, interpretation reactive, moderate variability, accelerations present 15 x 15, no fetal deceleration noted, onset 1730 endtime 1812 irr ctx  Uterine Assessment   Method:     Frequency (min):     Ctx Count in 10 min:     Duration:     Intensity:     Intensity by IUPC:     Resting Tone:     Resting Tone by IUPC:     [place] Units:       Laboratory Results:   Lab Results (last 24 hours)       Procedure Component Value Units Date/Time    CBC (No Diff) [[id number]]  (Abnormal) Collected: 03/02/25 1814    Specimen: Blood Updated: 03/02/25 1816     WBC 7.13 10*3/mm3      RBC  3.93 10*6/mm3      Hemoglobin 11.5 g/dL      Hematocrit 34.0 %      MCV 86.5 fL      MCH 29.3 pg      MCHC 33.8 g/dL      RDW 21.5 %      RDW-SD 65.2 fl      MPV 11.0 fL      Platelets 149 10*3/mm3     Preeclampsia Panel [[id number]] Collected: 25    Specimen: Blood Updated: 25    Treponema pallidum AB w/Reflex RPR [[id number]] Collected: 25    Specimen: Blood Updated: 25          Radiology Review:   Imaging Results (Last 24 Hours)       ** No results found for the last 24 hours. **          Other Studies:    Assessment & Plan       Third trimester pregnancy    Previous bariatric surgery complicating pregnancy    Type 2 diabetes mellitus affecting pregnancy, antepartum    AMA (advanced maternal age) multigravida 35+    History of  delivery, currently pregnant    History of sleeve gastrectomy        Assessment:  1.  Intrauterine pregnancy at 34w5d gestation with reactive fetal status.    2.   contractions with lower abdominal pain  3.  Prior  x 4  4.  Type 2 diabetes requiring insulin fair control  5.   AMA  6.   Obesity with BMI of 38.61  7.   Status post steroids and magnesium sulfate at 29 weeks gestation  Plan:  1.  Admit, IV hydration, labs, n.p.o., continuous EFM, home medications, fingersticks  2. Plan of care has been reviewed with patient.  3.  Risks, benefits of treatment plan have been discussed.  4.  All questions have been answered.  5   discussed with Dr. Forester Enrico Greenwood DO  3/2/2025  18:26 EST

## 2025-03-03 ENCOUNTER — APPOINTMENT (OUTPATIENT)
Dept: WOMENS IMAGING | Facility: HOSPITAL | Age: 35
End: 2025-03-03
Payer: COMMERCIAL

## 2025-03-03 LAB
GLUCOSE BLDC GLUCOMTR-MCNC: 197 MG/DL (ref 70–130)
GLUCOSE BLDC GLUCOMTR-MCNC: 74 MG/DL (ref 70–130)
GLUCOSE BLDC GLUCOMTR-MCNC: 87 MG/DL (ref 70–130)
GLUCOSE BLDC GLUCOMTR-MCNC: 88 MG/DL (ref 70–130)
GLUCOSE BLDC GLUCOMTR-MCNC: 89 MG/DL (ref 70–130)
GLUCOSE BLDC GLUCOMTR-MCNC: 98 MG/DL (ref 70–130)
TREPONEMA PALLIDUM IGG+IGM AB [PRESENCE] IN SERUM OR PLASMA BY IMMUNOASSAY: NORMAL

## 2025-03-03 PROCEDURE — 25010000002 ENOXAPARIN PER 10 MG: Performed by: OBSTETRICS & GYNECOLOGY

## 2025-03-03 PROCEDURE — 63710000001 INSULIN LISPRO (HUMAN) PER 5 UNITS: Performed by: OBSTETRICS & GYNECOLOGY

## 2025-03-03 PROCEDURE — 63710000001 INSULIN GLARGINE PER 5 UNITS: Performed by: OBSTETRICS & GYNECOLOGY

## 2025-03-03 PROCEDURE — 25010000002 KETOROLAC TROMETHAMINE PER 15 MG: Performed by: OBSTETRICS & GYNECOLOGY

## 2025-03-03 PROCEDURE — 0503F POSTPARTUM CARE VISIT: CPT | Performed by: ADVANCED PRACTICE MIDWIFE

## 2025-03-03 PROCEDURE — 82948 REAGENT STRIP/BLOOD GLUCOSE: CPT

## 2025-03-03 RX ORDER — OXYCODONE HYDROCHLORIDE 10 MG/1
10 TABLET ORAL EVERY 4 HOURS PRN
Status: DISCONTINUED | OUTPATIENT
Start: 2025-03-03 | End: 2025-03-06 | Stop reason: HOSPADM

## 2025-03-03 RX ORDER — PRENATAL VIT/IRON FUM/FOLIC AC 27MG-0.8MG
1 TABLET ORAL DAILY
Status: DISCONTINUED | OUTPATIENT
Start: 2025-03-03 | End: 2025-03-06 | Stop reason: HOSPADM

## 2025-03-03 RX ORDER — PROMETHAZINE HYDROCHLORIDE 25 MG/1
25 TABLET ORAL ONCE AS NEEDED
Status: DISCONTINUED | OUTPATIENT
Start: 2025-03-03 | End: 2025-03-06 | Stop reason: HOSPADM

## 2025-03-03 RX ORDER — PROMETHAZINE HYDROCHLORIDE 12.5 MG/1
12.5 SUPPOSITORY RECTAL ONCE AS NEEDED
Status: DISCONTINUED | OUTPATIENT
Start: 2025-03-03 | End: 2025-03-06 | Stop reason: HOSPADM

## 2025-03-03 RX ORDER — NICOTINE 21 MG/24HR
1 PATCH, TRANSDERMAL 24 HOURS TRANSDERMAL EVERY 24 HOURS
Status: DISCONTINUED | OUTPATIENT
Start: 2025-03-03 | End: 2025-03-06 | Stop reason: HOSPADM

## 2025-03-03 RX ORDER — OXYCODONE HYDROCHLORIDE 5 MG/1
5 TABLET ORAL EVERY 4 HOURS PRN
Status: DISCONTINUED | OUTPATIENT
Start: 2025-03-03 | End: 2025-03-06 | Stop reason: HOSPADM

## 2025-03-03 RX ORDER — ACETAMINOPHEN 500 MG
1000 TABLET ORAL EVERY 6 HOURS
Status: COMPLETED | OUTPATIENT
Start: 2025-03-03 | End: 2025-03-03

## 2025-03-03 RX ORDER — HYDROCORTISONE 25 MG/G
1 CREAM TOPICAL AS NEEDED
Status: DISCONTINUED | OUTPATIENT
Start: 2025-03-03 | End: 2025-03-06 | Stop reason: HOSPADM

## 2025-03-03 RX ORDER — ALUMINA, MAGNESIA, AND SIMETHICONE 2400; 2400; 240 MG/30ML; MG/30ML; MG/30ML
15 SUSPENSION ORAL EVERY 4 HOURS PRN
Status: DISCONTINUED | OUTPATIENT
Start: 2025-03-03 | End: 2025-03-06 | Stop reason: HOSPADM

## 2025-03-03 RX ORDER — DOCUSATE SODIUM 100 MG/1
100 CAPSULE, LIQUID FILLED ORAL 2 TIMES DAILY PRN
Status: DISCONTINUED | OUTPATIENT
Start: 2025-03-03 | End: 2025-03-06 | Stop reason: HOSPADM

## 2025-03-03 RX ORDER — KETOROLAC TROMETHAMINE 15 MG/ML
15 INJECTION, SOLUTION INTRAMUSCULAR; INTRAVENOUS EVERY 6 HOURS
Status: COMPLETED | OUTPATIENT
Start: 2025-03-03 | End: 2025-03-04

## 2025-03-03 RX ORDER — OXYTOCIN/0.9 % SODIUM CHLORIDE 30/500 ML
125 PLASTIC BAG, INJECTION (ML) INTRAVENOUS ONCE AS NEEDED
Status: DISCONTINUED | OUTPATIENT
Start: 2025-03-03 | End: 2025-03-04

## 2025-03-03 RX ORDER — SIMETHICONE 80 MG
80 TABLET,CHEWABLE ORAL 4 TIMES DAILY PRN
Status: DISCONTINUED | OUTPATIENT
Start: 2025-03-03 | End: 2025-03-06 | Stop reason: HOSPADM

## 2025-03-03 RX ORDER — ONDANSETRON 2 MG/ML
4 INJECTION INTRAMUSCULAR; INTRAVENOUS EVERY 6 HOURS PRN
Status: DISCONTINUED | OUTPATIENT
Start: 2025-03-03 | End: 2025-03-06 | Stop reason: HOSPADM

## 2025-03-03 RX ORDER — CALCIUM CARBONATE 500 MG/1
1 TABLET, CHEWABLE ORAL EVERY 4 HOURS PRN
Status: DISCONTINUED | OUTPATIENT
Start: 2025-03-03 | End: 2025-03-06 | Stop reason: HOSPADM

## 2025-03-03 RX ORDER — NALOXONE HCL 0.4 MG/ML
0.4 VIAL (ML) INJECTION
Status: DISCONTINUED | OUTPATIENT
Start: 2025-03-03 | End: 2025-03-04

## 2025-03-03 RX ORDER — ONDANSETRON 2 MG/ML
4 INJECTION INTRAMUSCULAR; INTRAVENOUS ONCE AS NEEDED
Status: DISCONTINUED | OUTPATIENT
Start: 2025-03-03 | End: 2025-03-04

## 2025-03-03 RX ORDER — METOCLOPRAMIDE 10 MG/1
10 TABLET ORAL ONCE AS NEEDED
Status: DISCONTINUED | OUTPATIENT
Start: 2025-03-03 | End: 2025-03-06 | Stop reason: HOSPADM

## 2025-03-03 RX ORDER — IBUPROFEN 600 MG/1
600 TABLET, FILM COATED ORAL EVERY 6 HOURS
Status: DISCONTINUED | OUTPATIENT
Start: 2025-03-04 | End: 2025-03-06 | Stop reason: HOSPADM

## 2025-03-03 RX ORDER — ENOXAPARIN SODIUM 100 MG/ML
40 INJECTION SUBCUTANEOUS NIGHTLY
Status: DISCONTINUED | OUTPATIENT
Start: 2025-03-04 | End: 2025-03-06 | Stop reason: HOSPADM

## 2025-03-03 RX ORDER — ACETAMINOPHEN 325 MG/1
650 TABLET ORAL EVERY 6 HOURS
Status: DISCONTINUED | OUTPATIENT
Start: 2025-03-04 | End: 2025-03-06 | Stop reason: HOSPADM

## 2025-03-03 RX ORDER — ONDANSETRON 4 MG/1
4 TABLET, ORALLY DISINTEGRATING ORAL EVERY 6 HOURS PRN
Status: DISCONTINUED | OUTPATIENT
Start: 2025-03-03 | End: 2025-03-06 | Stop reason: HOSPADM

## 2025-03-03 RX ADMIN — SIMETHICONE 80 MG: 80 TABLET, CHEWABLE ORAL at 08:37

## 2025-03-03 RX ADMIN — INSULIN LISPRO 10 UNITS: 100 INJECTION, SOLUTION INTRAVENOUS; SUBCUTANEOUS at 08:37

## 2025-03-03 RX ADMIN — KETOROLAC TROMETHAMINE 15 MG: 15 INJECTION, SOLUTION INTRAMUSCULAR; INTRAVENOUS at 05:24

## 2025-03-03 RX ADMIN — Medication 1 APPLICATION: at 01:48

## 2025-03-03 RX ADMIN — DOCUSATE SODIUM 100 MG: 100 CAPSULE, LIQUID FILLED ORAL at 08:36

## 2025-03-03 RX ADMIN — KETOROLAC TROMETHAMINE 15 MG: 15 INJECTION, SOLUTION INTRAMUSCULAR; INTRAVENOUS at 12:53

## 2025-03-03 RX ADMIN — KETOROLAC TROMETHAMINE 15 MG: 15 INJECTION, SOLUTION INTRAMUSCULAR; INTRAVENOUS at 18:10

## 2025-03-03 RX ADMIN — ACETAMINOPHEN 1000 MG: 500 TABLET ORAL at 15:09

## 2025-03-03 RX ADMIN — ACETAMINOPHEN 1000 MG: 500 TABLET ORAL at 08:36

## 2025-03-03 RX ADMIN — ACETAMINOPHEN 1000 MG: 500 TABLET ORAL at 02:53

## 2025-03-03 RX ADMIN — PRENATAL VITAMINS-IRON FUMARATE 27 MG IRON-FOLIC ACID 0.8 MG TABLET 1 TABLET: at 08:36

## 2025-03-03 RX ADMIN — INSULIN LISPRO 10 UNITS: 100 INJECTION, SOLUTION INTRAVENOUS; SUBCUTANEOUS at 13:47

## 2025-03-03 RX ADMIN — INSULIN LISPRO 10 UNITS: 100 INJECTION, SOLUTION INTRAVENOUS; SUBCUTANEOUS at 18:10

## 2025-03-03 RX ADMIN — ACETAMINOPHEN 1000 MG: 500 TABLET ORAL at 21:36

## 2025-03-03 RX ADMIN — INSULIN GLARGINE 10 UNITS: 100 INJECTION, SOLUTION SUBCUTANEOUS at 09:33

## 2025-03-03 RX ADMIN — INSULIN GLARGINE 10 UNITS: 100 INJECTION, SOLUTION SUBCUTANEOUS at 21:36

## 2025-03-03 RX ADMIN — ENOXAPARIN SODIUM 40 MG: 40 INJECTION SUBCUTANEOUS at 23:59

## 2025-03-03 NOTE — OP NOTE
"Operative Note    Patient name: Shelley Longoria  YOB: 1990   MRN: [id number]  Admission Date: 3/2/2025  Referring Provider: No Known Provider    ID: 35 y.o.  at 34w5d    Preoperative Diagnosis:   Third trimester pregnancy    Previous bariatric surgery complicating pregnancy    Type 2 diabetes mellitus affecting pregnancy, antepartum    AMA (advanced maternal age) multigravida 35+    History of  delivery, currently pregnant    History of sleeve gastrectomy    Uterine rupture       Postoperative Diagnosis: Same as above.    Procedure(s): repeatlow transverse  delivery with bilateral salpingectomy    Procedures:    *  SECTION REPEAT WITH SALPINGECTOMY    Procedure(s):   SECTION REPEAT WITH SALPINGECTOMY    Surgeons: Surgeons and Role:     * Jonathan Marques MD - Primary    Anesthesia: Spinal    Estimated Blood Loss:  500  mL    IV Fluids: [unfilled]    Preoperative antibiotic: cefazolin (Ancef) and Azithromycin    Blood products:   Blood Administration Record (From admission, onward)      None            Pathology:   Order Name Source Comment Collection Info Order Time   TISSUE PATHOLOGY EXAM Placenta  Collected By: Magali Rodriguez RN 3/2/2025  9:23 PM     Specimen source(s):   Placenta          Release to patient   Routine Release        TISSUE PATHOLOGY EXAM Fallopian Tubes, Bilateral  Collected By: Magali Rodriguez RN 3/2/2025  9:23 PM     Specimen source(s):   Fallopian Tubes, Bilateral          Release to patient   Routine Release            Drains: Cain catheter to gravity    Complications: None    Condition: Stable to recovery room    Infant:                Gender: female infant    Weight: 2770 g (6 lb 1.7 oz)    Apgars: 8  @ 1 minute /     9  @ 5 minutes    Cord gases: Venous:  No results found for: \"PHCVEN\", \"BECVEN\"     Arterial:  No results found for: \"PHCART\", \"BECART\"         Operative Summary:   After obtaining informed consent the " patient was taken to the operating room where adequate anesthesia was obtained.  Cain catheter was placed in the bladder preoperatively.  IV antibiotics were given preoperatively.       The abdomen was prepped and draped in the usual sterile fashion for  delivery.  After confirming adequate anesthesia a Pfannenstiel skin incision was made with the scalpel and carried through to the underlying layer of fascia.  The fascia was incised in the midline and the incision extended laterally with the Rubio scissors and with blunt dissection.       The upper aspect of the fascia was grasped with 2 Kocher clamps, elevated, and dissected off the underlying rectus muscles bluntly and with the Rubio scissors.  The Kocher clamps were removed and applied to the inferior aspect of the fascia.  The fascia was dissected off of the rectus muscles in the same fashion.  The peritoneum was entered bluntly.  The incision was stretched and the bladder blade and Nicholas retractor inserted for visualization of the uterus.       The uterus was found to have window present consistent with impending rupture. The lower uterine segment was incised with the scalpel in a low transverse fashion.  The uterine incision was entered digitally and the incision extended bluntly in a cranial-caudal fashion.  Retractors were removed and membranes were ruptured.  The infant was delivered atraumatically from vertex presentation.  The umbilical cord was clamped and cut and the nose and mouth bulb suctioned.  The infant was handed off to waiting pediatric staff.       Cord blood gases were collected from a clamped segment of umbilical cord.  Cord blood was collected.  The placenta was removed using cord traction and uterine massage.  The uterus was exteriorized and cleared of all clots and debris.  The uterine incision was repaired with #1 Monocryl in a running locked fashion. A single-layer technique was used.  Additional hemostatic measures required:  figure-of-eight sutures and Vida.    The incision was inspected and excellent hemostasis was noted.  The tubes and ovaries were noted to be normal.        The right tube was grasped with a [place] clamp and excised with Enseal. The entirety of the tube was ligated and excised with the Metzenbaum scissors.  Excellent hemostasis was noted.  The left tube was then identified ligated and excised in a similar fashion.  Excellent hemostasis was noted.  The uterus was returned to the abdomen.  The gutters were cleared of all clots and debris.  Irrigation was used. The uterine incision and tubal sites were again inspected and found to be hemostatic.       The peritoneum was reapproximated with 3-0 Vicryl.  The fascia was closed with #1 looped PDS in a running fashion.  The subcutaneous space was reapproximated using 3-0 Vicryl.      The skin was closed using 3-0 Monocryl on a Josh needle in a subcutaneous fashion followed by skin glue.   The patient was transferred to the recovery room in stable condition.    Jonathan Marques MD  03/02/25

## 2025-03-03 NOTE — LACTATION NOTE
03/03/25 0830   Maternal Information   Date of Referral 03/03/25   Person Making Referral lactation consultant  (Courtesy visit for NICU breastfeeding mother. Pt states this is her 4th baby & she BF her others for 2yrs. She has already reported getting 3mls of colostrum from hospital pump.)   Maternal Assessment   Left Nipple Symptoms intact;nontender   Right Nipple Symptoms intact;nontender   Maternal Infant Feeding   Maternal Emotional State independent;receptive;relaxed   Milk Expression/Equipment   Breast Pump Type double electric, hospital grade  (Pt states her insurance sent her a Medela hands free pump & she has a mirian collection container as well. Pt encouraged to pump q3hrs around the clock to promote/maintain milk supply. Pt given all supplies needed to store breastmilk & questions answerd)

## 2025-03-03 NOTE — ANESTHESIA POSTPROCEDURE EVALUATION
Patient: Shelley JohnsonleroGarcia    Procedure Summary       Date: 25 Room / Location: [facility] LABOR DELIVERY   [facility]IV[facility]    Anesthesia Start:  Anesthesia Stop:     Procedure:  SECTION REPEAT WITH SALPINGECTOMY (Bilateral) Diagnosis:     Surgeons: Jonathan Marques MD Provider: Brittany Forman DO    Anesthesia Type: ITN, spinal ASA Status: 3 - Emergent            Anesthesia Type: ITN, spinal    Vitals  Vitals Value Taken Time   /66    Temp 97.9    Pulse 101 25   Resp 15    SpO2 94 % 25   Vitals shown include unfiled device data.        Post Anesthesia Care and Evaluation    Patient location during evaluation: bedside  Patient participation: complete - patient participated  Level of consciousness: awake and awake and alert  Pain score: 0  Pain management: satisfactory to patient    Airway patency: patent  Anesthetic complications: No anesthetic complications  PONV Status: none  Cardiovascular status: acceptable, hemodynamically stable and stable  Respiratory status: acceptable  Hydration status: stable  Post Neuraxial Block status: No signs or symptoms of PDPH

## 2025-03-03 NOTE — ANESTHESIA PREPROCEDURE EVALUATION
Anesthesia Evaluation     Patient summary reviewed and Nursing notes reviewed   NPO Solid Status: Waived due to emergency  NPO Liquid Status: Waived due to emergency           Airway   Mallampati: II  TM distance: >3 FB  Neck ROM: full  Dental - normal exam     Pulmonary    (+) ,shortness of breath  Cardiovascular     (+) hyperlipidemia      Neuro/Psych  (+) psychiatric history Anxiety and Depression  GI/Hepatic/Renal/Endo    (+) obesity, morbid obesity, hiatal hernia, GERD poorly controlled, liver disease fatty liver disease, diabetes mellitus type 2 using insulin    Musculoskeletal (-) negative ROS    Abdominal    Substance History - negative use     OB/GYN    (+) Pregnant        Other - negative ROS                   Anesthesia Plan    ASA 3 - emergent     ITN and spinal       Anesthetic plan, risks, benefits, and alternatives have been provided, discussed and informed consent has been obtained with: patient.    Use of blood products discussed with patient .      CODE STATUS:

## 2025-03-03 NOTE — PROGRESS NOTES
Postpartum Progress Note    Patient name: Shelley Longoria  YOB: 1990   MRN: [id number]  Referring Provider: No Known Provider  Admission Date: 3/2/2025  Date of Service: 3/3/2025    ID: 35 y.o.     Diagnosis:   S/p  delivery 1 Day Post-Op     Third trimester pregnancy    Previous bariatric surgery complicating pregnancy    Type 2 diabetes mellitus affecting pregnancy, antepartum    AMA (advanced maternal age) multigravida 35+    History of  delivery, currently pregnant    History of sleeve gastrectomy    Uterine rupture       Subjective:      No complaints.  Moderate lochia.  Ambulating, voiding, tolerating diet.  Pain well controlled.  The patient is currently pumping.   This baby is a girl.    Objective:      Vital signs:  Vital Signs Range for the last 24 hours  Temperature: Temp:  [97.6 °F (36.4 °C)-98.2 °F (36.8 °C)] 98.1 °F (36.7 °C)   Temp Source: Temp src: Oral   BP: BP: ()/(53-84) 134/69   Pulse: Heart Rate:  [] 94   Respirations: Resp:  [14-18] 18   Weight: 105 kg (232 lb)     General: Alert & oriented x4, in no apparent distress  Abdomen: soft, nontender  Uterus: firm, nontender  Incision: clean, dry, glue intact  Extremities: nontender; no edema      Labs:  Lab Results   Component Value Date    WBC 7.13 2025    HGB 11.5 (L) 2025    HCT 34.0 2025    MCV 86.5 2025     2025     Results from last 7 days   Lab Units 25  1800   ABO TYPING  B   RH TYPING  Positive     External Prenatal Results       Pregnancy Outside Results - Transcribed From Office Records - See Scanned Records For Details       Test Value Date Time    ABO  B  25 1800    Rh  Positive  25 1800    Antibody Screen  Negative  25 1800       Negative  25 1910       Negative  25 1713       Negative  24 1558    Varicella IgG  225 index 24 1558    Rubella  1.03 index 24 1558    Hgb  11.5 g/dL  03/02/25 1814       10.8 g/dL 02/24/25 1313       9.7 g/dL 01/20/25 1910       10.3 g/dL 01/14/25 1713       12.7 g/dL 09/03/24 1558       12.2 g/dL 08/19/24 1503    Hct  34.0 % 03/02/25 1814       32.5 % 02/24/25 1313       30.0 % 01/20/25 1910       31.1 % 01/14/25 1713       38.7 % 09/03/24 1558       36.7 % 08/19/24 1503    HgB A1c   6.9 % 08/19/24 1503    1h GTT       3h GTT Fasting       3h GTT 1 hour       3h GTT 2 hour       3h GTT 3 hour        Gonorrhea (discrete)  Negative  09/03/24 1600    Chlamydia (discrete)  Negative  09/03/24 1600    RPR  Non Reactive  01/14/25 1713       Non Reactive  09/03/24 1558    Syphils cascade: TP-Ab (FTA)  Non-Reactive  03/02/25 1813       Non-Reactive  01/20/25 1910    TP-Ab  Non-Reactive  03/02/25 1813       Non-Reactive  01/20/25 1910    TP-Ab (EIA)       TPPA       HBsAg  Negative  09/03/24 1558    Herpes Simplex Virus PCR       Herpes Simplex VIrus Culture       HIV  Non Reactive  09/03/24 1558    Hep C RNA Quant PCR       Hep C Antibody  Non Reactive  09/03/24 1558    AFP       NIPT       Cystic Fibrosis (Elijah)       Cystic Fibroisis        Spinal Muscular atrophy       Fragile X       Group B Strep       GBS Susceptibility to Clindamycin       GBS Susceptibility to Erythromycin       Fetal Fibronectin       Genetic Testing, Maternal Blood                 Drug Screening       Test Value Date Time    Urine Drug Screen       Amphetamine Screen  Negative ng/mL 09/03/24 1600    Barbiturate Screen  Negative ng/mL 09/03/24 1600    Benzodiazepine Screen  Negative ng/mL 09/03/24 1600    Methadone Screen  Negative ng/mL 09/03/24 1600    Phencyclidine Screen  Negative ng/mL 09/03/24 1600    Opiates Screen       THC Screen       Cocaine Screen       Propoxyphene Screen  Negative ng/mL 09/03/24 1600    Buprenorphine Screen       Methamphetamine Screen       Oxycodone Screen       Tricyclic Antidepressants Screen                 Legend    ^: Historical                             Assessment/Plan:      1 Day Post-Op s/p Procedure(s):   SECTION REPEAT WITH SALPINGECTOMY  1. S/p  delivery: Continue postoperative care.  Doing well.  2. Infant feeding: Supportive care.  The patient is currently pumping.  3. T2DM: Blood sugars stable on half insulin dose.

## 2025-03-03 NOTE — PROGRESS NOTES
Daily Progress Note    Patient name: Shelley Longoria  YOB: 1990   MRN: [id number]  Admission Date: 3/2/2025  Date of Service: 3/2/2025  Referring Provider: No Known Provider    Shelley Longoria is a 35 y.o.    at 34w5d  admitted on 3/2/2025 for Third trimester pregnancy with abdominal pain    Hospital day 0      Diagnoses:   Patient Active Problem List    Diagnosis     *Third trimester pregnancy [Z34.93]     Abdominal pain affecting pregnancy [O26.899, R10.9]     Maternal iron deficiency anemia affecting pregnancy in third trimester, antepartum [O99.013, D50.9]     History of sleeve gastrectomy [Z90.3]     History of gastric surgery [Z98.890]     Current moderate episode of major depressive disorder without prior episode [F32.1]     AMA (advanced maternal age) multigravida 35+ [O09.529]     History of  delivery, currently pregnant [O34.219]     Phentermine/topiramate exposure in current pregnancy [O35.9XX0]     Major depressive disorder with single episode, in partial remission [F32.4]     Dehydration [E86.0]     Vomiting [R11.10]     GERD (gastroesophageal reflux disease) [K21.9]     Type 2 diabetes mellitus affecting pregnancy, antepartum [O24.119]     FH: bariatric surgery [Z84.89]     S/P LSG  - Postop Hemorrhage [Z98.84]     Previous bariatric surgery complicating pregnancy [O99.840]     Mixed hyperlipidemia [E78.2]        Chief Complaint:  Chief Complaint   Patient presents with    Contractions       Subjective:      Shelley complains of intense lower uterine pain and states that she feels like her uterus is opening up. I was called to the room due to patient crying in pain due to regular painful contractions.    Reports fetal movement is normal  Denies leakage of amniotic fluid.  Denies vaginal bleeding    Objective:     Vital signs:  Temp:  [98.2 °F (36.8 °C)] 98.2 °F (36.8 °C)  Heart Rate:  [106] 106  Resp:  [18] 18  BP: (123)/(70) 123/70    Abdomen: soft,  nontender  Uterus: gravid, nontender  Extremities: nontender; no edema        Non-Stress Test:    Fetal Heart Rate Assessment   Method: Fetal HR Assessment Method: external   Beats/min: Fetal HR (beats/min): 135   Baseline: Fetal HR Baseline: normal range   Variability: Fetal HR Variability: moderate (amplitude range 6 to 25 bpm)   Accels: Fetal HR Accelerations: absent   Decels: Fetal HR Decelerations: absent   Tracing Category:       Uterine Assessment   Method: Method: external tocotransducer   Frequency (min): Contraction Frequency (Minutes): 2-3   Ctx Count in 10 min:     Duration:     Intensity: Contraction Intensity: mild by palpation   Intensity by IUPC:     Resting Tone: Uterine Resting Tone: soft by palpation   Resting Tone by IUPC:     [place] Units:       Cervix: Exam by:     Dilation:     Effacement:     Station:         Most recent ultrasound:  2/25/25; concerns for thin lower uterine segment with 29 week US at [facility]    Medications:  sodium chloride, 10 mL, Intravenous, Q12H         sodium chloride    Labs:  Lab Results (last 24 hours)       Procedure Component Value Units Date/Time    POC Glucose Once [[id number]]  (Abnormal) Collected: 03/02/25 1856    Specimen: Blood Updated: 03/02/25 1858     Glucose 65 mg/dL     Preeclampsia Panel [[id number]]  (Abnormal) Collected: 03/02/25 1813    Specimen: Blood Updated: 03/02/25 1855     Alkaline Phosphatase 123 U/L      ALT (SGPT) 13 U/L      AST (SGOT) 12 U/L      Creatinine 0.42 mg/dL      Total Bilirubin <0.2 mg/dL       U/L      Comment: Specimen hemolyzed.  Results may be affected.        Uric Acid 2.6 mg/dL     CBC (No Diff) [[id number]]  (Abnormal) Collected: 03/02/25 1814    Specimen: Blood Updated: 03/02/25 1816     WBC 7.13 10*3/mm3      RBC 3.93 10*6/mm3      Hemoglobin 11.5 g/dL      Hematocrit 34.0 %      MCV 86.5 fL      MCH 29.3 pg      MCHC 33.8 g/dL      RDW 21.5 %      RDW-SD 65.2 fl      MPV 11.0 fL      Platelets 149 10*3/mm3      Treponema pallidum AB w/Reflex RPR [[id number]] Collected: 25    Specimen: Blood Updated: 25          Lab Results   Component Value Date    HGB 11.5 (L) 2025         Assessment/Plan:      Shelley is a 35 y.o.    at 34w5d.  1. Abdominal pain in pregnancy:  Pain is highly suspicious for uterine rupture.  Discussed findings and recommend proceeding with delivery by repeat CS.   2. DM: Will continue insulin at 1/2 dose after delivey.    3. Fetal: Reactive NST  4.  Desires permanent surgical sterilization- will proceed with bilateral salpingectomy   4. Delivery plan: cephalic, desires repeat  delivery.    All questions were answered to the best of my ability.    Jonathan Marques MD  3/2/2025

## 2025-03-03 NOTE — ANESTHESIA POSTPROCEDURE EVALUATION
Patient: Shelley JohnsonleroGarcia    Procedure Summary       Date: 25 Room / [facility] LABOR DELIVERY   [facility] LABOR DELIVERY    Anesthesia Start:  Anesthesia Stop:     Procedure:  SECTION REPEAT WITH SALPINGECTOMY (Bilateral) Diagnosis:     Surgeons: Jonathan Marques MD Provider: Brittany Forman DO    Anesthesia Type: ITN, spinal ASA Status: 3 - Emergent            Anesthesia Type: ITN, spinal    Vitals  Vitals Value Taken Time   /56 25 1125   Temp 98.1 °F (36.7 °C) 25 1125   Pulse 96 25 1125   Resp 18 25 1125   SpO2 99 % 25 2346   Vitals shown include unfiled device data.        Post Anesthesia Care and Evaluation    Patient location during evaluation: bedside  Patient participation: complete - patient participated  Level of consciousness: awake and alert  Pain management: adequate    Airway patency: patent  Anesthetic complications: No anesthetic complications    Cardiovascular status: acceptable  Respiratory status: acceptable  Hydration status: acceptable  Post Neuraxial Block status: Motor and sensory function returned to baseline and No signs or symptoms of PDPH

## 2025-03-03 NOTE — ANESTHESIA PROCEDURE NOTES
Spinal Block      Patient reassessed immediately prior to procedure    Patient location during procedure: OB  Indication:procedure for pain  Performed By  Anesthesiologist: Brittany Forman DO  Preanesthetic Checklist  Completed: patient identified, IV checked, risks and benefits discussed, surgical consent, monitors and equipment checked, pre-op evaluation and timeout performed  Spinal Block Prep:  Patient Position:sitting  Sterile Tech:cap, gloves, mask and sterile barriers  Prep:Chloraprep  Patient Monitoring:blood pressure monitoring and EKG    Spinal Block Procedure  Approach:midline  Guidance:palpation technique  Location:L3-L4  Needle Type:Derek  Needle Gauge:25 G  Placement of Spinal needle event:cerebrospinal fluid aspirated  Paresthesia: no  Fluid Appearance:clear  Medications: bupivacaine in dextrose (MARCAINE SPINAL / Hyberbaric) 0.75-8.25 % injection - Intrathecal   1.6 mL - 3/2/2025 8:58:00 PM   Post Assessment  Patient Tolerance:patient tolerated the procedure well with no apparent complications  Complications no

## 2025-03-04 ENCOUNTER — PATIENT OUTREACH (OUTPATIENT)
Dept: LABOR AND DELIVERY | Facility: HOSPITAL | Age: 35
End: 2025-03-04
Payer: COMMERCIAL

## 2025-03-04 LAB
BASOPHILS # BLD AUTO: 0.01 10*3/MM3 (ref 0–0.2)
BASOPHILS NFR BLD AUTO: 0.2 % (ref 0–1.5)
CYTO UR: NORMAL
CYTO UR: NORMAL
DEPRECATED RDW RBC AUTO: 69.2 FL (ref 37–54)
EOSINOPHIL # BLD AUTO: 0.1 10*3/MM3 (ref 0–0.4)
EOSINOPHIL NFR BLD AUTO: 1.5 % (ref 0.3–6.2)
ERYTHROCYTE [DISTWIDTH] IN BLOOD BY AUTOMATED COUNT: 21.8 % (ref 12.3–15.4)
GLUCOSE BLDC GLUCOMTR-MCNC: 162 MG/DL (ref 70–130)
GLUCOSE BLDC GLUCOMTR-MCNC: 196 MG/DL (ref 70–130)
GLUCOSE BLDC GLUCOMTR-MCNC: 91 MG/DL (ref 70–130)
HCT VFR BLD AUTO: 31.3 % (ref 34–46.6)
HGB BLD-MCNC: 10.1 G/DL (ref 12–15.9)
IMM GRANULOCYTES # BLD AUTO: 0.05 10*3/MM3 (ref 0–0.05)
IMM GRANULOCYTES NFR BLD AUTO: 0.8 % (ref 0–0.5)
LAB AP CASE REPORT: NORMAL
LAB AP CASE REPORT: NORMAL
LAB AP CLINICAL INFORMATION: NORMAL
LAB AP CLINICAL INFORMATION: NORMAL
LYMPHOCYTES # BLD AUTO: 1.76 10*3/MM3 (ref 0.7–3.1)
LYMPHOCYTES NFR BLD AUTO: 27.2 % (ref 19.6–45.3)
MACROCYTES BLD QL SMEAR: NORMAL
MCH RBC QN AUTO: 29.1 PG (ref 26.6–33)
MCHC RBC AUTO-ENTMCNC: 32.3 G/DL (ref 31.5–35.7)
MCV RBC AUTO: 90.2 FL (ref 79–97)
MICROCYTES BLD QL: NORMAL
MONOCYTES # BLD AUTO: 0.35 10*3/MM3 (ref 0.1–0.9)
MONOCYTES NFR BLD AUTO: 5.4 % (ref 5–12)
NEUTROPHILS NFR BLD AUTO: 4.21 10*3/MM3 (ref 1.7–7)
NEUTROPHILS NFR BLD AUTO: 64.9 % (ref 42.7–76)
NRBC BLD AUTO-RTO: 0 /100 WBC (ref 0–0.2)
OVALOCYTES BLD QL SMEAR: NORMAL
PATH REPORT.FINAL DX SPEC: NORMAL
PATH REPORT.FINAL DX SPEC: NORMAL
PATH REPORT.GROSS SPEC: NORMAL
PATH REPORT.GROSS SPEC: NORMAL
PLAT MORPH BLD: NORMAL
PLATELET # BLD AUTO: 121 10*3/MM3 (ref 140–450)
PMV BLD AUTO: 11.4 FL (ref 6–12)
POLYCHROMASIA BLD QL SMEAR: NORMAL
RBC # BLD AUTO: 3.47 10*6/MM3 (ref 3.77–5.28)
WBC MORPH BLD: NORMAL
WBC NRBC COR # BLD AUTO: 6.48 10*3/MM3 (ref 3.4–10.8)

## 2025-03-04 PROCEDURE — 25010000002 KETOROLAC TROMETHAMINE PER 15 MG: Performed by: OBSTETRICS & GYNECOLOGY

## 2025-03-04 PROCEDURE — 63710000001 INSULIN LISPRO (HUMAN) PER 5 UNITS: Performed by: OBSTETRICS & GYNECOLOGY

## 2025-03-04 PROCEDURE — 25010000002 ENOXAPARIN PER 10 MG: Performed by: OBSTETRICS & GYNECOLOGY

## 2025-03-04 PROCEDURE — 0503F POSTPARTUM CARE VISIT: CPT

## 2025-03-04 PROCEDURE — 63710000001 INSULIN GLARGINE PER 5 UNITS: Performed by: OBSTETRICS & GYNECOLOGY

## 2025-03-04 PROCEDURE — 85025 COMPLETE CBC W/AUTO DIFF WBC: CPT | Performed by: OBSTETRICS & GYNECOLOGY

## 2025-03-04 PROCEDURE — 85007 BL SMEAR W/DIFF WBC COUNT: CPT | Performed by: OBSTETRICS & GYNECOLOGY

## 2025-03-04 PROCEDURE — 82948 REAGENT STRIP/BLOOD GLUCOSE: CPT

## 2025-03-04 RX ADMIN — KETOROLAC TROMETHAMINE 15 MG: 15 INJECTION, SOLUTION INTRAMUSCULAR; INTRAVENOUS at 00:00

## 2025-03-04 RX ADMIN — OXYCODONE 5 MG: 5 TABLET ORAL at 19:18

## 2025-03-04 RX ADMIN — ACETAMINOPHEN 650 MG: 325 TABLET ORAL at 08:16

## 2025-03-04 RX ADMIN — INSULIN GLARGINE 10 UNITS: 100 INJECTION, SOLUTION SUBCUTANEOUS at 22:12

## 2025-03-04 RX ADMIN — IBUPROFEN 600 MG: 600 TABLET, FILM COATED ORAL at 05:20

## 2025-03-04 RX ADMIN — OXYCODONE 5 MG: 5 TABLET ORAL at 05:20

## 2025-03-04 RX ADMIN — DOCUSATE SODIUM 100 MG: 100 CAPSULE, LIQUID FILLED ORAL at 08:16

## 2025-03-04 RX ADMIN — INSULIN GLARGINE 10 UNITS: 100 INJECTION, SOLUTION SUBCUTANEOUS at 08:17

## 2025-03-04 RX ADMIN — PRENATAL VITAMINS-IRON FUMARATE 27 MG IRON-FOLIC ACID 0.8 MG TABLET 1 TABLET: at 08:16

## 2025-03-04 RX ADMIN — SIMETHICONE 80 MG: 80 TABLET, CHEWABLE ORAL at 08:16

## 2025-03-04 RX ADMIN — IBUPROFEN 600 MG: 600 TABLET, FILM COATED ORAL at 11:37

## 2025-03-04 RX ADMIN — INSULIN LISPRO 10 UNITS: 100 INJECTION, SOLUTION INTRAVENOUS; SUBCUTANEOUS at 08:17

## 2025-03-04 RX ADMIN — INSULIN LISPRO 10 UNITS: 100 INJECTION, SOLUTION INTRAVENOUS; SUBCUTANEOUS at 19:12

## 2025-03-04 RX ADMIN — ACETAMINOPHEN 650 MG: 325 TABLET ORAL at 22:12

## 2025-03-04 RX ADMIN — ACETAMINOPHEN 650 MG: 325 TABLET ORAL at 14:37

## 2025-03-04 RX ADMIN — INSULIN LISPRO 10 UNITS: 100 INJECTION, SOLUTION INTRAVENOUS; SUBCUTANEOUS at 11:36

## 2025-03-04 RX ADMIN — IBUPROFEN 600 MG: 600 TABLET, FILM COATED ORAL at 19:12

## 2025-03-04 NOTE — PROGRESS NOTES
"Progress Note    Patient name: Shelley Longoria  YOB: 1990   MRN: [id number]  Admission Date: 3/2/2025  Date of Service: 3/4/2025  Referring: No Known Provider    ID: 35 y.o.     Post Op Day: 2 Days Post-Op    Diagnosis:   S/p  delivery    Third trimester pregnancy    Previous bariatric surgery complicating pregnancy    Type 2 diabetes mellitus affecting pregnancy, antepartum    AMA (advanced maternal age) multigravida 35+    History of  delivery, currently pregnant    History of sleeve gastrectomy    Uterine rupture       Subjective:      No complaints.  Normal lochia.  Ambulating, voiding, tolerating diet.  Pain well controlled.  The patient is currently breastfeeding.  This baby is a female. In NICU    Objective:      Temp:  [97.7 °F (36.5 °C)-98.1 °F (36.7 °C)] 97.9 °F (36.6 °C)  Heart Rate:  [88-97] 97  Resp:  [16-18] 18  BP: (101-134)/(56-69) 120/59    Medications:  Scheduled Meds:acetaminophen, 650 mg, Oral, Q6H  enoxaparin sodium, 40 mg, Subcutaneous, Nightly  ibuprofen, 600 mg, Oral, Q6H  insulin glargine, 10 Units, Subcutaneous, Q12H  Insulin Lispro, 10 Units, Subcutaneous, TID With Meals  nicotine, 1 patch, Transdermal, Q24H  prenatal vitamin, 1 tablet, Oral, Daily      Continuous Infusions:lactated ringers, 125 mL/hr, Last Rate: 125 mL/hr (25)      PRN Meds:.  acetaminophen    aluminum-magnesium hydroxide-simethicone **OR** calcium carbonate    docusate sodium    Hydrocortisone (Perianal)    lanolin    metoclopramide    nicotine polacrilex    ondansetron ODT **OR** ondansetron    oxyCODONE **OR** oxyCODONE    promethazine **OR** promethazine    simethicone     Exam:  Abdomen: soft, nontender  Uterus: nontender, firm  Incision: clean, dry, intact  Extremities: nontender; no edema     Labs:  Lab Results   Component Value Date    HGB 10.1 (L) 2025     No results found for: \"ABORH\"    Assessment/Plan:      POD#2 Days Post-Op s/p repeat  " delivery w/ bilateral salpingectomy. Doing well.   Asymptomatic anemia. VSS.   Low platelets. Likely hemodilution.   Type 2 DM- Insulin controlled.     Plan:     Continue routine postoperative care. Advance.   Anemia- Continue PNV  AM CBC f/u anemia and platelets    Devora Martin, APRN  3/4/2025

## 2025-03-04 NOTE — CONSULTS
" Discussed and taught patient about diabetes self-management, risk factors, and importance of blood glucose control to reduce complications. Target blood glucose readings and A1c goals per ADA were reviewed. No recent A1c to review. Signs, symptoms, and treatment of hyperglycemia and hypoglycemia were discussed. Educated on how post partum changes ( breast feeding, hormonal changes, lack of sleep, etc) may lead to glucose variance and require frequent monitoring and care titration. Lifestyle changes such as physical activity with MD approval and healthy eating were encouraged. Stressed the importance of strict blood sugar control after surgery to prevent complications such as infection and to promote healing of incision. Encouraged pt to monitor blood sugar at home 3+  times per day and to call PCP if blood sugar is trending high. Encouraged to keep record of blood glucose readings to take to follow up appointment with PCP. Currently using Dexcom CGM. Clarity yaima reveals 2 day TIR 69% , 7/14/30/90 days reveals TIR 60-50%. Advised to keep checking frequently to look for new trends to aid in lifestyle changes of medication changes with provider orders. Provided patient with copy of [employer]'s \"What is Diabetes\" handout, \"Blood Glucose Goals\" handout, and \"What is A1c\" handout.  Patient feels confident in management with current care regiment and resources. 30 minutes in the care and education of this patient.  "

## 2025-03-04 NOTE — OUTREACH NOTE
[facility]  IP Postpartum    Questions/Answers      Flowsheet Row Responses   Best Method for Contacting Cell   Support Person Present No   Does the patient have a car seat at the hospital No   Delivery Note Reviewed Reviewed   Were birth expectations met? No   Birth Expectations Not Met Comment WEmergent C/S for possible uterine rupture.   Is there a need for additional support/resources? No   Lactation Note Reviewed Reviewed   Is additional support needed? No   Any questions or concerns? No   Is the patient going to use Meds to Beds? Yes   Any concerns related discharge meds/ability to  prescriptions? No   Confirm Postpartum OB appointment No  [Plans f/u with Yves]   Confirm initial well-child Pediatrician appointment date/time: No  [NB in NICU, plans f/u with Jalyn Knox]   Additional post-discharge F/U appointments No   Does patient have transportation to appointments? Yes   Any other assistance needed to ensure she is able to attend appointments? No   Does patient have supplies needed at home for  care? Breast Pump, Clothing, Crib, Diapers          Feeling well after delivery. Has been ambulating around room and to NICU to visit NB. Reports pumping q 3 hrs with small amounts of colostrum collected. Provided with outpatient lactation clinic contact information for breastfeeding or pumping assistance after discharge. Signs and symptoms of preeclampsia reviewed. VU. Provided with POST Birth warning signs flyer from [employer].     Encouraged to call with questions, concerns, or for support. Contact information provided. Will f/u 3/14/25.    Juany Mar RN  Maternity Nurse Navigator    3/4/2025, 11:09 EST

## 2025-03-05 LAB
GLUCOSE BLDC GLUCOMTR-MCNC: 116 MG/DL (ref 70–130)
GLUCOSE BLDC GLUCOMTR-MCNC: 77 MG/DL (ref 70–130)
GLUCOSE BLDC GLUCOMTR-MCNC: 81 MG/DL (ref 70–130)
GLUCOSE BLDC GLUCOMTR-MCNC: 89 MG/DL (ref 70–130)

## 2025-03-05 PROCEDURE — 82948 REAGENT STRIP/BLOOD GLUCOSE: CPT

## 2025-03-05 PROCEDURE — 63710000001 INSULIN LISPRO (HUMAN) PER 5 UNITS: Performed by: OBSTETRICS & GYNECOLOGY

## 2025-03-05 PROCEDURE — 63710000001 INSULIN GLARGINE PER 5 UNITS: Performed by: OBSTETRICS & GYNECOLOGY

## 2025-03-05 PROCEDURE — 0503F POSTPARTUM CARE VISIT: CPT | Performed by: NURSE PRACTITIONER

## 2025-03-05 PROCEDURE — 25010000002 ENOXAPARIN PER 10 MG: Performed by: OBSTETRICS & GYNECOLOGY

## 2025-03-05 RX ADMIN — INSULIN LISPRO 10 UNITS: 100 INJECTION, SOLUTION INTRAVENOUS; SUBCUTANEOUS at 08:15

## 2025-03-05 RX ADMIN — OXYCODONE 5 MG: 5 TABLET ORAL at 13:35

## 2025-03-05 RX ADMIN — PRENATAL VITAMINS-IRON FUMARATE 27 MG IRON-FOLIC ACID 0.8 MG TABLET 1 TABLET: at 08:24

## 2025-03-05 RX ADMIN — Medication 1 APPLICATION: at 17:54

## 2025-03-05 RX ADMIN — OXYCODONE 5 MG: 5 TABLET ORAL at 03:09

## 2025-03-05 RX ADMIN — ACETAMINOPHEN 650 MG: 325 TABLET ORAL at 08:24

## 2025-03-05 RX ADMIN — INSULIN LISPRO 10 UNITS: 100 INJECTION, SOLUTION INTRAVENOUS; SUBCUTANEOUS at 13:38

## 2025-03-05 RX ADMIN — INSULIN GLARGINE 10 UNITS: 100 INJECTION, SOLUTION SUBCUTANEOUS at 20:06

## 2025-03-05 RX ADMIN — INSULIN GLARGINE 10 UNITS: 100 INJECTION, SOLUTION SUBCUTANEOUS at 08:24

## 2025-03-05 RX ADMIN — IBUPROFEN 600 MG: 600 TABLET, FILM COATED ORAL at 00:22

## 2025-03-05 RX ADMIN — DOCUSATE SODIUM 100 MG: 100 CAPSULE, LIQUID FILLED ORAL at 08:24

## 2025-03-05 RX ADMIN — IBUPROFEN 600 MG: 600 TABLET, FILM COATED ORAL at 17:50

## 2025-03-05 RX ADMIN — OXYCODONE 5 MG: 5 TABLET ORAL at 17:52

## 2025-03-05 RX ADMIN — Medication 1 APPLICATION: at 09:38

## 2025-03-05 RX ADMIN — OXYCODONE 5 MG: 5 TABLET ORAL at 08:27

## 2025-03-05 RX ADMIN — INSULIN LISPRO 10 UNITS: 100 INJECTION, SOLUTION INTRAVENOUS; SUBCUTANEOUS at 17:50

## 2025-03-05 RX ADMIN — ENOXAPARIN SODIUM 40 MG: 40 INJECTION SUBCUTANEOUS at 20:05

## 2025-03-05 RX ADMIN — ACETAMINOPHEN 650 MG: 325 TABLET ORAL at 20:05

## 2025-03-05 RX ADMIN — ACETAMINOPHEN 650 MG: 325 TABLET ORAL at 03:07

## 2025-03-05 RX ADMIN — ENOXAPARIN SODIUM 40 MG: 40 INJECTION SUBCUTANEOUS at 00:16

## 2025-03-05 NOTE — LACTATION NOTE
03/05/25 0920   Maternal Information   Date of Referral 03/05/25   Person Making Referral lactation consultant  (courtesy)   Maternal Reason for Referral separation from infant   Infant Reason for Referral separation from mother;NICU admission     Courtesy visit; mother reporting well with pumping for infant; mother also showed LC a video of infant nursing while in NICU; great latch noted; bilateral nipples healing well; redness from utilizing smaller flanges (24mm), now using 27mm flanges for pump and mother stated this size is a better fit; mother requested Lanolin; LC provided lanolin, patient RN aware; also supplied nursing pads; to call lactation PRN.

## 2025-03-05 NOTE — PROGRESS NOTES
Postpartum Progress Note    Patient name: Shelley Longoria  YOB: 1990   MRN: [id number]  Referring Provider: No Known Provider  Admission Date: 3/2/2025  Date of Service: 3/5/2025    ID: 35 y.o.     Diagnosis:   S/p  delivery 3 Days Post-Op     Third trimester pregnancy    Previous bariatric surgery complicating pregnancy    Type 2 diabetes mellitus affecting pregnancy, antepartum    AMA (advanced maternal age) multigravida 35+    History of  delivery, currently pregnant    History of sleeve gastrectomy    Uterine rupture       Subjective:      No complaints.  Moderate lochia.  Ambulating, voiding, tolerating diet.  Pain well controlled.  The patient is currently breastfeeding.   This baby is a female in the NICU    Objective:      Vital signs:  Vital Signs Range for the last 24 hours  Temperature: Temp:  [97 °F (36.1 °C)-98.8 °F (37.1 °C)] 97.8 °F (36.6 °C)   Temp Source: Temp src: Oral   BP: BP: (112-121)/(65-74) 121/70   Pulse: Heart Rate:  [84-98] 86   Respirations: Resp:  [16-18] 16   Weight: 105 kg (232 lb)     General: Alert & oriented x4, in no apparent distress  Abdomen: soft, nontender  Uterus: firm, nontender  Incision: clean, dry, intact,   Extremities: nontender; no edema      Labs:  Lab Results   Component Value Date    WBC 6.48 2025    HGB 10.1 (L) 2025    HCT 31.3 (L) 2025    MCV 90.2 2025     (L) 2025     Results from last 7 days   Lab Units 25  1800   ABO TYPING  B   RH TYPING  Positive     External Prenatal Results       Pregnancy Outside Results - Transcribed From Office Records - See Scanned Records For Details       Test Value Date Time    ABO  B  25 1800    Rh  Positive  25 1800    Antibody Screen  Negative  25 1800       Negative  25 1910       Negative  25 1713       Negative  24 1558    Varicella IgG  225 index 24 1558    Rubella  1.03 index 24 1558     Hgb  10.1 g/dL 03/04/25 0515       11.5 g/dL 03/02/25 1814       10.8 g/dL 02/24/25 1313       9.7 g/dL 01/20/25 1910       10.3 g/dL 01/14/25 1713       12.7 g/dL 09/03/24 1558       12.2 g/dL 08/19/24 1503    Hct  31.3 % 03/04/25 0515       34.0 % 03/02/25 1814       32.5 % 02/24/25 1313       30.0 % 01/20/25 1910       31.1 % 01/14/25 1713       38.7 % 09/03/24 1558       36.7 % 08/19/24 1503    HgB A1c   6.9 % 08/19/24 1503    1h GTT       3h GTT Fasting       3h GTT 1 hour       3h GTT 2 hour       3h GTT 3 hour        Gonorrhea (discrete)  Negative  09/03/24 1600    Chlamydia (discrete)  Negative  09/03/24 1600    RPR  Non Reactive  01/14/25 1713       Non Reactive  09/03/24 1558    Syphils cascade: TP-Ab (FTA)  Non-Reactive  03/02/25 1813       Non-Reactive  01/20/25 1910    TP-Ab  Non-Reactive  03/02/25 1813       Non-Reactive  01/20/25 1910    TP-Ab (EIA)       TPPA       HBsAg  Negative  09/03/24 1558    Herpes Simplex Virus PCR       Herpes Simplex VIrus Culture       HIV  Non Reactive  09/03/24 1558    Hep C RNA Quant PCR       Hep C Antibody  Non Reactive  09/03/24 1558    AFP       NIPT       Cystic Fibrosis (Elijah)       Cystic Fibroisis        Spinal Muscular atrophy       Fragile X       Group B Strep       GBS Susceptibility to Clindamycin       GBS Susceptibility to Erythromycin       Fetal Fibronectin       Genetic Testing, Maternal Blood                 Drug Screening       Test Value Date Time    Urine Drug Screen       Amphetamine Screen  Negative ng/mL 09/03/24 1600    Barbiturate Screen  Negative ng/mL 09/03/24 1600    Benzodiazepine Screen  Negative ng/mL 09/03/24 1600    Methadone Screen  Negative ng/mL 09/03/24 1600    Phencyclidine Screen  Negative ng/mL 09/03/24 1600    Opiates Screen       THC Screen       Cocaine Screen       Propoxyphene Screen  Negative ng/mL 09/03/24 1600    Buprenorphine Screen       Methamphetamine Screen       Oxycodone Screen       Tricyclic Antidepressants  Screen                 Legend    ^: Historical                            Assessment/Plan:      3 Days Post-Op s/p Procedure(s):   SECTION REPEAT WITH SALPINGECTOMY  1. S/p  delivery: Continue postoperative care.  Doing well.  2. Infant feeding: Supportive care.  The patient is currently breastfeeding. Infant is a female stable in the NICU.  3. A.m. CBC pending. Platelets 121 yesterday.  4. T2DM insulin controlled.   5. Pt would like DC home tomorrow.

## 2025-03-06 VITALS
WEIGHT: 232 LBS | BODY MASS INDEX: 38.65 KG/M2 | SYSTOLIC BLOOD PRESSURE: 122 MMHG | TEMPERATURE: 98.1 F | RESPIRATION RATE: 16 BRPM | OXYGEN SATURATION: 99 % | HEIGHT: 65 IN | DIASTOLIC BLOOD PRESSURE: 74 MMHG | HEART RATE: 89 BPM

## 2025-03-06 LAB
GLUCOSE BLDC GLUCOMTR-MCNC: 119 MG/DL (ref 70–130)
GLUCOSE BLDC GLUCOMTR-MCNC: 92 MG/DL (ref 70–130)

## 2025-03-06 PROCEDURE — 82948 REAGENT STRIP/BLOOD GLUCOSE: CPT

## 2025-03-06 PROCEDURE — 63710000001 INSULIN GLARGINE PER 5 UNITS: Performed by: OBSTETRICS & GYNECOLOGY

## 2025-03-06 PROCEDURE — 63710000001 INSULIN LISPRO (HUMAN) PER 5 UNITS: Performed by: OBSTETRICS & GYNECOLOGY

## 2025-03-06 RX ORDER — IBUPROFEN 600 MG/1
600 TABLET, FILM COATED ORAL EVERY 6 HOURS PRN
Qty: 60 TABLET | Refills: 0 | Status: SHIPPED | OUTPATIENT
Start: 2025-03-06

## 2025-03-06 RX ORDER — PSEUDOEPHEDRINE HCL 30 MG
100 TABLET ORAL 2 TIMES DAILY PRN
Qty: 30 CAPSULE | Refills: 0 | Status: SHIPPED | OUTPATIENT
Start: 2025-03-06

## 2025-03-06 RX ORDER — OXYCODONE HYDROCHLORIDE 5 MG/1
5 TABLET ORAL EVERY 6 HOURS PRN
Qty: 12 TABLET | Refills: 0 | Status: SHIPPED | OUTPATIENT
Start: 2025-03-06 | End: 2025-03-09

## 2025-03-06 RX ORDER — INSULIN GLARGINE 100 [IU]/ML
10 INJECTION, SOLUTION SUBCUTANEOUS 2 TIMES DAILY
Qty: 30 ML | Refills: 1 | Status: SHIPPED | OUTPATIENT
Start: 2025-03-06 | End: 2025-03-12

## 2025-03-06 RX ORDER — INSULIN LISPRO 100 [IU]/ML
INJECTION, SOLUTION SUBCUTANEOUS
Qty: 60 ML | Refills: 1 | Status: SHIPPED | OUTPATIENT
Start: 2025-03-06 | End: 2025-03-12

## 2025-03-06 RX ADMIN — HYDROCORTISONE 2.5% 1 APPLICATION: 25 CREAM TOPICAL at 13:42

## 2025-03-06 RX ADMIN — IBUPROFEN 600 MG: 600 TABLET, FILM COATED ORAL at 00:13

## 2025-03-06 RX ADMIN — ACETAMINOPHEN 650 MG: 325 TABLET ORAL at 08:16

## 2025-03-06 RX ADMIN — IBUPROFEN 600 MG: 600 TABLET, FILM COATED ORAL at 06:46

## 2025-03-06 RX ADMIN — IBUPROFEN 600 MG: 600 TABLET, FILM COATED ORAL at 11:54

## 2025-03-06 RX ADMIN — INSULIN LISPRO 10 UNITS: 100 INJECTION, SOLUTION INTRAVENOUS; SUBCUTANEOUS at 08:17

## 2025-03-06 RX ADMIN — PRENATAL VITAMINS-IRON FUMARATE 27 MG IRON-FOLIC ACID 0.8 MG TABLET 1 TABLET: at 08:16

## 2025-03-06 RX ADMIN — OXYCODONE 5 MG: 5 TABLET ORAL at 00:15

## 2025-03-06 RX ADMIN — INSULIN GLARGINE 10 UNITS: 100 INJECTION, SOLUTION SUBCUTANEOUS at 08:30

## 2025-03-06 RX ADMIN — ACETAMINOPHEN 650 MG: 325 TABLET ORAL at 03:47

## 2025-03-06 NOTE — LACTATION NOTE
Attempted lactation courtesy check in with mom but patient is in NICU visiting infant.  Lactation will continue to follow.

## 2025-03-06 NOTE — DISCHARGE SUMMARY
Discharge Summary    Date of Admission: 3/2/2025  Date of Discharge:  3/6/2025      Patient: Shelley Longoria      MR#:[id number]    Primary Surgeon/OB: Jonathan Marques MD    Discharge Surgeon/OB: Juvenal/    Presenting Problem/History of Present Illness  Third trimester pregnancy [Z34.93]  Uterine rupture [S37.69XA]       Third trimester pregnancy    Previous bariatric surgery complicating pregnancy    Type 2 diabetes mellitus affecting pregnancy, antepartum    AMA (advanced maternal age) multigravida 35+    History of  delivery, currently pregnant    History of sleeve gastrectomy    Uterine rupture        Discharge Diagnosis:  section at 34w5d    Procedures:  , Low Transverse    3/2/2025   9:13 PM     Feeding method: Breastfeeding Status: Yes    Rh Immune globulin given: no    Rubella vaccine given: no    Discharge Date: 3/6/2025; Discharge Time: 11:38 EST    Early Discharge:  NO    Hospital Course  Patient is a 35 y.o. female  at 34w5d status post  section with uneventful postoperative recovery.  Patient was advanced to regular diet on postoperative day#1.  On discharge, ambulating, tolerating a regular diet without any difficulties and her incision is dry, clean and intact.     Infant:   female fetus 2770 g (6 lb 1.7 oz) with Apgar scores of 8 , 9  at five minutes.    Circumcision: no    Condition on Discharge:  Stable    Vital Signs  Temp:  [97.8 °F (36.6 °C)-98.2 °F (36.8 °C)] 98.1 °F (36.7 °C)  Heart Rate:  [73-89] 89  Resp:  [16] 16  BP: (110-135)/(69-74) 122/74    Lab Results   Component Value Date    WBC 6.48 2025    HGB 10.1 (L) 2025    HCT 31.3 (L) 2025    MCV 90.2 2025     (L) 2025       Discharge Disposition  Home or Self Care    Discharge Medications     Discharge Medications        New Medications        Instructions Start Date   docusate sodium 100 MG capsule   100 mg, Oral, 2 Times Daily PRN       ibuprofen 600 MG tablet  Commonly known as: ADVIL,MOTRIN   600 mg, Oral, Every 6 Hours PRN      oxyCODONE 5 MG immediate release tablet  Commonly known as: ROXICODONE   5 mg, Oral, Every 6 Hours PRN             Changes to Medications        Instructions Start Date   Insulin Lispro Kang KwikPen 100 UNIT/ML solution pen-injector  What changed: See the new instructions.   Inject 10 Units under the skin into the appropriate area as directed Every Morning Before Breakfast AND 10 Units Daily Before Lunch AND 10 Units Daily Before Supper. May also inject 10 Units 3 (Three) Times a Day As Needed (with snacks).      Lantus SoloStar 100 UNIT/ML injection pen  Generic drug: Insulin Glargine  What changed: how much to take   10 Units, Subcutaneous, 2 Times Daily, Inject under the skin into the appropriate area 22 units in the morning and 22 units in the evening approximately 12 hours apart.             Continue These Medications        Instructions Start Date   ACCRUFeR 30 MG capsule  Generic drug: Ferric Maltol   30 mg, Oral, 2 Times Daily, Take 1 hour before or 2 hours after meals.      aspirin 81 MG EC tablet   81 mg, Oral, Daily      Dexcom G7 Sensor misc   1 each, Not Applicable, Every 10 Days      freestyle lancets   Check blood sugar three times daily PRN.      FreeStyle Lite device   1 dose, Not Applicable, Daily      FREESTYLE LITE test strip  Generic drug: glucose blood   Use as instructed      glucagon 1 MG injection  Commonly known as: GLUCAGEN   Use as directed for emergently low blood glucose level. Formulary Compliance Approval      glucose 4 GM chewable tablet  Commonly known as: DEX4   16 g, Oral, As Needed      metFORMIN  MG 24 hr tablet  Commonly known as: GLUCOPHAGE-XR   TAKE TWO TABLETS BY MOUTH DAILY WITH BREAKFAST AND Three TABLETS BY MOUTH WITH DINNER      Pen Needles 32G X 4 MM misc   1 each, Subcutaneous, 4 Times Daily PRN, Formulary Compliance Approval      PRILOSEC PO   Take  by mouth.       TYLENOL PO   Take  by mouth.      valACYclovir 1000 MG tablet  Commonly known as: VALTREX   1 g, Daily      VITAFUSION PRENATAL PO       vitamin B-12 100 MCG tablet  Commonly known as: CYANOCOBALAMIN   50 mcg, Daily      vitamin D3 125 MCG (5000 UT) capsule capsule   5,000 Units, Daily               Discharge Diet:   Diet Instructions       Diet: Diabetic Diets; Consistent Carbohydrate; Thin (IDDSI 0)      Discharge Diet: Diabetic Diets    Diabetic Diet: Consistent Carbohydrate    Fluid Consistency: Thin (IDDSI 0)            Activity at Discharge:   Activity Instructions       Driving Restrictions      Type of Restriction:  Driving  Lifting  Bathing  Sex       Driving Restrictions: No Driving (Time Limited)    Length: 2 Weeks    Explain Sexual Activity Restrictions: 6 weeks    Bathing Restrictions: No Tub Bath    Lifting Restrictions: Lifting Restriction (Indicate Limit)    Weight Limit (Pounds): 20    Length of Lifting Restriction: 2 weeks    Pelvic Rest              Follow-up Appointments  Future Appointments   Date Time Provider Department Center   3/17/2025  2:30 PM [facility]     Additional Instructions for the Follow-ups that You Need to Schedule       Call MD With Problems / Concerns   As directed      Instructions: Monitor for excessive bleeding >1 pad/hr for several hours, dizziness, syncope, shortness of breath, chest pain, fever or changes in blood pressure. Call or go to ED with foul smelling discharge, fever of >100.4, signs of postpartum depression, saturating a pad in <1 hour, blood clots larger than a golf ball. Also, call with headache that does not resolve with medication or rest, vision changes, pain in right upper quadrant, worsening swelling, or BP >140/90 if able to monitor at home.     Please call endocrinologist if you have trouble with your blood sugars    Order Comments: Instructions: Monitor for excessive bleeding >1 pad/hr for several hours, dizziness, syncope, shortness  of breath, chest pain, fever or changes in blood pressure. Call or go to ED with foul smelling discharge, fever of >100.4, signs of postpartum depression, saturating a pad in <1 hour, blood clots larger than a golf ball. Also, call with headache that does not resolve with medication or rest, vision changes, pain in right upper quadrant, worsening swelling, or BP >140/90 if able to monitor at home.  Please call endocrinologist if you have trouble with your blood sugars         Discharge Follow-up with Specified Provider: ; 2 Weeks   As directed      To:    Follow Up: 2 Weeks                VALENCIA Alford  03/06/25  11:38 EST

## 2025-03-10 ENCOUNTER — LACTATION ENCOUNTER (OUTPATIENT)
Dept: OTHER | Facility: HOSPITAL | Age: 35
End: 2025-03-10

## 2025-03-10 NOTE — LACTATION NOTE
This note was copied from a baby's chart.  NICU discharge education.  Infant is being discharge to home today.  Patient states that she is pumping breastmilk and providing to infant in a bottle.  Patient stated that she has put infant to the breast with a shield and without a shield and infant had a good latch.  Patient has 3 other children that she breastfeed for 2 years each, and feels very confident with breastfeeding.  Discussed lactation's outpatient lactation clinic appointment, patient declined at this time, but stated she will call if she has any concerns.  Discussed continuing to pump if infant is getting any supplementation to keep supply the same as now.  Patient states she is making more than enough milk for infant when pumping.  Answered all questions and concerns at this time.

## 2025-03-12 ENCOUNTER — MEDICATION THERAPY MANAGEMENT (OUTPATIENT)
Dept: OBSTETRICS AND GYNECOLOGY | Facility: HOSPITAL | Age: 35
End: 2025-03-12
Payer: COMMERCIAL

## 2025-03-12 ENCOUNTER — TELEPHONE (OUTPATIENT)
Dept: OBSTETRICS AND GYNECOLOGY | Facility: HOSPITAL | Age: 35
End: 2025-03-12
Payer: COMMERCIAL

## 2025-03-12 DIAGNOSIS — O24.119 TYPE 2 DIABETES MELLITUS AFFECTING PREGNANCY, ANTEPARTUM: ICD-10-CM

## 2025-03-12 DIAGNOSIS — O24.119 TYPE 2 DIABETES MELLITUS AFFECTING PREGNANCY, ANTEPARTUM: Primary | ICD-10-CM

## 2025-03-12 RX ORDER — INSULIN GLARGINE 100 [IU]/ML
24 INJECTION, SOLUTION SUBCUTANEOUS NIGHTLY
Qty: 30 ML | Refills: 1 | Status: SHIPPED | OUTPATIENT
Start: 2025-03-12

## 2025-03-12 RX ORDER — INSULIN LISPRO 100 [IU]/ML
12 INJECTION, SOLUTION SUBCUTANEOUS
Qty: 60 ML | Refills: 1 | Status: SHIPPED | OUTPATIENT
Start: 2025-03-12

## 2025-03-12 NOTE — TELEPHONE ENCOUNTER
Spoke with patient over the phone.  Informed patient that Dr. Kim has reviewed her blood sugar log and is changes her Lantus to 24 units at night.  She is to wait 24 hours since the last split dose before taking.  She is also increasing her Lispro to 12 units with meals. Also Dr. Kim put in an endocrinology referral and she should expect a call from endocrinology in the next couple of days.  Patient denies needs at this time and voices understanding.  Linda Wen RN

## 2025-03-14 ENCOUNTER — POSTPARTUM VISIT (OUTPATIENT)
Dept: OBSTETRICS AND GYNECOLOGY | Facility: CLINIC | Age: 35
End: 2025-03-14
Payer: COMMERCIAL

## 2025-03-14 VITALS
BODY MASS INDEX: 36.06 KG/M2 | HEIGHT: 65 IN | DIASTOLIC BLOOD PRESSURE: 80 MMHG | WEIGHT: 216.4 LBS | SYSTOLIC BLOOD PRESSURE: 116 MMHG

## 2025-03-14 DIAGNOSIS — Z48.89 ENCOUNTER FOR POST SURGICAL WOUND CHECK: ICD-10-CM

## 2025-03-14 NOTE — PROGRESS NOTES
Chief Complaint   Patient presents with    Postpartum Care       Postpartum Visit         Shelley Longoria is a 35 y.o.  who presents today for a 2 week(s) postpartum check.      C/S: repeat, Type 2 DM insulin controlled and hx of uterine rupture     , Low Transverse   Information for the patient's :  Marshall Longoria [[id number]]   3/2/2025   female   Marshall Longoria   2770 g (6 lb 1.7 oz)   Gestational Age: 34w5d     Baby To NICU for 7 days  Delivering MD: Jonathan Marques MD.    Pregnancy complications:  Type 2 DM insulin controlled, hx of gastric sleeve and hx of uterine rupture    Patient reports her incision is clean, dry, intact. Patient describes vaginal bleeding as absent. She is breastfeeding. She would like to discuss the following complaints today: none.    Patient had a bilateral salpingectomy for contraception.    Patient denies concerns for postpartum depression/anxiety. Patient denies  suicidal or homicidal ideation. Her postpartum depression screening questionnaire: 0. No treatment is indicated      The additional following portions of the patient's history were reviewed and updated as appropriate: allergies and current medications.      Review of Systems   Respiratory: Negative.  Negative for shortness of breath.    Cardiovascular: Negative.  Negative for chest pain.   Gastrointestinal: Negative.  Negative for abdominal distention, abdominal pain and constipation.   Genitourinary: Negative.  Negative for breast discharge, breast lump, breast pain, dysuria, pelvic pain, pelvic pressure, urinary incontinence, vaginal bleeding, vaginal discharge and vaginal pain.   Skin:  Positive for wound (c/s incision- nml tenderness).   Psychiatric/Behavioral: Negative.  Negative for depressed mood. The patient is not nervous/anxious.        I have reviewed and agree with the HPI, ROS, and historical information as entered above. Devora Martin,  "APRN      Objective   /80   Ht 165.1 cm (65\")   Wt 98.2 kg (216 lb 6.4 oz)   LMP 2024 (Exact Date)   Breastfeeding Yes   BMI 36.01 kg/m²     Physical Exam  Vitals and nursing note reviewed. Exam conducted with a chaperone present.   Constitutional:       General: She is not in acute distress.     Appearance: Normal appearance. She is not ill-appearing or toxic-appearing.   HENT:      Head: Normocephalic and atraumatic.   Pulmonary:      Effort: Pulmonary effort is normal.   Abdominal:      Palpations: Abdomen is soft. There is no mass.      Tenderness: There is abdominal tenderness (Nml incision tenderness.).      Hernia: No hernia is present.      Comments: LTCS incision well approximated; no drainage, bleeding, or redness.       Neurological:      Mental Status: She is alert and oriented to person, place, and time.   Psychiatric:         Mood and Affect: Mood normal.         Behavior: Behavior normal.              Assessment and Plan    Problem List Items Addressed This Visit    None  Visit Diagnoses         Postpartum care following  delivery    -  Primary      Encounter for post surgical wound check                S/p , 2 week(s) postpartum.  Doing well.    Continued pelvic rest with a return to driving and light physical activity.  Baby doing well.  Breastfeeding going well.  No si/sx of postpartum depression  Contraception: contraceptive methods: Abstinence, s/p bilateral salpingectomy.  Insert interdry cloth into abdominal fold.  Care after c/s education included in patient instructions.   Return for 6 week PP visit, Yves.    Devora Martin, APRN  2025  "

## 2025-03-17 ENCOUNTER — PATIENT OUTREACH (OUTPATIENT)
Dept: LABOR AND DELIVERY | Facility: HOSPITAL | Age: 35
End: 2025-03-17
Payer: COMMERCIAL

## 2025-03-17 NOTE — OUTREACH NOTE
Motherhood Connection  Postpartum Check-In    Questions/Answers      Flowsheet Row Responses   Visit Setting Telephone   Best Method for Contacting Cell   OB Discharge Note Reviewed  Reviewed   OB Discharge Medications Reviewed  Reviewed    discharged home with mother? Infant in ICU   Comment Infant in NICU 10 days   Current Pain Levels 0-10 2   At Rest Pain Levels 0-10 1   Pain level with activity 0-10 1   Acceptable Pain Level 0-10 3   Pain Location Abdomen   Pain Description Incisional   How do you treat your pain? Pillow Support, Position Changes   Verbalized Emotional State Acceptance   Family/Support Network Spouse   Level of Involvement in Care Attentive, Interactive   Do you feel comfortable in your relationship with your baby? Yes   Have members of your household adjusted to your baby? Yes   Is the baby's father supportive and/or involved with the baby? Yes   How does your partner feel about the baby? Happy, Involved   Do you feel safe at home, school and work? Yes   Are you in a relationship with someone who threatens you or hurts you? No   Do you have the resources to keep yourself and your baby healthy and safe? Yes   Lochia (per patient report) Brown-monique Red   Amount Scant   Number of pads per day 2   Lochia Odor None   Is patient breastfeeding? Yes, not pumping   Not pumping - Left Breast Full, Soft   Not pumping - Right Breast Full, Soft   Not pumping - Left Nipple Intact, Nontender   Not pumping - Right nipple Intact, Nontender   Breast Care Supportive Bra   Postpartum Depression Screening Education Education Provided   Doctor Appointments: Education Provided   Breastfeeding Education Education Provided   Postpartum Care Education Education Provided   S & S to report Education Provided   Followup Appointments Made Yes   Well Child Visit Appointments Made Yes   Appointment Date 25   Provider/Agency Dr Marinelli   Well Child Checkup Provider Name Jalyn Sauer   Well Child Check Up Date:  25   Did you complete the visit? Yes   Were there any specific concerns? No   Umbilical Cord No reported signs or symptoms   Infant Feeding Method Breast   Is a lactation referral indicated? No   Frequency of feedings q 3hr   Number of wet diapers x 24 hours 9   Last BM x 24 hours 3   Emesis (Unmeasured Occurence) none   What safe sleep surface is available? Bassinet   Are there stuffed animals, toys, pillows, quilts, blankets, wedges, positioners, bumpers or other loose bedding in the infant's sleeping environment? No   Where does the baby usually sleep? Bassinet   Does the baby ever share a sleep surface with a sibling, adult or pet? No   Does the baby ever share a sleep surface in a bed, couch, recliner or other? No   What position do you place your baby to sleep for naps? Back   What position do you place your baby to sleep at night Back   Are you and/or other caregivers smoking inside or outside the baby's home? No   Is the infant dressed appropiately for the temperature of the home? Yes   Do you use a clean, dry pacifier that is not attached to a string or stuffed animal? Yes            Review of Systems    Most Recent [place]  Depression Scale Score (EPDS)    Performed by a clinician: 0 (3/17/2025  1:23 PM)    5 Ps Screen  Completed    Feeling well since going home. Minimal pain and lochia WNL. States mood has been stable. Reports good family support. Reports breastfeeding going very well.     Baby doing well with no concerns at follow up appointment. Has regained to birth weight.     No community resource needs at present. Updated resources provided via [employer]. RN from call center to f/u next week. Contact information provided. Encouraged to call with questions, concerns, or for support before then.    Juany Mar RN  Maternity Nurse Navigator    3/17/2025, 13:41 EDT

## 2025-03-19 ENCOUNTER — MEDICATION THERAPY MANAGEMENT (OUTPATIENT)
Dept: OBSTETRICS AND GYNECOLOGY | Facility: HOSPITAL | Age: 35
End: 2025-03-19
Payer: COMMERCIAL

## 2025-03-19 DIAGNOSIS — O24.119 TYPE 2 DIABETES MELLITUS AFFECTING PREGNANCY, ANTEPARTUM: ICD-10-CM

## 2025-03-19 RX ORDER — INSULIN LISPRO 100 [IU]/ML
14 INJECTION, SOLUTION SUBCUTANEOUS
Qty: 60 ML | Refills: 1 | Status: SHIPPED | OUTPATIENT
Start: 2025-03-19

## 2025-03-24 ENCOUNTER — PATIENT OUTREACH (OUTPATIENT)
Dept: CALL CENTER | Facility: HOSPITAL | Age: 35
End: 2025-03-24
Payer: COMMERCIAL

## 2025-03-24 NOTE — OUTREACH NOTE
Motherhood Connection Survey      Flowsheet Row Responses   [facility] facility patient discharged from? [place]   Week 1 attempt successful? No   Unsuccessful attempts Attempt 2   Reschedule Tomorrow              Dolly NAVARRO - Registered Nurse

## 2025-03-24 NOTE — OUTREACH NOTE
Motherhood Connection Survey      Flowsheet Row Responses   [facility] facility patient discharged from? [place]   Week 1 attempt successful? No   Unsuccessful attempts Attempt 1  [pt requested call back after 4pm]   Reschedule Today              Dolly NAVARRO - Registered Nurse

## 2025-03-25 ENCOUNTER — PATIENT OUTREACH (OUTPATIENT)
Dept: CALL CENTER | Facility: HOSPITAL | Age: 35
End: 2025-03-25
Payer: COMMERCIAL

## 2025-03-25 NOTE — OUTREACH NOTE
Motherhood Connection Survey      Flowsheet Row Responses   [facility] facility patient discharged from? [place]   Week 1 attempt successful? No   Unsuccessful attempts Attempt 3   Revoke Decline to participate              Mendoza TINSLEY - Registered Nurse

## 2025-03-26 ENCOUNTER — OFFICE VISIT (OUTPATIENT)
Dept: ENDOCRINOLOGY | Facility: CLINIC | Age: 35
End: 2025-03-26
Payer: COMMERCIAL

## 2025-03-26 VITALS
HEART RATE: 117 BPM | OXYGEN SATURATION: 98 % | SYSTOLIC BLOOD PRESSURE: 124 MMHG | DIASTOLIC BLOOD PRESSURE: 76 MMHG | HEIGHT: 65 IN | WEIGHT: 210 LBS | BODY MASS INDEX: 34.99 KG/M2

## 2025-03-26 DIAGNOSIS — R73.09 ABNORMAL GLUCOSE: ICD-10-CM

## 2025-03-26 DIAGNOSIS — D50.9 MATERNAL IRON DEFICIENCY ANEMIA AFFECTING PREGNANCY IN THIRD TRIMESTER, ANTEPARTUM: ICD-10-CM

## 2025-03-26 DIAGNOSIS — E78.2 MIXED HYPERLIPIDEMIA: ICD-10-CM

## 2025-03-26 DIAGNOSIS — O99.013 MATERNAL IRON DEFICIENCY ANEMIA AFFECTING PREGNANCY IN THIRD TRIMESTER, ANTEPARTUM: ICD-10-CM

## 2025-03-26 DIAGNOSIS — E11.9 TYPE 2 DIABETES MELLITUS WITHOUT COMPLICATION, WITH LONG-TERM CURRENT USE OF INSULIN: Primary | ICD-10-CM

## 2025-03-26 DIAGNOSIS — Z79.4 TYPE 2 DIABETES MELLITUS WITHOUT COMPLICATION, WITH LONG-TERM CURRENT USE OF INSULIN: Primary | ICD-10-CM

## 2025-03-26 LAB
EXPIRATION DATE: ABNORMAL
EXPIRATION DATE: ABNORMAL
GLUCOSE BLDC GLUCOMTR-MCNC: 301 MG/DL (ref 70–130)
HBA1C MFR BLD: 6.1 % (ref 4.5–5.7)
Lab: ABNORMAL
Lab: ABNORMAL

## 2025-03-26 PROCEDURE — 83036 HEMOGLOBIN GLYCOSYLATED A1C: CPT | Performed by: INTERNAL MEDICINE

## 2025-03-26 PROCEDURE — 99204 OFFICE O/P NEW MOD 45 MIN: CPT | Performed by: INTERNAL MEDICINE

## 2025-03-26 PROCEDURE — 1160F RVW MEDS BY RX/DR IN RCRD: CPT | Performed by: INTERNAL MEDICINE

## 2025-03-26 PROCEDURE — 1159F MED LIST DOCD IN RCRD: CPT | Performed by: INTERNAL MEDICINE

## 2025-03-26 PROCEDURE — 3044F HG A1C LEVEL LT 7.0%: CPT | Performed by: INTERNAL MEDICINE

## 2025-03-26 PROCEDURE — 82947 ASSAY GLUCOSE BLOOD QUANT: CPT | Performed by: INTERNAL MEDICINE

## 2025-03-26 RX ORDER — ACYCLOVIR 400 MG/1
1 TABLET ORAL
Qty: 9 EACH | Refills: 1 | Status: SHIPPED | OUTPATIENT
Start: 2025-03-26

## 2025-03-26 RX ORDER — INSULIN GLARGINE 100 [IU]/ML
10 INJECTION, SOLUTION SUBCUTANEOUS 2 TIMES DAILY
Qty: 30 ML | Refills: 1 | Status: SHIPPED | OUTPATIENT
Start: 2025-03-26

## 2025-03-26 RX ORDER — BLOOD-GLUCOSE METER
KIT MISCELLANEOUS
Qty: 100 EACH | Refills: 12 | Status: SHIPPED | OUTPATIENT
Start: 2025-03-26

## 2025-03-26 RX ORDER — METFORMIN HYDROCHLORIDE 500 MG/1
TABLET, EXTENDED RELEASE ORAL
Qty: 270 TABLET | Refills: 1 | Status: SHIPPED | OUTPATIENT
Start: 2025-03-26

## 2025-03-26 RX ORDER — INSULIN LISPRO 100 [IU]/ML
10 INJECTION, SOLUTION INTRAVENOUS; SUBCUTANEOUS
Qty: 30 ML | Refills: 1 | Status: SHIPPED | OUTPATIENT
Start: 2025-03-26

## 2025-03-26 NOTE — ASSESSMENT & PLAN NOTE
Blood sugar and 90 day average sugar reviewed  Results for orders placed or performed in visit on 03/26/25   POC Glucose, Blood    Collection Time: 03/26/25  1:27 PM    Specimen: Blood   Result Value Ref Range    Glucose 301 (A) 70 - 130 mg/dL    Lot Number [id number]     Expiration Date 882,025    POC Glycosylated Hemoglobin (Hb A1C)    Collection Time: 03/26/25  1:27 PM    Specimen: Blood   Result Value Ref Range    Hemoglobin A1C 6.1 (A) 4.5 - 5.7 %    Lot Number [id number]     Expiration Date 10,112,026      Is taking lantus and humalog  Stopped metformin in hospital   10 u lantus bid and 10 u lispro ac tid   Add back metformin and discussed adjustment of insulin for high and low sugars   Update eye exam  No foot lesion   Ur alb due NOV

## 2025-03-26 NOTE — PROGRESS NOTES
Shelley Mills Von 35 y.o.  CC:  New patient referred for evaluation and management of II Diabetes Mellitus now 3 weeks postpartum     [place]: New Patient referred for evaluation and management of II Diabetes Mellitus now 3 weeks postpartum    Diagnosed with diabetes in 2015- had gestational diabetes with second pregnancy   In 2017 A1c was 10  Had weight loss surgery in 2019- sleeve procedure and lost 100 lbs   Most recent pregnancy was on high dose insulin lantus 40 u bid and lispro 30 u am, 42 u lunch, 86 u dinner   Gained 74 lbs during this pregnancy due to recurrent severe low sugar including a spell of low sugar requiring her to be doused with cold water to arouse her  Since delivery she has gone back to insulin therapy   Had btl with this pregnancy   Is breastfeeding currently and plans to breastfeed at least 12-18 months   She felt better taking lantus 10 u bid and humalog 10 u tid ac along with metformin   These prescriptions and titration schedule were sent for metformin   C/o floaters during pregnancy now resolved- discussed need for annual eye exam  Downloaded sensor and reviewed dexcom data - 12 days of sensor data discussed  Higher evening sugars reviewed    Allergies   Allergen Reactions    Buspar [Buspirone] Other (See Comments)     Fatigue         Current Outpatient Medications:     Acetaminophen (TYLENOL PO), Take  by mouth., Disp: , Rfl:     aspirin 81 MG EC tablet, Take 1 tablet by mouth Daily., Disp: 90 tablet, Rfl: 3    Blood Glucose Monitoring Suppl (FreeStyle Lite) device, 1 dose Daily., Disp: 1 each, Rfl: 0    Continuous Glucose Sensor (Dexcom G7 Sensor) misc, Use 1 each Every 10 (Ten) Days., Disp: 9 each, Rfl: 1    docusate sodium 100 MG capsule, Take 1 capsule by mouth 2 (Two) Times a Day As Needed for Constipation., Disp: 30 capsule, Rfl: 0    Ferric Maltol (ACCRUFeR) 30 MG capsule, Take 1 capsule by mouth 2 (Two) Times a Day. Take 1 hour before or 2 hours after meals., Disp: , Rfl:      glucagon (GLUCAGEN) 1 MG injection, Use as directed for emergently low blood glucose level. Formulary Compliance Approval, Disp: 1 each, Rfl: 0    glucose (DEX4) 4 GM chewable tablet, Chew 4 tablets As Needed for Low Blood Sugar., Disp: 30 tablet, Rfl: 12    glucose blood (FREESTYLE LITE) test strip, Use to test sugar twice daily, Disp: 100 each, Rfl: 12    ibuprofen (ADVIL,MOTRIN) 600 MG tablet, Take 1 tablet by mouth Every 6 (Six) Hours As Needed for Mild Pain., Disp: 60 tablet, Rfl: 0    Insulin Glargine (Lantus SoloStar) 100 UNIT/ML injection pen, Inject 10 Units under the skin into the appropriate area as directed 2 (Two) Times a Day., Disp: 30 mL, Rfl: 1    Insulin Pen Needle (Pen Needles) 32G X 4 MM misc, Inject 1 each under the skin into the appropriate area as directed 4 (Four) Times a Day As Needed (for insulin injections). Formulary Compliance Approval, Disp: 100 each, Rfl: 12    Lancets (freestyle) lancets, Check blood sugar three times daily PRN., Disp: 100 each, Rfl: 2    metFORMIN ER (GLUCOPHAGE-XR) 500 MG 24 hr tablet, Take 1 pill with breakfast and 2 pills with dinner, Disp: 270 tablet, Rfl: 1    multivitamin with minerals (MULTIVITAMIN ADULT PO), Take 1 tablet by mouth Daily., Disp: , Rfl:     Omeprazole Magnesium (PRILOSEC PO), Take  by mouth., Disp: , Rfl:     Prenatal MV-Min-FA-Omega-3 (VITAFUSION PRENATAL PO), , Disp: , Rfl:     valACYclovir (VALTREX) 1000 MG tablet, Take 1 tablet by mouth Daily., Disp: , Rfl:     vitamin B-12 (CYANOCOBALAMIN) 100 MCG tablet, Take 0.5 tablets by mouth Daily., Disp: , Rfl:     vitamin D3 125 MCG (5000 UT) capsule capsule, Take 1 capsule by mouth Daily., Disp: , Rfl:     Insulin Lispro, 1 Unit Dial, (HumaLOG KwikPen) 100 UNIT/ML solution pen-injector, Inject 10 Units under the skin into the appropriate area as directed 3 (Three) Times a Day Before Meals., Disp: 30 mL, Rfl: 1    Patient Active Problem List    Diagnosis     Uterine rupture [S37.69XA]     Third  trimester pregnancy [Z34.93]     Abdominal pain affecting pregnancy [O26.899, R10.9]     Maternal iron deficiency anemia affecting pregnancy in third trimester, antepartum [O99.013, D50.9]     History of sleeve gastrectomy [Z90.3]     History of gastric surgery [Z98.890]     Current moderate episode of major depressive disorder without prior episode [F32.1]     AMA (advanced maternal age) multigravida 35+ [O09.529]     History of  delivery, currently pregnant [O34.219]     Phentermine/topiramate exposure in current pregnancy [O35.9XX0]     Major depressive disorder with single episode, in partial remission [F32.4]     Dehydration [E86.0]     Vomiting [R11.10]     GERD (gastroesophageal reflux disease) [K21.9]     Abnormal glucose [R73.09]     Type 2 diabetes mellitus affecting pregnancy, antepartum [O24.119]     FH: bariatric surgery [Z84.89]     S/P LSG 11/ - Postop Hemorrhage [Z98.84]     Previous bariatric surgery complicating pregnancy [O99.840]     Mixed hyperlipidemia [E78.2]      Review of Systems   Constitutional:  Negative for activity change, appetite change and unexpected weight change.   HENT:  Negative for congestion and rhinorrhea.    Eyes:  Negative for visual disturbance.   Respiratory:  Negative for cough and shortness of breath.    Cardiovascular:  Negative for palpitations and leg swelling.   Gastrointestinal:  Negative for constipation, diarrhea and nausea.   Genitourinary:  Negative for hematuria.   Musculoskeletal:  Negative for arthralgias, back pain, gait problem, joint swelling and myalgias.   Skin:  Negative for color change, rash and wound.   Allergic/Immunologic: Negative for environmental allergies, food allergies and immunocompromised state.   Neurological:  Negative for dizziness, weakness and light-headedness.   Psychiatric/Behavioral:  Negative for confusion, decreased concentration, dysphoric mood and sleep disturbance. The patient is not nervous/anxious.      Social  "History     Socioeconomic History    Marital status:    Tobacco Use    Smoking status: Former     Current packs/day: 0.00     Average packs/day: 2.0 packs/day for 2.0 years (4.0 ttl pk-yrs)     Types: Cigarettes     Start date: 2010     Quit date: 2012     Years since quittin.7    Smokeless tobacco: Never   Vaping Use    Vaping status: Never Used   Substance and Sexual Activity    Alcohol use: No     Comment: 1-2 TIMES PER MONTH, WINE    Drug use: No    Sexual activity: Yes     Partners: Male     Birth control/protection: None     Family History   Problem Relation Age of Onset    Stroke Mother     Heart disease Mother     Diabetes Mother     Obesity Mother     Hypertension Mother             Arthritis Mother     Diabetes Father     Hypertension Father     Heart disease Father     Arthritis Father      /76   Pulse 117   Ht 165.1 cm (65\")   Wt 95.3 kg (210 lb)   LMP 2024 (Exact Date)   SpO2 98%   BMI 34.95 kg/m²   Physical Exam  Vitals and nursing note reviewed.   Constitutional:       Appearance: Normal appearance. She is well-developed.   HENT:      Head: Normocephalic and atraumatic.   Eyes:      General: Lids are normal.      Extraocular Movements: Extraocular movements intact.      Conjunctiva/sclera: Conjunctivae normal.      Pupils: Pupils are equal, round, and reactive to light.   Neck:      Thyroid: No thyroid mass or thyromegaly.      Vascular: No carotid bruit.      Trachea: Trachea normal. No tracheal deviation.   Cardiovascular:      Rate and Rhythm: Normal rate and regular rhythm.      Pulses: Normal pulses.      Heart sounds: Normal heart sounds. No murmur heard.     No friction rub. No gallop.   Pulmonary:      Effort: Pulmonary effort is normal. No respiratory distress.      Breath sounds: Normal breath sounds. No wheezing.   Musculoskeletal:         General: No deformity. Normal range of motion.      Cervical back: Normal range of motion and neck supple. "   Lymphadenopathy:      Cervical: No cervical adenopathy.   Skin:     General: Skin is warm and dry.      Findings: No erythema or rash.      Nails: There is no clubbing.   Neurological:      General: No focal deficit present.      Mental Status: She is alert and oriented to person, place, and time.      Cranial Nerves: No cranial nerve deficit.      Deep Tendon Reflexes: Reflexes are normal and symmetric. Reflexes normal.   Psychiatric:         Mood and Affect: Mood normal.         Speech: Speech normal.         Behavior: Behavior normal.         Thought Content: Thought content normal.         Judgment: Judgment normal.       Results for orders placed or performed in visit on 03/26/25   POC Glucose, Blood    Collection Time: 03/26/25  1:27 PM    Specimen: Blood   Result Value Ref Range    Glucose 301 (A) 70 - 130 mg/dL    Lot Number [id number]     Expiration Date 882,025    POC Glycosylated Hemoglobin (Hb A1C)    Collection Time: 03/26/25  1:27 PM    Specimen: Blood   Result Value Ref Range    Hemoglobin A1C 6.1 (A) 4.5 - 5.7 %    Lot Number [id number]     Expiration Date 10,112,026      Diagnoses and all orders for this visit:    1. Type 2 diabetes mellitus without complication, with long-term current use of insulin (Primary)  Assessment & Plan:  Blood sugar and 90 day average sugar reviewed  Results for orders placed or performed in visit on 03/26/25   POC Glucose, Blood    Collection Time: 03/26/25  1:27 PM    Specimen: Blood   Result Value Ref Range    Glucose 301 (A) 70 - 130 mg/dL    Lot Number [id number]     Expiration Date 882,025    POC Glycosylated Hemoglobin (Hb A1C)    Collection Time: 03/26/25  1:27 PM    Specimen: Blood   Result Value Ref Range    Hemoglobin A1C 6.1 (A) 4.5 - 5.7 %    Lot Number [id number]     Expiration Date 10,112,026      Is taking lantus and humalog  Stopped metformin in hospital   10 u lantus bid and 10 u lispro ac tid   Add back metformin and discussed adjustment of insulin for  high and low sugars   Update eye exam  No foot lesion   Ur alb due NOV     Orders:  -     POC Glucose, Blood  -     POC Glycosylated Hemoglobin (Hb A1C)  -     Insulin Glargine (Lantus SoloStar) 100 UNIT/ML injection pen; Inject 10 Units under the skin into the appropriate area as directed 2 (Two) Times a Day.  Dispense: 30 mL; Refill: 1  -     Continuous Glucose Sensor (Dexcom G7 Sensor) misc; Use 1 each Every 10 (Ten) Days.  Dispense: 9 each; Refill: 1    2. Abnormal glucose  -     metFORMIN ER (GLUCOPHAGE-XR) 500 MG 24 hr tablet; Take 1 pill with breakfast and 2 pills with dinner  Dispense: 270 tablet; Refill: 1  -     glucose blood (FREESTYLE LITE) test strip; Use to test sugar twice daily  Dispense: 100 each; Refill: 12    3. Mixed hyperlipidemia  Assessment & Plan:   Check flp NOV       4. Maternal iron deficiency anemia affecting pregnancy in third trimester, antepartum  Assessment & Plan:  Update cbc, iron, b12 next office visit       Other orders  -     Insulin Lispro, 1 Unit Dial, (HumaLOG KwikPen) 100 UNIT/ML solution pen-injector; Inject 10 Units under the skin into the appropriate area as directed 3 (Three) Times a Day Before Meals.  Dispense: 30 mL; Refill: 1    Return in about 3 months (around 6/26/2025) for Recheck.    Electronically signed by: Florence Christiansen MD

## 2025-03-27 ENCOUNTER — TELEPHONE (OUTPATIENT)
Dept: OBSTETRICS AND GYNECOLOGY | Facility: HOSPITAL | Age: 35
End: 2025-03-27
Payer: COMMERCIAL

## 2025-03-27 NOTE — TELEPHONE ENCOUNTER
Called pt to let her know Dr Kim reviewed her blood sugar log and had recommendations. Pt states she established care for her diabetes with Dr Christiansen yesterday and will have her medication managed and reviewed there.

## 2025-04-16 ENCOUNTER — POSTPARTUM VISIT (OUTPATIENT)
Dept: OBSTETRICS AND GYNECOLOGY | Facility: CLINIC | Age: 35
End: 2025-04-16
Payer: COMMERCIAL

## 2025-04-16 VITALS
WEIGHT: 211.6 LBS | HEIGHT: 65 IN | DIASTOLIC BLOOD PRESSURE: 70 MMHG | SYSTOLIC BLOOD PRESSURE: 116 MMHG | BODY MASS INDEX: 35.25 KG/M2

## 2025-04-16 NOTE — PROGRESS NOTES
Chief Complaint   Patient presents with    Postpartum Care       Postpartum Visit         Shelley Longoria is a 35 y.o.  who presents today for a 6 week(s) postpartum check.     C/S:   labor and impending uterine rupture      , Low Transverse   Information for the patient's :  Chrystal Mack [[id number]]   3/2/2025   female   Chrystal Bella   2770 g (6 lb 1.7 oz)   Gestational Age: 34w5d       Baby Discharged: To NICU for 8 days  Delivering Physician: Jonathan Marques MD    Her pregnancy was complicated by DM requiring insulin, AMA, previous C/S x4, history of gastric sleeve, and  labor (with impending uterine rupture).  The incision is healing well. Patient describes vaginal bleeding as intermittent and light.  Patient is breastfeeding.  She had a bilateral salpingectomy at time of delivery for contraception.      She would like to discuss the following complaints today: none. She is still seeing Dr. Christiansen at endocrinology, and they are managing her insulin. She reports her glucoses seem to be under better control now; fastings running  and postprandials 140-160.    Patient denies concerns for postpartum depression/anxiety. Patient denies  suicidal or homicidal ideation. Her postpartum depression screening questionnaire: 0. No treatment is indicated      Last Pap: 2/15/2023. Results: negative. HPV:  unknown .   Last Completed Pap Smear            Upcoming       PAP SMEAR (Every 3 Years) Next due on 2/15/2026      02/15/2023  Patient-Reported (Performed Externally) - Dr Fragoso    2019  Patient-Reported (Performed Externally) - [facility]                              The additional following portions of the patient's history were reviewed and updated as appropriate: allergies, current medications, past family history, past medical history, past social history, past surgical history, and  "problem list.    Review of Systems  All other systems reviewed and are negative.     I have reviewed and agree with the HPI, ROS, and historical information as entered above. Lucho Marinelli MD     /70 (BP Location: Right arm, Patient Position: Sitting, Cuff Size: Large Adult)   Ht 165.1 cm (65\")   Wt 96 kg (211 lb 9.6 oz)   LMP 2024 (Exact Date)   Breastfeeding Yes   BMI 35.21 kg/m²     Physical Exam  Vitals and nursing note reviewed. Exam conducted with a chaperone present.   Constitutional:       Appearance: Normal appearance. She is well-developed.   HENT:      Head: Normocephalic and atraumatic.   Pulmonary:      Effort: Pulmonary effort is normal.   Abdominal:      General: A surgical scar is present. There is no distension.      Palpations: Abdomen is soft. Abdomen is not rigid. There is no mass.      Tenderness: There is no abdominal tenderness. There is no guarding or rebound.   Genitourinary:     General: Normal vulva.      Exam position: Lithotomy position.      Vagina: Normal. No vaginal discharge, tenderness or bleeding.      Cervix: Normal. No cervical motion tenderness or lesion.      Uterus: Normal. Not enlarged, not tender and no uterine prolapse.       Adnexa: Right adnexa normal and left adnexa normal.        Right: No mass, tenderness or fullness.          Left: No mass, tenderness or fullness.     Skin:     General: Skin is warm and dry.   Neurological:      Mental Status: She is alert and oriented to person, place, and time.   Psychiatric:         Mood and Affect: Mood normal.         Behavior: Behavior normal.             Assessment and Plan    Problem List Items Addressed This Visit    None  Visit Diagnoses         Postpartum exam    -  Primary            S/p , 6 week(s) postpartum.  Doing well.    Return to normal physical activity.  No pelvic restrictions.   Baby doing well.  Breastfeeding going well.  No si/sx of postpartum depression  Following with endo for " DM2  contraceptive methods: bilateral salpingectomy  Return in about 1 year (around 4/16/2026) for Annual exam.     Lucho Marinelli MD  04/16/2025

## 2025-05-13 RX ORDER — FLUCONAZOLE 150 MG/1
150 TABLET ORAL DAILY
Qty: 2 TABLET | Refills: 0 | Status: SHIPPED | OUTPATIENT
Start: 2025-05-13

## 2025-06-17 ENCOUNTER — PATIENT ROUNDING (BHMG ONLY) (OUTPATIENT)
Dept: URGENT CARE | Facility: CLINIC | Age: 35
End: 2025-06-17
Payer: COMMERCIAL

## 2025-06-18 ENCOUNTER — OFFICE VISIT (OUTPATIENT)
Age: 35
End: 2025-06-18
Payer: COMMERCIAL

## 2025-06-18 VITALS
BODY MASS INDEX: 34.99 KG/M2 | WEIGHT: 210 LBS | HEIGHT: 65 IN | SYSTOLIC BLOOD PRESSURE: 130 MMHG | DIASTOLIC BLOOD PRESSURE: 84 MMHG

## 2025-06-18 DIAGNOSIS — S89.92XA LEFT KNEE INJURY, INITIAL ENCOUNTER: Primary | ICD-10-CM

## 2025-06-18 NOTE — PROGRESS NOTES
[facility] – [place], [place] Orthopaedic Surgery Office Visit - Sherrill Perez PA-C    Office Visit       Patient Name: Shelley Longoria    Chief Complaint:   Chief Complaint   Patient presents with    Left Knee - Pain       Referring Physician: Shantell Quevedo APRN    History of Present Illness:   Shelley Longoria is a 35 y.o. female who presents with left knee injury.  Ongoing since 6/15/2025.  She was stepping down off her porch when her knee bent laterally. She had excruciating pain at that time. Went to urgent care the next day.  X-ray images were obtained.  She was provided a hinged knee sleeve.  Taking ibuprofen and Tylenol for pain.  Here today for further evaluation.    She has 4 kids at home.      Subjective     Review of Systems   Constitutional:  Negative for chills, fever, unexpected weight gain and unexpected weight loss.   HENT:  Negative for congestion, postnasal drip and rhinorrhea.    Eyes:  Negative for blurred vision.   Respiratory:  Negative for shortness of breath.    Cardiovascular:  Negative for leg swelling.   Gastrointestinal:  Negative for abdominal pain, nausea and vomiting.   Genitourinary:  Negative for difficulty urinating.   Musculoskeletal:  Positive for arthralgias. Negative for gait problem, joint swelling and myalgias.   Skin:  Negative for skin lesions and wound.   Neurological:  Negative for dizziness, weakness, light-headedness and numbness.   Hematological:  Does not bruise/bleed easily.   Psychiatric/Behavioral:  Negative for depressed mood.         Past Medical History:   Past Medical History:   Diagnosis Date    Anxiety     COVID-19     2020, 2022    Diabetes mellitus, type II     w/ hx gestational diabetes 2015, dx May 2017, started on insulin when initially dx, lost weight w/ Adipex, stopped insulin 6 months after dx, but now w/ weight regain, back on insulin.  Managed by PCP    Dyspepsia     Dyspnea on  exertion     Fatigue     Fatty liver     w/ abnormal LFTs, US 2018      GERD (gastroesophageal reflux disease) 2023    Herpes 2017    Hiatal hernia 2023    Added automatically from request for surgery [id number]      Hiatal hernia     Hernia in  when sleeve done and then repaired in     History of gestational diabetes     diagnosed with gestational diabetes with pregnancy in  and     History of Helicobacter pylori infection     UBT (+) 19 - PrevPak RX; repeat HPSA (+) 2019 - Pylera RX; repeat UBT (holding PPI) (+) - referred to GI, RX Levaquin/Flagyl/Pepto RX  - HPSA negative 2023    History of transfusion     [facility]- PT UNSURE HOW MANY UNITS RECEIVED, NO REACTIONS    Maternal iron deficiency anemia affecting pregnancy in third trimester, antepartum 2025    Mixed hyperlipidemia 10/15/2019    NAFL (nonalcoholic fatty liver)     w/ abnormal LFTs, US 2018    Obesity (BMI 30-39.9)     Wears eyeglasses        Past Surgical History:   Past Surgical History:   Procedure Laterality Date    BREAST BIOPSY      CERVICAL CONE BIOPSY  2004    benign     SECTION      , , ,      SECTION Bilateral 2025    Procedure:  SECTION REPEAT WITH SALPINGECTOMY;  Surgeon: Jonathan Marques MD;  Location:  [facility];  Service: Obstetrics/Gynecology;  Laterality: Bilateral;    ENDOSCOPY N/A 2019    Procedure: ESOPHAGOGASTRODUODENOSCOPY;  Surgeon: Mila Whatley MD;  Location:  [facility];  Service: Bariatric    EXCESSIVE THIGH / HIP / BUTTOCK / FLANK SKIN EXCISION Bilateral 05/15/2024        GASTRIC SLEEVE LAPAROSCOPIC N/A 2019    Procedure: GASTRIC SLEEVE LAPAROSCOPIC;  Surgeon: Mila Whatley MD;  Location:  [facility];  Service: Bariatric    HIATAL HERNIA REPAIR N/A 2019    Procedure: HIATAL HERNIA REPAIR LAPAROSCOPIC;  Surgeon: Mila Whatley MD;  Location:  [facility];  Service: Bariatric     HIATAL HERNIA REPAIR N/A 2023    Procedure: LAPAROSCOPIC HIATAL HERNIA REPAIR WITH MESH WITH DAVINCI ROBOT;  Surgeon: Mila Whatley MD;  Location: [facility];  Service: Robotics - DaVinci;  Laterality: N/A;    TUBAL ABDOMINAL LIGATION  2025    UPPER GASTROINTESTINAL ENDOSCOPY  2019    Dr VIVIENNE Whatley    UPPER GASTROINTESTINAL ENDOSCOPY  2022    Dr Aldrich, positive H pylori    WISDOM TOOTH EXTRACTION         Family History:   Family History   Problem Relation Age of Onset    Stroke Mother     Heart disease Mother     Diabetes Mother     Obesity Mother     Hypertension Mother             Arthritis Mother     Diabetes Father     Hypertension Father     Heart disease Father     Arthritis Father        Social History:   Social History     Socioeconomic History    Marital status:    Tobacco Use    Smoking status: Former     Current packs/day: 0.00     Average packs/day: 2.0 packs/day for 2.0 years (4.0 ttl pk-yrs)     Types: Cigarettes     Start date: 2010     Quit date: 2012     Years since quittin.9    Smokeless tobacco: Never   Vaping Use    Vaping status: Never Used   Substance and Sexual Activity    Alcohol use: No     Comment: 1-2 TIMES PER MONTH, WINE    Drug use: No    Sexual activity: Yes     Partners: Male     Birth control/protection: None       Medications:   Current Outpatient Medications:     Blood Glucose Monitoring Suppl (FreeStyle Lite) device, 1 dose Daily., Disp: 1 each, Rfl: 0    Continuous Glucose Sensor (Dexcom G7 Sensor) misc, Use 1 each Every 10 (Ten) Days., Disp: 9 each, Rfl: 1    diclofenac (VOLTAREN) 50 MG EC tablet, Take 1 tablet by mouth 2 (Two) Times a Day for 10 days., Disp: 20 tablet, Rfl: 0    fluconazole (Diflucan) 150 MG tablet, Take 1 tablet by mouth Daily. Take once, then again 3 days later if needed, Disp: 2 tablet, Rfl: 0    glucose blood (FREESTYLE LITE) test strip, Use to test sugar twice daily, Disp: 100 each, Rfl:  "12    Insulin Glargine (Lantus SoloStar) 100 UNIT/ML injection pen, Inject 10 Units under the skin into the appropriate area as directed 2 (Two) Times a Day., Disp: 30 mL, Rfl: 1    Insulin Lispro, 1 Unit Dial, (HumaLOG KwikPen) 100 UNIT/ML solution pen-injector, Inject 10 Units under the skin into the appropriate area as directed 3 (Three) Times a Day Before Meals., Disp: 30 mL, Rfl: 1    Insulin Pen Needle (Pen Needles) 32G X 4 MM misc, Inject 1 each under the skin into the appropriate area as directed 4 (Four) Times a Day As Needed (for insulin injections). Formulary Compliance Approval, Disp: 100 each, Rfl: 12    Lancets (freestyle) lancets, Check blood sugar three times daily PRN., Disp: 100 each, Rfl: 2    metFORMIN ER (GLUCOPHAGE-XR) 500 MG 24 hr tablet, Take 1 pill with breakfast and 2 pills with dinner, Disp: 270 tablet, Rfl: 1    multivitamin with minerals (MULTIVITAMIN ADULT PO), Take 1 tablet by mouth Daily., Disp: , Rfl:     Omeprazole Magnesium (PRILOSEC PO), Take  by mouth., Disp: , Rfl:     valACYclovir (VALTREX) 1000 MG tablet, Take 1 tablet by mouth Daily., Disp: , Rfl:     vitamin B-12 (CYANOCOBALAMIN) 100 MCG tablet, Take 0.5 tablets by mouth Daily., Disp: , Rfl:     vitamin D3 125 MCG (5000 UT) capsule capsule, Take 1 capsule by mouth Daily., Disp: , Rfl:     Allergies:   Allergies   Allergen Reactions    Buspar [Buspirone] Other (See Comments)     Fatigue         I have reviewed and updated the following portions of the patient's history and review of systems: allergies, current medications, past family history, past medical history, past social history, past surgical history and problem list.    Objective      Vital Signs:   Vitals:    06/18/25 0934   BP: 130/84   Weight: 95.3 kg (210 lb)   Height: 165.1 cm (65\")       Ortho Exam:  Knee Exam: LEFT  ----------  ALIGNMENT: neutral, no varus or valgus deformity     RANGE OF MOTION:  5-90; painful  LIGAMENTOUS STABILITY:   Pain with varus stress " testing     STRENGTH:  5/5 knee flexion, extension. 5/5 ankle dorsiflexion and plantarflexion.     PAIN WITH PALPATION: tenderness along LCL  KNEE EFFUSION: minimal  PAIN WITH KNEE ROM: yes    SENSATION TO LIGHT TOUCH:  DEEP PERONEAL/SUPERFICIAL PERONEAL/SURAL/SAPHENOUS/TIBIAL:   intact    EDEMA:  no  ERYTHEMA:  no  WOUNDS/INCISIONS: no overlying skin problems.      Results Review:   XR Knee 4+ View Left  Narrative: XR KNEE 4+ VW LEFT    Date of Exam: 6/16/2025 4:07 PM EDT    Indication: knee injury, states knee gave out while stepping down off porch, felt pop, having pain, decreased ROM    Comparison: None available.    Findings:  Mild tricompartment degenerative changes are present. No fractures, dislocations or acute osseous abnormalities are identified. 3 cm x 1.4 cm medullary lesion in the distal shaft of the left femur likely an enchondroma. There is a small suprapatellar   effusion.  Impression: Impression:    1. Mild tricompartment degenerative change. Small suprapatellar effusion. No fractures are identified.    2. Probable enchondroma in the distal shaft of the left femur.    Electronically Signed: Brody Yang MD    6/16/2025 4:29 PM EDT    Workstation ID: [id number]         Assessment / Plan      Assessment:  Diagnoses and all orders for this visit:    1. Left knee injury, initial encounter (Primary)  -     MRI Knee Left Without Contrast; Future        Quality Metrics:   BMI:   BMI is >= 30 and <35. (Class 1 Obesity). The following options were offered after discussion;: weight loss educational material (shared in after visit summary)       Tobacco:   Shelley Mills Von  reports that she quit smoking about 12 years ago. Her smoking use included cigarettes. She started smoking about 14 years ago. She has a 4 pack-year smoking history. She has never used smokeless tobacco.           Plan:  X-ray images left knee 6/16/2025 reviewed today.  I have also reviewed urgent care notes.  No acute bony findings  on x-rays.  Evidence of endochondroma likely within distal femur.  Patient has pain along the LCL with varus stress testing.  I feel she may have an LCL strain versus lateral meniscal tear. Recommend MRI of the left knee to further evaluate.  Recommend hinged knee brace today for stability.  Encouraged to wear while walking.  May take off for sleep and showers.  Encouraged gentle range of motion with brace off.  Recommend over-the-counter anti-inflammatories as needed for pain.      Follow Up:   After MRI left knee          Sherrill Perez PA-C  [facility] [facility] [place] Orthopedic Surgery       Dictated using Dragon Speech Recognition.

## 2025-06-26 ENCOUNTER — HOSPITAL ENCOUNTER (OUTPATIENT)
Dept: MRI IMAGING | Facility: HOSPITAL | Age: 35
Discharge: HOME | End: 2025-06-26
Payer: COMMERCIAL

## 2025-06-26 DIAGNOSIS — S89.92XA LEFT KNEE INJURY, INITIAL ENCOUNTER: ICD-10-CM

## 2025-06-26 PROCEDURE — 73721 MRI JNT OF LWR EXTRE W/O DYE: CPT

## 2025-07-09 ENCOUNTER — OFFICE VISIT (OUTPATIENT)
Age: 35
End: 2025-07-09
Payer: COMMERCIAL

## 2025-07-09 VITALS
BODY MASS INDEX: 34.99 KG/M2 | SYSTOLIC BLOOD PRESSURE: 106 MMHG | WEIGHT: 210 LBS | HEIGHT: 65 IN | DIASTOLIC BLOOD PRESSURE: 80 MMHG

## 2025-07-09 DIAGNOSIS — S83.272A COMPLEX TEAR OF LATERAL MENISCUS OF LEFT KNEE AS CURRENT INJURY, INITIAL ENCOUNTER: Primary | ICD-10-CM

## 2025-07-09 DIAGNOSIS — D16.9 ENCHONDROMA OF BONE: ICD-10-CM

## 2025-07-09 NOTE — PROGRESS NOTES
[facility] [place]        Subjective     CC: Follow-up (3 Week Follow Up - Left knee injury DOI 6/15/25 (MRI f/u 6/26/25 ))      BEATRIZ Longoria is a 35 y.o. female.  New patient to me.  She fell Rachelle 15.  She has had instability pain locking catching and giving way.  She went to urgent care.  She had x-rays.  X-rays showed enchondroma.  She had an MRI.  She is here follow-up with me after the MRI.    Overall, patient's symptoms are unchanged.  She feels her knee gives out.    ROS:    Constiutional:Pt denies fever, chills, nausea, or vomiting.  MSK:as above        Objective      Past Medical History  Past Medical History:   Diagnosis Date    Anxiety     COVID-19 2020, 2022    Diabetes mellitus, type II     w/ hx gestational diabetes 2015, dx May 2017, started on insulin when initially dx, lost weight w/ Adipex, stopped insulin 6 months after dx, but now w/ weight regain, back on insulin.  Managed by PCP    Dyspepsia     Dyspnea on exertion     Fatigue     Fatty liver     w/ abnormal LFTs, US 7/2018      GERD (gastroesophageal reflux disease) 02/01/2023    Herpes 2017    Hiatal hernia 02/09/2023    Added automatically from request for surgery [id number]      Hiatal hernia     Hernia in 2019 when sleeve done and then repaired in 2024    History of gestational diabetes 2015    diagnosed with gestational diabetes with pregnancy in 2015 and 2025    History of Helicobacter pylori infection     UBT (+) 6/14/19 - PrevPak RX; repeat HPSA (+) 11/2019 - Pylera RX; repeat UBT (holding PPI) (+) - referred to GI, RX Levaquin/Flagyl/Pepto RX 2/22 - HPSA negative 1/2023    History of transfusion 2019    [facility]- PT UNSURE HOW MANY UNITS RECEIVED, NO REACTIONS    Maternal iron deficiency anemia affecting pregnancy in third trimester, antepartum 01/17/2025    Mixed hyperlipidemia 10/15/2019    NAFL (nonalcoholic fatty liver)     w/ abnormal LFTs, US 7/2018    Obesity (BMI 30-39.9)     Wears  "eyeglasses      Social History     Socioeconomic History    Marital status:    Tobacco Use    Smoking status: Former     Current packs/day: 0.00     Average packs/day: 2.0 packs/day for 2.0 years (4.0 ttl pk-yrs)     Types: Cigarettes     Start date: 2010     Quit date: 2012     Years since quittin.0    Smokeless tobacco: Never   Vaping Use    Vaping status: Never Used   Substance and Sexual Activity    Alcohol use: No     Comment: 1-2 TIMES PER MONTH, WINE    Drug use: No    Sexual activity: Yes     Partners: Male     Birth control/protection: None          Physical Exam  /80 (BP Location: Left arm, Patient Position: Sitting)   Ht 165.1 cm (65\")   Wt 95.3 kg (210 lb)   BMI 34.95 kg/m²     Body mass index is 34.95 kg/m².    Patient is well nourished and well developed.        Ortho Exam  Antalgic gait on left.  She wears a hinged knee brace.  She is tender lateral joint line with positive Laura.  Full range of motion and stable ligament exam.  Trace effusion.    Imaging/Labs/EMG Reviewed and Interpreted:  Imaging Results (Last 24 Hours)       ** No results found for the last 24 hours. **        I viewed in person and interpret the left knee MRI from  as a lateral meniscus tear.  She also has an enchondroma.      Assessment    Assessment:  1. Complex tear of lateral meniscus of left knee as current injury, initial encounter    2. Enchondroma of bone        Plan:  The plan is for left knee arthroscopy partial lateral meniscectomy.Treatment options and alternatives were discussed with patient.  Surgical risks include but are not limited to pain, bleeding, infection, failure to relieve symptoms, need for further procedures, recurrence of symptoms, damage to healthy adjacent structures, hardware loosening/failure, stiffness, weakness, scar, blood clots/DVT/PE, loss of limb or life. We also discussed the postoperative protocol and expected outcome although no guarantees are possible " with surgery. All questions were answered; the patient would like to proceed with surgical intervention.  The enchondroma is benign and may need yearly follow-up with x-rays      David Tafoya MD  07/09/25  10:12 EDT      Dictated Utilizing Dragon Dictation.

## 2025-08-14 ENCOUNTER — OUTSIDE FACILITY SERVICE (OUTPATIENT)
Dept: ORTHOPEDIC SURGERY | Facility: CLINIC | Age: 35
End: 2025-08-14
Payer: COMMERCIAL

## 2025-08-14 DIAGNOSIS — E11.9 TYPE 2 DIABETES MELLITUS WITHOUT COMPLICATION, WITH LONG-TERM CURRENT USE OF INSULIN: ICD-10-CM

## 2025-08-14 DIAGNOSIS — Z87.828 S/P ARTHROSCOPIC PARTIAL LATERAL MENISCECTOMY OF LEFT KNEE: Primary | ICD-10-CM

## 2025-08-14 DIAGNOSIS — Z79.4 TYPE 2 DIABETES MELLITUS WITHOUT COMPLICATION, WITH LONG-TERM CURRENT USE OF INSULIN: ICD-10-CM

## 2025-08-14 DIAGNOSIS — Z98.890 S/P ARTHROSCOPIC PARTIAL LATERAL MENISCECTOMY OF LEFT KNEE: Primary | ICD-10-CM

## 2025-08-14 RX ORDER — HYDROCODONE BITARTRATE AND ACETAMINOPHEN 5; 325 MG/1; MG/1
1 TABLET ORAL EVERY 6 HOURS PRN
Qty: 12 TABLET | Refills: 0 | Status: SHIPPED | OUTPATIENT
Start: 2025-08-14

## 2025-08-14 RX ORDER — ONDANSETRON 4 MG/1
4 TABLET, FILM COATED ORAL EVERY 8 HOURS PRN
Qty: 10 TABLET | Refills: 1 | Status: SHIPPED | OUTPATIENT
Start: 2025-08-14

## 2025-08-15 ENCOUNTER — PRIOR AUTHORIZATION (OUTPATIENT)
Dept: ENDOCRINOLOGY | Facility: CLINIC | Age: 35
End: 2025-08-15
Payer: COMMERCIAL

## 2025-08-15 RX ORDER — ACYCLOVIR 400 MG/1
1 TABLET ORAL
Qty: 9 EACH | Refills: 6 | Status: SHIPPED | OUTPATIENT
Start: 2025-08-15

## 2025-08-20 ENCOUNTER — OFFICE VISIT (OUTPATIENT)
Age: 35
End: 2025-08-20
Payer: COMMERCIAL

## 2025-08-20 VITALS — TEMPERATURE: 97.8 F | BODY MASS INDEX: 34.99 KG/M2 | HEIGHT: 65 IN | WEIGHT: 210 LBS

## 2025-08-20 DIAGNOSIS — Z98.890 S/P ARTHROSCOPIC PARTIAL LATERAL MENISCECTOMY OF LEFT KNEE: Primary | ICD-10-CM

## 2025-08-20 DIAGNOSIS — Z87.828 S/P ARTHROSCOPIC PARTIAL LATERAL MENISCECTOMY OF LEFT KNEE: Primary | ICD-10-CM

## (undated) DEVICE — ARM DRAPE

## (undated) DEVICE — TROCARS: Brand: KII® OPTICAL ACCESS SYSTEM

## (undated) DEVICE — GOWN,NON-REINFORCED,SIRUS,SET IN SLV,XXL: Brand: MEDLINE

## (undated) DEVICE — APPL CHLORAPREP TINTED 26ML TEAL

## (undated) DEVICE — VESSEL SEALER EXTEND: Brand: ENDOWRIST

## (undated) DEVICE — BLADELESS OBTURATOR, LONG: Brand: WECK VISTA

## (undated) DEVICE — PATIENT RETURN ELECTRODE, SINGLE-USE, CONTACT QUALITY MONITORING, ADULT, WITH 9FT CORD, FOR PATIENTS WEIGING OVER 33LBS. (15KG): Brand: MEGADYNE

## (undated) DEVICE — SOL IRR H2O BO 1000ML STRL

## (undated) DEVICE — SUT VIC 2/0 CT1 27IN J339H BX/36

## (undated) DEVICE — COVER,LIGHT HANDLE,FLX,1/PK: Brand: MEDLINE INDUSTRIES, INC.

## (undated) DEVICE — ENDOPATH XCEL BLADELESS TROCARS WITH STABILITY SLEEVES: Brand: ENDOPATH XCEL

## (undated) DEVICE — EXTRA LARGE, DISPOSABLE C-SECTION PROTECTOR - RETRACTOR: Brand: ALEXIS ® O C-SECTION PROTECTOR - RETRACTOR

## (undated) DEVICE — [HIGH FLOW INSUFFLATOR,  DO NOT USE IF PACKAGE IS DAMAGED,  KEEP DRY,  KEEP AWAY FROM SUNLIGHT,  PROTECT FROM HEAT AND RADIOACTIVE SOURCES.]: Brand: PNEUMOSURE

## (undated) DEVICE — SHT AIR TRANSFR COMFRT GLIDE LT LAT 40X80IN

## (undated) DEVICE — GLV SURG SENSICARE MICRO PF LF 6.5 STRL

## (undated) DEVICE — INTENDED USE FOR SURGICAL MARKING ON INTACT SKIN, ALSO PROVIDES A PERMANENT METHOD OF IDENTIFYING OBJECTS IN THE OPERATING ROOM: Brand: WRITESITE® REGULAR TIP SKIN MARKER

## (undated) DEVICE — BLANKT WARM UPPR/BDY ARM/OUT 57X196CM

## (undated) DEVICE — GLV SURG SENSICARE PI MIC PF SZ6.5 LF STRL

## (undated) DEVICE — CANNULA SEAL

## (undated) DEVICE — DRN PENRS 1/2X18IN LTX

## (undated) DEVICE — UNDRPD COMFRT GLD DRYPAD 36X57IN

## (undated) DEVICE — 3 RING SUTURE PASSER - 16 CM: Brand: 3 RING SUTURE PASSER - 16 CM

## (undated) DEVICE — TRAP FLD MINIVAC MEGADYNE 100ML

## (undated) DEVICE — CLTH CLENS READYCLEANSE PERI CARE PK/5

## (undated) DEVICE — VIOLET BRAIDED (POLYGLACTIN 910), SYNTHETIC ABSORBABLE SUTURE: Brand: COATED VICRYL

## (undated) DEVICE — SUT SILK 2/0 SH 30IN K833H

## (undated) DEVICE — CONTN GRAD MEAS TRIANG 32OZ BLK

## (undated) DEVICE — MARYLAND JAW LAPAROSCOPIC SEALER/DIVIDER COATED: Brand: LIGASURE

## (undated) DEVICE — SUT SILK 0 SH 30IN K834H

## (undated) DEVICE — SOL IRR NACL 0.9PCT BO 1000ML

## (undated) DEVICE — MAT PREVALON MOBL TRANSFR AIR W/PAD REPROC 39X81IN

## (undated) DEVICE — SKIN AFFIX SURG ADHESIVE 72/CS 0.55ML: Brand: MEDLINE

## (undated) DEVICE — SUT MNCRYL 1/0 CT1 36IN Y947H BX/36

## (undated) DEVICE — GLV SURG DERMASSURE GRN LF PF 7.0

## (undated) DEVICE — COLUMN DRAPE

## (undated) DEVICE — ADHS SKIN PREMIERPRO EXOFIN TOPICAL HI/VISC .5ML

## (undated) DEVICE — PENCL SMOKE/EVAC MEGADYNE TELESCP 10FT

## (undated) DEVICE — CLMP STD 22CM DISP

## (undated) DEVICE — PK C/SECT 10

## (undated) DEVICE — TISSUE RETRIEVAL SYSTEM: Brand: INZII RETRIEVAL SYSTEM

## (undated) DEVICE — PK BARIATRIC 10

## (undated) DEVICE — APPL CHLORAPREP W/TINT 26ML BLU

## (undated) DEVICE — FLTR PLUMEPORT LAP W/CONN STRL

## (undated) DEVICE — Device: Brand: DEFENDO AIR/WATER/SUCTION AND BIOPSY VALVE

## (undated) DEVICE — ENSEAL 20 CM SHAFT, LARGE JAW: Brand: ENSEAL X1

## (undated) DEVICE — TRY SPINE BLCK WHITACRE 25G 3X5IN

## (undated) DEVICE — ENDOPATH 5MM ENDOSCOPIC BLUNT TIP DISSECTORS (12 POUCHES CONTAINING 3 DISSECTORS EACH): Brand: ENDOPATH

## (undated) DEVICE — 4-PORT MANIFOLD: Brand: NEPTUNE 2

## (undated) DEVICE — SUT PDS 1 TP1 48IN Z880G BX/12

## (undated) DEVICE — ENDOPATH XCEL UNIVERSAL TROCAR STABLILITY SLEEVES: Brand: ENDOPATH XCEL

## (undated) DEVICE — SUT VIC 3/0 CT1 27IN DYED J338H

## (undated) DEVICE — GLV SURG PREMIERPRO GAMMEX NEOPRN PF SZ7 GRN

## (undated) DEVICE — APL DUPLOSPRAYER MIS 40CM

## (undated) DEVICE — SUT MNCRYL 3/0 27L Y523H BX/36

## (undated) DEVICE — ST TBG CONN PNEUMOCLEAR EVAC SMOKE HEAT/HUMID

## (undated) DEVICE — POWER SHELL: Brand: SIGNIA